# Patient Record
Sex: MALE | Race: WHITE | Employment: OTHER | URBAN - METROPOLITAN AREA
[De-identification: names, ages, dates, MRNs, and addresses within clinical notes are randomized per-mention and may not be internally consistent; named-entity substitution may affect disease eponyms.]

---

## 2019-04-01 ENCOUNTER — APPOINTMENT (OUTPATIENT)
Dept: CT IMAGING | Age: 54
End: 2019-04-01
Payer: MEDICARE

## 2019-04-01 ENCOUNTER — APPOINTMENT (OUTPATIENT)
Dept: GENERAL RADIOLOGY | Age: 54
End: 2019-04-01
Payer: MEDICARE

## 2019-04-01 ENCOUNTER — HOSPITAL ENCOUNTER (EMERGENCY)
Age: 54
Discharge: HOME | End: 2019-04-01
Attending: EMERGENCY MEDICINE
Payer: MEDICARE

## 2019-04-01 VITALS
TEMPERATURE: 98.2 F | BODY MASS INDEX: 30.27 KG/M2 | HEART RATE: 83 BPM | DIASTOLIC BLOOD PRESSURE: 88 MMHG | WEIGHT: 170.86 LBS | RESPIRATION RATE: 18 BRPM | SYSTOLIC BLOOD PRESSURE: 163 MMHG | OXYGEN SATURATION: 98 %

## 2019-04-01 DIAGNOSIS — S09.90XA CLOSED HEAD INJURY, INITIAL ENCOUNTER: ICD-10-CM

## 2019-04-01 DIAGNOSIS — S16.1XXA CERVICAL STRAIN, ACUTE, INITIAL ENCOUNTER: ICD-10-CM

## 2019-04-01 DIAGNOSIS — S20.211A RIB CONTUSION, RIGHT, INITIAL ENCOUNTER: ICD-10-CM
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
## 2019-04-01 PROCEDURE — 71046 X-RAY EXAM CHEST 2 VIEWS: CPT

## 2019-04-01 PROCEDURE — 72125 CT NECK SPINE W/O DYE: CPT

## 2019-04-01 PROCEDURE — 6370000000 HC RX 637 (ALT 250 FOR IP): Performed by: EMERGENCY MEDICINE

## 2019-04-01 PROCEDURE — 99284 EMERGENCY DEPT VISIT MOD MDM: CPT

## 2019-04-01 PROCEDURE — 70450 CT HEAD/BRAIN W/O DYE: CPT

## 2019-04-01 RX ORDER — ONDANSETRON 4 MG/1
4 TABLET, FILM COATED ORAL EVERY 8 HOURS PRN
Qty: 10 TABLET | Refills: 0 | Status: SHIPPED | OUTPATIENT
Start: 2019-04-01

## 2019-04-01 RX ORDER — TRAMADOL HYDROCHLORIDE 50 MG/1
50 TABLET ORAL EVERY 6 HOURS PRN
Qty: 9 TABLET | Refills: 0 | Status: SHIPPED | OUTPATIENT
Start: 2019-04-01 | End: 2019-04-04

## 2019-04-01 RX ORDER — IBUPROFEN 800 MG/1
800 TABLET ORAL EVERY 8 HOURS PRN
Qty: 30 TABLET | Refills: 0 | Status: SHIPPED | OUTPATIENT
Start: 2019-04-01

## 2019-04-01 RX ORDER — TRAMADOL HYDROCHLORIDE 50 MG/1
50 TABLET ORAL ONCE
Status: COMPLETED | OUTPATIENT
Start: 2019-04-01 | End: 2019-04-01

## 2019-04-01 RX ADMIN — TRAMADOL HYDROCHLORIDE 50 MG: 50 TABLET, FILM COATED ORAL at 01:37

## 2019-04-01 ASSESSMENT — PAIN DESCRIPTION - DESCRIPTORS: DESCRIPTORS: HEADACHE;THROBBING;SHARP

## 2019-04-01 ASSESSMENT — PAIN DESCRIPTION - PAIN TYPE: TYPE: ACUTE PAIN

## 2019-04-01 ASSESSMENT — PAIN DESCRIPTION - LOCATION: LOCATION: RIB CAGE;NECK

## 2019-04-01 ASSESSMENT — PAIN SCALES - GENERAL
PAINLEVEL_OUTOF10: 10
PAINLEVEL_OUTOF10: 5
PAINLEVEL_OUTOF10: 10

## 2019-04-01 ASSESSMENT — PAIN DESCRIPTION - FREQUENCY: FREQUENCY: CONTINUOUS

## 2019-04-01 NOTE — LETTER
[address] [address]  Phone: [phone]               April 1, 2019    Patient: Marie Badillo Sr   YOB: 1965   Date of Visit: 4/1/2019       To Whom It May Concern:    Cheyanen Schmid was seen and treated in our emergency department on 4/1/2019. He may return to work on 04/01/2019.       Sincerely,       Abiola Larsen RN         Signature:__________________________________
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
## 2022-11-29 ENCOUNTER — OFFICE VISIT (OUTPATIENT)
Dept: INTERNAL MEDICINE CLINIC | Age: 57
End: 2022-11-29
Payer: MEDICAID

## 2022-11-29 VITALS
WEIGHT: 173 LBS | HEART RATE: 83 BPM | BODY MASS INDEX: 30.65 KG/M2 | TEMPERATURE: 97.9 F | SYSTOLIC BLOOD PRESSURE: 134 MMHG | OXYGEN SATURATION: 97 % | DIASTOLIC BLOOD PRESSURE: 86 MMHG | HEIGHT: 63 IN

## 2022-11-29 DIAGNOSIS — K21.9 GASTROESOPHAGEAL REFLUX DISEASE, UNSPECIFIED WHETHER ESOPHAGITIS PRESENT: ICD-10-CM

## 2022-11-29 DIAGNOSIS — I50.22 CHRONIC SYSTOLIC CONGESTIVE HEART FAILURE (HCC): ICD-10-CM

## 2022-11-29 DIAGNOSIS — Z11.4 SCREENING FOR HIV WITHOUT PRESENCE OF RISK FACTORS: ICD-10-CM

## 2022-11-29 DIAGNOSIS — I25.118 CORONARY ARTERY DISEASE OF NATIVE ARTERY OF NATIVE HEART WITH STABLE ANGINA PECTORIS (HCC): ICD-10-CM

## 2022-11-29 DIAGNOSIS — Z12.5 SCREENING PSA (PROSTATE SPECIFIC ANTIGEN): ICD-10-CM

## 2022-11-29 DIAGNOSIS — E11.65 TYPE 2 DIABETES MELLITUS WITH HYPERGLYCEMIA, WITH LONG-TERM CURRENT USE OF INSULIN (HCC): Primary | ICD-10-CM

## 2022-11-29 DIAGNOSIS — Z79.4 TYPE 2 DIABETES MELLITUS WITH HYPERGLYCEMIA, WITH LONG-TERM CURRENT USE OF INSULIN (HCC): Primary | ICD-10-CM

## 2022-11-29 DIAGNOSIS — E11.41 DIABETIC MONONEUROPATHY ASSOCIATED WITH TYPE 2 DIABETES MELLITUS (HCC): ICD-10-CM

## 2022-11-29 DIAGNOSIS — I10 ESSENTIAL HYPERTENSION: ICD-10-CM

## 2022-11-29 DIAGNOSIS — Z11.59 NEED FOR HEPATITIS C SCREENING TEST: ICD-10-CM

## 2022-11-29 DIAGNOSIS — E78.2 MIXED HYPERLIPIDEMIA: ICD-10-CM

## 2022-11-29 LAB
A/G RATIO: 0.7 (ref 1.1–2.2)
ALBUMIN SERPL-MCNC: 2.8 G/DL (ref 3.4–5)
ALP BLD-CCNC: 123 U/L (ref 40–129)
ALT SERPL-CCNC: 7 U/L (ref 10–40)
ANION GAP SERPL CALCULATED.3IONS-SCNC: 12 MMOL/L (ref 3–16)
AST SERPL-CCNC: 9 U/L (ref 15–37)
BILIRUB SERPL-MCNC: 0.6 MG/DL (ref 0–1)
BUN BLDV-MCNC: 41 MG/DL (ref 7–20)
CALCIUM SERPL-MCNC: 9 MG/DL (ref 8.3–10.6)
CHLORIDE BLD-SCNC: 105 MMOL/L (ref 99–110)
CHOLESTEROL, TOTAL: 101 MG/DL (ref 0–199)
CO2: 24 MMOL/L (ref 21–32)
CREAT SERPL-MCNC: 1.6 MG/DL (ref 0.9–1.3)
GFR SERPL CREATININE-BSD FRML MDRD: 49 ML/MIN/{1.73_M2}
GLUCOSE BLD-MCNC: 128 MG/DL (ref 70–99)
HDLC SERPL-MCNC: 30 MG/DL (ref 40–60)
HEPATITIS C ANTIBODY INTERPRETATION: NORMAL
LDL CHOLESTEROL CALCULATED: 52 MG/DL
POTASSIUM SERPL-SCNC: 4.6 MMOL/L (ref 3.5–5.1)
PROSTATE SPECIFIC ANTIGEN: 1.75 NG/ML (ref 0–4)
SODIUM BLD-SCNC: 141 MMOL/L (ref 136–145)
TOTAL PROTEIN: 6.9 G/DL (ref 6.4–8.2)
TRIGL SERPL-MCNC: 94 MG/DL (ref 0–150)
TSH REFLEX: 2.13 UIU/ML (ref 0.27–4.2)
VLDLC SERPL CALC-MCNC: 19 MG/DL

## 2022-11-29 PROCEDURE — G8484 FLU IMMUNIZE NO ADMIN: HCPCS | Performed by: INTERNAL MEDICINE

## 2022-11-29 PROCEDURE — 3017F COLORECTAL CA SCREEN DOC REV: CPT | Performed by: INTERNAL MEDICINE

## 2022-11-29 PROCEDURE — 3046F HEMOGLOBIN A1C LEVEL >9.0%: CPT | Performed by: INTERNAL MEDICINE

## 2022-11-29 PROCEDURE — 2022F DILAT RTA XM EVC RTNOPTHY: CPT | Performed by: INTERNAL MEDICINE

## 2022-11-29 PROCEDURE — 1036F TOBACCO NON-USER: CPT | Performed by: INTERNAL MEDICINE

## 2022-11-29 PROCEDURE — 36415 COLL VENOUS BLD VENIPUNCTURE: CPT | Performed by: INTERNAL MEDICINE

## 2022-11-29 PROCEDURE — G8417 CALC BMI ABV UP PARAM F/U: HCPCS | Performed by: INTERNAL MEDICINE

## 2022-11-29 PROCEDURE — 3078F DIAST BP <80 MM HG: CPT | Performed by: INTERNAL MEDICINE

## 2022-11-29 PROCEDURE — 99204 OFFICE O/P NEW MOD 45 MIN: CPT | Performed by: INTERNAL MEDICINE

## 2022-11-29 PROCEDURE — G8427 DOCREV CUR MEDS BY ELIG CLIN: HCPCS | Performed by: INTERNAL MEDICINE

## 2022-11-29 PROCEDURE — 3074F SYST BP LT 130 MM HG: CPT | Performed by: INTERNAL MEDICINE

## 2022-11-29 RX ORDER — CLOPIDOGREL BISULFATE 75 MG/1
75 TABLET ORAL DAILY
COMMUNITY

## 2022-11-29 RX ORDER — HYDRALAZINE HYDROCHLORIDE 10 MG/1
10 TABLET, FILM COATED ORAL 3 TIMES DAILY
COMMUNITY

## 2022-11-29 RX ORDER — INSULIN ASPART 100 [IU]/ML
7 INJECTION, SOLUTION INTRAVENOUS; SUBCUTANEOUS
Qty: 5 ADJUSTABLE DOSE PRE-FILLED PEN SYRINGE | Refills: 3 | Status: SHIPPED | OUTPATIENT
Start: 2022-11-29

## 2022-11-29 RX ORDER — DULAGLUTIDE 0.75 MG/.5ML
0.75 INJECTION, SOLUTION SUBCUTANEOUS WEEKLY
Qty: 4 ADJUSTABLE DOSE PRE-FILLED PEN SYRINGE | Refills: 1 | Status: SHIPPED | OUTPATIENT
Start: 2022-11-29

## 2022-11-29 RX ORDER — ASPIRIN 81 MG/1
81 TABLET ORAL DAILY
COMMUNITY

## 2022-11-29 RX ORDER — INSULIN GLARGINE 100 [IU]/ML
10 INJECTION, SOLUTION SUBCUTANEOUS NIGHTLY
Qty: 5 ADJUSTABLE DOSE PRE-FILLED PEN SYRINGE | Refills: 3 | Status: SHIPPED | OUTPATIENT
Start: 2022-11-29

## 2022-11-29 RX ORDER — TORSEMIDE 20 MG/1
20 TABLET ORAL DAILY
COMMUNITY

## 2022-11-29 RX ORDER — CARVEDILOL 3.12 MG/1
3.12 TABLET ORAL 2 TIMES DAILY WITH MEALS
COMMUNITY

## 2022-11-29 RX ORDER — ATORVASTATIN CALCIUM 40 MG/1
40 TABLET, FILM COATED ORAL DAILY
COMMUNITY

## 2022-11-29 RX ORDER — OMEPRAZOLE 20 MG/1
40 CAPSULE, DELAYED RELEASE ORAL DAILY
COMMUNITY

## 2022-11-29 RX ORDER — GABAPENTIN 300 MG/1
300 CAPSULE ORAL 3 TIMES DAILY
COMMUNITY

## 2022-11-29 RX ORDER — LISINOPRIL 10 MG/1
10 TABLET ORAL DAILY
COMMUNITY

## 2022-11-29 SDOH — ECONOMIC STABILITY: FOOD INSECURITY: WITHIN THE PAST 12 MONTHS, YOU WORRIED THAT YOUR FOOD WOULD RUN OUT BEFORE YOU GOT MONEY TO BUY MORE.: NEVER TRUE

## 2022-11-29 SDOH — ECONOMIC STABILITY: FOOD INSECURITY: WITHIN THE PAST 12 MONTHS, THE FOOD YOU BOUGHT JUST DIDN'T LAST AND YOU DIDN'T HAVE MONEY TO GET MORE.: NEVER TRUE

## 2022-11-29 ASSESSMENT — PATIENT HEALTH QUESTIONNAIRE - PHQ9
SUM OF ALL RESPONSES TO PHQ QUESTIONS 1-9: 0
SUM OF ALL RESPONSES TO PHQ QUESTIONS 1-9: 0
2. FEELING DOWN, DEPRESSED OR HOPELESS: 0
SUM OF ALL RESPONSES TO PHQ QUESTIONS 1-9: 0
SUM OF ALL RESPONSES TO PHQ9 QUESTIONS 1 & 2: 0
SUM OF ALL RESPONSES TO PHQ QUESTIONS 1-9: 0
1. LITTLE INTEREST OR PLEASURE IN DOING THINGS: 0

## 2022-11-29 ASSESSMENT — ENCOUNTER SYMPTOMS
BLOOD IN STOOL: 0
VOMITING: 0
SHORTNESS OF BREATH: 0
COUGH: 0
SORE THROAT: 0
ABDOMINAL PAIN: 0
NAUSEA: 0
CHEST TIGHTNESS: 0

## 2022-11-29 ASSESSMENT — LIFESTYLE VARIABLES
HOW MANY STANDARD DRINKS CONTAINING ALCOHOL DO YOU HAVE ON A TYPICAL DAY: PATIENT DOES NOT DRINK
HOW OFTEN DO YOU HAVE A DRINK CONTAINING ALCOHOL: NEVER

## 2022-11-29 ASSESSMENT — SOCIAL DETERMINANTS OF HEALTH (SDOH): HOW HARD IS IT FOR YOU TO PAY FOR THE VERY BASICS LIKE FOOD, HOUSING, MEDICAL CARE, AND HEATING?: NOT HARD AT ALL

## 2022-11-29 NOTE — PROGRESS NOTES
Wolf Fernando Sr (:  1965) is a 59 y.o. male, New patient, here for evaluation of the following chief complaint(s):  New Patient, Establish Care, and Diabetes         ASSESSMENT/PLAN:  1. Type 2 diabetes mellitus with hyperglycemia, with long-term current use of insulin (HCC)  Chronic, uncontrolled. Continue current dose of insulin glargine and aspart, starting on Trulicity for diabetes treatment   -     AFL - Desmond Rivera MD, Ophthalmology, [facility]  -     Dulaglutide (TRULICITY) 1.76 FC/0.8UW SOPN; Inject 0.75 mg into the skin once a week, Disp-4 Adjustable Dose Pre-filled Pen Syringe, R-1Normal  -     insulin glargine (LANTUS SOLOSTAR) 100 UNIT/ML injection pen; Inject 10 Units into the skin nightly, Disp-5 Adjustable Dose Pre-filled Pen Syringe, R-3Normal  -     insulin aspart (NOVOLOG FLEXPEN) 100 UNIT/ML injection pen; Inject 7 Units into the skin 3 times daily (before meals), Disp-5 Adjustable Dose Pre-filled Pen Syringe, R-3Normal  -     Hemoglobin A1C  2. Coronary artery disease of native artery of native heart with stable angina pectoris (HCC)  - Stable   - Continue current dose of aspirin, atorvastatin, carvedilol, clopidogrel, lisinopril. Continue nitroglycerin as needed for chest pain. Patient follows with cardiology. 3. Chronic systolic congestive heart failure (HCC)  - Stable   - Continue current dose of torsemide, carvedilol, lisinopril.  -Follows with cardiology    4. Gastroesophageal reflux disease, unspecified whether esophagitis present  - Stable   - Continue current dose of Omeprazole    5. Diabetic mononeuropathy associated with type 2 diabetes mellitus (HCC)  - Stable   - Continue current dose of gabapentin    6. Screening PSA (prostate specific antigen)  -     PSA Screening    7. Mixed hyperlipidemia  -     Lipid Panel  -     TSH with Reflex    8.  Essential hypertension  - Stable   - Continue current dose of Carvedilol and lisinopril  -     Comprehensive Metabolic Panel    9. Screening for HIV without presence of risk factors  -     HIV Screen    10. Need for hepatitis C screening test  -     Hepatitis C Antibody      Return in about 4 weeks (around 12/27/2022). Subjective   SUBJECTIVE/OBJECTIVE:  Diabetes  He presents for his initial diabetic visit. He has type 2 diabetes mellitus. Pertinent negatives for hypoglycemia include no dizziness. Pertinent negatives for diabetes include no chest pain, no fatigue and no weakness. Diabetic complications include heart disease and peripheral neuropathy. Risk factors for coronary artery disease include hypertension, male sex and diabetes mellitus. Current diabetic treatment includes insulin injections. He is compliant with treatment all of the time. He is following a diabetic diet. His home blood glucose trend is fluctuating minimally. An ACE inhibitor/angiotensin II receptor blocker is being taken. Coronary Artery Disease  Presents for initial visit. The disease course has been stable. Symptoms include leg swelling. Pertinent negatives include no chest pain, chest pressure, chest tightness, dizziness, palpitations or shortness of breath. Risk factors include diabetes. Past treatments include nitrates, antiplatelet agents, aspirin and ACE inhibitors. The treatment provided significant relief. Compliance with prior treatments has been good. His past medical history is significant for CHF. Past surgical history includes cardiac stents. Review of Systems   Constitutional:  Negative for fatigue and fever. HENT:  Negative for nosebleeds and sore throat. Respiratory:  Negative for cough, chest tightness and shortness of breath. Cardiovascular:  Positive for leg swelling. Negative for chest pain and palpitations. Gastrointestinal:  Negative for abdominal pain, blood in stool, nausea and vomiting. Neurological:  Negative for dizziness and weakness.         Objective   Physical Exam  Constitutional: Appearance: Normal appearance. Comments: Patient is on wheelchair   HENT:      Head: Normocephalic and atraumatic. Eyes:      General: No scleral icterus. Conjunctiva/sclera: Conjunctivae normal.   Cardiovascular:      Rate and Rhythm: Normal rate and regular rhythm. Pulses: Normal pulses. Heart sounds: Normal heart sounds. Pulmonary:      Effort: Pulmonary effort is normal.      Breath sounds: Normal breath sounds. Musculoskeletal:         General: No swelling. Right lower leg: Edema present. Left lower leg: Edema present. Comments: S/p partial amputation of left foot   Skin:     General: Skin is warm and dry. Neurological:      Mental Status: He is alert and oriented to person, place, and time. Mental status is at baseline. Psychiatric:         Mood and Affect: Mood normal.         Behavior: Behavior normal.            An electronic signature was used to authenticate this note.     --Jett Graves MD

## 2022-11-30 LAB
ESTIMATED AVERAGE GLUCOSE: 168.6 MG/DL
HBA1C MFR BLD: 7.5 %
HIV AG/AB: NORMAL
HIV ANTIGEN: NORMAL
HIV-1 ANTIBODY: NORMAL
HIV-2 AB: NORMAL

## 2022-12-01 DIAGNOSIS — N28.9 IMPAIRED RENAL FUNCTION: Primary | ICD-10-CM

## 2022-12-19 DIAGNOSIS — E11.65 TYPE 2 DIABETES MELLITUS WITH HYPERGLYCEMIA, WITH LONG-TERM CURRENT USE OF INSULIN (HCC): ICD-10-CM

## 2022-12-19 DIAGNOSIS — Z79.4 TYPE 2 DIABETES MELLITUS WITH HYPERGLYCEMIA, WITH LONG-TERM CURRENT USE OF INSULIN (HCC): ICD-10-CM

## 2022-12-19 RX ORDER — INSULIN GLARGINE 100 [IU]/ML
10 INJECTION, SOLUTION SUBCUTANEOUS NIGHTLY
Qty: 5 ADJUSTABLE DOSE PRE-FILLED PEN SYRINGE | Refills: 3 | Status: SHIPPED | OUTPATIENT
Start: 2022-12-19

## 2023-01-24 DIAGNOSIS — Z79.4 TYPE 2 DIABETES MELLITUS WITH HYPERGLYCEMIA, WITH LONG-TERM CURRENT USE OF INSULIN (HCC): ICD-10-CM

## 2023-01-24 DIAGNOSIS — E11.65 TYPE 2 DIABETES MELLITUS WITH HYPERGLYCEMIA, WITH LONG-TERM CURRENT USE OF INSULIN (HCC): ICD-10-CM

## 2023-01-25 RX ORDER — DULAGLUTIDE 0.75 MG/.5ML
0.75 INJECTION, SOLUTION SUBCUTANEOUS WEEKLY
Qty: 4 ADJUSTABLE DOSE PRE-FILLED PEN SYRINGE | Refills: 1 | Status: SHIPPED | OUTPATIENT
Start: 2023-01-25

## 2023-01-30 ENCOUNTER — OFFICE VISIT (OUTPATIENT)
Dept: INTERNAL MEDICINE CLINIC | Age: 58
End: 2023-01-30
Payer: MEDICAID

## 2023-01-30 VITALS
WEIGHT: 183.6 LBS | BODY MASS INDEX: 32.52 KG/M2 | TEMPERATURE: 97.6 F | DIASTOLIC BLOOD PRESSURE: 72 MMHG | HEART RATE: 91 BPM | SYSTOLIC BLOOD PRESSURE: 153 MMHG

## 2023-01-30 DIAGNOSIS — E11.65 TYPE 2 DIABETES MELLITUS WITH HYPERGLYCEMIA, WITH LONG-TERM CURRENT USE OF INSULIN (HCC): ICD-10-CM

## 2023-01-30 DIAGNOSIS — E78.2 MIXED HYPERLIPIDEMIA: ICD-10-CM

## 2023-01-30 DIAGNOSIS — I25.118 CORONARY ARTERY DISEASE OF NATIVE ARTERY OF NATIVE HEART WITH STABLE ANGINA PECTORIS (HCC): ICD-10-CM

## 2023-01-30 DIAGNOSIS — I10 ESSENTIAL HYPERTENSION: ICD-10-CM

## 2023-01-30 DIAGNOSIS — E11.41 DIABETIC MONONEUROPATHY ASSOCIATED WITH TYPE 2 DIABETES MELLITUS (HCC): ICD-10-CM

## 2023-01-30 DIAGNOSIS — Z79.4 TYPE 2 DIABETES MELLITUS WITH HYPERGLYCEMIA, WITH LONG-TERM CURRENT USE OF INSULIN (HCC): ICD-10-CM

## 2023-01-30 DIAGNOSIS — K21.9 GASTROESOPHAGEAL REFLUX DISEASE, UNSPECIFIED WHETHER ESOPHAGITIS PRESENT: ICD-10-CM

## 2023-01-30 DIAGNOSIS — R06.02 SHORTNESS OF BREATH: ICD-10-CM

## 2023-01-30 DIAGNOSIS — I50.22 CHRONIC SYSTOLIC CONGESTIVE HEART FAILURE (HCC): ICD-10-CM

## 2023-01-30 DIAGNOSIS — R05.1 ACUTE COUGH: Primary | ICD-10-CM

## 2023-01-30 LAB
A/G RATIO: 0.7 (ref 1.1–2.2)
ALBUMIN SERPL-MCNC: 2.7 G/DL (ref 3.4–5)
ALP BLD-CCNC: 151 U/L (ref 40–129)
ALT SERPL-CCNC: 10 U/L (ref 10–40)
ANION GAP SERPL CALCULATED.3IONS-SCNC: 9 MMOL/L (ref 3–16)
AST SERPL-CCNC: 14 U/L (ref 15–37)
BASOPHILS ABSOLUTE: 0.1 K/UL (ref 0–0.2)
BASOPHILS RELATIVE PERCENT: 1.2 %
BILIRUB SERPL-MCNC: 0.3 MG/DL (ref 0–1)
BUN BLDV-MCNC: 38 MG/DL (ref 7–20)
CALCIUM SERPL-MCNC: 8.4 MG/DL (ref 8.3–10.6)
CHLORIDE BLD-SCNC: 105 MMOL/L (ref 99–110)
CO2: 24 MMOL/L (ref 21–32)
CREAT SERPL-MCNC: 1.7 MG/DL (ref 0.9–1.3)
EOSINOPHILS ABSOLUTE: 0.3 K/UL (ref 0–0.6)
EOSINOPHILS RELATIVE PERCENT: 4.9 %
GFR SERPL CREATININE-BSD FRML MDRD: 46 ML/MIN/{1.73_M2}
GLUCOSE BLD-MCNC: 90 MG/DL (ref 70–99)
HCT VFR BLD CALC: 30.2 % (ref 40.5–52.5)
HEMOGLOBIN: 9.8 G/DL (ref 13.5–17.5)
INFLUENZA A ANTIBODY: NORMAL
INFLUENZA B ANTIBODY: NORMAL
LYMPHOCYTES ABSOLUTE: 1.4 K/UL (ref 1–5.1)
LYMPHOCYTES RELATIVE PERCENT: 20.4 %
MCH RBC QN AUTO: 29.7 PG (ref 26–34)
MCHC RBC AUTO-ENTMCNC: 32.5 G/DL (ref 31–36)
MCV RBC AUTO: 91.3 FL (ref 80–100)
MONOCYTES ABSOLUTE: 0.4 K/UL (ref 0–1.3)
MONOCYTES RELATIVE PERCENT: 5.6 %
NEUTROPHILS ABSOLUTE: 4.8 K/UL (ref 1.7–7.7)
NEUTROPHILS RELATIVE PERCENT: 67.9 %
PDW BLD-RTO: 13.8 % (ref 12.4–15.4)
PLATELET # BLD: 282 K/UL (ref 135–450)
PMV BLD AUTO: 8.8 FL (ref 5–10.5)
POTASSIUM SERPL-SCNC: 4.5 MMOL/L (ref 3.5–5.1)
PRO-BNP: ABNORMAL PG/ML (ref 0–124)
RBC # BLD: 3.31 M/UL (ref 4.2–5.9)
SODIUM BLD-SCNC: 138 MMOL/L (ref 136–145)
TOTAL PROTEIN: 6.6 G/DL (ref 6.4–8.2)
WBC # BLD: 7 K/UL (ref 4–11)

## 2023-01-30 PROCEDURE — 99215 OFFICE O/P EST HI 40 MIN: CPT | Performed by: INTERNAL MEDICINE

## 2023-01-30 PROCEDURE — 2022F DILAT RTA XM EVC RTNOPTHY: CPT | Performed by: INTERNAL MEDICINE

## 2023-01-30 PROCEDURE — G8417 CALC BMI ABV UP PARAM F/U: HCPCS | Performed by: INTERNAL MEDICINE

## 2023-01-30 PROCEDURE — 3017F COLORECTAL CA SCREEN DOC REV: CPT | Performed by: INTERNAL MEDICINE

## 2023-01-30 PROCEDURE — 1036F TOBACCO NON-USER: CPT | Performed by: INTERNAL MEDICINE

## 2023-01-30 PROCEDURE — G8427 DOCREV CUR MEDS BY ELIG CLIN: HCPCS | Performed by: INTERNAL MEDICINE

## 2023-01-30 PROCEDURE — 3077F SYST BP >= 140 MM HG: CPT | Performed by: INTERNAL MEDICINE

## 2023-01-30 PROCEDURE — 3046F HEMOGLOBIN A1C LEVEL >9.0%: CPT | Performed by: INTERNAL MEDICINE

## 2023-01-30 PROCEDURE — 87804 INFLUENZA ASSAY W/OPTIC: CPT | Performed by: INTERNAL MEDICINE

## 2023-01-30 PROCEDURE — G8484 FLU IMMUNIZE NO ADMIN: HCPCS | Performed by: INTERNAL MEDICINE

## 2023-01-30 PROCEDURE — 36415 COLL VENOUS BLD VENIPUNCTURE: CPT | Performed by: INTERNAL MEDICINE

## 2023-01-30 PROCEDURE — 3078F DIAST BP <80 MM HG: CPT | Performed by: INTERNAL MEDICINE

## 2023-01-30 RX ORDER — TORSEMIDE 20 MG/1
20 TABLET ORAL 2 TIMES DAILY
Qty: 60 TABLET | Refills: 0 | Status: SHIPPED | OUTPATIENT
Start: 2023-01-30 | End: 2023-03-01

## 2023-01-30 ASSESSMENT — ENCOUNTER SYMPTOMS
NAUSEA: 0
CHEST TIGHTNESS: 0
VOMITING: 0
SHORTNESS OF BREATH: 0
COUGH: 0
SORE THROAT: 0
COUGH: 1
ABDOMINAL PAIN: 0
SHORTNESS OF BREATH: 1
BLOOD IN STOOL: 0

## 2023-01-30 NOTE — PROGRESS NOTES
Emelina Wright Sr (:  1965) is a 59 y.o. male, New patient, here for evaluation of the following chief complaint(s):  Diabetes (Follow up, pt also states his been sob x 3-4  days ) and Cough         ASSESSMENT/PLAN:  1. Acute cough  Probably due to heart failure exacerbation. Patient has gained 10 pounds in the last 2 months. Complains of shortness of breath. Ordered COVID and influenza test as mentioned below, will treat if positive. Increasing the dose of torsemide to 20 mg twice daily. -     COVID-19  -     POCT Influenza A/B  2. Chronic systolic congestive heart failure ([address]  Patient is probably having heart failure exacerbation  Complains of cough and shortness of breath. Crackles present on lung exam bilaterally. Increasing dose of torsemide to 20 mg twice daily. Advised patient and his daughter to inform patient's cardiologist about the current condition of the patient and go to emergency if there is any worsening shortness of breath or if there is chest pain. -     torsemide (DEMADEX) 20 MG tablet; Take 1 tablet by mouth in the morning and at bedtime, Disp-60 tablet, R-0Normal  3. Shortness of breath  Probably due to heart failure exacerbation as mentioned above. Ordered CBC, CMP and BNP for further work-up. Treating heart failure as mentioned above by increasing the dose of torsemide to 20 mg daily.  -     CBC with Auto Differential  -     Comprehensive Metabolic Panel  -     Brain Natriuretic Peptide  4. Type 2 diabetes mellitus with hyperglycemia, with long-term current use of insulin (HCC)  - Stable   - Continue current dose of Trulicity, insulin glargine and aspart. 5. Coronary artery disease of native artery of native heart with stable angina pectoris [facility])  Patient has worsening shortness of breath in the past few days. Treating heart failure exacerbation as mentioned above.   Advised patient to call his cardiologist and inform about shortness of breath or go to emergency if there is worsening shortness of breath or chest pain. .  Continue current dose of aspirin, atorvastatin, carvedilol, clopidogrel, lisinopril. Continue  nitroglycerin as needed for chest pain. 6. Gastroesophageal reflux disease, unspecified whether esophagitis present  - Stable   - Continue current dose of Omeprazole  7. Diabetic mononeuropathy associated with type 2 diabetes mellitus (HCC)  - Stable   - Continue current dose of gabapentin  8. Essential hypertension  Blood pressure is elevated today. Increasing the dose of torsemide as mentioned above. Continue current dose of carvedilol and lisinopril. 9. Mixed hyperlipidemia  - Stable   - Continue current dose of atorvastatin    Return in about 4 weeks (around 2/27/2023). Subjective   SUBJECTIVE/OBJECTIVE:  Diabetes  He presents for his initial diabetic visit. He has type 2 diabetes mellitus. Pertinent negatives for hypoglycemia include no dizziness. Pertinent negatives for diabetes include no chest pain, no fatigue and no weakness. Diabetic complications include heart disease and peripheral neuropathy. Risk factors for coronary artery disease include hypertension, male sex and diabetes mellitus. Current diabetic treatment includes insulin injections. He is compliant with treatment all of the time. He is following a diabetic diet. His home blood glucose trend is fluctuating minimally. An ACE inhibitor/angiotensin II receptor blocker is being taken. Coronary Artery Disease  Presents for initial visit. The disease course has been stable. Symptoms include leg swelling and shortness of breath. Pertinent negatives include no chest pain, chest pressure, chest tightness, dizziness or palpitations. Risk factors include diabetes. Past treatments include nitrates, antiplatelet agents, aspirin and ACE inhibitors. The treatment provided significant relief. Compliance with prior treatments has been good. His past medical history is significant for CHF.  Past surgical history includes cardiac stents.   Congestive Heart Failure  Presents for follow-up visit. Associated symptoms include edema and shortness of breath. Pertinent negatives include no abdominal pain, chest pain, chest pressure, fatigue or palpitations. The symptoms have been worsening. Compliance with total regimen is %. Compliance with medications is %.     Review of Systems   Constitutional:  Negative for fatigue and fever.   HENT:  Negative for nosebleeds and sore throat.    Respiratory:  Positive for cough and shortness of breath. Negative for chest tightness.    Cardiovascular:  Positive for leg swelling. Negative for chest pain and palpitations.   Gastrointestinal:  Negative for abdominal pain, blood in stool, nausea and vomiting.   Neurological:  Negative for dizziness and weakness.        Objective   Physical Exam  Constitutional:       Appearance: Normal appearance.      Comments: Patient is on wheelchair   HENT:      Head: Normocephalic and atraumatic.   Eyes:      General: No scleral icterus.     Conjunctiva/sclera: Conjunctivae normal.   Cardiovascular:      Rate and Rhythm: Normal rate and regular rhythm.      Pulses: Normal pulses.      Heart sounds: Normal heart sounds.   Pulmonary:      Effort: Pulmonary effort is normal. No respiratory distress.      Comments: Bilateral crackles present  Musculoskeletal:         General: No swelling.      Right lower leg: Edema present.      Left lower leg: Edema present.      Comments: S/p partial amputation of left foot   Skin:     General: Skin is warm and dry.   Neurological:      Mental Status: He is alert and oriented to person, place, and time. Mental status is at baseline.   Psychiatric:         Mood and Affect: Mood normal.         Behavior: Behavior normal.        During this visit I have spent 40 minutes reviewing previous notes, test results, medications and to perform face to face encounter with the patient obtaining history,  performing physical exam and discussing the diagnosis, lab results, medications and importance of compliance with the treatment plan as well as to complete documentation in electronic health records. An electronic signature was used to authenticate this note.     --Sophie Carbajal MD

## 2023-01-30 NOTE — PROGRESS NOTES
Becky Chávez Sr (:  1965) is a 59 y.o. male, New patient, here for evaluation of the following chief complaint(s):  Diabetes (Follow up, pt also states his been sob x 3-4  days )         ASSESSMENT/PLAN:  1. Type 2 diabetes mellitus with hyperglycemia, with long-term current use of insulin (HCC)  Chronic, uncontrolled. Continue current dose of insulin glargine and aspart, starting on Trulicity for diabetes treatment   -     [facility] - Desmond Rivera MD, Ophthalmology, [facility] [place]  -     Dulaglutide (TRULICITY) 1.06 AQ/6.9DM SOPN; Inject 0.75 mg into the skin once a week, Disp-4 Adjustable Dose Pre-filled Pen Syringe, R-1Normal  -     insulin glargine (LANTUS SOLOSTAR) 100 UNIT/ML injection pen; Inject 10 Units into the skin nightly, Disp-5 Adjustable Dose Pre-filled Pen Syringe, R-3Normal  -     insulin aspart (NOVOLOG FLEXPEN) 100 UNIT/ML injection pen; Inject 7 Units into the skin 3 times daily (before meals), Disp-5 Adjustable Dose Pre-filled Pen Syringe, R-3Normal  -     Hemoglobin A1C  2. Coronary artery disease of native artery of native heart with stable angina pectoris (HCC)  - Stable   - Continue current dose of aspirin, atorvastatin, carvedilol, clopidogrel, lisinopril. Continue nitroglycerin as needed for chest pain. Patient follows with cardiology. 3. Chronic systolic congestive heart failure (HCC)  - Stable   - Continue current dose of torsemide, carvedilol, lisinopril.  -Follows with cardiology    4. Gastroesophageal reflux disease, unspecified whether esophagitis present  - Stable   - Continue current dose of Omeprazole    5. Diabetic mononeuropathy associated with type 2 diabetes mellitus (HCC)  - Stable   - Continue current dose of gabapentin    6. Screening PSA (prostate specific antigen)  -     PSA Screening    7. Mixed hyperlipidemia  -     Lipid Panel  -     TSH with Reflex    8.  Essential hypertension  - Stable   - Continue current dose of Carvedilol and lisinopril  - Comprehensive Metabolic Panel    9. Screening for HIV without presence of risk factors  -     HIV Screen    10. Need for hepatitis C screening test  -     Hepatitis C Antibody      Return in about 4 weeks (around 2/27/2023). Subjective   SUBJECTIVE/OBJECTIVE:  Diabetes  He presents for his initial diabetic visit. He has type 2 diabetes mellitus. Pertinent negatives for hypoglycemia include no dizziness. Pertinent negatives for diabetes include no chest pain, no fatigue and no weakness. Diabetic complications include heart disease and peripheral neuropathy. Risk factors for coronary artery disease include hypertension, male sex and diabetes mellitus. Current diabetic treatment includes insulin injections. He is compliant with treatment all of the time. He is following a diabetic diet. His home blood glucose trend is fluctuating minimally. An ACE inhibitor/angiotensin II receptor blocker is being taken. Coronary Artery Disease  Presents for initial visit. The disease course has been stable. Symptoms include leg swelling. Pertinent negatives include no chest pain, chest pressure, chest tightness, dizziness, palpitations or shortness of breath. Risk factors include diabetes. Past treatments include nitrates, antiplatelet agents, aspirin and ACE inhibitors. The treatment provided significant relief. Compliance with prior treatments has been good. His past medical history is significant for CHF. Past surgical history includes cardiac stents. Review of Systems   Constitutional:  Negative for fatigue and fever. HENT:  Negative for nosebleeds and sore throat. Respiratory:  Negative for cough, chest tightness and shortness of breath. Cardiovascular:  Positive for leg swelling. Negative for chest pain and palpitations. Gastrointestinal:  Negative for abdominal pain, blood in stool, nausea and vomiting. Neurological:  Negative for dizziness and weakness.         Objective   Physical Exam  Constitutional:       Appearance: Normal appearance. Comments: Patient is on wheelchair   HENT:      Head: Normocephalic and atraumatic. Eyes:      General: No scleral icterus. Conjunctiva/sclera: Conjunctivae normal.   Cardiovascular:      Rate and Rhythm: Normal rate and regular rhythm. Pulses: Normal pulses. Heart sounds: Normal heart sounds. Pulmonary:      Effort: Pulmonary effort is normal.      Breath sounds: Normal breath sounds. Musculoskeletal:         General: No swelling. Right lower leg: Edema present. Left lower leg: Edema present. Comments: S/p partial amputation of left foot   Skin:     General: Skin is warm and dry. Neurological:      Mental Status: He is alert and oriented to person, place, and time. Mental status is at baseline. Psychiatric:         Mood and Affect: Mood normal.         Behavior: Behavior normal.            An electronic signature was used to authenticate this note.     --Néstor Regan MD

## 2023-01-31 DIAGNOSIS — D64.9 ANEMIA, UNSPECIFIED TYPE: Primary | ICD-10-CM

## 2023-01-31 DIAGNOSIS — R74.8 ELEVATED ALKALINE PHOSPHATASE LEVEL: ICD-10-CM

## 2023-01-31 LAB — SARS-COV-2: NOT DETECTED

## 2023-03-02 DIAGNOSIS — I50.22 CHRONIC SYSTOLIC CONGESTIVE HEART FAILURE (HCC): ICD-10-CM

## 2023-03-02 RX ORDER — TORSEMIDE 20 MG/1
TABLET ORAL
Qty: 60 TABLET | Refills: 0 | Status: SHIPPED | OUTPATIENT
Start: 2023-03-02

## 2023-03-14 ENCOUNTER — TELEPHONE (OUTPATIENT)
Dept: INTERNAL MEDICINE CLINIC | Age: 58
End: 2023-03-14

## 2023-03-14 NOTE — TELEPHONE ENCOUNTER
[facility] , he was in [facility]  for a HEART attach - 4 March 2023 till 9 March 2023 .

## 2023-04-01 DIAGNOSIS — Z79.4 TYPE 2 DIABETES MELLITUS WITH HYPERGLYCEMIA, WITH LONG-TERM CURRENT USE OF INSULIN (HCC): ICD-10-CM

## 2023-04-01 DIAGNOSIS — E11.65 TYPE 2 DIABETES MELLITUS WITH HYPERGLYCEMIA, WITH LONG-TERM CURRENT USE OF INSULIN (HCC): ICD-10-CM

## 2023-04-03 DIAGNOSIS — I50.22 CHRONIC SYSTOLIC CONGESTIVE HEART FAILURE (HCC): ICD-10-CM

## 2023-04-03 RX ORDER — TORSEMIDE 20 MG/1
TABLET ORAL
Qty: 60 TABLET | Refills: 0 | Status: SHIPPED | OUTPATIENT
Start: 2023-04-03

## 2023-04-03 RX ORDER — DULAGLUTIDE 0.75 MG/.5ML
0.75 INJECTION, SOLUTION SUBCUTANEOUS WEEKLY
Qty: 2 ADJUSTABLE DOSE PRE-FILLED PEN SYRINGE | Refills: 1 | Status: SHIPPED | OUTPATIENT
Start: 2023-04-03

## 2023-04-03 NOTE — TELEPHONE ENCOUNTER
Last office visit 1/30/23      Future Appointments   Date Time Provider Sanjana Ponce   4/20/2023 12:00 PM Nallely Payne MD [facility]

## 2023-05-06 DIAGNOSIS — I50.22 CHRONIC SYSTOLIC CONGESTIVE HEART FAILURE (HCC): ICD-10-CM

## 2023-05-08 RX ORDER — TORSEMIDE 20 MG/1
TABLET ORAL
Qty: 60 TABLET | Refills: 0 | Status: SHIPPED | OUTPATIENT
Start: 2023-05-08

## 2023-06-17 DIAGNOSIS — Z79.4 TYPE 2 DIABETES MELLITUS WITH HYPERGLYCEMIA, WITH LONG-TERM CURRENT USE OF INSULIN (HCC): ICD-10-CM

## 2023-06-17 DIAGNOSIS — E11.65 TYPE 2 DIABETES MELLITUS WITH HYPERGLYCEMIA, WITH LONG-TERM CURRENT USE OF INSULIN (HCC): ICD-10-CM

## 2023-06-19 DIAGNOSIS — I50.22 CHRONIC SYSTOLIC CONGESTIVE HEART FAILURE (HCC): ICD-10-CM

## 2023-06-19 RX ORDER — DULAGLUTIDE 0.75 MG/.5ML
INJECTION, SOLUTION SUBCUTANEOUS
Qty: 2 ADJUSTABLE DOSE PRE-FILLED PEN SYRINGE | Refills: 1 | Status: SHIPPED | OUTPATIENT
Start: 2023-06-19

## 2023-06-19 RX ORDER — TORSEMIDE 20 MG/1
TABLET ORAL
Qty: 60 TABLET | Refills: 0 | Status: SHIPPED | OUTPATIENT
Start: 2023-06-19

## 2023-07-26 DIAGNOSIS — I50.22 CHRONIC SYSTOLIC CONGESTIVE HEART FAILURE (HCC): ICD-10-CM

## 2023-07-26 RX ORDER — TORSEMIDE 20 MG/1
TABLET ORAL
Qty: 60 TABLET | Refills: 0 | OUTPATIENT
Start: 2023-07-26

## 2023-10-12 ENCOUNTER — OFFICE VISIT (OUTPATIENT)
Dept: INTERNAL MEDICINE CLINIC | Age: 58
End: 2023-10-12
Payer: MEDICAID

## 2023-10-12 VITALS
TEMPERATURE: 98.5 F | DIASTOLIC BLOOD PRESSURE: 87 MMHG | OXYGEN SATURATION: 98 % | WEIGHT: 174.8 LBS | SYSTOLIC BLOOD PRESSURE: 174 MMHG | BODY MASS INDEX: 30.96 KG/M2 | HEART RATE: 91 BPM

## 2023-10-12 DIAGNOSIS — Z12.5 SCREENING PSA (PROSTATE SPECIFIC ANTIGEN): ICD-10-CM

## 2023-10-12 DIAGNOSIS — E11.41 DIABETIC MONONEUROPATHY ASSOCIATED WITH TYPE 2 DIABETES MELLITUS (HCC): ICD-10-CM

## 2023-10-12 DIAGNOSIS — I10 ESSENTIAL HYPERTENSION: ICD-10-CM

## 2023-10-12 DIAGNOSIS — K59.00 CONSTIPATION, UNSPECIFIED CONSTIPATION TYPE: ICD-10-CM

## 2023-10-12 DIAGNOSIS — E11.65 TYPE 2 DIABETES MELLITUS WITH HYPERGLYCEMIA, WITH LONG-TERM CURRENT USE OF INSULIN (HCC): Primary | ICD-10-CM

## 2023-10-12 DIAGNOSIS — I25.118 CORONARY ARTERY DISEASE OF NATIVE ARTERY OF NATIVE HEART WITH STABLE ANGINA PECTORIS (HCC): ICD-10-CM

## 2023-10-12 DIAGNOSIS — D64.9 ANEMIA, UNSPECIFIED TYPE: ICD-10-CM

## 2023-10-12 DIAGNOSIS — E78.2 MIXED HYPERLIPIDEMIA: ICD-10-CM

## 2023-10-12 DIAGNOSIS — Z79.4 TYPE 2 DIABETES MELLITUS WITH HYPERGLYCEMIA, WITH LONG-TERM CURRENT USE OF INSULIN (HCC): Primary | ICD-10-CM

## 2023-10-12 DIAGNOSIS — K21.9 GASTROESOPHAGEAL REFLUX DISEASE, UNSPECIFIED WHETHER ESOPHAGITIS PRESENT: ICD-10-CM

## 2023-10-12 DIAGNOSIS — I50.22 CHRONIC SYSTOLIC CONGESTIVE HEART FAILURE (HCC): ICD-10-CM

## 2023-10-12 LAB
CREAT UR-MCNC: 35.4 MG/DL (ref 39–259)
MICROALBUMIN UR DL<=1MG/L-MCNC: 180.7 MG/DL
MICROALBUMIN/CREAT UR: 5104.5 MG/G (ref 0–30)
PSA SERPL DL<=0.01 NG/ML-MCNC: 1.64 NG/ML (ref 0–4)

## 2023-10-12 PROCEDURE — G8427 DOCREV CUR MEDS BY ELIG CLIN: HCPCS | Performed by: INTERNAL MEDICINE

## 2023-10-12 PROCEDURE — 3046F HEMOGLOBIN A1C LEVEL >9.0%: CPT | Performed by: INTERNAL MEDICINE

## 2023-10-12 PROCEDURE — G8417 CALC BMI ABV UP PARAM F/U: HCPCS | Performed by: INTERNAL MEDICINE

## 2023-10-12 PROCEDURE — 2022F DILAT RTA XM EVC RTNOPTHY: CPT | Performed by: INTERNAL MEDICINE

## 2023-10-12 PROCEDURE — 3077F SYST BP >= 140 MM HG: CPT | Performed by: INTERNAL MEDICINE

## 2023-10-12 PROCEDURE — G8484 FLU IMMUNIZE NO ADMIN: HCPCS | Performed by: INTERNAL MEDICINE

## 2023-10-12 PROCEDURE — 3079F DIAST BP 80-89 MM HG: CPT | Performed by: INTERNAL MEDICINE

## 2023-10-12 PROCEDURE — 1036F TOBACCO NON-USER: CPT | Performed by: INTERNAL MEDICINE

## 2023-10-12 PROCEDURE — 99215 OFFICE O/P EST HI 40 MIN: CPT | Performed by: INTERNAL MEDICINE

## 2023-10-12 PROCEDURE — 3017F COLORECTAL CA SCREEN DOC REV: CPT | Performed by: INTERNAL MEDICINE

## 2023-10-12 PROCEDURE — 36415 COLL VENOUS BLD VENIPUNCTURE: CPT | Performed by: INTERNAL MEDICINE

## 2023-10-12 RX ORDER — NITROGLYCERIN 0.3 MG/1
0.3 TABLET SUBLINGUAL EVERY 5 MIN PRN
Qty: 30 TABLET | Refills: 1 | Status: SHIPPED | OUTPATIENT
Start: 2023-10-12

## 2023-10-12 RX ORDER — TORSEMIDE 20 MG/1
TABLET ORAL
Qty: 60 TABLET | Refills: 1 | Status: SHIPPED | OUTPATIENT
Start: 2023-10-12

## 2023-10-12 RX ORDER — DULAGLUTIDE 1.5 MG/.5ML
1.5 INJECTION, SOLUTION SUBCUTANEOUS WEEKLY
Qty: 4 ADJUSTABLE DOSE PRE-FILLED PEN SYRINGE | Refills: 5 | Status: SHIPPED | OUTPATIENT
Start: 2023-10-12

## 2023-10-12 RX ORDER — ATORVASTATIN CALCIUM 40 MG/1
40 TABLET, FILM COATED ORAL DAILY
Qty: 30 TABLET | Refills: 5 | Status: SHIPPED | OUTPATIENT
Start: 2023-10-12

## 2023-10-12 RX ORDER — ASPIRIN 81 MG/1
81 TABLET ORAL DAILY
Qty: 30 TABLET | Refills: 5 | Status: SHIPPED | OUTPATIENT
Start: 2023-10-12

## 2023-10-12 RX ORDER — CARVEDILOL 6.25 MG/1
6.25 TABLET ORAL 2 TIMES DAILY WITH MEALS
Qty: 60 TABLET | Status: CANCELLED | OUTPATIENT
Start: 2023-10-12

## 2023-10-12 RX ORDER — DULAGLUTIDE 0.75 MG/.5ML
INJECTION, SOLUTION SUBCUTANEOUS
Qty: 2 ADJUSTABLE DOSE PRE-FILLED PEN SYRINGE | Refills: 1 | Status: CANCELLED | OUTPATIENT
Start: 2023-10-12

## 2023-10-12 RX ORDER — GLUCOSAMINE HCL/CHONDROITIN SU 500-400 MG
CAPSULE ORAL
Qty: 100 STRIP | Refills: 5 | Status: SHIPPED | OUTPATIENT
Start: 2023-10-12

## 2023-10-12 RX ORDER — CLOPIDOGREL BISULFATE 75 MG/1
75 TABLET ORAL DAILY
Qty: 30 TABLET | Status: CANCELLED | OUTPATIENT
Start: 2023-10-12

## 2023-10-12 RX ORDER — OMEPRAZOLE 20 MG/1
40 CAPSULE, DELAYED RELEASE ORAL DAILY
Qty: 30 CAPSULE | Refills: 5 | Status: SHIPPED | OUTPATIENT
Start: 2023-10-12

## 2023-10-12 RX ORDER — POLYETHYLENE GLYCOL 3350 17 G/17G
17 POWDER, FOR SOLUTION ORAL DAILY PRN
Qty: 527 G | Refills: 1 | Status: SHIPPED | OUTPATIENT
Start: 2023-10-12 | End: 2023-12-13

## 2023-10-12 RX ORDER — INSULIN GLARGINE 100 [IU]/ML
10 INJECTION, SOLUTION SUBCUTANEOUS NIGHTLY
Qty: 5 ADJUSTABLE DOSE PRE-FILLED PEN SYRINGE | Refills: 3 | Status: CANCELLED | OUTPATIENT
Start: 2023-10-12

## 2023-10-12 RX ORDER — GABAPENTIN 300 MG/1
300 CAPSULE ORAL 3 TIMES DAILY
Qty: 90 CAPSULE | Refills: 5 | Status: SHIPPED | OUTPATIENT
Start: 2023-10-12 | End: 2024-04-09

## 2023-10-12 RX ORDER — HYDRALAZINE HYDROCHLORIDE 10 MG/1
10 TABLET, FILM COATED ORAL 3 TIMES DAILY
Qty: 90 TABLET | Refills: 5 | Status: SHIPPED | OUTPATIENT
Start: 2023-10-12

## 2023-10-12 RX ORDER — LISINOPRIL 10 MG/1
10 TABLET ORAL DAILY
Qty: 30 TABLET | Refills: 5 | Status: SHIPPED | OUTPATIENT
Start: 2023-10-12

## 2023-10-12 RX ORDER — INSULIN ASPART 100 [IU]/ML
7 INJECTION, SOLUTION INTRAVENOUS; SUBCUTANEOUS
Qty: 5 ADJUSTABLE DOSE PRE-FILLED PEN SYRINGE | Refills: 3 | Status: CANCELLED | OUTPATIENT
Start: 2023-10-12

## 2023-10-12 SDOH — ECONOMIC STABILITY: HOUSING INSECURITY
IN THE LAST 12 MONTHS, WAS THERE A TIME WHEN YOU DID NOT HAVE A STEADY PLACE TO SLEEP OR SLEPT IN A SHELTER (INCLUDING NOW)?: NO

## 2023-10-12 SDOH — ECONOMIC STABILITY: INCOME INSECURITY: HOW HARD IS IT FOR YOU TO PAY FOR THE VERY BASICS LIKE FOOD, HOUSING, MEDICAL CARE, AND HEATING?: NOT HARD AT ALL

## 2023-10-12 SDOH — ECONOMIC STABILITY: FOOD INSECURITY: WITHIN THE PAST 12 MONTHS, YOU WORRIED THAT YOUR FOOD WOULD RUN OUT BEFORE YOU GOT MONEY TO BUY MORE.: NEVER TRUE

## 2023-10-12 SDOH — ECONOMIC STABILITY: FOOD INSECURITY: WITHIN THE PAST 12 MONTHS, THE FOOD YOU BOUGHT JUST DIDN'T LAST AND YOU DIDN'T HAVE MONEY TO GET MORE.: NEVER TRUE

## 2023-10-12 ASSESSMENT — ENCOUNTER SYMPTOMS
SORE THROAT: 0
BLOOD IN STOOL: 0
CHEST TIGHTNESS: 0
CONSTIPATION: 1
SHORTNESS OF BREATH: 0
VOMITING: 0
NAUSEA: 0
ABDOMINAL PAIN: 0
COUGH: 0

## 2023-10-12 ASSESSMENT — PATIENT HEALTH QUESTIONNAIRE - PHQ9
SUM OF ALL RESPONSES TO PHQ9 QUESTIONS 1 & 2: 0
SUM OF ALL RESPONSES TO PHQ QUESTIONS 1-9: 0
SUM OF ALL RESPONSES TO PHQ QUESTIONS 1-9: 0
1. LITTLE INTEREST OR PLEASURE IN DOING THINGS: 0
2. FEELING DOWN, DEPRESSED OR HOPELESS: 0
SUM OF ALL RESPONSES TO PHQ QUESTIONS 1-9: 0
SUM OF ALL RESPONSES TO PHQ QUESTIONS 1-9: 0

## 2023-10-12 NOTE — PROGRESS NOTES
Teresa Cardona  (:  1965) is a 59 y.o. male, New patient, here for evaluation of the following chief complaint(s):  Constipation (Been going on for awhile, says that he goes 2-3 times a week) and Medication Check (Would like to have diabetic testing strips as well as chewable aspirin tablets)         ASSESSMENT/PLAN:  1. Type 2 diabetes mellitus with hyperglycemia, with long-term current use of insulin ([facility])  Chronic, uncontrolled. Patient is noncompliant with his insulin. Advised patient to be compliant with his diabetic medications including insulin. Increasing the dose of Trulicity to 1.5 mg q. weekly. Continue current dose of insulin glargine and aspart. -     Hemoglobin A1C; Future  -     Microalbumin / creatinine urine ratio  -     dulaglutide (TRULICITY) 1.5 XP/3.3EQ SC injection; Inject 0.5 mLs into the skin once a week, Disp-4 Adjustable Dose Pre-filled Pen Syringe, R-5Normal  -     blood glucose monitor strips; Test 2 times a day & as needed for symptoms of irregular blood glucose. Dispense sufficient amount for indicated testing frequency plus additional to accommodate PRN testing needs. , Disp-100 strip, R-5, Normal  2. Coronary artery disease of native artery of native heart with stable angina pectoris (HCC)  - Stable   - Continue current dose of aspirin, atorvastatin, carvedilol, clopidogrel, lisinopril. Continue nitroglycerin as needed for chest pain. Patient follows with cardiology. -     aspirin 81 MG EC tablet; Take 1 tablet by mouth daily, Disp-30 tablet, R-5Normal    3. Chronic systolic congestive heart failure (HCC)  - Stable   - Continue current dose of torsemide, carvedilol, lisinopril. -     torsemide (DEMADEX) 20 MG tablet; TAKE 1 TABLET BY MOUTH TWICE A DAY IN THE MORNING AND IN THE EVENING, Disp-60 tablet, R-1Normal  -     nitroGLYCERIN (NITROSTAT) 0.3 MG SL tablet; Place 1 tablet under the tongue every 5 minutes as needed for Chest pain up to max of 3 total doses.

## 2023-10-19 DIAGNOSIS — E11.65 TYPE 2 DIABETES MELLITUS WITH HYPERGLYCEMIA, WITH LONG-TERM CURRENT USE OF INSULIN (HCC): Primary | ICD-10-CM

## 2023-10-19 DIAGNOSIS — Z79.4 TYPE 2 DIABETES MELLITUS WITH HYPERGLYCEMIA, WITH LONG-TERM CURRENT USE OF INSULIN (HCC): Primary | ICD-10-CM

## 2023-11-03 DIAGNOSIS — Z79.4 TYPE 2 DIABETES MELLITUS WITH HYPERGLYCEMIA, WITH LONG-TERM CURRENT USE OF INSULIN (HCC): ICD-10-CM

## 2023-11-03 DIAGNOSIS — E11.65 TYPE 2 DIABETES MELLITUS WITH HYPERGLYCEMIA, WITH LONG-TERM CURRENT USE OF INSULIN (HCC): ICD-10-CM

## 2023-11-03 NOTE — TELEPHONE ENCOUNTER
Future Appointments   Date Time Provider Department Center   11/20/2023  9:15 AM Idris Laws MD [facility] [place] [facility]         Last appt 10/12/23

## 2023-11-04 RX ORDER — DULAGLUTIDE 0.75 MG/.5ML
INJECTION, SOLUTION SUBCUTANEOUS
OUTPATIENT
Start: 2023-11-04

## 2023-11-07 DIAGNOSIS — I50.22 CHRONIC SYSTOLIC CONGESTIVE HEART FAILURE (HCC): ICD-10-CM

## 2023-11-07 RX ORDER — TORSEMIDE 20 MG/1
TABLET ORAL
Qty: 60 TABLET | Refills: 1 | Status: SHIPPED | OUTPATIENT
Start: 2023-11-07

## 2023-11-13 ENCOUNTER — TELEPHONE (OUTPATIENT)
Dept: INTERNAL MEDICINE CLINIC | Age: 58
End: 2023-11-13

## 2023-11-13 NOTE — TELEPHONE ENCOUNTER
Pts daughter called, is he supposed to stop taking Mireya?    Had labs done Friday with [facility]    Please advise    Thank you

## 2023-11-14 NOTE — TELEPHONE ENCOUNTER
I am unable to see his blood work results done at [facility].   Please advise the patient to continue taking Neyda Aldana and bring his blood work results to the appointment with me on 11/20/2023

## 2023-11-20 ENCOUNTER — OFFICE VISIT (OUTPATIENT)
Dept: INTERNAL MEDICINE CLINIC | Age: 58
End: 2023-11-20
Payer: MEDICAID

## 2023-11-20 VITALS
OXYGEN SATURATION: 100 % | SYSTOLIC BLOOD PRESSURE: 135 MMHG | HEART RATE: 84 BPM | TEMPERATURE: 98.3 F | DIASTOLIC BLOOD PRESSURE: 74 MMHG

## 2023-11-20 DIAGNOSIS — N28.9 KIDNEY FUNCTION ABNORMAL: ICD-10-CM

## 2023-11-20 DIAGNOSIS — I50.22 CHRONIC SYSTOLIC CONGESTIVE HEART FAILURE (HCC): ICD-10-CM

## 2023-11-20 DIAGNOSIS — E11.41 DIABETIC MONONEUROPATHY ASSOCIATED WITH TYPE 2 DIABETES MELLITUS (HCC): ICD-10-CM

## 2023-11-20 DIAGNOSIS — E78.2 MIXED HYPERLIPIDEMIA: ICD-10-CM

## 2023-11-20 DIAGNOSIS — E11.65 TYPE 2 DIABETES MELLITUS WITH HYPERGLYCEMIA, WITH LONG-TERM CURRENT USE OF INSULIN (HCC): Primary | ICD-10-CM

## 2023-11-20 DIAGNOSIS — D64.9 ANEMIA, UNSPECIFIED TYPE: ICD-10-CM

## 2023-11-20 DIAGNOSIS — I25.118 CORONARY ARTERY DISEASE OF NATIVE ARTERY OF NATIVE HEART WITH STABLE ANGINA PECTORIS (HCC): ICD-10-CM

## 2023-11-20 DIAGNOSIS — K21.9 GASTROESOPHAGEAL REFLUX DISEASE, UNSPECIFIED WHETHER ESOPHAGITIS PRESENT: ICD-10-CM

## 2023-11-20 DIAGNOSIS — I10 ESSENTIAL HYPERTENSION: ICD-10-CM

## 2023-11-20 DIAGNOSIS — Z79.4 TYPE 2 DIABETES MELLITUS WITH HYPERGLYCEMIA, WITH LONG-TERM CURRENT USE OF INSULIN (HCC): Primary | ICD-10-CM

## 2023-11-20 DIAGNOSIS — Z12.11 COLON CANCER SCREENING: ICD-10-CM

## 2023-11-20 LAB
ANION GAP SERPL CALCULATED.3IONS-SCNC: 13 MMOL/L (ref 3–16)
BASOPHILS # BLD: 0.1 K/UL (ref 0–0.2)
BASOPHILS NFR BLD: 0.6 %
BUN SERPL-MCNC: 52 MG/DL (ref 7–20)
CALCIUM SERPL-MCNC: 8 MG/DL (ref 8.3–10.6)
CHLORIDE SERPL-SCNC: 96 MMOL/L (ref 99–110)
CO2 SERPL-SCNC: 19 MMOL/L (ref 21–32)
CREAT SERPL-MCNC: 2.8 MG/DL (ref 0.8–1.3)
DEPRECATED RDW RBC AUTO: 12.6 % (ref 12.4–15.4)
EOSINOPHIL # BLD: 0.5 K/UL (ref 0–0.6)
EOSINOPHIL NFR BLD: 6.4 %
FERRITIN SERPL IA-MCNC: 387.3 NG/ML (ref 30–400)
FOLATE SERPL-MCNC: 5.32 NG/ML (ref 4.78–24.2)
GFR SERPLBLD CREATININE-BSD FMLA CKD-EPI: 25 ML/MIN/{1.73_M2}
GLUCOSE SERPL-MCNC: 497 MG/DL (ref 70–99)
HCT VFR BLD AUTO: 24 % (ref 40.5–52.5)
HGB BLD-MCNC: 8.2 G/DL (ref 13.5–17.5)
IRON SATN MFR SERPL: 29 % (ref 20–50)
IRON SERPL-MCNC: 54 UG/DL (ref 59–158)
LYMPHOCYTES # BLD: 1 K/UL (ref 1–5.1)
LYMPHOCYTES NFR BLD: 13 %
MCH RBC QN AUTO: 31.2 PG (ref 26–34)
MCHC RBC AUTO-ENTMCNC: 34.1 G/DL (ref 31–36)
MCV RBC AUTO: 91.5 FL (ref 80–100)
MONOCYTES # BLD: 0.5 K/UL (ref 0–1.3)
MONOCYTES NFR BLD: 6.8 %
NEUTROPHILS # BLD: 5.7 K/UL (ref 1.7–7.7)
NEUTROPHILS NFR BLD: 73.2 %
PLATELET # BLD AUTO: 319 K/UL (ref 135–450)
PMV BLD AUTO: 8.7 FL (ref 5–10.5)
POTASSIUM SERPL-SCNC: 5.9 MMOL/L (ref 3.5–5.1)
RBC # BLD AUTO: 2.62 M/UL (ref 4.2–5.9)
SODIUM SERPL-SCNC: 128 MMOL/L (ref 136–145)
TIBC SERPL-MCNC: 189 UG/DL (ref 260–445)
VIT B12 SERPL-MCNC: 541 PG/ML (ref 211–911)
WBC # BLD AUTO: 7.8 K/UL (ref 4–11)

## 2023-11-20 PROCEDURE — 36415 COLL VENOUS BLD VENIPUNCTURE: CPT | Performed by: INTERNAL MEDICINE

## 2023-11-20 PROCEDURE — 3078F DIAST BP <80 MM HG: CPT | Performed by: INTERNAL MEDICINE

## 2023-11-20 PROCEDURE — G8484 FLU IMMUNIZE NO ADMIN: HCPCS | Performed by: INTERNAL MEDICINE

## 2023-11-20 PROCEDURE — 3046F HEMOGLOBIN A1C LEVEL >9.0%: CPT | Performed by: INTERNAL MEDICINE

## 2023-11-20 PROCEDURE — 1036F TOBACCO NON-USER: CPT | Performed by: INTERNAL MEDICINE

## 2023-11-20 PROCEDURE — 99215 OFFICE O/P EST HI 40 MIN: CPT | Performed by: INTERNAL MEDICINE

## 2023-11-20 PROCEDURE — G8427 DOCREV CUR MEDS BY ELIG CLIN: HCPCS | Performed by: INTERNAL MEDICINE

## 2023-11-20 PROCEDURE — 3075F SYST BP GE 130 - 139MM HG: CPT | Performed by: INTERNAL MEDICINE

## 2023-11-20 PROCEDURE — G8417 CALC BMI ABV UP PARAM F/U: HCPCS | Performed by: INTERNAL MEDICINE

## 2023-11-20 PROCEDURE — 3017F COLORECTAL CA SCREEN DOC REV: CPT | Performed by: INTERNAL MEDICINE

## 2023-11-20 PROCEDURE — 2022F DILAT RTA XM EVC RTNOPTHY: CPT | Performed by: INTERNAL MEDICINE

## 2023-11-20 ASSESSMENT — ENCOUNTER SYMPTOMS
SHORTNESS OF BREATH: 0
COUGH: 0
VOMITING: 0
BLOOD IN STOOL: 0
NAUSEA: 0
SORE THROAT: 0
ABDOMINAL PAIN: 0

## 2023-11-20 NOTE — PROGRESS NOTES
Katy Mccarthy Sr (:  1965) is a 59 y.o. male, New patient, here for evaluation of the following chief complaint(s):  Diabetes         ASSESSMENT/PLAN:  1. Type 2 diabetes mellitus with hyperglycemia, with long-term current use of insulin (HCC)  Chronic, uncontrolled. Patient was admitted in the hospital recently and his medications were changed. Patient stopped short acting insulin  Increasing insulin glargine to 14 units nightly   Continue current dose of farxiga and trulicity  Advised patient to check his fasting blood sugar daily and bring the records during the follow-up. 2. Coronary artery disease of native artery of native heart with stable angina pectoris (HCC)  - Stable   - Continue current dose of aspirin, atorvastatin, carvedilol, clopidogrel, lisinopril. Continue nitroglycerin as needed for chest pain. Patient follows with cardiology. 3. Chronic systolic congestive heart failure (HCC)  - Stable   - Continue current dose of torsemide, carvedilol, lisinopril.  -Follows with cardiology    4. Gastroesophageal reflux disease, unspecified whether esophagitis present  - Stable   - Continue current dose of Omeprazole    5. Diabetic mononeuropathy associated with type 2 diabetes mellitus (HCC)  - Stable   - Continue current dose of gabapentin  -     gabapentin (NEURONTIN) 300 MG capsule; Take 1 capsule by mouth 3 times daily for 180 days. , Disp-90 capsule, R-5Normal    6. Mixed hyperlipidemia  - Stable   - Continue current dose of atorvastatin  -     atorvastatin (LIPITOR) 40 MG tablet; Take 1 tablet by mouth daily, Disp-30 tablet, R-5Normal  -     Lipid Panel; Future    8. Essential hypertension  - Stable   - Continue current dose of hydralazine, carvedilol and lisinopril    8. Kidney function abnormal  Patient follows with nephrology. Repeat kidney function today. -     Basic Metabolic Panel. 9. Anemia, unspecified type  Chronic, uncontrolled.   Etiology is unclear  Ordered below

## 2023-11-22 DIAGNOSIS — K21.9 GASTROESOPHAGEAL REFLUX DISEASE, UNSPECIFIED WHETHER ESOPHAGITIS PRESENT: ICD-10-CM

## 2023-11-22 NOTE — TELEPHONE ENCOUNTER
Future Appointments   Date Time Provider [address]   12/4/2023  9:15 AM Mynor Kirk MD [address] [facility]. appointment 11/20/23      Pharmacy is requesting 90 day prescription

## 2023-11-23 RX ORDER — OMEPRAZOLE 20 MG/1
40 CAPSULE, DELAYED RELEASE ORAL DAILY
Qty: 180 CAPSULE | Refills: 0 | Status: SHIPPED | OUTPATIENT
Start: 2023-11-23 | End: 2024-02-21

## 2023-12-04 ENCOUNTER — OFFICE VISIT (OUTPATIENT)
Dept: INTERNAL MEDICINE CLINIC | Age: 58
End: 2023-12-04
Payer: MEDICAID

## 2023-12-04 VITALS
OXYGEN SATURATION: 97 % | BODY MASS INDEX: 32.56 KG/M2 | WEIGHT: 183.8 LBS | TEMPERATURE: 98.3 F | HEART RATE: 91 BPM | SYSTOLIC BLOOD PRESSURE: 137 MMHG | DIASTOLIC BLOOD PRESSURE: 73 MMHG

## 2023-12-04 DIAGNOSIS — I10 ESSENTIAL HYPERTENSION: ICD-10-CM

## 2023-12-04 DIAGNOSIS — Z79.4 TYPE 2 DIABETES MELLITUS WITH HYPERGLYCEMIA, WITH LONG-TERM CURRENT USE OF INSULIN (HCC): Primary | ICD-10-CM

## 2023-12-04 DIAGNOSIS — N28.9 KIDNEY FUNCTION ABNORMAL: ICD-10-CM

## 2023-12-04 DIAGNOSIS — E11.65 TYPE 2 DIABETES MELLITUS WITH HYPERGLYCEMIA, WITH LONG-TERM CURRENT USE OF INSULIN (HCC): Primary | ICD-10-CM

## 2023-12-04 PROCEDURE — G8484 FLU IMMUNIZE NO ADMIN: HCPCS | Performed by: INTERNAL MEDICINE

## 2023-12-04 PROCEDURE — 3075F SYST BP GE 130 - 139MM HG: CPT | Performed by: INTERNAL MEDICINE

## 2023-12-04 PROCEDURE — 99214 OFFICE O/P EST MOD 30 MIN: CPT | Performed by: INTERNAL MEDICINE

## 2023-12-04 PROCEDURE — 1036F TOBACCO NON-USER: CPT | Performed by: INTERNAL MEDICINE

## 2023-12-04 PROCEDURE — 3046F HEMOGLOBIN A1C LEVEL >9.0%: CPT | Performed by: INTERNAL MEDICINE

## 2023-12-04 PROCEDURE — G8417 CALC BMI ABV UP PARAM F/U: HCPCS | Performed by: INTERNAL MEDICINE

## 2023-12-04 PROCEDURE — 3017F COLORECTAL CA SCREEN DOC REV: CPT | Performed by: INTERNAL MEDICINE

## 2023-12-04 PROCEDURE — 3078F DIAST BP <80 MM HG: CPT | Performed by: INTERNAL MEDICINE

## 2023-12-04 PROCEDURE — 2022F DILAT RTA XM EVC RTNOPTHY: CPT | Performed by: INTERNAL MEDICINE

## 2023-12-04 PROCEDURE — G8427 DOCREV CUR MEDS BY ELIG CLIN: HCPCS | Performed by: INTERNAL MEDICINE

## 2023-12-04 ASSESSMENT — ENCOUNTER SYMPTOMS
COUGH: 0
SHORTNESS OF BREATH: 0
SORE THROAT: 0
VOMITING: 0
ABDOMINAL PAIN: 0
NAUSEA: 0
BLOOD IN STOOL: 0

## 2023-12-04 NOTE — PROGRESS NOTES
Hira Jiménez Sr (:  1965) is a 59 y.o. male, New patient, here for evaluation of the following chief complaint(s):  Follow-up         ASSESSMENT/PLAN:  1. Type 2 diabetes mellitus with hyperglycemia, with long-term current use of insulin (HCC)  Chronic, uncontrolled. Recent A1c in 2023 during hospital admission was 13.5  Home blood sugars improving with insulin glargine and Trulicity. Advised patient to increase his Lantus insulin glargine dose to 16 units nightly, check fasting blood sugars daily and adjust insulin dose based on the results. Continue current dose of Trulicity. Will increase Trulicity dose after 4 weeks of therapy. [address] [address] [place] was stopped due to worsening kidney function. 2. Essential hypertension  - Stable   - Continue current dose of hydralazine, carvedilol  Lisinopril was stopped by his nephrologist due to worsening kidney function. 3. Kidney function abnormal  Patient follows with nephrology. Farxiga and lisinopril were stopped due to worsening kidney function. Patient will perform labs to check his kidney function and other tests as advised by his nephrologist as soon as possible. Return in about 4 weeks (around 2024) for Diabetes. Subjective   SUBJECTIVE/OBJECTIVE:  Diabetes  He presents for his initial diabetic visit. He has type 2 diabetes mellitus. His disease course has been fluctuating. Pertinent negatives for hypoglycemia include no dizziness or headaches. Pertinent negatives for diabetes include no chest pain, no fatigue and no weakness. Risk factors for coronary artery disease include hypertension, male sex and diabetes mellitus. Current diabetic treatment includes insulin injections (Trulicity). He is compliant with treatment all of the time. He is following a diabetic diet. His home blood glucose trend is fluctuating minimally. His breakfast blood glucose range is generally 140-180 mg/dl.  An ACE inhibitor/angiotensin II receptor blocker

## 2024-01-08 ENCOUNTER — TELEPHONE (OUTPATIENT)
Dept: INTERNAL MEDICINE CLINIC | Age: 59
End: 2024-01-08

## 2024-01-08 NOTE — TELEPHONE ENCOUNTER
Located within [facility] reached out to use regarding getting a hold of this pt, I attempted to call pt and left vm for him to call us back.        Needs to schedule with Gastro at [phone]

## 2024-01-15 ENCOUNTER — OFFICE VISIT (OUTPATIENT)
Dept: INTERNAL MEDICINE CLINIC | Age: 59
End: 2024-01-15
Payer: COMMERCIAL

## 2024-01-15 VITALS
WEIGHT: 188 LBS | HEIGHT: 63 IN | DIASTOLIC BLOOD PRESSURE: 80 MMHG | SYSTOLIC BLOOD PRESSURE: 130 MMHG | BODY MASS INDEX: 33.31 KG/M2 | OXYGEN SATURATION: 95 % | HEART RATE: 92 BPM

## 2024-01-15 DIAGNOSIS — E11.41 DIABETIC MONONEUROPATHY ASSOCIATED WITH TYPE 2 DIABETES MELLITUS (HCC): ICD-10-CM

## 2024-01-15 DIAGNOSIS — I50.22 CHRONIC SYSTOLIC CONGESTIVE HEART FAILURE (HCC): ICD-10-CM

## 2024-01-15 DIAGNOSIS — E11.65 TYPE 2 DIABETES MELLITUS WITH HYPERGLYCEMIA, WITH LONG-TERM CURRENT USE OF INSULIN (HCC): Primary | ICD-10-CM

## 2024-01-15 DIAGNOSIS — I25.118 CORONARY ARTERY DISEASE OF NATIVE ARTERY OF NATIVE HEART WITH STABLE ANGINA PECTORIS (HCC): ICD-10-CM

## 2024-01-15 DIAGNOSIS — Z79.4 TYPE 2 DIABETES MELLITUS WITH HYPERGLYCEMIA, WITH LONG-TERM CURRENT USE OF INSULIN (HCC): Primary | ICD-10-CM

## 2024-01-15 DIAGNOSIS — N18.4 STAGE 4 CHRONIC KIDNEY DISEASE (HCC): ICD-10-CM

## 2024-01-15 DIAGNOSIS — I10 ESSENTIAL HYPERTENSION: ICD-10-CM

## 2024-01-15 PROCEDURE — 3075F SYST BP GE 130 - 139MM HG: CPT | Performed by: INTERNAL MEDICINE

## 2024-01-15 PROCEDURE — 1036F TOBACCO NON-USER: CPT | Performed by: INTERNAL MEDICINE

## 2024-01-15 PROCEDURE — 2022F DILAT RTA XM EVC RTNOPTHY: CPT | Performed by: INTERNAL MEDICINE

## 2024-01-15 PROCEDURE — 99214 OFFICE O/P EST MOD 30 MIN: CPT | Performed by: INTERNAL MEDICINE

## 2024-01-15 PROCEDURE — 3079F DIAST BP 80-89 MM HG: CPT | Performed by: INTERNAL MEDICINE

## 2024-01-15 PROCEDURE — G8417 CALC BMI ABV UP PARAM F/U: HCPCS | Performed by: INTERNAL MEDICINE

## 2024-01-15 PROCEDURE — G8484 FLU IMMUNIZE NO ADMIN: HCPCS | Performed by: INTERNAL MEDICINE

## 2024-01-15 PROCEDURE — G8427 DOCREV CUR MEDS BY ELIG CLIN: HCPCS | Performed by: INTERNAL MEDICINE

## 2024-01-15 PROCEDURE — 3017F COLORECTAL CA SCREEN DOC REV: CPT | Performed by: INTERNAL MEDICINE

## 2024-01-15 PROCEDURE — 3046F HEMOGLOBIN A1C LEVEL >9.0%: CPT | Performed by: INTERNAL MEDICINE

## 2024-01-15 RX ORDER — GABAPENTIN 300 MG/1
300 CAPSULE ORAL 2 TIMES DAILY
Qty: 180 CAPSULE | Refills: 1 | Status: SHIPPED | OUTPATIENT
Start: 2024-01-15 | End: 2024-07-13

## 2024-01-15 RX ORDER — DULAGLUTIDE 1.5 MG/.5ML
1.5 INJECTION, SOLUTION SUBCUTANEOUS WEEKLY
Qty: 4 ADJUSTABLE DOSE PRE-FILLED PEN SYRINGE | Refills: 5 | Status: CANCELLED | OUTPATIENT
Start: 2024-01-15

## 2024-01-15 RX ORDER — ACYCLOVIR 400 MG/1
TABLET ORAL
Qty: 2 EACH | Refills: 5 | Status: SHIPPED | OUTPATIENT
Start: 2024-01-15

## 2024-01-15 RX ORDER — DULAGLUTIDE 3 MG/.5ML
3 INJECTION, SOLUTION SUBCUTANEOUS WEEKLY
Qty: 4 ADJUSTABLE DOSE PRE-FILLED PEN SYRINGE | Refills: 1 | Status: SHIPPED | OUTPATIENT
Start: 2024-01-15

## 2024-01-15 ASSESSMENT — PATIENT HEALTH QUESTIONNAIRE - PHQ9
SUM OF ALL RESPONSES TO PHQ QUESTIONS 1-9: 0
SUM OF ALL RESPONSES TO PHQ9 QUESTIONS 1 & 2: 0
SUM OF ALL RESPONSES TO PHQ QUESTIONS 1-9: 0
1. LITTLE INTEREST OR PLEASURE IN DOING THINGS: 0
2. FEELING DOWN, DEPRESSED OR HOPELESS: 0

## 2024-01-15 NOTE — PROGRESS NOTES
Juan Carlos Wong  (:  1965) is a 58 y.o. male, Established patient, here for evaluation of the following chief complaint(s):  Follow-up and Diabetes         ASSESSMENT/PLAN:  1. Type 2 diabetes mellitus with hyperglycemia, with long-term current use of insulin (HCC)  Chronic, uncontrolled.  Increasing the dose of Trulicity to 3 mg q. weekly after discussing with the patient.  Continue current dose of insulin Lantus  Prescribed Continuous glucose monitor for monitoring his blood sugar and improve glycemic control  -     Dulaglutide (TRULICITY) 3 MG/0.5ML SOPN; Inject 3 mg into the skin once a week, Disp-4 Adjustable Dose Pre-filled Pen Syringe, R-1Normal  -     Continuous Blood Gluc Sensor (DEXCOM G7 SENSOR) MISC; Use as directed, Disp-2 each, R-5Normal  2. Diabetic mononeuropathy associated with type 2 diabetes mellitus (HCC)  - Stable   - Continue current dose of gabapentin  -     gabapentin (NEURONTIN) 300 MG capsule; Take 1 capsule by mouth 2 times daily for 180 days., Disp-180 capsule, R-1Normal  3. Essential hypertension  - Stable   - Continue current dose of carvedilol, hydralazine  4. Stage 4 chronic kidney disease (HCC)  Follows with nephrology  5. Coronary artery disease of native artery of native heart with stable angina pectoris (HCC)  - Stable   - Continue current dose of aspirin, atorvastatin, carvedilol, clopidogrel  Continue nitroglycerin as needed for chest pain.  Patient follows with cardiology.  6. Chronic systolic congestive heart failure (HCC)  - Stable   - Continue current dose of torsemide, carvedilol  -Follows with cardiology    Return in about 4 weeks (around 2024).         Subjective   SUBJECTIVE/OBJECTIVE:  Diabetes  He presents for his initial diabetic visit. He has type 2 diabetes mellitus. His disease course has been fluctuating. Pertinent negatives for hypoglycemia include no dizziness or headaches. Pertinent negatives for diabetes include no chest pain, no fatigue and

## 2024-01-17 ENCOUNTER — TELEPHONE (OUTPATIENT)
Dept: PRIMARY CARE CLINIC | Age: 59
End: 2024-01-17

## 2024-01-17 DIAGNOSIS — Z79.4 TYPE 2 DIABETES MELLITUS WITH HYPERGLYCEMIA, WITH LONG-TERM CURRENT USE OF INSULIN (HCC): ICD-10-CM

## 2024-01-17 DIAGNOSIS — E11.65 TYPE 2 DIABETES MELLITUS WITH HYPERGLYCEMIA, WITH LONG-TERM CURRENT USE OF INSULIN (HCC): ICD-10-CM

## 2024-01-17 RX ORDER — ACYCLOVIR 400 MG/1
TABLET ORAL
Qty: 2 EACH | Refills: 5 | Status: CANCELLED | OUTPATIENT
Start: 2024-01-17

## 2024-01-17 NOTE — TELEPHONE ENCOUNTER
Office has been notified that pt is requiring Prior Authorization for the following medication:  DEXCOM G7 SENSOR     Please initiate this request through CoverMyMeds, contacting the following Payor/Insurance:  Ady Vance     Please see below, or the documentation attached to this encounter for any additional information that may assist in processing PA:  --     Thank you!

## 2024-01-18 ENCOUNTER — TELEPHONE (OUTPATIENT)
Dept: ADMINISTRATIVE | Age: 59
End: 2024-01-18

## 2024-01-18 NOTE — TELEPHONE ENCOUNTER
Submitted PA for DEXCOM G7 SENSOR  Via CMM (Key: [id number]) STATUS: NOT SENT    OV NOT DONE     Follow up done daily; if no decision with in three days we will refax.  If another three days goes by with no decision will call the insurance for status.

## 2024-01-19 NOTE — TELEPHONE ENCOUNTER
Please let the PA dept know once the office visit is completed. Thanks.     If this requires a response please respond to the pool ( P MHCX PSC MEDICATION PRE-AUTH).      Thank you please advise patient.

## 2024-01-22 ASSESSMENT — ENCOUNTER SYMPTOMS
SHORTNESS OF BREATH: 0
NAUSEA: 0
COUGH: 0
ABDOMINAL PAIN: 0
SORE THROAT: 0
BLOOD IN STOOL: 0
VOMITING: 0

## 2024-01-24 NOTE — TELEPHONE ENCOUNTER
The medication is APPROVED FROM 01/23/24 THRU 01/23/25    If this requires a response please respond to the pool ( P MHCX PSC MEDICATION PRE-AUTH).      Thank you please advise patient.

## 2024-02-08 DIAGNOSIS — I10 ESSENTIAL HYPERTENSION: ICD-10-CM

## 2024-02-08 RX ORDER — HYDRALAZINE HYDROCHLORIDE 10 MG/1
10 TABLET, FILM COATED ORAL 3 TIMES DAILY
Qty: 270 TABLET | Refills: 1 | Status: SHIPPED | OUTPATIENT
Start: 2024-02-08

## 2024-02-19 ENCOUNTER — TELEPHONE (OUTPATIENT)
Dept: INTERNAL MEDICINE CLINIC | Age: 59
End: 2024-02-19

## 2024-02-19 NOTE — TELEPHONE ENCOUNTER
JUNIOR Curry from [place] called stating that he was in [facility] for sepsis and went into Cardic Arrest.  From Feb 5th-Feb 12th.  Would like us to schedule a follow up with him.

## 2024-02-20 DIAGNOSIS — K21.9 GASTROESOPHAGEAL REFLUX DISEASE, UNSPECIFIED WHETHER ESOPHAGITIS PRESENT: ICD-10-CM

## 2024-02-20 DIAGNOSIS — E78.2 MIXED HYPERLIPIDEMIA: ICD-10-CM

## 2024-02-20 RX ORDER — OMEPRAZOLE 20 MG/1
40 CAPSULE, DELAYED RELEASE ORAL DAILY
Qty: 180 CAPSULE | Refills: 0 | Status: SHIPPED | OUTPATIENT
Start: 2024-02-20 | End: 2024-02-20 | Stop reason: SDUPTHER

## 2024-02-20 RX ORDER — ATORVASTATIN CALCIUM 40 MG/1
40 TABLET, FILM COATED ORAL DAILY
Qty: 90 TABLET | Refills: 1 | Status: SHIPPED | OUTPATIENT
Start: 2024-02-20

## 2024-02-20 RX ORDER — OMEPRAZOLE 20 MG/1
40 CAPSULE, DELAYED RELEASE ORAL DAILY
Qty: 180 CAPSULE | Refills: 0 | Status: SHIPPED | OUTPATIENT
Start: 2024-02-20

## 2024-02-20 NOTE — TELEPHONE ENCOUNTER
Future Appointments   Date Time Provider Department Center   2/28/2024 11:15 AM Idris Laws MD [facility] [place] - [place]         Last appt 1/15/24

## 2024-02-20 NOTE — TELEPHONE ENCOUNTER
Kathia with [facility] called, will you sign home care orders for [place]? Nursing, PT and OT      Also needs refills for:    omeprazole (PRILOSEC) 20 MG delayed release capsule     Odansetron 4mg 1 tablet 8 hours as needed    Iron 325mg daily    He has also been approved for a Dexcom, can that be sent to the pharmacy as well    Please send to [facility] on [address]      Please advise    [phone]

## 2024-02-21 ENCOUNTER — TELEPHONE (OUTPATIENT)
Dept: INTERNAL MEDICINE CLINIC | Age: 59
End: 2024-02-21

## 2024-02-21 DIAGNOSIS — E11.41 DIABETIC MONONEUROPATHY ASSOCIATED WITH TYPE 2 DIABETES MELLITUS (HCC): Primary | ICD-10-CM

## 2024-02-21 NOTE — TELEPHONE ENCOUNTER
[facility] called and they want to know if you can fax a order for a     4 wheel walker with a seat.    Please fax to [facility] [phone]    Kristine [phone] [facility]

## 2024-03-08 LAB
LEFT VENTRICULAR EJECTION FRACTION HIGH VALUE: 25 %
LEFT VENTRICULAR EJECTION FRACTION MODE: NORMAL
LV EF: 20 %

## 2024-03-12 ENCOUNTER — TELEPHONE (OUTPATIENT)
Dept: INTERNAL MEDICINE CLINIC | Age: 59
End: 2024-03-12

## 2024-03-12 NOTE — TELEPHONE ENCOUNTER
Kathia with [facility] called, will you sign home care orders for Juan Carlos?    [phone]    Thank you

## 2024-03-27 ENCOUNTER — TELEPHONE (OUTPATIENT)
Dept: INTERNAL MEDICINE CLINIC | Age: 59
End: 2024-03-27

## 2024-03-27 ENCOUNTER — HOSPITAL ENCOUNTER (INPATIENT)
Age: 59
LOS: 7 days | Discharge: HOME HEALTH | DRG: 871 | End: 2024-04-03
Attending: EMERGENCY MEDICINE | Admitting: INTERNAL MEDICINE
Payer: COMMERCIAL

## 2024-03-27 ENCOUNTER — APPOINTMENT (OUTPATIENT)
Dept: CT IMAGING | Age: 59
DRG: 871 | End: 2024-03-27
Payer: COMMERCIAL

## 2024-03-27 ENCOUNTER — APPOINTMENT (OUTPATIENT)
Dept: GENERAL RADIOLOGY | Age: 59
DRG: 871 | End: 2024-03-27
Payer: COMMERCIAL

## 2024-03-27 DIAGNOSIS — S00.83XA CONTUSION OF FACE, INITIAL ENCOUNTER: ICD-10-CM

## 2024-03-27 DIAGNOSIS — J18.9 PNEUMONIA DUE TO INFECTIOUS ORGANISM, UNSPECIFIED LATERALITY, UNSPECIFIED PART OF LUNG: ICD-10-CM
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
## 2024-03-27 DIAGNOSIS — I50.22 CHRONIC SYSTOLIC CONGESTIVE HEART FAILURE (HCC): ICD-10-CM

## 2024-03-27 DIAGNOSIS — R53.1 GENERALIZED WEAKNESS: Primary | ICD-10-CM

## 2024-03-27 DIAGNOSIS — E86.0 DEHYDRATION: ICD-10-CM

## 2024-03-27 DIAGNOSIS — M62.82 NON-TRAUMATIC RHABDOMYOLYSIS: ICD-10-CM

## 2024-03-27 DIAGNOSIS — I50.9 ACUTE ON CHRONIC CONGESTIVE HEART FAILURE, UNSPECIFIED HEART FAILURE TYPE (HCC): ICD-10-CM

## 2024-03-27 DIAGNOSIS — R60.9 DEPENDENT EDEMA: ICD-10-CM

## 2024-03-27 DIAGNOSIS — A41.9 SEPTICEMIA (HCC): ICD-10-CM

## 2024-03-27 LAB
ALBUMIN SERPL-MCNC: 2.5 G/DL (ref 3.4–5)
ALBUMIN/GLOB SERPL: 0.6 {RATIO} (ref 1.1–2.2)
ALP SERPL-CCNC: 221 U/L (ref 40–129)
ALT SERPL-CCNC: 13 U/L (ref 10–40)
ANION GAP SERPL CALCULATED.3IONS-SCNC: 11 MMOL/L (ref 3–16)
AST SERPL-CCNC: 20 U/L (ref 15–37)
BASOPHILS # BLD: 0.2 K/UL (ref 0–0.2)
BASOPHILS NFR BLD: 1 %
BILIRUB SERPL-MCNC: 0.8 MG/DL (ref 0–1)
BILIRUB UR QL STRIP.AUTO: ABNORMAL
BUN SERPL-MCNC: 35 MG/DL (ref 7–20)
CALCIUM SERPL-MCNC: 8.3 MG/DL (ref 8.3–10.6)
CHLORIDE SERPL-SCNC: 105 MMOL/L (ref 99–110)
CK SERPL-CCNC: 972 U/L (ref 39–308)
CLARITY UR: CLEAR
CO2 SERPL-SCNC: 18 MMOL/L (ref 21–32)
COLOR UR: YELLOW
CREAT SERPL-MCNC: 2.6 MG/DL (ref 0.8–1.3)
DEPRECATED RDW RBC AUTO: 17.8 % (ref 12.4–15.4)
EOSINOPHIL # BLD: 0 K/UL (ref 0–0.6)
EOSINOPHIL NFR BLD: 0 %
EPI CELLS #/AREA URNS HPF: ABNORMAL /HPF (ref 0–5)
FLUAV RNA UPPER RESP QL NAA+PROBE: NEGATIVE
FLUBV AG NPH QL: NEGATIVE
GFR SERPLBLD CREATININE-BSD FMLA CKD-EPI: 27 ML/MIN/{1.73_M2}
GLUCOSE SERPL-MCNC: 178 MG/DL (ref 70–99)
GLUCOSE UR STRIP.AUTO-MCNC: 250 MG/DL
HCT VFR BLD AUTO: 33.1 % (ref 40.5–52.5)
HGB BLD-MCNC: 10.7 G/DL (ref 13.5–17.5)
HGB UR QL STRIP.AUTO: ABNORMAL
KETONES UR STRIP.AUTO-MCNC: NEGATIVE MG/DL
LACTATE BLDV-SCNC: 1.3 MMOL/L (ref 0.4–1.9)
LEUKOCYTE ESTERASE UR QL STRIP.AUTO: NEGATIVE
LYMPHOCYTES # BLD: 0.6 K/UL (ref 1–5.1)
LYMPHOCYTES NFR BLD: 3.8 %
MCH RBC QN AUTO: 29.4 PG (ref 26–34)
MCHC RBC AUTO-ENTMCNC: 32.4 G/DL (ref 31–36)
MCV RBC AUTO: 90.8 FL (ref 80–100)
MONOCYTES # BLD: 0.6 K/UL (ref 0–1.3)
MONOCYTES NFR BLD: 3.8 %
MUCOUS THREADS #/AREA URNS LPF: ABNORMAL /LPF
NEUTROPHILS # BLD: 14.4 K/UL (ref 1.7–7.7)
NEUTROPHILS NFR BLD: 91.4 %
NITRITE UR QL STRIP.AUTO: NEGATIVE
NT-PROBNP SERPL-MCNC: ABNORMAL PG/ML (ref 0–124)
PH UR STRIP.AUTO: 6 [PH] (ref 5–8)
PLATELET # BLD AUTO: 226 K/UL (ref 135–450)
PMV BLD AUTO: 9.4 FL (ref 5–10.5)
POTASSIUM SERPL-SCNC: 4.9 MMOL/L (ref 3.5–5.1)
PROT SERPL-MCNC: 6.9 G/DL (ref 6.4–8.2)
PROT UR STRIP.AUTO-MCNC: >=300 MG/DL
RBC # BLD AUTO: 3.64 M/UL (ref 4.2–5.9)
RBC #/AREA URNS HPF: ABNORMAL /HPF (ref 0–4)
REASON FOR REJECTION: NORMAL
REJECTED TEST: NORMAL
SARS-COV-2 RDRP RESP QL NAA+PROBE: NOT DETECTED
SODIUM SERPL-SCNC: 134 MMOL/L (ref 136–145)
SP GR UR STRIP.AUTO: 1.02 (ref 1–1.03)
TROPONIN, HIGH SENSITIVITY: 333 NG/L (ref 0–22)
TROPONIN, HIGH SENSITIVITY: 344 NG/L (ref 0–22)
UA COMPLETE W REFLEX CULTURE PNL UR: ABNORMAL
UA DIPSTICK W REFLEX MICRO PNL UR: YES
URN SPEC COLLECT METH UR: ABNORMAL
UROBILINOGEN UR STRIP-ACNC: 0.2 E.U./DL
WBC # BLD AUTO: 15.8 K/UL (ref 4–11)
WBC #/AREA URNS HPF: ABNORMAL /HPF (ref 0–5)

## 2024-03-27 PROCEDURE — 2580000003 HC RX 258: Performed by: PHYSICIAN ASSISTANT

## 2024-03-27 PROCEDURE — 85025 COMPLETE CBC W/AUTO DIFF WBC: CPT

## 2024-03-27 PROCEDURE — 99285 EMERGENCY DEPT VISIT HI MDM: CPT

## 2024-03-27 PROCEDURE — 93005 ELECTROCARDIOGRAM TRACING: CPT | Performed by: PHYSICIAN ASSISTANT

## 2024-03-27 PROCEDURE — 83880 ASSAY OF NATRIURETIC PEPTIDE: CPT

## 2024-03-27 PROCEDURE — 82550 ASSAY OF CK (CPK): CPT

## 2024-03-27 PROCEDURE — 87040 BLOOD CULTURE FOR BACTERIA: CPT

## 2024-03-27 PROCEDURE — 83605 ASSAY OF LACTIC ACID: CPT

## 2024-03-27 PROCEDURE — 96375 TX/PRO/DX INJ NEW DRUG ADDON: CPT

## 2024-03-27 PROCEDURE — 6360000002 HC RX W HCPCS: Performed by: PHYSICIAN ASSISTANT

## 2024-03-27 PROCEDURE — 72125 CT NECK SPINE W/O DYE: CPT

## 2024-03-27 PROCEDURE — 96374 THER/PROPH/DIAG INJ IV PUSH: CPT

## 2024-03-27 PROCEDURE — 70450 CT HEAD/BRAIN W/O DYE: CPT

## 2024-03-27 PROCEDURE — 81001 URINALYSIS AUTO W/SCOPE: CPT

## 2024-03-27 PROCEDURE — 80053 COMPREHEN METABOLIC PANEL: CPT

## 2024-03-27 PROCEDURE — 87804 INFLUENZA ASSAY W/OPTIC: CPT

## 2024-03-27 PROCEDURE — 87635 SARS-COV-2 COVID-19 AMP PRB: CPT

## 2024-03-27 PROCEDURE — 71046 X-RAY EXAM CHEST 2 VIEWS: CPT

## 2024-03-27 PROCEDURE — 1200000000 HC SEMI PRIVATE

## 2024-03-27 PROCEDURE — 6370000000 HC RX 637 (ALT 250 FOR IP): Performed by: PHYSICIAN ASSISTANT

## 2024-03-27 PROCEDURE — 84484 ASSAY OF TROPONIN QUANT: CPT

## 2024-03-27 PROCEDURE — 36415 COLL VENOUS BLD VENIPUNCTURE: CPT

## 2024-03-27 RX ORDER — SODIUM CHLORIDE 9 MG/ML
INJECTION, SOLUTION INTRAVENOUS PRN
Status: DISCONTINUED | OUTPATIENT
Start: 2024-03-27 | End: 2024-04-02 | Stop reason: SDUPTHER

## 2024-03-27 RX ORDER — ASPIRIN 81 MG/1
81 TABLET ORAL DAILY
Status: DISCONTINUED | OUTPATIENT
Start: 2024-03-28 | End: 2024-04-03 | Stop reason: HOSPADM

## 2024-03-27 RX ORDER — SODIUM CHLORIDE 0.9 % (FLUSH) 0.9 %
10 SYRINGE (ML) INJECTION EVERY 12 HOURS SCHEDULED
Status: DISCONTINUED | OUTPATIENT
Start: 2024-03-28 | End: 2024-04-03 | Stop reason: HOSPADM

## 2024-03-27 RX ORDER — PROMETHAZINE HYDROCHLORIDE 25 MG/1
12.5 TABLET ORAL EVERY 6 HOURS PRN
Status: DISCONTINUED | OUTPATIENT
Start: 2024-03-27 | End: 2024-04-03 | Stop reason: HOSPADM

## 2024-03-27 RX ORDER — LANOLIN ALCOHOL/MO/W.PET/CERES
3 CREAM (GRAM) TOPICAL NIGHTLY
Status: DISCONTINUED | OUTPATIENT
Start: 2024-03-28 | End: 2024-04-03 | Stop reason: HOSPADM

## 2024-03-27 RX ORDER — HYDRALAZINE HYDROCHLORIDE 10 MG/1
10 TABLET, FILM COATED ORAL 3 TIMES DAILY
Status: DISCONTINUED | OUTPATIENT
Start: 2024-03-28 | End: 2024-04-03 | Stop reason: HOSPADM

## 2024-03-27 RX ORDER — ENOXAPARIN SODIUM 100 MG/ML
40 INJECTION SUBCUTANEOUS DAILY
Status: DISCONTINUED | OUTPATIENT
Start: 2024-03-28 | End: 2024-03-28

## 2024-03-27 RX ORDER — GLUCAGON 1 MG/ML
1 KIT INJECTION PRN
Status: DISCONTINUED | OUTPATIENT
Start: 2024-03-27 | End: 2024-04-03 | Stop reason: HOSPADM

## 2024-03-27 RX ORDER — DEXTROSE MONOHYDRATE 100 MG/ML
INJECTION, SOLUTION INTRAVENOUS CONTINUOUS PRN
Status: DISCONTINUED | OUTPATIENT
Start: 2024-03-27 | End: 2024-04-03 | Stop reason: HOSPADM

## 2024-03-27 RX ORDER — ACETAMINOPHEN 650 MG/1
650 SUPPOSITORY RECTAL EVERY 6 HOURS PRN
Status: DISCONTINUED | OUTPATIENT
Start: 2024-03-27 | End: 2024-04-03 | Stop reason: HOSPADM

## 2024-03-27 RX ORDER — ONDANSETRON 2 MG/ML
4 INJECTION INTRAMUSCULAR; INTRAVENOUS EVERY 6 HOURS PRN
Status: DISCONTINUED | OUTPATIENT
Start: 2024-03-27 | End: 2024-04-03 | Stop reason: HOSPADM

## 2024-03-27 RX ORDER — MAGNESIUM SULFATE IN WATER 40 MG/ML
2000 INJECTION, SOLUTION INTRAVENOUS PRN
Status: DISCONTINUED | OUTPATIENT
Start: 2024-03-27 | End: 2024-04-03 | Stop reason: HOSPADM

## 2024-03-27 RX ORDER — ACETAMINOPHEN 325 MG/1
650 TABLET ORAL EVERY 6 HOURS PRN
Status: DISCONTINUED | OUTPATIENT
Start: 2024-03-27 | End: 2024-04-03 | Stop reason: HOSPADM

## 2024-03-27 RX ORDER — PANTOPRAZOLE SODIUM 40 MG/1
40 TABLET, DELAYED RELEASE ORAL
Status: DISCONTINUED | OUTPATIENT
Start: 2024-03-28 | End: 2024-04-03 | Stop reason: HOSPADM

## 2024-03-27 RX ORDER — ACETAMINOPHEN 325 MG/1
650 TABLET ORAL ONCE
Status: COMPLETED | OUTPATIENT
Start: 2024-03-27 | End: 2024-03-27

## 2024-03-27 RX ORDER — GABAPENTIN 300 MG/1
300 CAPSULE ORAL 2 TIMES DAILY
Status: DISCONTINUED | OUTPATIENT
Start: 2024-03-28 | End: 2024-04-03 | Stop reason: HOSPADM

## 2024-03-27 RX ORDER — POTASSIUM CHLORIDE 20 MEQ/1
40 TABLET, EXTENDED RELEASE ORAL PRN
Status: DISCONTINUED | OUTPATIENT
Start: 2024-03-27 | End: 2024-04-03 | Stop reason: HOSPADM

## 2024-03-27 RX ORDER — POTASSIUM CHLORIDE 7.45 MG/ML
10 INJECTION INTRAVENOUS PRN
Status: DISCONTINUED | OUTPATIENT
Start: 2024-03-27 | End: 2024-04-03 | Stop reason: HOSPADM

## 2024-03-27 RX ORDER — ATORVASTATIN CALCIUM 40 MG/1
40 TABLET, FILM COATED ORAL DAILY
Status: DISCONTINUED | OUTPATIENT
Start: 2024-03-28 | End: 2024-04-03 | Stop reason: HOSPADM

## 2024-03-27 RX ORDER — FUROSEMIDE 10 MG/ML
20 INJECTION INTRAMUSCULAR; INTRAVENOUS ONCE
Status: DISCONTINUED | OUTPATIENT
Start: 2024-03-27 | End: 2024-03-27

## 2024-03-27 RX ORDER — SODIUM CHLORIDE 0.9 % (FLUSH) 0.9 %
10 SYRINGE (ML) INJECTION PRN
Status: DISCONTINUED | OUTPATIENT
Start: 2024-03-27 | End: 2024-04-03 | Stop reason: HOSPADM

## 2024-03-27 RX ORDER — ONDANSETRON 2 MG/ML
4 INJECTION INTRAMUSCULAR; INTRAVENOUS ONCE
Status: COMPLETED | OUTPATIENT
Start: 2024-03-27 | End: 2024-03-27

## 2024-03-27 RX ORDER — SODIUM CHLORIDE 9 MG/ML
INJECTION, SOLUTION INTRAVENOUS CONTINUOUS
Status: DISCONTINUED | OUTPATIENT
Start: 2024-03-27 | End: 2024-03-28

## 2024-03-27 RX ORDER — NICOTINE 21 MG/24HR
1 PATCH, TRANSDERMAL 24 HOURS TRANSDERMAL DAILY
Status: DISCONTINUED | OUTPATIENT
Start: 2024-03-28 | End: 2024-03-30

## 2024-03-27 RX ORDER — 0.9 % SODIUM CHLORIDE 0.9 %
500 INTRAVENOUS SOLUTION INTRAVENOUS ONCE
Status: DISCONTINUED | OUTPATIENT
Start: 2024-03-27 | End: 2024-03-27

## 2024-03-27 RX ORDER — INSULIN LISPRO 100 [IU]/ML
0-4 INJECTION, SOLUTION INTRAVENOUS; SUBCUTANEOUS NIGHTLY
Status: DISCONTINUED | OUTPATIENT
Start: 2024-03-28 | End: 2024-04-01

## 2024-03-27 RX ORDER — INSULIN GLARGINE 100 [IU]/ML
8 INJECTION, SOLUTION SUBCUTANEOUS NIGHTLY
Status: DISCONTINUED | OUTPATIENT
Start: 2024-03-28 | End: 2024-04-02

## 2024-03-27 RX ORDER — CLOPIDOGREL BISULFATE 75 MG/1
75 TABLET ORAL DAILY
Status: DISCONTINUED | OUTPATIENT
Start: 2024-03-28 | End: 2024-04-03 | Stop reason: HOSPADM

## 2024-03-27 RX ORDER — CARVEDILOL 6.25 MG/1
6.25 TABLET ORAL 2 TIMES DAILY WITH MEALS
Status: DISCONTINUED | OUTPATIENT
Start: 2024-03-28 | End: 2024-03-29

## 2024-03-27 RX ORDER — INSULIN LISPRO 100 [IU]/ML
0-8 INJECTION, SOLUTION INTRAVENOUS; SUBCUTANEOUS
Status: DISCONTINUED | OUTPATIENT
Start: 2024-03-28 | End: 2024-04-01

## 2024-03-27 RX ADMIN — SODIUM CHLORIDE 1500 MG: 9 INJECTION, SOLUTION INTRAVENOUS at 21:49

## 2024-03-27 RX ADMIN — ONDANSETRON 4 MG: 2 INJECTION INTRAMUSCULAR; INTRAVENOUS at 20:41

## 2024-03-27 RX ADMIN — CEFEPIME 2000 MG: 2 INJECTION, POWDER, FOR SOLUTION INTRAVENOUS at 20:29

## 2024-03-27 RX ADMIN — SODIUM CHLORIDE: 9 INJECTION, SOLUTION INTRAVENOUS at 21:47

## 2024-03-27 RX ADMIN — ACETAMINOPHEN 650 MG: 325 TABLET ORAL at 20:22

## 2024-03-27 ASSESSMENT — PAIN - FUNCTIONAL ASSESSMENT: PAIN_FUNCTIONAL_ASSESSMENT: NONE - DENIES PAIN

## 2024-03-27 NOTE — TELEPHONE ENCOUNTER
Linnette with [facility] called, patient fell last night around 2am. Laid on the flood till around 11am.  Legs are swollen and right eye.     Daughter is taking him to [facility]    Thank you

## 2024-03-28 ENCOUNTER — APPOINTMENT (OUTPATIENT)
Dept: GENERAL RADIOLOGY | Age: 59
DRG: 871 | End: 2024-03-28
Payer: COMMERCIAL

## 2024-03-28 ENCOUNTER — APPOINTMENT (OUTPATIENT)
Dept: CT IMAGING | Age: 59
DRG: 871 | End: 2024-03-28
Payer: COMMERCIAL

## 2024-03-28 LAB
ALBUMIN SERPL-MCNC: 2.2 G/DL (ref 3.4–5)
ALBUMIN/GLOB SERPL: 0.6 {RATIO} (ref 1.1–2.2)
ALP SERPL-CCNC: 178 U/L (ref 40–129)
ALT SERPL-CCNC: 11 U/L (ref 10–40)
ANION GAP SERPL CALCULATED.3IONS-SCNC: 10 MMOL/L (ref 3–16)
AST SERPL-CCNC: 18 U/L (ref 15–37)
BASOPHILS # BLD: 0 K/UL (ref 0–0.2)
BASOPHILS NFR BLD: 0.5 %
BILIRUB SERPL-MCNC: 0.4 MG/DL (ref 0–1)
BUN SERPL-MCNC: 34 MG/DL (ref 7–20)
C DIFF TOX A+B STL QL IA: NORMAL
CALCIUM SERPL-MCNC: 8 MG/DL (ref 8.3–10.6)
CHLORIDE SERPL-SCNC: 106 MMOL/L (ref 99–110)
CO2 SERPL-SCNC: 19 MMOL/L (ref 21–32)
CREAT SERPL-MCNC: 2.6 MG/DL (ref 0.8–1.3)
CREAT UR-MCNC: 54.3 MG/DL (ref 39–259)
DEPRECATED RDW RBC AUTO: 17.7 % (ref 12.4–15.4)
EKG ATRIAL RATE: 104 BPM
EKG DIAGNOSIS: NORMAL
EKG P AXIS: 36 DEGREES
EKG P-R INTERVAL: 154 MS
EKG Q-T INTERVAL: 334 MS
EKG QRS DURATION: 84 MS
EKG QTC CALCULATION (BAZETT): 439 MS
EKG R AXIS: -3 DEGREES
EKG T AXIS: 98 DEGREES
EKG VENTRICULAR RATE: 104 BPM
EOSINOPHIL # BLD: 0 K/UL (ref 0–0.6)
EOSINOPHIL NFR BLD: 0.2 %
EST. AVERAGE GLUCOSE BLD GHB EST-MCNC: 182.9 MG/DL
GFR SERPLBLD CREATININE-BSD FMLA CKD-EPI: 27 ML/MIN/{1.73_M2}
GLUCOSE BLD-MCNC: 125 MG/DL (ref 70–99)
GLUCOSE BLD-MCNC: 145 MG/DL (ref 70–99)
GLUCOSE BLD-MCNC: 162 MG/DL (ref 70–99)
GLUCOSE BLD-MCNC: 183 MG/DL (ref 70–99)
GLUCOSE BLD-MCNC: 184 MG/DL (ref 70–99)
GLUCOSE SERPL-MCNC: 161 MG/DL (ref 70–99)
HBA1C MFR BLD: 8 %
HCT VFR BLD AUTO: 28.5 % (ref 40.5–52.5)
HGB BLD-MCNC: 9.6 G/DL (ref 13.5–17.5)
LACTATE BLDV-SCNC: 1.3 MMOL/L (ref 0.4–2)
LDH SERPL L TO P-CCNC: 279 U/L (ref 100–190)
LYMPHOCYTES # BLD: 0.7 K/UL (ref 1–5.1)
LYMPHOCYTES NFR BLD: 7.5 %
MAGNESIUM SERPL-MCNC: 1.7 MG/DL (ref 1.8–2.4)
MCH RBC QN AUTO: 30.6 PG (ref 26–34)
MCHC RBC AUTO-ENTMCNC: 33.6 G/DL (ref 31–36)
MCV RBC AUTO: 90.9 FL (ref 80–100)
MONOCYTES # BLD: 0.5 K/UL (ref 0–1.3)
MONOCYTES NFR BLD: 5.4 %
NEUTROPHILS # BLD: 8.3 K/UL (ref 1.7–7.7)
NEUTROPHILS NFR BLD: 86.4 %
PERFORMED ON: ABNORMAL
PLATELET # BLD AUTO: 179 K/UL (ref 135–450)
PMV BLD AUTO: 8.9 FL (ref 5–10.5)
POTASSIUM SERPL-SCNC: 5.2 MMOL/L (ref 3.5–5.1)
PROT SERPL-MCNC: 6 G/DL (ref 6.4–8.2)
PROT UR-MCNC: 418 MG/DL
PROT/CREAT UR-RTO: 7.7 MG/DL
RBC # BLD AUTO: 3.13 M/UL (ref 4.2–5.9)
REPORT: NORMAL
RESP PATH DNA+RNA PNL NPH NAA+NON-PROBE: NORMAL
SODIUM SERPL-SCNC: 135 MMOL/L (ref 136–145)
TROPONIN, HIGH SENSITIVITY: 319 NG/L (ref 0–22)
TROPONIN, HIGH SENSITIVITY: 357 NG/L (ref 0–22)
VANCOMYCIN SERPL-MCNC: 12.7 UG/ML
WBC # BLD AUTO: 9.6 K/UL (ref 4–11)

## 2024-03-28 PROCEDURE — 93325 DOPPLER ECHO COLOR FLOW MAPG: CPT

## 2024-03-28 PROCEDURE — 84156 ASSAY OF PROTEIN URINE: CPT

## 2024-03-28 PROCEDURE — 6370000000 HC RX 637 (ALT 250 FOR IP)

## 2024-03-28 PROCEDURE — 6360000004 HC RX CONTRAST MEDICATION: Performed by: INTERNAL MEDICINE

## 2024-03-28 PROCEDURE — 2580000003 HC RX 258: Performed by: INTERNAL MEDICINE

## 2024-03-28 PROCEDURE — 97162 PT EVAL MOD COMPLEX 30 MIN: CPT

## 2024-03-28 PROCEDURE — 83615 LACTATE (LD) (LDH) ENZYME: CPT

## 2024-03-28 PROCEDURE — 6360000002 HC RX W HCPCS

## 2024-03-28 PROCEDURE — 80202 ASSAY OF VANCOMYCIN: CPT

## 2024-03-28 PROCEDURE — 6370000000 HC RX 637 (ALT 250 FOR IP): Performed by: INTERNAL MEDICINE

## 2024-03-28 PROCEDURE — 80053 COMPREHEN METABOLIC PANEL: CPT

## 2024-03-28 PROCEDURE — 6360000002 HC RX W HCPCS: Performed by: INTERNAL MEDICINE

## 2024-03-28 PROCEDURE — 85025 COMPLETE CBC W/AUTO DIFF WBC: CPT

## 2024-03-28 PROCEDURE — 2060000000 HC ICU INTERMEDIATE R&B

## 2024-03-28 PROCEDURE — 73630 X-RAY EXAM OF FOOT: CPT

## 2024-03-28 PROCEDURE — 36415 COLL VENOUS BLD VENIPUNCTURE: CPT

## 2024-03-28 PROCEDURE — 84484 ASSAY OF TROPONIN QUANT: CPT

## 2024-03-28 PROCEDURE — 87324 CLOSTRIDIUM AG IA: CPT

## 2024-03-28 PROCEDURE — 99223 1ST HOSP IP/OBS HIGH 75: CPT | Performed by: INTERNAL MEDICINE

## 2024-03-28 PROCEDURE — 71250 CT THORAX DX C-: CPT

## 2024-03-28 PROCEDURE — 97166 OT EVAL MOD COMPLEX 45 MIN: CPT

## 2024-03-28 PROCEDURE — 97530 THERAPEUTIC ACTIVITIES: CPT

## 2024-03-28 PROCEDURE — 87449 NOS EACH ORGANISM AG IA: CPT

## 2024-03-28 PROCEDURE — 83735 ASSAY OF MAGNESIUM: CPT

## 2024-03-28 PROCEDURE — 0202U NFCT DS 22 TRGT SARS-COV-2: CPT

## 2024-03-28 PROCEDURE — 93010 ELECTROCARDIOGRAM REPORT: CPT | Performed by: INTERNAL MEDICINE

## 2024-03-28 PROCEDURE — 83605 ASSAY OF LACTIC ACID: CPT

## 2024-03-28 PROCEDURE — 82570 ASSAY OF URINE CREATININE: CPT

## 2024-03-28 PROCEDURE — 82668 ASSAY OF ERYTHROPOIETIN: CPT

## 2024-03-28 PROCEDURE — 2580000003 HC RX 258

## 2024-03-28 PROCEDURE — 97116 GAIT TRAINING THERAPY: CPT

## 2024-03-28 PROCEDURE — 87086 URINE CULTURE/COLONY COUNT: CPT

## 2024-03-28 PROCEDURE — 83036 HEMOGLOBIN GLYCOSYLATED A1C: CPT

## 2024-03-28 PROCEDURE — 97535 SELF CARE MNGMENT TRAINING: CPT

## 2024-03-28 PROCEDURE — 93308 TTE F-UP OR LMTD: CPT

## 2024-03-28 PROCEDURE — 6370000000 HC RX 637 (ALT 250 FOR IP): Performed by: PODIATRIST

## 2024-03-28 PROCEDURE — 84155 ASSAY OF PROTEIN SERUM: CPT

## 2024-03-28 PROCEDURE — 84165 PROTEIN E-PHORESIS SERUM: CPT

## 2024-03-28 RX ORDER — MAGNESIUM SULFATE 1 G/100ML
1000 INJECTION INTRAVENOUS
Status: DISPENSED | OUTPATIENT
Start: 2024-03-28 | End: 2024-03-28

## 2024-03-28 RX ORDER — ENOXAPARIN SODIUM 100 MG/ML
30 INJECTION SUBCUTANEOUS DAILY
Status: DISCONTINUED | OUTPATIENT
Start: 2024-03-29 | End: 2024-04-03 | Stop reason: HOSPADM

## 2024-03-28 RX ORDER — LANOLIN ALCOHOL/MO/W.PET/CERES
400 CREAM (GRAM) TOPICAL ONCE
Status: COMPLETED | OUTPATIENT
Start: 2024-03-28 | End: 2024-03-28

## 2024-03-28 RX ORDER — CASTOR OIL AND BALSAM, PERU 788; 87 MG/G; MG/G
OINTMENT TOPICAL 2 TIMES DAILY
Status: DISCONTINUED | OUTPATIENT
Start: 2024-03-28 | End: 2024-04-03 | Stop reason: HOSPADM

## 2024-03-28 RX ORDER — MILRINONE LACTATE 0.2 MG/ML
.0625-.75 INJECTION, SOLUTION INTRAVENOUS CONTINUOUS
Status: DISCONTINUED | OUTPATIENT
Start: 2024-03-28 | End: 2024-04-01

## 2024-03-28 RX ORDER — FUROSEMIDE 10 MG/ML
80 INJECTION INTRAMUSCULAR; INTRAVENOUS ONCE
Status: COMPLETED | OUTPATIENT
Start: 2024-03-28 | End: 2024-03-28

## 2024-03-28 RX ADMIN — MILRINONE LACTATE IN DEXTROSE 0.12 MCG/KG/MIN: 200 INJECTION, SOLUTION INTRAVENOUS at 21:00

## 2024-03-28 RX ADMIN — PANTOPRAZOLE SODIUM 40 MG: 40 TABLET, DELAYED RELEASE ORAL at 00:28

## 2024-03-28 RX ADMIN — CARVEDILOL 6.25 MG: 6.25 TABLET, FILM COATED ORAL at 09:15

## 2024-03-28 RX ADMIN — Medication: at 18:47

## 2024-03-28 RX ADMIN — FUROSEMIDE 5 MG/HR: 10 INJECTION, SOLUTION INTRAMUSCULAR; INTRAVENOUS at 07:19

## 2024-03-28 RX ADMIN — GABAPENTIN 300 MG: 300 CAPSULE ORAL at 21:07

## 2024-03-28 RX ADMIN — CEFEPIME 2000 MG: 2 INJECTION, POWDER, FOR SOLUTION INTRAVENOUS at 16:28

## 2024-03-28 RX ADMIN — PERFLUTREN 1.5 ML: 6.52 INJECTION, SUSPENSION INTRAVENOUS at 14:43

## 2024-03-28 RX ADMIN — CEFTRIAXONE SODIUM 2000 MG: 2 INJECTION, POWDER, FOR SOLUTION INTRAMUSCULAR; INTRAVENOUS at 00:28

## 2024-03-28 RX ADMIN — INSULIN GLARGINE 8 UNITS: 100 INJECTION, SOLUTION SUBCUTANEOUS at 21:07

## 2024-03-28 RX ADMIN — GABAPENTIN 300 MG: 300 CAPSULE ORAL at 00:28

## 2024-03-28 RX ADMIN — PANTOPRAZOLE SODIUM 40 MG: 40 TABLET, DELAYED RELEASE ORAL at 16:21

## 2024-03-28 RX ADMIN — FUROSEMIDE 5 MG/HR: 10 INJECTION, SOLUTION INTRAMUSCULAR; INTRAVENOUS at 23:25

## 2024-03-28 RX ADMIN — Medication 3 MG: at 00:28

## 2024-03-28 RX ADMIN — PANTOPRAZOLE SODIUM 40 MG: 40 TABLET, DELAYED RELEASE ORAL at 05:53

## 2024-03-28 RX ADMIN — HYDRALAZINE HYDROCHLORIDE 10 MG: 10 TABLET, FILM COATED ORAL at 21:07

## 2024-03-28 RX ADMIN — ASPIRIN 81 MG: 81 TABLET, COATED ORAL at 09:15

## 2024-03-28 RX ADMIN — Medication 10 ML: at 21:07

## 2024-03-28 RX ADMIN — ENOXAPARIN SODIUM 40 MG: 100 INJECTION SUBCUTANEOUS at 09:15

## 2024-03-28 RX ADMIN — INSULIN GLARGINE 8 UNITS: 100 INJECTION, SOLUTION SUBCUTANEOUS at 00:35

## 2024-03-28 RX ADMIN — CASTOR OIL AND BALSAM, PERU: 788; 87 OINTMENT TOPICAL at 14:02

## 2024-03-28 RX ADMIN — FUROSEMIDE 80 MG: 10 INJECTION, SOLUTION INTRAMUSCULAR; INTRAVENOUS at 12:57

## 2024-03-28 RX ADMIN — ATORVASTATIN CALCIUM 40 MG: 40 TABLET, FILM COATED ORAL at 09:15

## 2024-03-28 RX ADMIN — GABAPENTIN 300 MG: 300 CAPSULE ORAL at 09:15

## 2024-03-28 RX ADMIN — MILRINONE LACTATE IN DEXTROSE 0.12 MCG/KG/MIN: 200 INJECTION, SOLUTION INTRAVENOUS at 20:38

## 2024-03-28 RX ADMIN — CASTOR OIL AND BALSAM, PERU: 788; 87 OINTMENT TOPICAL at 21:07

## 2024-03-28 RX ADMIN — Medication 400 MG: at 16:21

## 2024-03-28 RX ADMIN — CLOPIDOGREL BISULFATE 75 MG: 75 TABLET ORAL at 09:15

## 2024-03-28 RX ADMIN — Medication 3 MG: at 21:08

## 2024-03-28 ASSESSMENT — PAIN SCALES - GENERAL: PAINLEVEL_OUTOF10: 0

## 2024-03-28 NOTE — CARE COORDINATION
[facility] Contin[facility] Nurse  Consult Note       NAME:  Juan Carlos Wong Sr  MEDICAL RECORD NUMBER:  [id number]  AGE: 59 y.o.   GENDER: male  : 1965  TODAY'S DATE:  3/28/2024    Subjective   Reason for WOCN Evaluation and Assessment: left diabetic foot wound and wound to right buttock, uncertain of etiology possible DTI      Juan Carlos Wong Sr is a 59 y.o. male referred by:   [] Physician  [x] Nursing  [] Other:     Wound Identification:  Wound Type: diabetic and undetermined  Contributing Factors: diabetes    Wound History: chronic foot wound, follows at [place]  Current Wound Care Treatment:  uncertain    Patient Goal of Care:  [x] Wound Healing  [] Odor Control  [] Palliative Care  [] Pain Control   [] Other:         PAST MEDICAL HISTORY        Diagnosis Date    Back pain     Diabetes mellitus (HCC)     Pneumonia        PAST SURGICAL HISTORY    Past Surgical History:   Procedure Laterality Date    COLONOSCOPY      LEG SURGERY Left     LIVER BIOPSY         FAMILY HISTORY    History reviewed. No pertinent family history.    SOCIAL HISTORY    Social History     Tobacco Use    Smoking status: Never    Smokeless tobacco: Never   Substance Use Topics    Alcohol use: No    Drug use: No       ALLERGIES    No Known Allergies    MEDICATIONS    No current facility-administered medications on file prior to encounter.     Current Outpatient Medications on File Prior to Encounter   Medication Sig Dispense Refill    atorvastatin (LIPITOR) 40 MG tablet TAKE 1 TABLET BY MOUTH EVERY DAY 90 tablet 1    omeprazole (PRILOSEC) 20 MG delayed release capsule Take 2 capsules by mouth daily 180 capsule 0    hydrALAZINE (APRESOLINE) 10 MG tablet TAKE 1 TABLET BY MOUTH THREE TIMES A  tablet 1    gabapentin (NEURONTIN) 300 MG capsule Take 1 capsule by mouth 2 times daily for 180 days. 180 capsule 1    Dulaglutide (TRULICITY) 3 MG/0.5ML SOPN Inject 3 mg into the skin once a week 4 Adjustable Dose Pre-filled Pen

## 2024-03-28 NOTE — H&P
221*     No results for input(s): \"INR\" in the last 72 hours.  Recent Labs     03/27/24 1925   CKTOTAL 972*       Urinalysis:      Lab Results   Component Value Date/Time    NITRU Negative 03/27/2024 08:03 PM    WBCUA 3-5 03/27/2024 08:03 PM    RBCUA 0-2 03/27/2024 08:03 PM    BLOODU LARGE 03/27/2024 08:03 PM    SPECGRAV 1.025 03/27/2024 08:03 PM    GLUCOSEU 250 03/27/2024 08:03 PM       Radiology:     CXR: I have reviewed the CXR with the following interpretation:   No acute process  EKG:  I have reviewed the EKG with the following interpretation:   Sinus tachycardia    .  CT CERVICAL SPINE WO CONTRAST   Final Result   No acute abnormality of the cervical spine.         CT HEAD WO CONTRAST   Final Result   No acute intracranial abnormality.         XR CHEST (2 VW)   Final Result   Findings most consistent with mild CHF.  Interval increase in size of the   cardiac silhouette with perihilar edema and small right pleural effusion.                      Yamila Salas, DO

## 2024-03-28 NOTE — DISCHARGE INSTR - COC
Continuity of Care Form    Patient Name: Juan Carlos Wong Sr   :  1965  MRN:  [id number]    Admit date:  3/27/2024  Discharge date:  4/3/24    Code Status Order: Full Code   Advance Directives:     Admitting Physician:  Yamila Salas DO  PCP: Idris Laws MD    Discharging Nurse: ***  Discharging Hospital Unit/Room#: Z0P-2651/3110-01  Discharging Unit Phone Number: ***    Emergency Contact:   Extended Emergency Contact Information  Primary Emergency Contact: Elmira Plunkett  Home Phone: [phone]  Relation: Child    Past Surgical History:  Past Surgical History:   Procedure Laterality Date    COLONOSCOPY      LEG SURGERY Left     LIVER BIOPSY         Immunization History:     There is no immunization history on file for this patient.    Active Problems:  Patient Active Problem List   Diagnosis Code    Sepsis (HCC) A41.9       Isolation/Infection:   Isolation            C Diff Contact          Patient Infection Status       None to display                     Nurse Assessment:  Last Vital Signs: BP (!) 147/70   Pulse 86   Temp 97.9 °F (36.6 °C) (Oral)   Resp 21   Ht 1.753 m (5' 9.02\")   Wt 92.7 kg (204 lb 5.9 oz)   SpO2 96%   BMI 30.17 kg/m²     Last documented pain score (0-10 scale): Pain Level: 0  Last Weight:   Wt Readings from Last 1 Encounters:   24 92.7 kg (204 lb 5.9 oz)     Mental Status:  oriented and alert    IV Access:  - None    Nursing Mobility/ADLs:  Walking   Assisted  Transfer  Assisted  Bathing  Assisted  Dressing  Assisted  Toileting  Assisted  Feeding  Independent  Med Admin  Assisted  Med Delivery   whole    Wound Care Documentation and Therapy:  Wound 24 Foot Left;Lateral (Active)   Wound Image   24   Wound Etiology Diabetic 24   Dressing Status New dressing applied;Clean;Dry;Intact 24   Dressing/Treatment Silicone border 24   Wound Length (cm) 3 cm 24   Wound Width (cm) 4 cm 24   Wound

## 2024-03-28 NOTE — DISCHARGE INSTRUCTIONS
[email or web address]   --- this is American Heart Association interactive Healthier Living with Heart Failure guidebook.  Please click hyperlink or copy / paste link into search bar. The QR Code is also available below. Use your mouse to scroll through the pages.  Lots of information about weight monitoring, diet tips, activity, meds, etc    Heart Failure Tools and Resources QR Code is below. It includes multiple resources to include symptom tracker, med tracker, further HF info, and access to a HF Support Network online Community    HF [place]  -- this is a free smart phone joyce available for iPhone and Android download.  Use your phone to track sodium / fluid intake, zone tool symptom tracking, weights, medications, etc. Click on this hyperlink  HF [place]   for QR code for easy download or the link is also found in the below HF Tools and Resources.      DASH (Dietary Approach to Stop Hypertension) diet --  https://www.nhlbi.nih.gov/education/dash-eating-plan -- this diet is a flexible eating plan that promotes heart healthy eating style.  Click on hyperlink or copy / paste link into search bar.  Lots of low sodium recipes and tips.    [email or web address]/recipes  -- more free recipes

## 2024-03-28 NOTE — DISCHARGE INSTR - DIET
Heart Failure Nutrition Therapy  This diet will help you feel better and support your heart by reducing symptoms of fluid retention, shortness of breath and swelling.   You should focus on:  Limiting sodium in your diet by reading labels and limiting foods high in sodium.  Limit your daily sodium intake to 2,000 mg per day.  Select foods with 140 mg of sodium or less per serving.  Foods with more than 300 mg of sodium per serving may not fit into a reduced-sodium meal plan.  Check serving sizes. If you eat more than 1 serving, you will get more sodium than the amount listed.   Limiting fluid in your diet.  Ask your doctor how much fluid you can have per day  Remember foods that are liquid at room temperature such as popsicles, soup, ice cream and Jell-O are fluids.   Checking your weight to make sure you're not retaining too much fluid.  Weigh yourself every morning. If you gain 3 or more pounds in one day or 5 pounds within 1 week, call your doctor.  Foods to choose and avoid:  Avoid processed foods. Eat more fresh foods.  Fresh and frozen fruits and vegetables are good choices.  Choose fresh meats. Avoid processed meats such as fernandez, sausage and hot dogs.  Do not add salt to your food while cooking or at the table.  Try dry or fresh herbs, pepper, lemon juice, or a sodium-free seasoning blend such as Mrs. Dash to add flavor to food.   Do not use a salt substitute.  Use caution at restaurants  Restaurant foods are high in sodium. Ask for your food to be cooked without salt and request sauces and dressing to come “on the side.”

## 2024-03-28 NOTE — CARE COORDINATION
[facility]  Patient is active with [facility] for nurse and OT. Will follow for discharge to home with orders to resume care.    Kodi Harman RN, BSN CTN  [facility] [phone]

## 2024-03-28 NOTE — ED PROVIDER NOTES
[facility]  EMERGENCY DEPARTMENT ENCOUNTER        Pt Name: Juan Carlos Wong Sr  MRN: [id number]  Birthdate 1965  Date of evaluation: 3/27/2024  Provider: Angelina Ram PA-C  PCP: Idris Laws MD  Note Started: 8:19 PM EDT 3/27/24       I have seen and evaluated this patient with my supervising physician Omari Salcedo DO.      CHIEF COMPLAINT       Chief Complaint   Patient presents with    Fall     Pt states slid out of chair onto his knees and hit his right eye on the floor at 0200. No LOC. Pt was unable to get up off the floor r/t bilat leg swelling x 3 weeks. Daughter helped pt off the floor at 1145. Home care nurse did left foot wound care today. Pt states Home Care nurse comes to his home 3 x a week. Pt A&O. Right eye swollen. No visual disturbances.        HISTORY OF PRESENT ILLNESS: 1 or more Elements             Juan Carlos Wong Sr is a 59 y.o. male who presents to the ED with multiple complaints.  States he has been having some increased leg swelling over the past 2 to 3 weeks.  Also some generalized weakness.  Yesterday evening he was trying to get out of his chair and go into the bathroom to urinate but he was so weak that he fell to the ground.  He hit his head.  He did not lose consciousness.  However he was so weak he was unable to get up.  He was found by his neighbor today who brings him in for further evaluation.  He has chills here, but denies pain.    Nursing Notes were all reviewed and agreed with or any disagreements were addressed in the HPI.    REVIEW OF SYSTEMS :      Review of Systems   All other systems reviewed and are negative.      Positives and Pertinent negatives as per HPI.     SURGICAL HISTORY     Past Surgical History:   Procedure Laterality Date    COLONOSCOPY      LEG SURGERY Left     LIVER BIOPSY         CURRENTMEDICATIONS       Previous Medications    ASPIRIN 81 MG EC TABLET    Take 1 tablet by mouth daily    
instructions for specific medications, discharge information, and treatments. They were verbally instructed to return to emergency if any problems.    Medications - No data to display    New Prescriptions    No medications on file       The patient's blood pressure was found to be elevated according to CMS/Medicare and the Affordable Care Act/[facility] criteria. Elevated blood pressure could occur because of pain or anxiety or other reasons and does not mean that they need to have their blood pressure treated or medications otherwise adjusted. However, this could also be a sign that they will need to have their blood pressure treated or medications changed.    The patient was instructed to follow up closely with their personal physician to have their blood pressure rechecked. The patient was instructed to take a list of recent blood pressure readings to their next visit with their personal physician.    Patient refused pain medicines at the time of their exam.    IMPRESSION(S):  1. Generalized weakness    2. Dependent edema    3. Dehydration          Critical Care Time: 10 minutes       Omari Salcedo,   03/27/24 0997

## 2024-03-29 PROBLEM — L03.119 CELLULITIS OF FOOT: Status: ACTIVE | Noted: 2024-03-29

## 2024-03-29 PROBLEM — L08.9 DIABETIC FOOT INFECTION (HCC): Status: ACTIVE | Noted: 2024-03-29

## 2024-03-29 PROBLEM — I50.9 ACUTE ON CHRONIC CONGESTIVE HEART FAILURE (HCC): Status: ACTIVE | Noted: 2024-03-29
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
## 2024-03-29 PROBLEM — D72.828 NEUTROPHILIA: Status: ACTIVE | Noted: 2024-03-29

## 2024-03-29 PROBLEM — N18.32 STAGE 3B CHRONIC KIDNEY DISEASE (HCC): Status: ACTIVE | Noted: 2024-03-29

## 2024-03-29 PROBLEM — I25.10 CORONARY ARTERY DISEASE INVOLVING NATIVE CORONARY ARTERY OF NATIVE HEART WITHOUT ANGINA PECTORIS: Status: ACTIVE | Noted: 2024-03-29

## 2024-03-29 PROBLEM — M62.82 NON-TRAUMATIC RHABDOMYOLYSIS: Status: ACTIVE | Noted: 2024-03-29

## 2024-03-29 PROBLEM — R53.1 GENERALIZED WEAKNESS: Status: ACTIVE | Noted: 2024-03-29

## 2024-03-29 PROBLEM — E11.628 DIABETIC FOOT INFECTION (HCC): Status: ACTIVE | Noted: 2024-03-29

## 2024-03-29 PROBLEM — D72.9 NEUTROPHILIA: Status: ACTIVE | Noted: 2024-03-29

## 2024-03-29 LAB
ALBUMIN SERPL ELPH-MCNC: 1.8 G/DL (ref 3.1–4.9)
ALBUMIN SERPL-MCNC: 2.1 G/DL (ref 3.4–5)
ALBUMIN/GLOB SERPL: 0.6 {RATIO} (ref 1.1–2.2)
ALP SERPL-CCNC: 148 U/L (ref 40–129)
ALPHA1 GLOB SERPL ELPH-MCNC: 0.4 G/DL (ref 0.2–0.4)
ALPHA2 GLOB SERPL ELPH-MCNC: 0.8 G/DL (ref 0.4–1.1)
ALT SERPL-CCNC: 11 U/L (ref 10–40)
ANION GAP SERPL CALCULATED.3IONS-SCNC: 13 MMOL/L (ref 3–16)
AST SERPL-CCNC: 16 U/L (ref 15–37)
B-GLOBULIN SERPL ELPH-MCNC: 1 G/DL (ref 0.9–1.6)
BACTERIA UR CULT: NORMAL
BASOPHILS # BLD: 0 K/UL (ref 0–0.2)
BASOPHILS NFR BLD: 0.4 %
BILIRUB SERPL-MCNC: 0.4 MG/DL (ref 0–1)
BUN SERPL-MCNC: 38 MG/DL (ref 7–20)
CALCIUM SERPL-MCNC: 7.8 MG/DL (ref 8.3–10.6)
CHLORIDE SERPL-SCNC: 104 MMOL/L (ref 99–110)
CO2 SERPL-SCNC: 19 MMOL/L (ref 21–32)
CREAT SERPL-MCNC: 2.8 MG/DL (ref 0.8–1.3)
DEPRECATED RDW RBC AUTO: 17.7 % (ref 12.4–15.4)
EOSINOPHIL # BLD: 0.6 K/UL (ref 0–0.6)
EOSINOPHIL NFR BLD: 7.5 %
EPO SERPL-ACNC: 23 MU/ML (ref 4–27)
GAMMA GLOB SERPL ELPH-MCNC: 1.9 G/DL (ref 0.6–1.8)
GFR SERPLBLD CREATININE-BSD FMLA CKD-EPI: 25 ML/MIN/{1.73_M2}
GLUCOSE BLD-MCNC: 117 MG/DL (ref 70–99)
GLUCOSE BLD-MCNC: 183 MG/DL (ref 70–99)
GLUCOSE BLD-MCNC: 203 MG/DL (ref 70–99)
GLUCOSE BLD-MCNC: 264 MG/DL (ref 70–99)
GLUCOSE SERPL-MCNC: 134 MG/DL (ref 70–99)
HCT VFR BLD AUTO: 29.9 % (ref 40.5–52.5)
HGB BLD-MCNC: 10.2 G/DL (ref 13.5–17.5)
LYMPHOCYTES # BLD: 0.7 K/UL (ref 1–5.1)
LYMPHOCYTES NFR BLD: 8.9 %
MAGNESIUM SERPL-MCNC: 1.7 MG/DL (ref 1.8–2.4)
MCH RBC QN AUTO: 30.3 PG (ref 26–34)
MCHC RBC AUTO-ENTMCNC: 34 G/DL (ref 31–36)
MCV RBC AUTO: 89 FL (ref 80–100)
MONOCYTES # BLD: 0.5 K/UL (ref 0–1.3)
MONOCYTES NFR BLD: 7.3 %
NEUTROPHILS # BLD: 5.7 K/UL (ref 1.7–7.7)
NEUTROPHILS NFR BLD: 75.9 %
PERFORMED ON: ABNORMAL
PHOSPHATE SERPL-MCNC: 4.2 MG/DL (ref 2.5–4.9)
PLATELET # BLD AUTO: 168 K/UL (ref 135–450)
PMV BLD AUTO: 9.1 FL (ref 5–10.5)
POTASSIUM SERPL-SCNC: 4 MMOL/L (ref 3.5–5.1)
POTASSIUM SERPL-SCNC: 4 MMOL/L (ref 3.5–5.1)
PROT SERPL-MCNC: 5.6 G/DL (ref 6.4–8.2)
PROT SERPL-MCNC: 5.8 G/DL (ref 6.4–8.2)
RBC # BLD AUTO: 3.36 M/UL (ref 4.2–5.9)
SODIUM SERPL-SCNC: 136 MMOL/L (ref 136–145)
SPE/IFE INTERPRETATION: NORMAL
TROPONIN, HIGH SENSITIVITY: 270 NG/L (ref 0–22)
WBC # BLD AUTO: 7.5 K/UL (ref 4–11)

## 2024-03-29 PROCEDURE — 6360000002 HC RX W HCPCS: Performed by: INTERNAL MEDICINE

## 2024-03-29 PROCEDURE — 6370000000 HC RX 637 (ALT 250 FOR IP): Performed by: INTERNAL MEDICINE

## 2024-03-29 PROCEDURE — 6360000002 HC RX W HCPCS

## 2024-03-29 PROCEDURE — 87641 MR-STAPH DNA AMP PROBE: CPT

## 2024-03-29 PROCEDURE — 36415 COLL VENOUS BLD VENIPUNCTURE: CPT

## 2024-03-29 PROCEDURE — 97530 THERAPEUTIC ACTIVITIES: CPT

## 2024-03-29 PROCEDURE — 6370000000 HC RX 637 (ALT 250 FOR IP): Performed by: PODIATRIST

## 2024-03-29 PROCEDURE — 2580000003 HC RX 258

## 2024-03-29 PROCEDURE — 2060000000 HC ICU INTERMEDIATE R&B

## 2024-03-29 PROCEDURE — 2580000003 HC RX 258: Performed by: INTERNAL MEDICINE

## 2024-03-29 PROCEDURE — 93926 LOWER EXTREMITY STUDY: CPT

## 2024-03-29 PROCEDURE — 6360000002 HC RX W HCPCS: Performed by: HOSPITALIST

## 2024-03-29 PROCEDURE — 80053 COMPREHEN METABOLIC PANEL: CPT

## 2024-03-29 PROCEDURE — 99223 1ST HOSP IP/OBS HIGH 75: CPT | Performed by: INTERNAL MEDICINE

## 2024-03-29 PROCEDURE — 83735 ASSAY OF MAGNESIUM: CPT

## 2024-03-29 PROCEDURE — 85025 COMPLETE CBC W/AUTO DIFF WBC: CPT

## 2024-03-29 PROCEDURE — 99233 SBSQ HOSP IP/OBS HIGH 50: CPT | Performed by: INTERNAL MEDICINE

## 2024-03-29 PROCEDURE — 84484 ASSAY OF TROPONIN QUANT: CPT

## 2024-03-29 PROCEDURE — 94760 N-INVAS EAR/PLS OXIMETRY 1: CPT

## 2024-03-29 RX ORDER — MAGNESIUM SULFATE IN WATER 40 MG/ML
4000 INJECTION, SOLUTION INTRAVENOUS ONCE
Status: COMPLETED | OUTPATIENT
Start: 2024-03-29 | End: 2024-03-29

## 2024-03-29 RX ORDER — MAGNESIUM SULFATE 1 G/100ML
1000 INJECTION INTRAVENOUS
Status: DISCONTINUED | OUTPATIENT
Start: 2024-03-29 | End: 2024-03-29

## 2024-03-29 RX ORDER — METRONIDAZOLE 500 MG/1
500 TABLET ORAL EVERY 8 HOURS SCHEDULED
Status: DISCONTINUED | OUTPATIENT
Start: 2024-03-29 | End: 2024-04-03 | Stop reason: HOSPADM

## 2024-03-29 RX ORDER — CARVEDILOL 3.12 MG/1
3.12 TABLET ORAL 2 TIMES DAILY WITH MEALS
Status: DISCONTINUED | OUTPATIENT
Start: 2024-03-29 | End: 2024-03-31

## 2024-03-29 RX ADMIN — HYDRALAZINE HYDROCHLORIDE 10 MG: 10 TABLET, FILM COATED ORAL at 14:29

## 2024-03-29 RX ADMIN — GABAPENTIN 300 MG: 300 CAPSULE ORAL at 11:33

## 2024-03-29 RX ADMIN — FUROSEMIDE 5 MG/HR: 10 INJECTION, SOLUTION INTRAMUSCULAR; INTRAVENOUS at 20:08

## 2024-03-29 RX ADMIN — MAGNESIUM SULFATE HEPTAHYDRATE 4000 MG: 40 INJECTION, SOLUTION INTRAVENOUS at 14:18

## 2024-03-29 RX ADMIN — CASTOR OIL AND BALSAM, PERU: 788; 87 OINTMENT TOPICAL at 11:38

## 2024-03-29 RX ADMIN — CEFEPIME 2000 MG: 2 INJECTION, POWDER, FOR SOLUTION INTRAVENOUS at 18:33

## 2024-03-29 RX ADMIN — MILRINONE LACTATE IN DEXTROSE 0.13 MCG/KG/MIN: 200 INJECTION, SOLUTION INTRAVENOUS at 18:22

## 2024-03-29 RX ADMIN — CARVEDILOL 3.12 MG: 3.12 TABLET, FILM COATED ORAL at 18:25

## 2024-03-29 RX ADMIN — Medication: at 13:24

## 2024-03-29 RX ADMIN — CLOPIDOGREL BISULFATE 75 MG: 75 TABLET ORAL at 11:33

## 2024-03-29 RX ADMIN — GABAPENTIN 300 MG: 300 CAPSULE ORAL at 20:07

## 2024-03-29 RX ADMIN — Medication 10 ML: at 12:21

## 2024-03-29 RX ADMIN — HYDRALAZINE HYDROCHLORIDE 10 MG: 10 TABLET, FILM COATED ORAL at 11:33

## 2024-03-29 RX ADMIN — ENOXAPARIN SODIUM 30 MG: 100 INJECTION SUBCUTANEOUS at 11:33

## 2024-03-29 RX ADMIN — INSULIN GLARGINE 8 UNITS: 100 INJECTION, SOLUTION SUBCUTANEOUS at 21:03

## 2024-03-29 RX ADMIN — CARVEDILOL 3.12 MG: 3.12 TABLET, FILM COATED ORAL at 11:50

## 2024-03-29 RX ADMIN — Medication 3 MG: at 20:07

## 2024-03-29 RX ADMIN — PANTOPRAZOLE SODIUM 40 MG: 40 TABLET, DELAYED RELEASE ORAL at 06:27

## 2024-03-29 RX ADMIN — ASPIRIN 81 MG: 81 TABLET, COATED ORAL at 11:33

## 2024-03-29 RX ADMIN — PANTOPRAZOLE SODIUM 40 MG: 40 TABLET, DELAYED RELEASE ORAL at 14:29

## 2024-03-29 RX ADMIN — METRONIDAZOLE 500 MG: 500 TABLET ORAL at 23:05

## 2024-03-29 RX ADMIN — ATORVASTATIN CALCIUM 40 MG: 40 TABLET, FILM COATED ORAL at 11:33

## 2024-03-29 RX ADMIN — CASTOR OIL AND BALSAM, PERU: 788; 87 OINTMENT TOPICAL at 20:07

## 2024-03-29 RX ADMIN — INSULIN LISPRO 2 UNITS: 100 INJECTION, SOLUTION INTRAVENOUS; SUBCUTANEOUS at 18:25

## 2024-03-29 ASSESSMENT — PAIN SCALES - GENERAL: PAINLEVEL_OUTOF10: 0

## 2024-03-29 NOTE — CONSULTS
Cardiovascular Consultation     Attending Physician: Jere Padilla MD    PATIENT: Juan Carlos Wong Sr  : 1965  MRN: [id number]    Reason for Consultation:   Chief Complaint   Patient presents with    Fall     Pt states slid out of chair onto his knees and hit his right eye on the floor at 0200. No LOC. Pt was unable to get up off the floor r/t bilat leg swelling x 3 weeks. Daughter helped pt off the floor at 1145. Home care nurse did left foot wound care today. Pt states Home Care nurse comes to his home 3 x a week. Pt A&O. Right eye swollen. No visual disturbances.      History of present illness:   Mr. Juan Carlos Wong Sr is a 59 y.o. male patient, known to [facility], admitted yesterday following fall at home.   Juan Carlos was admitted -24 at [facility] for sepsis with Influenza A and lower extremity cellulitis (LLE DFU). Echo was stable from previous without vegetations. Patient's previous ACEi was held for MIGUEL/CKD and ultimately not resumed. Pertinent medications on discharge were Aspirin, Plavix, Atorvastatin, Hydralazine, Carvedilol,Torsemide. Patient with home healthcare. Daughter found him after fall out of chair onto the floor. No syncope/LOC.   Reports bilateral LE edema progressing over preceding three weeks. Unknown weight trend.   Receiving antibiotics for concerns of cellulitis.     Medical History:      Diagnosis Date    Back pain     Diabetes mellitus (HCC)     Pneumonia        Surgical History:      Procedure Laterality Date    COLONOSCOPY      LEG SURGERY Left     LIVER BIOPSY         Social History:  Social History     Socioeconomic History    Marital status:      Spouse name: Not on file    Number of children: Not on file    Years of education: Not on file    Highest education level: Not on file   Occupational History    Not on file   Tobacco Use    Smoking status: Never    Smokeless tobacco: Never   Substance and Sexual Activity    
      Infectious Diseases Inpatient Consult Note      Reason for Consult:  Left complicated diabetic foot infection, WBC elevation    Requesting Physician:       Primary Care Physician:  Idris Laws MD    History Obtained From:  Epic  and pt     CHIEF COMPLAINT:     Chief Complaint   Patient presents with    Fall     Pt states slid out of chair onto his knees and hit his right eye on the floor at 0200. No LOC. Pt was unable to get up off the floor r/t bilat leg swelling x 3 weeks. Daughter helped pt off the floor at 1145. Home care nurse did left foot wound care today. Pt states Home Care nurse comes to his home 3 x a week. Pt A&O. Right eye swollen. No visual disturbances.          HISTORY OF PRESENT ILLNESS:  59 y.o. man with a significant history for diabetes, neuropathy, congestive heart failure, chronic kidney disease stage IIIb admitted to hospital following a fall.  Patient was sitting on the chair slid on his knees and down on the floor no report of loss of consciousness but was unable to get up from the floor family came to help him brought him into the hospital for further evaluation.  Patient has been dealing with the left foot ulceration ongoing from February 2024.  He was also found to have bilateral lower extremity edema with increasing weight gain and fluid retention labs indicate creatinine 2.8 lactic acid 1.3 CK9 22 BNP 35,000 LFT normal hemoglobin A1c at 8 WBC 15.8 on admission, blood cultures negative C. difficile toxin negative respiratory viral panel negative Tmax 100.5.  CT chest abdomen pelvis indicated third spacing of the fluid with consistent with fluid overload significantly enlarged groin lymph node right greater than the left with bilateral external iliac chain lymph nodes reactive  Left foot negative for osteomyelitis chest x-ray consistent with congestive heart failure CT head negative we are consulted for recommendations.  :        Past Medical History:    Past 
Clinical Pharmacy Note  Vancomycin Consult    Pharmacy consult received for one-time dose of vancomycin in the Emergency Department.    Ht Readings from Last 1 Encounters:   03/27/24 1.753 m (5' 9\")        Wt Readings from Last 1 Encounters:   03/27/24 99.1 kg (218 lb 7.6 oz)         Assessment/Plan:  Vancomycin 1500 mg x 1 in ED.  If vancomycin is to continue on admission and pharmacy is to manage dosing, please re-consult with admission orders.          
Comprehensive Nutrition Assessment    Type and Reason for Visit:  Patient Education, Wound    Nutrition Recommendations/Plan:   Continue current diet  Start Jamie BID.     Malnutrition Assessment:  Malnutrition Status:  Insufficient data (03/28/24 1009)    Context:  Acute Illness       Nutrition Assessment:    Currently in radiology. RD assessment for wounds. Pt. presented to the ED s/p fall. Pt. unable to get himself off the floor d/t bilateral lower extremity edema. Pt. is currently being treated for sepsis and was recently placed in droplet plus isolation for COVID rule out. Receiving a 3 carb/ cardiac diet. Meal intakes appear adequate, meal intake data is limited, day 1 of encounter. Increased nutrient needs d/t impaired skin integiry to foot and buttock. Recommend starting Jamie BID to promote adequate collagen production.  Consult per protocol for heart failure nutrition therapy received. Heart failure nutrition therapy handout placed in discharge instructions for Pt. to review. Will defer in person education at this time and reassess per nutrition standards of care.    Nutrition Related Findings:    Na 135, K 5.2, BUN 34, Cr 2.6, GFR 27, , Mg 1.70, Ca 8.0. LBM unknown. Skin w/ area to Lt. foot and buttock. Wound Type: Multiple, Diabetic Ulcer       Current Nutrition Intake & Therapies:    Average Meal Intake: %  Average Supplements Intake: None Ordered  ADULT DIET; Regular; 3 carb choices (45 gm/meal); Low Fat/Low Chol/High Fiber/2 gm Na; Low Sodium (2 gm); Low Potassium (Less than 3000 mg/day); 1500 ml    Anthropometric Measures:  Height: 175.3 cm (5' 9.02\")  Ideal Body Weight (IBW): 160 lbs (73 kg)       Current Body Weight: 92.7 kg (204 lb 5.9 oz), 127.7 % IBW.    Current BMI (kg/m2): 30.2                          BMI Categories: Obese Class 1 (BMI 30.0-34.9)    Estimated Daily Nutrient Needs:  Energy Requirements Based On: Kcal/kg  Weight Used for Energy Requirements: Current  Energy 
Full note dictated.    Patient seen at bedside.  Left plantar foot ulceration with fibro granular base. Patient had MRI at [facility] in February that was negative for OM.   Xrays taken on admission negative for OM. Soft tissue gas corresponds to area of ulceration.   Order written for local wound care daily with Cas.    Will follow    Shara Michelle DPM  [phone]      
HF RN consult noted per order set, chart reviewed. Pt came in due to fall/slipped to floor and BLE swelling x3 weeks. He was admitted with sepsis possibly due to diabetic foot ulcer as well as acute on chronic CHF. Pt does have a h/o systolic HF. He follows with Dr Justice at [facility]. Last office visit was 5/8/23, wt noted at 164 lbs. Pt's admit wt was 218 lbs. He had an OV with his PCP on 1/15 wt noted 188 lbs. Nonetheless, he is clearly above his dry weight. He is presently on Lasix gtt with both Cardiology and Nephrology following. Repeat echo pending. Appropriate HF orders are in place. Pt is active with [facility]. I will attempt to reach out to their liaison (it's after hours presently) to see what they have for pt's dry wt and when the last time he was seen. He might be a candidate for their remote telemedicine program. HF RN will follow along peripherally at this time while pt diureses. Continue routine HF education at bedside. I did add HF dc instructions to the AVS as well as to the BRUNO since active with [facility]. I did get a hospital f/u appt just in case he is discharged over the weekend but he will likely be her thru the weekend to jonathan. Will follow along.   
saturation.  Ferritin was 387.  B12 and folic acid were unremarkable.  Today's CBC shows the hemoglobin at 9.6.    History is taken largely from the electronic medical record as the patient is lethargic, arousable but not really answering questions    PAST MEDICAL HISTORY:      Past Medical History:   Diagnosis Date    Back pain     Diabetes mellitus (HCC)     Pneumonia        PAST SURGICAL HISTORY:      Past Surgical History:   Procedure Laterality Date    COLONOSCOPY      LEG SURGERY Left     LIVER BIOPSY         CURRENT MEDICATIONS:    Current Facility-Administered Medications   Medication Dose Route Frequency Provider Last Rate Last Admin    balsum peru-castor oil (VENELEX) ointment   Topical BID Mily Levi PA-C   Given at 03/28/24 1402    ceFEPIme (MAXIPIME) 2,000 mg in sodium chloride 0.9 % 100 mL IVPB (mini-bag)  2,000 mg IntraVENous Once Mily Levi PA-C        Followed by    [START ON 3/29/2024] ceFEPIme (MAXIPIME) 2,000 mg in sodium chloride 0.9 % 100 mL IVPB (mini-bag)  2,000 mg IntraVENous Q24H Mily Levi PA-C        magnesium oxide (MAG-OX) tablet 400 mg  400 mg Oral Once Mily Levi PA-C        collagenase ointment   Topical Daily Shara Michelle DPM        aspirin EC tablet 81 mg  81 mg Oral Daily Alvarez Salasd A, DO   81 mg at 03/28/24 0915    atorvastatin (LIPITOR) tablet 40 mg  40 mg Oral Daily Alvarez Salasd A, DO   40 mg at 03/28/24 0915    clopidogrel (PLAVIX) tablet 75 mg  75 mg Oral Daily Abbi Salasmad A, DO   75 mg at 03/28/24 0915    gabapentin (NEURONTIN) capsule 300 mg  300 mg Oral BID JosueAbbi javiermad A, DO   300 mg at 03/28/24 0915    hydrALAZINE (APRESOLINE) tablet 10 mg  10 mg Oral TID Abbi Salasmad A, DO        carvedilol (COREG) tablet 6.25 mg  6.25 mg Oral BID WC Abbi Salasmad A, DO   6.25 mg at 03/28/24 0915    insulin glargine (LANTUS) injection vial 8 Units  8 Units SubCUTAneous Nightly Alvarez Salasd A, DO   8 Units at 03/28/24 0035    pantoprazole 
was some concern for soft tissue emphysema, which corresponds to the area of ulceration on the plantar lateral foot.  The patient also has had a previous tibial aaron placed with the distal screws visible on his x-ray.  The patient did have an MRI done at a [facility] facility on February 7th, which was negative for any definitive osteomyelitis.    The patient has nonpalpable pedal pulses bilaterally.  He has pitting edema noted on the bilateral lower extremity of 2+.  He has absent pedal hair growth.  There are brawny pigmentary changes.    The patient's protective sensation as tested with a monofilament is absent at all sites tested on the bilateral lower extremities.    The patient has a previous partial hallux amputation on the right.  He has had a previous transmetatarsal amputation on the left.    There is a full-thickness skin ulceration noted on the plantar lateral aspect of the left foot.  The wound has a fibro-granular base.  It does not probe or track directly to bone.  There is no surrounding erythema, warmth, crepitus, or malodor associated with this ulceration.    ASSESSMENT:  Diabetic foot ulceration with concern for infection.    PLAN:    1. The patient was seen and evaluated at bedside.  2. Labs and imaging were reviewed.  The soft tissue emphysema seen on x-ray corresponds with the area of ulceration. As the patient's white count has resolved, there is a low index of suspicion for osteomyelitis, but we will follow along closely as this is a long-standing nonhealing ulceration.  3. An arterial duplex was ordered on the left lower extremity to ensure the patient has adequate peripheral circulation for healing.  4. Orders were written for local wound care with Santyl daily.  5. Agree with broad-spectrum antibiotic coverage until the etiology of this nonhealing ulcerations can be assessed.  Thank you for allowing me to participate in the care of this patient.  We will follow along closely while 
03/28/2024     Lab Results   Component Value Date    WBC 9.6 03/28/2024    HGB 9.6 (L) 03/28/2024    HCT 28.5 (L) 03/28/2024    MCV 90.9 03/28/2024     03/28/2024       ASSESSMENT     Patient Active Problem List   Diagnosis    Sepsis (HCC)       ASSESSMENT/PLAN:    # CKD Stage 3  - follows with Dr. Howell  - baseline serum creatinine 2.0-2.3   - at baseline  - check UPC  - monitor renal panel daily    # Edema  - weight gain and CXR consistent with CHF  - agree with IV lasix gtt   - was started without a bolus   - will add 80 mg IV x 1  - monitor response  - Cardiology to assess    # Leukocytosis  - on vanc and cetriaxone  - WBC improved  - recommend vanc levels daily if this medication to continue   - will check level in AM    # Hyperkalemia  - due to CKD and diet  - add low K diet   - should improve with diuresis  - add lokelma if persists    # Hypomagnesemia  - replete IV    # CKD MBD  - monitor phos    #HTN  - BP adequate.   - monitor with diuresis    Thank you!  Will follow with you      Available via [employer]/Doclink secure messaging

## 2024-03-29 NOTE — ACP (ADVANCE CARE PLANNING)
Advance Care Planning     Advance Care Planning Inpatient Note  [facility] Department    Today's Date: 3/29/2024  Unit: [facility] 5W PROGRESSIVE CARE    Received request from HealthCare Provider.  Upon review of chart and communication with care team, patient's decision making abilities are not in question.. Patient was/were present in the room during visit.    Goals of ACP Conversation:  Discuss advance care planning documents    Health Care Decision Makers:     No healthcare decision makers have been documented.  Click here to complete HealthCare Decision Makers including selection of the Healthcare Decision Maker Relationship (ie \"Primary\")  Summary:  No Decision Maker named by patient at this time    Advance Care Planning Documents (Patient Wishes):  None     Assessment:  Pt said he has 7 children but has one daughter he would like to discuss the AD docs with before completing them.    Interventions:  Provided education on documents for clarity and greater understanding  Discussed and provided education on state decision maker hierarchy  Encouraged ongoing ACP conversation with future decision makers and loved ones  Reviewed but did not complete ACP document    Care Preferences Communicated:   No    Outcomes/Plan:  ACP Discussion: Postponed  Pt to follow-up    Electronically signed by Chaplain Christel on 3/29/2024 at 3:11 PM

## 2024-03-30 LAB
ALBUMIN SERPL-MCNC: 2.1 G/DL (ref 3.4–5)
ALBUMIN/GLOB SERPL: 0.5 {RATIO} (ref 1.1–2.2)
ALP SERPL-CCNC: 181 U/L (ref 40–129)
ALT SERPL-CCNC: 11 U/L (ref 10–40)
ANION GAP SERPL CALCULATED.3IONS-SCNC: 12 MMOL/L (ref 3–16)
AST SERPL-CCNC: 16 U/L (ref 15–37)
BASOPHILS # BLD: 0 K/UL (ref 0–0.2)
BASOPHILS NFR BLD: 0.5 %
BILIRUB SERPL-MCNC: 0.4 MG/DL (ref 0–1)
BUN SERPL-MCNC: 36 MG/DL (ref 7–20)
CALCIUM SERPL-MCNC: 7.7 MG/DL (ref 8.3–10.6)
CHLORIDE SERPL-SCNC: 100 MMOL/L (ref 99–110)
CO2 SERPL-SCNC: 20 MMOL/L (ref 21–32)
CREAT SERPL-MCNC: 2.8 MG/DL (ref 0.8–1.3)
CRP SERPL-MCNC: 67.6 MG/L (ref 0–5.1)
DEPRECATED RDW RBC AUTO: 17.6 % (ref 12.4–15.4)
EOSINOPHIL # BLD: 0.7 K/UL (ref 0–0.6)
EOSINOPHIL NFR BLD: 8.4 %
ERYTHROCYTE [SEDIMENTATION RATE] IN BLOOD BY WESTERGREN METHOD: 27 MM/HR (ref 0–20)
GFR SERPLBLD CREATININE-BSD FMLA CKD-EPI: 25 ML/MIN/{1.73_M2}
GLUCOSE BLD-MCNC: 193 MG/DL (ref 70–99)
GLUCOSE BLD-MCNC: 195 MG/DL (ref 70–99)
GLUCOSE BLD-MCNC: 213 MG/DL (ref 70–99)
GLUCOSE BLD-MCNC: 238 MG/DL (ref 70–99)
GLUCOSE SERPL-MCNC: 235 MG/DL (ref 70–99)
HCT VFR BLD AUTO: 33.5 % (ref 40.5–52.5)
HGB BLD-MCNC: 11.3 G/DL (ref 13.5–17.5)
LYMPHOCYTES # BLD: 0.9 K/UL (ref 1–5.1)
LYMPHOCYTES NFR BLD: 9.9 %
MAGNESIUM SERPL-MCNC: 2.4 MG/DL (ref 1.8–2.4)
MCH RBC QN AUTO: 30.3 PG (ref 26–34)
MCHC RBC AUTO-ENTMCNC: 33.7 G/DL (ref 31–36)
MCV RBC AUTO: 89.7 FL (ref 80–100)
MONOCYTES # BLD: 0.6 K/UL (ref 0–1.3)
MONOCYTES NFR BLD: 7.1 %
MRSA DNA SPEC QL NAA+PROBE: NORMAL
NEUTROPHILS # BLD: 6.4 K/UL (ref 1.7–7.7)
NEUTROPHILS NFR BLD: 74.1 %
NT-PROBNP SERPL-MCNC: ABNORMAL PG/ML (ref 0–124)
PERFORMED ON: ABNORMAL
PHOSPHATE SERPL-MCNC: 3.6 MG/DL (ref 2.5–4.9)
PLATELET # BLD AUTO: 186 K/UL (ref 135–450)
PMV BLD AUTO: 9.1 FL (ref 5–10.5)
POTASSIUM SERPL-SCNC: 4.2 MMOL/L (ref 3.5–5.1)
POTASSIUM SERPL-SCNC: 4.2 MMOL/L (ref 3.5–5.1)
PROT SERPL-MCNC: 6.4 G/DL (ref 6.4–8.2)
RBC # BLD AUTO: 3.73 M/UL (ref 4.2–5.9)
REASON FOR REJECTION: NORMAL
REJECTED TEST: NORMAL
SODIUM SERPL-SCNC: 132 MMOL/L (ref 136–145)
WBC # BLD AUTO: 8.6 K/UL (ref 4–11)

## 2024-03-30 PROCEDURE — 2580000003 HC RX 258: Performed by: INTERNAL MEDICINE

## 2024-03-30 PROCEDURE — 80053 COMPREHEN METABOLIC PANEL: CPT

## 2024-03-30 PROCEDURE — 85025 COMPLETE CBC W/AUTO DIFF WBC: CPT

## 2024-03-30 PROCEDURE — 2060000000 HC ICU INTERMEDIATE R&B

## 2024-03-30 PROCEDURE — 6370000000 HC RX 637 (ALT 250 FOR IP): Performed by: INTERNAL MEDICINE

## 2024-03-30 PROCEDURE — 85652 RBC SED RATE AUTOMATED: CPT

## 2024-03-30 PROCEDURE — 36415 COLL VENOUS BLD VENIPUNCTURE: CPT

## 2024-03-30 PROCEDURE — 86140 C-REACTIVE PROTEIN: CPT

## 2024-03-30 PROCEDURE — 83880 ASSAY OF NATRIURETIC PEPTIDE: CPT

## 2024-03-30 PROCEDURE — 6360000002 HC RX W HCPCS: Performed by: INTERNAL MEDICINE

## 2024-03-30 PROCEDURE — 2580000003 HC RX 258

## 2024-03-30 PROCEDURE — 83735 ASSAY OF MAGNESIUM: CPT

## 2024-03-30 PROCEDURE — 6360000002 HC RX W HCPCS

## 2024-03-30 PROCEDURE — 6360000002 HC RX W HCPCS: Performed by: HOSPITALIST

## 2024-03-30 PROCEDURE — 99232 SBSQ HOSP IP/OBS MODERATE 35: CPT | Performed by: INTERNAL MEDICINE

## 2024-03-30 RX ADMIN — FUROSEMIDE 5 MG/HR: 10 INJECTION, SOLUTION INTRAMUSCULAR; INTRAVENOUS at 18:40

## 2024-03-30 RX ADMIN — CASTOR OIL AND BALSAM, PERU: 788; 87 OINTMENT TOPICAL at 20:56

## 2024-03-30 RX ADMIN — Medication: at 10:15

## 2024-03-30 RX ADMIN — ASPIRIN 81 MG: 81 TABLET, COATED ORAL at 09:33

## 2024-03-30 RX ADMIN — ATORVASTATIN CALCIUM 40 MG: 40 TABLET, FILM COATED ORAL at 09:32

## 2024-03-30 RX ADMIN — CEFEPIME 2000 MG: 2 INJECTION, POWDER, FOR SOLUTION INTRAVENOUS at 18:40

## 2024-03-30 RX ADMIN — HYDRALAZINE HYDROCHLORIDE 10 MG: 10 TABLET, FILM COATED ORAL at 13:52

## 2024-03-30 RX ADMIN — INSULIN GLARGINE 8 UNITS: 100 INJECTION, SOLUTION SUBCUTANEOUS at 20:58

## 2024-03-30 RX ADMIN — CARVEDILOL 3.12 MG: 3.12 TABLET, FILM COATED ORAL at 09:32

## 2024-03-30 RX ADMIN — HYDRALAZINE HYDROCHLORIDE 10 MG: 10 TABLET, FILM COATED ORAL at 09:34

## 2024-03-30 RX ADMIN — Medication 10 ML: at 09:34

## 2024-03-30 RX ADMIN — METRONIDAZOLE 500 MG: 500 TABLET ORAL at 05:15

## 2024-03-30 RX ADMIN — CLOPIDOGREL BISULFATE 75 MG: 75 TABLET ORAL at 09:32

## 2024-03-30 RX ADMIN — PANTOPRAZOLE SODIUM 40 MG: 40 TABLET, DELAYED RELEASE ORAL at 05:20

## 2024-03-30 RX ADMIN — CARVEDILOL 3.12 MG: 3.12 TABLET, FILM COATED ORAL at 18:41

## 2024-03-30 RX ADMIN — METRONIDAZOLE 500 MG: 500 TABLET ORAL at 20:57

## 2024-03-30 RX ADMIN — Medication 3 MG: at 20:57

## 2024-03-30 RX ADMIN — GABAPENTIN 300 MG: 300 CAPSULE ORAL at 20:57

## 2024-03-30 RX ADMIN — GABAPENTIN 300 MG: 300 CAPSULE ORAL at 09:32

## 2024-03-30 RX ADMIN — ENOXAPARIN SODIUM 30 MG: 100 INJECTION SUBCUTANEOUS at 09:33

## 2024-03-30 RX ADMIN — INSULIN LISPRO 2 UNITS: 100 INJECTION, SOLUTION INTRAVENOUS; SUBCUTANEOUS at 13:50

## 2024-03-30 RX ADMIN — METRONIDAZOLE 500 MG: 500 TABLET ORAL at 13:51

## 2024-03-30 RX ADMIN — CASTOR OIL AND BALSAM, PERU: 788; 87 OINTMENT TOPICAL at 10:15

## 2024-03-30 RX ADMIN — MILRINONE LACTATE IN DEXTROSE 0.13 MCG/KG/MIN: 200 INJECTION, SOLUTION INTRAVENOUS at 20:56

## 2024-03-31 LAB
ALBUMIN SERPL-MCNC: 2.2 G/DL (ref 3.4–5)
ANION GAP SERPL CALCULATED.3IONS-SCNC: 10 MMOL/L (ref 3–16)
BACTERIA BLD CULT ORG #2: NORMAL
BACTERIA BLD CULT: NORMAL
BUN SERPL-MCNC: 33 MG/DL (ref 7–20)
CALCIUM SERPL-MCNC: 7.8 MG/DL (ref 8.3–10.6)
CHLORIDE SERPL-SCNC: 100 MMOL/L (ref 99–110)
CO2 SERPL-SCNC: 23 MMOL/L (ref 21–32)
CREAT SERPL-MCNC: 2.2 MG/DL (ref 0.8–1.3)
GFR SERPLBLD CREATININE-BSD FMLA CKD-EPI: 33 ML/MIN/{1.73_M2}
GLUCOSE BLD-MCNC: 152 MG/DL (ref 70–99)
GLUCOSE BLD-MCNC: 202 MG/DL (ref 70–99)
GLUCOSE BLD-MCNC: 224 MG/DL (ref 70–99)
GLUCOSE BLD-MCNC: 319 MG/DL (ref 70–99)
GLUCOSE SERPL-MCNC: 175 MG/DL (ref 70–99)
MAGNESIUM SERPL-MCNC: 2.2 MG/DL (ref 1.8–2.4)
PERFORMED ON: ABNORMAL
PHOSPHATE SERPL-MCNC: 3.2 MG/DL (ref 2.5–4.9)
POTASSIUM SERPL-SCNC: 3.6 MMOL/L (ref 3.5–5.1)
SODIUM SERPL-SCNC: 133 MMOL/L (ref 136–145)

## 2024-03-31 PROCEDURE — 80069 RENAL FUNCTION PANEL: CPT

## 2024-03-31 PROCEDURE — 94760 N-INVAS EAR/PLS OXIMETRY 1: CPT

## 2024-03-31 PROCEDURE — 6370000000 HC RX 637 (ALT 250 FOR IP): Performed by: PODIATRIST

## 2024-03-31 PROCEDURE — 83735 ASSAY OF MAGNESIUM: CPT

## 2024-03-31 PROCEDURE — 6360000002 HC RX W HCPCS

## 2024-03-31 PROCEDURE — 6370000000 HC RX 637 (ALT 250 FOR IP): Performed by: INTERNAL MEDICINE

## 2024-03-31 PROCEDURE — 2580000003 HC RX 258

## 2024-03-31 PROCEDURE — 6360000002 HC RX W HCPCS: Performed by: HOSPITALIST

## 2024-03-31 PROCEDURE — 99233 SBSQ HOSP IP/OBS HIGH 50: CPT | Performed by: INTERNAL MEDICINE

## 2024-03-31 PROCEDURE — 2580000003 HC RX 258: Performed by: INTERNAL MEDICINE

## 2024-03-31 PROCEDURE — 36415 COLL VENOUS BLD VENIPUNCTURE: CPT

## 2024-03-31 PROCEDURE — 2060000000 HC ICU INTERMEDIATE R&B

## 2024-03-31 RX ORDER — SPIRONOLACTONE 25 MG/1
12.5 TABLET ORAL DAILY
Status: DISCONTINUED | OUTPATIENT
Start: 2024-03-31 | End: 2024-04-03 | Stop reason: HOSPADM

## 2024-03-31 RX ORDER — CARVEDILOL 6.25 MG/1
6.25 TABLET ORAL 2 TIMES DAILY WITH MEALS
Status: DISCONTINUED | OUTPATIENT
Start: 2024-03-31 | End: 2024-04-01

## 2024-03-31 RX ADMIN — INSULIN LISPRO 2 UNITS: 100 INJECTION, SOLUTION INTRAVENOUS; SUBCUTANEOUS at 18:51

## 2024-03-31 RX ADMIN — CARVEDILOL 6.25 MG: 6.25 TABLET, FILM COATED ORAL at 18:51

## 2024-03-31 RX ADMIN — ASPIRIN 81 MG: 81 TABLET, COATED ORAL at 10:42

## 2024-03-31 RX ADMIN — METRONIDAZOLE 500 MG: 500 TABLET ORAL at 21:08

## 2024-03-31 RX ADMIN — CEFEPIME 2000 MG: 2 INJECTION, POWDER, FOR SOLUTION INTRAVENOUS at 18:50

## 2024-03-31 RX ADMIN — Medication 10 ML: at 23:36

## 2024-03-31 RX ADMIN — SPIRONOLACTONE 12.5 MG: 25 TABLET ORAL at 18:51

## 2024-03-31 RX ADMIN — GABAPENTIN 300 MG: 300 CAPSULE ORAL at 21:08

## 2024-03-31 RX ADMIN — INSULIN GLARGINE 8 UNITS: 100 INJECTION, SOLUTION SUBCUTANEOUS at 21:08

## 2024-03-31 RX ADMIN — Medication 3 MG: at 21:08

## 2024-03-31 RX ADMIN — CLOPIDOGREL BISULFATE 75 MG: 75 TABLET ORAL at 10:42

## 2024-03-31 RX ADMIN — HYDRALAZINE HYDROCHLORIDE 10 MG: 10 TABLET, FILM COATED ORAL at 10:42

## 2024-03-31 RX ADMIN — INSULIN LISPRO 6 UNITS: 100 INJECTION, SOLUTION INTRAVENOUS; SUBCUTANEOUS at 12:33

## 2024-03-31 RX ADMIN — ATORVASTATIN CALCIUM 40 MG: 40 TABLET, FILM COATED ORAL at 10:42

## 2024-03-31 RX ADMIN — Medication: at 23:31

## 2024-03-31 RX ADMIN — CARVEDILOL 3.12 MG: 3.12 TABLET, FILM COATED ORAL at 10:47

## 2024-03-31 RX ADMIN — METRONIDAZOLE 500 MG: 500 TABLET ORAL at 15:04

## 2024-03-31 RX ADMIN — ENOXAPARIN SODIUM 30 MG: 100 INJECTION SUBCUTANEOUS at 10:43

## 2024-03-31 RX ADMIN — PANTOPRAZOLE SODIUM 40 MG: 40 TABLET, DELAYED RELEASE ORAL at 05:51

## 2024-03-31 RX ADMIN — PANTOPRAZOLE SODIUM 40 MG: 40 TABLET, DELAYED RELEASE ORAL at 15:04

## 2024-03-31 RX ADMIN — METRONIDAZOLE 500 MG: 500 TABLET ORAL at 05:51

## 2024-03-31 RX ADMIN — GABAPENTIN 300 MG: 300 CAPSULE ORAL at 10:42

## 2024-04-01 LAB
ALBUMIN SERPL-MCNC: 2.2 G/DL (ref 3.4–5)
ANION GAP SERPL CALCULATED.3IONS-SCNC: 9 MMOL/L (ref 3–16)
BASOPHILS # BLD: 0 K/UL (ref 0–0.2)
BASOPHILS NFR BLD: 0.4 %
BUN SERPL-MCNC: 30 MG/DL (ref 7–20)
CALCIUM SERPL-MCNC: 7.6 MG/DL (ref 8.3–10.6)
CHLORIDE SERPL-SCNC: 97 MMOL/L (ref 99–110)
CO2 SERPL-SCNC: 24 MMOL/L (ref 21–32)
CREAT SERPL-MCNC: 2.2 MG/DL (ref 0.8–1.3)
DEPRECATED RDW RBC AUTO: 17 % (ref 12.4–15.4)
EOSINOPHIL # BLD: 0.5 K/UL (ref 0–0.6)
EOSINOPHIL NFR BLD: 7 %
GFR SERPLBLD CREATININE-BSD FMLA CKD-EPI: 33 ML/MIN/{1.73_M2}
GLUCOSE BLD-MCNC: 182 MG/DL (ref 70–99)
GLUCOSE BLD-MCNC: 243 MG/DL (ref 70–99)
GLUCOSE BLD-MCNC: 278 MG/DL (ref 70–99)
GLUCOSE BLD-MCNC: 280 MG/DL (ref 70–99)
GLUCOSE SERPL-MCNC: 297 MG/DL (ref 70–99)
HCT VFR BLD AUTO: 32.7 % (ref 40.5–52.5)
HGB BLD-MCNC: 11.1 G/DL (ref 13.5–17.5)
LYMPHOCYTES # BLD: 1 K/UL (ref 1–5.1)
LYMPHOCYTES NFR BLD: 13.7 %
MAGNESIUM SERPL-MCNC: 1.8 MG/DL (ref 1.8–2.4)
MCH RBC QN AUTO: 29.8 PG (ref 26–34)
MCHC RBC AUTO-ENTMCNC: 34 G/DL (ref 31–36)
MCV RBC AUTO: 87.7 FL (ref 80–100)
MONOCYTES # BLD: 0.6 K/UL (ref 0–1.3)
MONOCYTES NFR BLD: 8.2 %
NEUTROPHILS # BLD: 4.9 K/UL (ref 1.7–7.7)
NEUTROPHILS NFR BLD: 70.7 %
PERFORMED ON: ABNORMAL
PHOSPHATE SERPL-MCNC: 2.8 MG/DL (ref 2.5–4.9)
PLATELET # BLD AUTO: 201 K/UL (ref 135–450)
PMV BLD AUTO: 9.1 FL (ref 5–10.5)
POTASSIUM SERPL-SCNC: 3.6 MMOL/L (ref 3.5–5.1)
RBC # BLD AUTO: 3.73 M/UL (ref 4.2–5.9)
SODIUM SERPL-SCNC: 130 MMOL/L (ref 136–145)
WBC # BLD AUTO: 7 K/UL (ref 4–11)

## 2024-04-01 PROCEDURE — 99232 SBSQ HOSP IP/OBS MODERATE 35: CPT | Performed by: INTERNAL MEDICINE

## 2024-04-01 PROCEDURE — 85025 COMPLETE CBC W/AUTO DIFF WBC: CPT

## 2024-04-01 PROCEDURE — 6370000000 HC RX 637 (ALT 250 FOR IP): Performed by: INTERNAL MEDICINE

## 2024-04-01 PROCEDURE — 97530 THERAPEUTIC ACTIVITIES: CPT

## 2024-04-01 PROCEDURE — 1200000000 HC SEMI PRIVATE

## 2024-04-01 PROCEDURE — 80069 RENAL FUNCTION PANEL: CPT

## 2024-04-01 PROCEDURE — 99232 SBSQ HOSP IP/OBS MODERATE 35: CPT | Performed by: NURSE PRACTITIONER

## 2024-04-01 PROCEDURE — 83735 ASSAY OF MAGNESIUM: CPT

## 2024-04-01 PROCEDURE — 6370000000 HC RX 637 (ALT 250 FOR IP)

## 2024-04-01 PROCEDURE — 6370000000 HC RX 637 (ALT 250 FOR IP): Performed by: NURSE PRACTITIONER

## 2024-04-01 PROCEDURE — 94760 N-INVAS EAR/PLS OXIMETRY 1: CPT

## 2024-04-01 PROCEDURE — 6360000002 HC RX W HCPCS

## 2024-04-01 PROCEDURE — 2580000003 HC RX 258: Performed by: INTERNAL MEDICINE

## 2024-04-01 PROCEDURE — 6360000002 HC RX W HCPCS: Performed by: HOSPITALIST

## 2024-04-01 PROCEDURE — 36415 COLL VENOUS BLD VENIPUNCTURE: CPT

## 2024-04-01 PROCEDURE — 2580000003 HC RX 258

## 2024-04-01 RX ORDER — INSULIN LISPRO 100 [IU]/ML
0-4 INJECTION, SOLUTION INTRAVENOUS; SUBCUTANEOUS NIGHTLY
Status: DISCONTINUED | OUTPATIENT
Start: 2024-04-01 | End: 2024-04-03 | Stop reason: HOSPADM

## 2024-04-01 RX ORDER — INSULIN LISPRO 100 [IU]/ML
0-16 INJECTION, SOLUTION INTRAVENOUS; SUBCUTANEOUS
Status: DISCONTINUED | OUTPATIENT
Start: 2024-04-01 | End: 2024-04-03

## 2024-04-01 RX ORDER — ISOSORBIDE MONONITRATE 30 MG/1
15 TABLET, EXTENDED RELEASE ORAL DAILY
Status: DISCONTINUED | OUTPATIENT
Start: 2024-04-01 | End: 2024-04-03 | Stop reason: HOSPADM

## 2024-04-01 RX ORDER — TORSEMIDE 20 MG/1
20 TABLET ORAL DAILY
Status: DISCONTINUED | OUTPATIENT
Start: 2024-04-01 | End: 2024-04-03 | Stop reason: HOSPADM

## 2024-04-01 RX ORDER — CARVEDILOL 12.5 MG/1
12.5 TABLET ORAL 2 TIMES DAILY WITH MEALS
Status: DISCONTINUED | OUTPATIENT
Start: 2024-04-01 | End: 2024-04-03 | Stop reason: HOSPADM

## 2024-04-01 RX ADMIN — ISOSORBIDE MONONITRATE 15 MG: 30 TABLET, EXTENDED RELEASE ORAL at 13:32

## 2024-04-01 RX ADMIN — ENOXAPARIN SODIUM 30 MG: 100 INJECTION SUBCUTANEOUS at 08:41

## 2024-04-01 RX ADMIN — Medication 3 MG: at 20:35

## 2024-04-01 RX ADMIN — CASTOR OIL AND BALSAM, PERU: 788; 87 OINTMENT TOPICAL at 00:36

## 2024-04-01 RX ADMIN — CARVEDILOL 6.25 MG: 6.25 TABLET, FILM COATED ORAL at 08:43

## 2024-04-01 RX ADMIN — HYDRALAZINE HYDROCHLORIDE 10 MG: 10 TABLET, FILM COATED ORAL at 20:36

## 2024-04-01 RX ADMIN — CLOPIDOGREL BISULFATE 75 MG: 75 TABLET ORAL at 08:43

## 2024-04-01 RX ADMIN — SPIRONOLACTONE 12.5 MG: 25 TABLET ORAL at 08:42

## 2024-04-01 RX ADMIN — CEFEPIME 2000 MG: 2 INJECTION, POWDER, FOR SOLUTION INTRAVENOUS at 18:45

## 2024-04-01 RX ADMIN — ASPIRIN 81 MG: 81 TABLET, COATED ORAL at 08:42

## 2024-04-01 RX ADMIN — INSULIN LISPRO 8 UNITS: 100 INJECTION, SOLUTION INTRAVENOUS; SUBCUTANEOUS at 12:49

## 2024-04-01 RX ADMIN — INSULIN GLARGINE 8 UNITS: 100 INJECTION, SOLUTION SUBCUTANEOUS at 20:36

## 2024-04-01 RX ADMIN — PANTOPRAZOLE SODIUM 40 MG: 40 TABLET, DELAYED RELEASE ORAL at 16:04

## 2024-04-01 RX ADMIN — INSULIN LISPRO 4 UNITS: 100 INJECTION, SOLUTION INTRAVENOUS; SUBCUTANEOUS at 08:41

## 2024-04-01 RX ADMIN — CARVEDILOL 12.5 MG: 12.5 TABLET, FILM COATED ORAL at 18:46

## 2024-04-01 RX ADMIN — GABAPENTIN 300 MG: 300 CAPSULE ORAL at 20:35

## 2024-04-01 RX ADMIN — METRONIDAZOLE 500 MG: 500 TABLET ORAL at 06:22

## 2024-04-01 RX ADMIN — METRONIDAZOLE 500 MG: 500 TABLET ORAL at 20:36

## 2024-04-01 RX ADMIN — METRONIDAZOLE 500 MG: 500 TABLET ORAL at 13:32

## 2024-04-01 RX ADMIN — CASTOR OIL AND BALSAM, PERU: 788; 87 OINTMENT TOPICAL at 08:44

## 2024-04-01 RX ADMIN — ATORVASTATIN CALCIUM 40 MG: 40 TABLET, FILM COATED ORAL at 08:42

## 2024-04-01 RX ADMIN — EMPAGLIFLOZIN 10 MG: 10 TABLET, FILM COATED ORAL at 08:42

## 2024-04-01 RX ADMIN — PANTOPRAZOLE SODIUM 40 MG: 40 TABLET, DELAYED RELEASE ORAL at 06:22

## 2024-04-01 RX ADMIN — TORSEMIDE 20 MG: 20 TABLET ORAL at 13:32

## 2024-04-01 RX ADMIN — GABAPENTIN 300 MG: 300 CAPSULE ORAL at 08:42

## 2024-04-01 RX ADMIN — Medication 10 ML: at 08:45

## 2024-04-01 ASSESSMENT — PAIN SCALES - GENERAL
PAINLEVEL_OUTOF10: 0
PAINLEVEL_OUTOF10: 0

## 2024-04-01 NOTE — FLOWSHEET NOTE
04/01/24 1211   Treatment Team Notification   Reason for Communication Evaluate  (\" pt's blood sugar looks like it's been pretty uncontrolled. Was 278 for breakfast and got 4 units of humalog, and now its 280 for lunch. Do you think we should increase his sliding scale?\")   Name of Team Member Notified Mily Levi PA   Treatment Team Role Physician Assistant   Method of Communication Secure Message   Response Waiting for response   Notification Time 1211     See new orders for increased sliding scale humalog.

## 2024-04-01 NOTE — CARE COORDINATION
Case Management Assessment  Initial Evaluation    Date/Time of Evaluation: 4/1/2024 2:54 PM  Assessment Completed by: Marianne Live RN    If patient is discharged prior to next notation, then this note serves as note for discharge by case management.    Patient Name: Juan Carlos Wong Sr                   YOB: 1965  Diagnosis: Dehydration [E86.0]  Dependent edema [R60.9]  Septicemia (HCC) [A41.9]  Generalized weakness [R53.1]  Contusion of face, initial encounter [S00.83XA]  Fall, initial encounter [W19.XXXA]  Sepsis (HCC) [A41.9]  Non-traumatic rhabdomyolysis [M62.82]  Pneumonia due to infectious organism, unspecified laterality, unspecified part of lung [J18.9]  Acute on chronic congestive heart failure, unspecified heart failure type (HCC) [I50.9]                   Date / Time: 3/27/2024  5:22 PM    Patient Admission Status: Inpatient   Readmission Risk (Low < 19, Mod (19-27), High > 27): Readmission Risk Score: 15.5    Current PCP: Idris Laws MD  PCP verified by ? Yes    Chart Reviewed: Yes      History Provided by: Patient  Patient Orientation: Alert and Oriented    Patient Cognition: Alert    Hospitalization in the last 30 days (Readmission):  No    If yes, Readmission Assessment in  Navigator will be completed.    Advance Directives:      Code Status: Full Code   Patient's Primary Decision Maker is: Legal Next of Kin    Primary Decision Maker: Elmira Plunkett - Child - [phone]    Discharge Planning:    Patient lives with: Alone Type of Home: Apartment  Primary Care Giver: Self  Patient Support Systems include: Children, Family Members, Friends/Neighbors   Current Financial resources: Medicare  Current community resources: ECF/Home Care  Current services prior to admission: Home Care, Durable Medical Equipment            Current DME: Cane, Walker, Wheelchair            Type of Home Care services:  Nursing Services, OT, PT    ADLS  Prior functional level: Assistance with

## 2024-04-01 NOTE — ACP (ADVANCE CARE PLANNING)
Advance Care Planning     Advance Care Planning Activator (Inpatient)  Conversation Note      Date of ACP Conversation: 4/1/2024     Conversation Conducted with: Patient with Decision Making Capacity    ACP Activator: Marianne Live RN    Health Care Decision Maker:     Current Designated Health Care Decision Maker:     Primary Decision Maker: DimitriosElmira - Child - [phone]    Today we documented Decision Maker(s) consistent with Legal Next of Kin hierarchy.    Care Preferences    Ventilation:  \"If you were in your present state of health and suddenly became very ill and were unable to breathe on your own, what would your preference be about the use of a ventilator (breathing machine) if it were available to you?\"      Would the patient desire the use of ventilator (breathing machine)?: yes    \"If your health worsens and it becomes clear that your chance of recovery is unlikely, what would your preference be about the use of a ventilator (breathing machine) if it were available to you?\"     Would the patient desire the use of ventilator (breathing machine)?: Yes      Resuscitation  \"CPR works best to restart the heart when there is a sudden event, like a heart attack, in someone who is otherwise healthy. Unfortunately, CPR does not typically restart the heart for people who have serious health conditions or who are very sick.\"    \"In the event your heart stopped as a result of an underlying serious health condition, would you want attempts to be made to restart your heart (answer \"yes\" for attempt to resuscitate) or would you prefer a natural death (answer \"no\" for do not attempt to resuscitate)?\" yes       [] Yes   [] No   Educated Patient / Decision Maker regarding differences between Advance Directives and portable DNR orders.    Length of ACP Conversation in minutes:      Conversation Outcomes:  ACP discussion completed    Follow-up plan:    [] Schedule follow-up conversation to continue planning  []

## 2024-04-01 NOTE — CARE COORDINATION
[facility] Nurse  Follow-up Progress Note       NAME:  Juan Carlos Wong Sr  MEDICAL RECORD NUMBER:  [id number]  AGE:  59 y.o.   GENDER:  male  :  1965  TODAY'S DATE:  2024    Subjective:   Pt seen for follow-up. Right buttock area resolved.  Will not cont to follow. Please re-consult if needed.  Objective:    /77   Pulse 80   Temp 98.2 °F (36.8 °C) (Oral)   Resp 17   Ht 1.753 m (5' 9.02\")   Wt 81.5 kg (179 lb 10.8 oz)   SpO2 95%   BMI 26.52 kg/m²   Jaxon Risk Score: Jaxon Scale Score: 20  Assessment:   Measurements:      Response to treatment:  Well tolerated by patient.     Pain Assessment:  Severity:  0 / 10    Plan:   Plan of Care:   -cont current tx  Will not cont to follow. Please re-consult if needed.    Specialty Bed Required : Yes   [] Low Air Loss   [x] Pressure Redistribution -Isoflex  [] Fluid Immersion  [] Bariatric  [] Total Pressure Relief  [] Other:     Current Diet: ADULT DIET; Regular; 3 carb choices (45 gm/meal); Low Fat/Low Chol/High Fiber/2 gm Na; Low Sodium (2 gm); Low Potassium (Less than 3000 mg/day); 1500 ml  ADULT ORAL NUTRITION SUPPLEMENT; Lunch, Dinner; Wound Healing Oral Supplement  Dietician consult:  Yes    Discharge Plan:  Placement for patient upon discharge: home with support   Patient appropriate for Outpatient Wound Care Center: Yes-[facility]    Referrals:  [x]   [] Home Health Care  [] Supplies  [] Other    Patient/Caregiver Teaching:  Level of patient/caregiver understanding able to:   [] Indicates understanding       [] Needs reinforcement  [] Unsuccessful      [x] Verbal Understanding  [] Demonstrated understanding       [] No evidence of learning  [] Refused teaching         [] N/A       Electronically signed by LILIA Jewell on 2024 at 2:42 PM

## 2024-04-02 ENCOUNTER — APPOINTMENT (OUTPATIENT)
Dept: CARDIAC CATH/INVASIVE PROCEDURES | Age: 59
DRG: 871 | End: 2024-04-02
Payer: COMMERCIAL

## 2024-04-02 LAB
ALBUMIN SERPL-MCNC: 2.2 G/DL (ref 3.4–5)
ANION GAP SERPL CALCULATED.3IONS-SCNC: 11 MMOL/L (ref 3–16)
BUN SERPL-MCNC: 28 MG/DL (ref 7–20)
CALCIUM SERPL-MCNC: 8 MG/DL (ref 8.3–10.6)
CHLORIDE SERPL-SCNC: 99 MMOL/L (ref 99–110)
CO2 SERPL-SCNC: 24 MMOL/L (ref 21–32)
CREAT SERPL-MCNC: 2.6 MG/DL (ref 0.8–1.3)
GFR SERPLBLD CREATININE-BSD FMLA CKD-EPI: 27 ML/MIN/{1.73_M2}
GLUCOSE BLD-MCNC: 208 MG/DL (ref 70–99)
GLUCOSE BLD-MCNC: 217 MG/DL (ref 70–99)
GLUCOSE BLD-MCNC: 241 MG/DL (ref 70–99)
GLUCOSE BLD-MCNC: 273 MG/DL (ref 70–99)
GLUCOSE BLD-MCNC: 316 MG/DL (ref 70–99)
GLUCOSE SERPL-MCNC: 276 MG/DL (ref 70–99)
NT-PROBNP SERPL-MCNC: ABNORMAL PG/ML (ref 0–124)
PERFORMED ON: ABNORMAL
PHOSPHATE SERPL-MCNC: 3.9 MG/DL (ref 2.5–4.9)
POTASSIUM SERPL-SCNC: 3.5 MMOL/L (ref 3.5–5.1)
SODIUM SERPL-SCNC: 134 MMOL/L (ref 136–145)

## 2024-04-02 PROCEDURE — 6370000000 HC RX 637 (ALT 250 FOR IP): Performed by: INTERNAL MEDICINE

## 2024-04-02 PROCEDURE — 2580000003 HC RX 258

## 2024-04-02 PROCEDURE — 94760 N-INVAS EAR/PLS OXIMETRY 1: CPT

## 2024-04-02 PROCEDURE — 6370000000 HC RX 637 (ALT 250 FOR IP)

## 2024-04-02 PROCEDURE — 93456 R HRT CORONARY ARTERY ANGIO: CPT | Performed by: INTERNAL MEDICINE

## 2024-04-02 PROCEDURE — 97535 SELF CARE MNGMENT TRAINING: CPT

## 2024-04-02 PROCEDURE — 1200000000 HC SEMI PRIVATE

## 2024-04-02 PROCEDURE — C1760 CLOSURE DEV, VASC: HCPCS | Performed by: INTERNAL MEDICINE

## 2024-04-02 PROCEDURE — 99233 SBSQ HOSP IP/OBS HIGH 50: CPT | Performed by: NURSE PRACTITIONER

## 2024-04-02 PROCEDURE — 6360000004 HC RX CONTRAST MEDICATION: Performed by: INTERNAL MEDICINE

## 2024-04-02 PROCEDURE — 97530 THERAPEUTIC ACTIVITIES: CPT

## 2024-04-02 PROCEDURE — 80069 RENAL FUNCTION PANEL: CPT

## 2024-04-02 PROCEDURE — 2709999900 HC NON-CHARGEABLE SUPPLY: Performed by: INTERNAL MEDICINE

## 2024-04-02 PROCEDURE — 99152 MOD SED SAME PHYS/QHP 5/>YRS: CPT

## 2024-04-02 PROCEDURE — 36415 COLL VENOUS BLD VENIPUNCTURE: CPT

## 2024-04-02 PROCEDURE — 6360000002 HC RX W HCPCS

## 2024-04-02 PROCEDURE — B2111ZZ FLUOROSCOPY OF MULTIPLE CORONARY ARTERIES USING LOW OSMOLAR CONTRAST: ICD-10-PCS | Performed by: INTERNAL MEDICINE

## 2024-04-02 PROCEDURE — 99152 MOD SED SAME PHYS/QHP 5/>YRS: CPT | Performed by: INTERNAL MEDICINE

## 2024-04-02 PROCEDURE — 4A023N8 MEASUREMENT OF CARDIAC SAMPLING AND PRESSURE, BILATERAL, PERCUTANEOUS APPROACH: ICD-10-PCS | Performed by: INTERNAL MEDICINE

## 2024-04-02 PROCEDURE — 2580000003 HC RX 258: Performed by: INTERNAL MEDICINE

## 2024-04-02 PROCEDURE — 83880 ASSAY OF NATRIURETIC PEPTIDE: CPT

## 2024-04-02 PROCEDURE — C1751 CATH, INF, PER/CENT/MIDLINE: HCPCS | Performed by: INTERNAL MEDICINE

## 2024-04-02 PROCEDURE — 2500000003 HC RX 250 WO HCPCS

## 2024-04-02 PROCEDURE — 6370000000 HC RX 637 (ALT 250 FOR IP): Performed by: NURSE PRACTITIONER

## 2024-04-02 PROCEDURE — C1894 INTRO/SHEATH, NON-LASER: HCPCS | Performed by: INTERNAL MEDICINE

## 2024-04-02 PROCEDURE — C1769 GUIDE WIRE: HCPCS | Performed by: INTERNAL MEDICINE

## 2024-04-02 PROCEDURE — 93456 R HRT CORONARY ARTERY ANGIO: CPT

## 2024-04-02 PROCEDURE — 6360000002 HC RX W HCPCS: Performed by: HOSPITALIST

## 2024-04-02 PROCEDURE — 2580000003 HC RX 258: Performed by: NURSE PRACTITIONER

## 2024-04-02 RX ORDER — INSULIN GLARGINE 100 [IU]/ML
10 INJECTION, SOLUTION SUBCUTANEOUS NIGHTLY
Status: DISCONTINUED | OUTPATIENT
Start: 2024-04-02 | End: 2024-04-03

## 2024-04-02 RX ORDER — SODIUM CHLORIDE 9 MG/ML
INJECTION, SOLUTION INTRAVENOUS CONTINUOUS
Status: DISCONTINUED | OUTPATIENT
Start: 2024-04-02 | End: 2024-04-03

## 2024-04-02 RX ORDER — ONDANSETRON 2 MG/ML
4 INJECTION INTRAMUSCULAR; INTRAVENOUS EVERY 6 HOURS PRN
Status: DISCONTINUED | OUTPATIENT
Start: 2024-04-02 | End: 2024-04-02 | Stop reason: SDUPTHER

## 2024-04-02 RX ORDER — SODIUM CHLORIDE 0.9 % (FLUSH) 0.9 %
5-40 SYRINGE (ML) INJECTION PRN
Status: DISCONTINUED | OUTPATIENT
Start: 2024-04-02 | End: 2024-04-03 | Stop reason: HOSPADM

## 2024-04-02 RX ORDER — SODIUM CHLORIDE 0.9 % (FLUSH) 0.9 %
5-40 SYRINGE (ML) INJECTION EVERY 12 HOURS SCHEDULED
Status: DISCONTINUED | OUTPATIENT
Start: 2024-04-02 | End: 2024-04-03 | Stop reason: HOSPADM

## 2024-04-02 RX ORDER — SODIUM CHLORIDE 9 MG/ML
INJECTION, SOLUTION INTRAVENOUS PRN
Status: DISCONTINUED | OUTPATIENT
Start: 2024-04-02 | End: 2024-04-03 | Stop reason: HOSPADM

## 2024-04-02 RX ORDER — INSULIN GLARGINE 100 [IU]/ML
2 INJECTION, SOLUTION SUBCUTANEOUS ONCE
Status: COMPLETED | OUTPATIENT
Start: 2024-04-02 | End: 2024-04-02

## 2024-04-02 RX ORDER — SODIUM CHLORIDE 9 MG/ML
INJECTION, SOLUTION INTRAVENOUS PRN
Status: DISCONTINUED | OUTPATIENT
Start: 2024-04-02 | End: 2024-04-02 | Stop reason: SDUPTHER

## 2024-04-02 RX ORDER — ACETAMINOPHEN 325 MG/1
650 TABLET ORAL EVERY 4 HOURS PRN
Status: DISCONTINUED | OUTPATIENT
Start: 2024-04-02 | End: 2024-04-02 | Stop reason: SDUPTHER

## 2024-04-02 RX ADMIN — INSULIN LISPRO 4 UNITS: 100 INJECTION, SOLUTION INTRAVENOUS; SUBCUTANEOUS at 21:11

## 2024-04-02 RX ADMIN — INSULIN GLARGINE 2 UNITS: 100 INJECTION, SOLUTION SUBCUTANEOUS at 08:58

## 2024-04-02 RX ADMIN — ATORVASTATIN CALCIUM 40 MG: 40 TABLET, FILM COATED ORAL at 08:58

## 2024-04-02 RX ADMIN — INSULIN LISPRO 4 UNITS: 100 INJECTION, SOLUTION INTRAVENOUS; SUBCUTANEOUS at 08:59

## 2024-04-02 RX ADMIN — CLOPIDOGREL BISULFATE 75 MG: 75 TABLET ORAL at 08:55

## 2024-04-02 RX ADMIN — PANTOPRAZOLE SODIUM 40 MG: 40 TABLET, DELAYED RELEASE ORAL at 17:13

## 2024-04-02 RX ADMIN — IOPAMIDOL 40 ML: 755 INJECTION, SOLUTION INTRAVENOUS at 16:05

## 2024-04-02 RX ADMIN — CASTOR OIL AND BALSAM, PERU: 788; 87 OINTMENT TOPICAL at 12:27

## 2024-04-02 RX ADMIN — SODIUM CHLORIDE, PRESERVATIVE FREE 10 ML: 5 INJECTION INTRAVENOUS at 10:19

## 2024-04-02 RX ADMIN — SPIRONOLACTONE 12.5 MG: 25 TABLET ORAL at 08:57

## 2024-04-02 RX ADMIN — CARVEDILOL 12.5 MG: 12.5 TABLET, FILM COATED ORAL at 17:13

## 2024-04-02 RX ADMIN — TORSEMIDE 20 MG: 20 TABLET ORAL at 08:55

## 2024-04-02 RX ADMIN — ASPIRIN 81 MG: 81 TABLET, COATED ORAL at 08:55

## 2024-04-02 RX ADMIN — CARVEDILOL 12.5 MG: 12.5 TABLET, FILM COATED ORAL at 08:57

## 2024-04-02 RX ADMIN — PANTOPRAZOLE SODIUM 40 MG: 40 TABLET, DELAYED RELEASE ORAL at 05:04

## 2024-04-02 RX ADMIN — Medication 3 MG: at 21:07

## 2024-04-02 RX ADMIN — ISOSORBIDE MONONITRATE 15 MG: 30 TABLET, EXTENDED RELEASE ORAL at 08:55

## 2024-04-02 RX ADMIN — SODIUM CHLORIDE: 9 INJECTION, SOLUTION INTRAVENOUS at 18:20

## 2024-04-02 RX ADMIN — INSULIN GLARGINE 10 UNITS: 100 INJECTION, SOLUTION SUBCUTANEOUS at 21:08

## 2024-04-02 RX ADMIN — HYDRALAZINE HYDROCHLORIDE 10 MG: 10 TABLET, FILM COATED ORAL at 08:57

## 2024-04-02 RX ADMIN — METRONIDAZOLE 500 MG: 500 TABLET ORAL at 05:04

## 2024-04-02 RX ADMIN — CEFEPIME 2000 MG: 2 INJECTION, POWDER, FOR SOLUTION INTRAVENOUS at 18:20

## 2024-04-02 RX ADMIN — SODIUM CHLORIDE: 9 INJECTION, SOLUTION INTRAVENOUS at 10:16

## 2024-04-02 RX ADMIN — GABAPENTIN 300 MG: 300 CAPSULE ORAL at 08:58

## 2024-04-02 RX ADMIN — METRONIDAZOLE 500 MG: 500 TABLET ORAL at 21:08

## 2024-04-02 RX ADMIN — ENOXAPARIN SODIUM 30 MG: 100 INJECTION SUBCUTANEOUS at 08:58

## 2024-04-02 RX ADMIN — Medication 10 ML: at 09:02

## 2024-04-02 RX ADMIN — HYDRALAZINE HYDROCHLORIDE 10 MG: 10 TABLET, FILM COATED ORAL at 21:08

## 2024-04-02 RX ADMIN — GABAPENTIN 300 MG: 300 CAPSULE ORAL at 21:08

## 2024-04-02 RX ADMIN — INSULIN LISPRO 4 UNITS: 100 INJECTION, SOLUTION INTRAVENOUS; SUBCUTANEOUS at 17:13

## 2024-04-02 NOTE — OP NOTE
[facility]   Procedure Note    CLINICAL HISTORY:       Juan Carlos Wong Sr is a 59 y.o. male with a history of coronary artery disease.  He was admitted with complaints of dyspnea. Troponin elevated     Patient Active Problem List   Diagnosis    Sepsis (HCC)    Generalized weakness    Acute on chronic congestive heart failure (HCC)    Coronary artery disease involving native coronary artery of native heart without angina pectoris    Fall    Non-traumatic rhabdomyolysis    Neutrophilia    Diabetic foot infection (HCC)    Stage 3b chronic kidney disease (HCC)    Cellulitis of foot         The risks, benefits, and details of the procedure were explained to the patient.  The patient verbalized understanding and wanted to proceed.  Informed written consent was obtained.    INDICATION:  NSTEMI    PROCEDURES PERFORMED:   Coronary angiogram   [facility]     PROCEDURE TECHNIQUE:  The patient was approached from the right femoral artery and right femoral vein using US/micro access techniques placing a 6/7F sheath.      CONTRAST:  Total of 40 cc.    COMPLICATIONS:  None.  At the end of the procedure a MYnx device was used for hemostasis.      EBL: 5 cc    Moderate Sedation:  Start time: 1436  Stop time: 1515  1 mg versed   50 mcg fentanyl   An independent trained observer pushed medications at my direction. We monitored the patient's level of consciousness and vital signs/physiologic status throughout the procedure duration (see start and start times above).       HEMODYNAMICS:      RA 3  RV 35/3  PA 44/15/23  PCWP 7   PA % 61  AO % 89  CO/CI 6.69 L/min 3.2 L/min/m2   dynes . Sec/cm-5   dynes . Sec/cm-5    ANGIOGRAM/CORONARY ARTERIOGRAM:       The left main coronary artery is normal .    The left anterior descending artery is normal .   D1 is normal     The left circumflex artery is normal .   OM1 is normal     The right coronary artery is dominant vessel with no disease .   RPDA is normal       SUMMARY:

## 2024-04-02 NOTE — PRE SEDATION
Sedation Pre-Procedure Note    Patient Name: Juan Carlos Wong Sr   YOB: 1965  Room/Bed: C1I-2682/4251-01  Medical Record Number: [id number]  Date: 4/2/2024   Time: 2:29 PM       Indication:  systolic heart failure     Consent: I have discussed with the patient and/or the patient representative the indication, alternatives, and the possible risks and/or complications of the planned procedure and the anesthesia methods. The patient and/or patient representative appear to understand and agree to proceed.    Vital Signs:   Vitals:    04/02/24 1345   BP: 138/76   Pulse: 77   Resp: 17   Temp:    SpO2: 94%       Past Medical History:   has a past medical history of Back pain, Diabetes mellitus (HCC), and Pneumonia.    Past Surgical History:   has a past surgical history that includes liver biopsy; Leg Surgery (Left); and Colonoscopy.    Medications:   Scheduled Meds:    insulin glargine  10 Units SubCUTAneous Nightly    sodium chloride flush  5-40 mL IntraVENous 2 times per day    insulin lispro  0-16 Units SubCUTAneous TID WC    insulin lispro  0-4 Units SubCUTAneous Nightly    carvedilol  12.5 mg Oral BID WC    isosorbide mononitrate  15 mg Oral Daily    [Held by provider] torsemide  20 mg Oral Daily    [Held by provider] empagliflozin  10 mg Oral Daily    [Held by provider] spironolactone  12.5 mg Oral Daily    metroNIDAZOLE  500 mg Oral 3 times per day    balsum peru-castor oil   Topical BID    cefepime  2,000 mg IntraVENous Q24H    collagenase   Topical Daily    enoxaparin  30 mg SubCUTAneous Daily    aspirin  81 mg Oral Daily    atorvastatin  40 mg Oral Daily    clopidogrel  75 mg Oral Daily    gabapentin  300 mg Oral BID    hydrALAZINE  10 mg Oral TID    pantoprazole  40 mg Oral BID AC    sodium chloride flush  10 mL IntraVENous 2 times per day    melatonin  3 mg Oral Nightly     Continuous Infusions:    sodium chloride      sodium chloride 50 mL/hr at 04/02/24 1016    dextrose       PRN Meds: sodium

## 2024-04-03 VITALS
TEMPERATURE: 97.8 F | SYSTOLIC BLOOD PRESSURE: 130 MMHG | HEART RATE: 71 BPM | OXYGEN SATURATION: 97 % | HEIGHT: 69 IN | WEIGHT: 176.37 LBS | RESPIRATION RATE: 15 BRPM | BODY MASS INDEX: 26.12 KG/M2 | DIASTOLIC BLOOD PRESSURE: 72 MMHG

## 2024-04-03 LAB
ALBUMIN SERPL-MCNC: 2.1 G/DL (ref 3.4–5)
ANION GAP SERPL CALCULATED.3IONS-SCNC: 10 MMOL/L (ref 3–16)
BUN SERPL-MCNC: 28 MG/DL (ref 7–20)
CALCIUM SERPL-MCNC: 7.9 MG/DL (ref 8.3–10.6)
CHLORIDE SERPL-SCNC: 99 MMOL/L (ref 99–110)
CO2 SERPL-SCNC: 24 MMOL/L (ref 21–32)
CREAT SERPL-MCNC: 2.3 MG/DL (ref 0.8–1.3)
DEPRECATED RDW RBC AUTO: 17 % (ref 12.4–15.4)
GFR SERPLBLD CREATININE-BSD FMLA CKD-EPI: 32 ML/MIN/{1.73_M2}
GLUCOSE BLD-MCNC: 219 MG/DL (ref 70–99)
GLUCOSE BLD-MCNC: 221 MG/DL (ref 70–99)
GLUCOSE BLD-MCNC: 266 MG/DL (ref 70–99)
GLUCOSE SERPL-MCNC: 247 MG/DL (ref 70–99)
HCT VFR BLD AUTO: 33.3 % (ref 40.5–52.5)
HGB BLD-MCNC: 11.3 G/DL (ref 13.5–17.5)
MAGNESIUM SERPL-MCNC: 1.8 MG/DL (ref 1.8–2.4)
MCH RBC QN AUTO: 29.9 PG (ref 26–34)
MCHC RBC AUTO-ENTMCNC: 33.8 G/DL (ref 31–36)
MCV RBC AUTO: 88.6 FL (ref 80–100)
PERFORMED ON: ABNORMAL
PHOSPHATE SERPL-MCNC: 4.2 MG/DL (ref 2.5–4.9)
PLATELET # BLD AUTO: 258 K/UL (ref 135–450)
PMV BLD AUTO: 8.7 FL (ref 5–10.5)
POTASSIUM SERPL-SCNC: 3.5 MMOL/L (ref 3.5–5.1)
RBC # BLD AUTO: 3.76 M/UL (ref 4.2–5.9)
SODIUM SERPL-SCNC: 133 MMOL/L (ref 136–145)
WBC # BLD AUTO: 7.8 K/UL (ref 4–11)

## 2024-04-03 PROCEDURE — 83735 ASSAY OF MAGNESIUM: CPT

## 2024-04-03 PROCEDURE — 99232 SBSQ HOSP IP/OBS MODERATE 35: CPT | Performed by: NURSE PRACTITIONER

## 2024-04-03 PROCEDURE — 80069 RENAL FUNCTION PANEL: CPT

## 2024-04-03 PROCEDURE — 6370000000 HC RX 637 (ALT 250 FOR IP)

## 2024-04-03 PROCEDURE — 6370000000 HC RX 637 (ALT 250 FOR IP): Performed by: INTERNAL MEDICINE

## 2024-04-03 PROCEDURE — 99232 SBSQ HOSP IP/OBS MODERATE 35: CPT | Performed by: INTERNAL MEDICINE

## 2024-04-03 PROCEDURE — 36415 COLL VENOUS BLD VENIPUNCTURE: CPT

## 2024-04-03 PROCEDURE — 2580000003 HC RX 258: Performed by: INTERNAL MEDICINE

## 2024-04-03 PROCEDURE — 6360000002 HC RX W HCPCS: Performed by: HOSPITALIST

## 2024-04-03 PROCEDURE — 6370000000 HC RX 637 (ALT 250 FOR IP): Performed by: NURSE PRACTITIONER

## 2024-04-03 PROCEDURE — 94760 N-INVAS EAR/PLS OXIMETRY 1: CPT

## 2024-04-03 PROCEDURE — 85027 COMPLETE CBC AUTOMATED: CPT

## 2024-04-03 PROCEDURE — 97530 THERAPEUTIC ACTIVITIES: CPT

## 2024-04-03 RX ORDER — CLOPIDOGREL BISULFATE 75 MG/1
75 TABLET ORAL DAILY
Qty: 30 TABLET | Refills: 5
Start: 2024-04-03 | End: 2024-09-30

## 2024-04-03 RX ORDER — CARVEDILOL 6.25 MG/1
12.5 TABLET ORAL 2 TIMES DAILY WITH MEALS
Qty: 60 TABLET | Refills: 3 | Status: SHIPPED | OUTPATIENT
Start: 2024-04-03

## 2024-04-03 RX ORDER — LEVOFLOXACIN 250 MG/1
250 TABLET, FILM COATED ORAL DAILY
Qty: 7 TABLET | Refills: 0 | Status: SHIPPED | OUTPATIENT
Start: 2024-04-03 | End: 2024-04-10

## 2024-04-03 RX ORDER — INSULIN LISPRO 100 [IU]/ML
0-4 INJECTION, SOLUTION INTRAVENOUS; SUBCUTANEOUS NIGHTLY
Status: DISCONTINUED | OUTPATIENT
Start: 2024-04-03 | End: 2024-04-03 | Stop reason: HOSPADM

## 2024-04-03 RX ORDER — ISOSORBIDE MONONITRATE 30 MG/1
15 TABLET, EXTENDED RELEASE ORAL DAILY
Qty: 30 TABLET | Refills: 3 | Status: SHIPPED | OUTPATIENT
Start: 2024-04-04

## 2024-04-03 RX ORDER — INSULIN GLARGINE 100 [IU]/ML
12 INJECTION, SOLUTION SUBCUTANEOUS NIGHTLY
Status: DISCONTINUED | OUTPATIENT
Start: 2024-04-03 | End: 2024-04-03 | Stop reason: HOSPADM

## 2024-04-03 RX ORDER — METRONIDAZOLE 500 MG/1
500 TABLET ORAL EVERY 8 HOURS SCHEDULED
Qty: 21 TABLET | Refills: 0 | Status: SHIPPED | OUTPATIENT
Start: 2024-04-03 | End: 2024-04-10

## 2024-04-03 RX ORDER — INSULIN LISPRO 100 [IU]/ML
5 INJECTION, SOLUTION INTRAVENOUS; SUBCUTANEOUS
Status: DISCONTINUED | OUTPATIENT
Start: 2024-04-03 | End: 2024-04-03 | Stop reason: HOSPADM

## 2024-04-03 RX ORDER — INSULIN LISPRO 100 [IU]/ML
0-4 INJECTION, SOLUTION INTRAVENOUS; SUBCUTANEOUS
Status: DISCONTINUED | OUTPATIENT
Start: 2024-04-03 | End: 2024-04-03 | Stop reason: HOSPADM

## 2024-04-03 RX ORDER — TORSEMIDE 20 MG/1
TABLET ORAL
Qty: 60 TABLET | Refills: 1
Start: 2024-04-03

## 2024-04-03 RX ADMIN — PANTOPRAZOLE SODIUM 40 MG: 40 TABLET, DELAYED RELEASE ORAL at 06:01

## 2024-04-03 RX ADMIN — METRONIDAZOLE 500 MG: 500 TABLET ORAL at 13:31

## 2024-04-03 RX ADMIN — INSULIN LISPRO 8 UNITS: 100 INJECTION, SOLUTION INTRAVENOUS; SUBCUTANEOUS at 09:10

## 2024-04-03 RX ADMIN — Medication 10 ML: at 00:28

## 2024-04-03 RX ADMIN — ENOXAPARIN SODIUM 30 MG: 100 INJECTION SUBCUTANEOUS at 09:09

## 2024-04-03 RX ADMIN — METRONIDAZOLE 500 MG: 500 TABLET ORAL at 06:00

## 2024-04-03 RX ADMIN — GABAPENTIN 300 MG: 300 CAPSULE ORAL at 09:07

## 2024-04-03 RX ADMIN — PANTOPRAZOLE SODIUM 40 MG: 40 TABLET, DELAYED RELEASE ORAL at 17:22

## 2024-04-03 RX ADMIN — CARVEDILOL 12.5 MG: 12.5 TABLET, FILM COATED ORAL at 09:04

## 2024-04-03 RX ADMIN — INSULIN LISPRO 1 UNITS: 100 INJECTION, SOLUTION INTRAVENOUS; SUBCUTANEOUS at 13:29

## 2024-04-03 RX ADMIN — ATORVASTATIN CALCIUM 40 MG: 40 TABLET, FILM COATED ORAL at 09:07

## 2024-04-03 RX ADMIN — CLOPIDOGREL BISULFATE 75 MG: 75 TABLET ORAL at 09:03

## 2024-04-03 RX ADMIN — INSULIN LISPRO 5 UNITS: 100 INJECTION, SOLUTION INTRAVENOUS; SUBCUTANEOUS at 14:08

## 2024-04-03 RX ADMIN — INSULIN LISPRO 1 UNITS: 100 INJECTION, SOLUTION INTRAVENOUS; SUBCUTANEOUS at 17:21

## 2024-04-03 RX ADMIN — ASPIRIN 81 MG: 81 TABLET, COATED ORAL at 09:06

## 2024-04-03 RX ADMIN — CARVEDILOL 12.5 MG: 12.5 TABLET, FILM COATED ORAL at 17:22

## 2024-04-03 RX ADMIN — INSULIN LISPRO 5 UNITS: 100 INJECTION, SOLUTION INTRAVENOUS; SUBCUTANEOUS at 17:22

## 2024-04-03 RX ADMIN — ISOSORBIDE MONONITRATE 15 MG: 30 TABLET, EXTENDED RELEASE ORAL at 09:09

## 2024-04-03 ASSESSMENT — PAIN SCALES - GENERAL
PAINLEVEL_OUTOF10: 0
PAINLEVEL_OUTOF10: 0

## 2024-04-03 NOTE — PLAN OF CARE
Problem: Cardiovascular - Adult  Goal: Absence of cardiac dysrhythmias or at baseline  Outcome: Progressing     Problem: Musculoskeletal - Adult  Goal: Return mobility to safest level of function  Outcome: Progressing  Flowsheets (Taken 4/2/2024 1850)  Return Mobility to Safest Level of Function: Assess patient stability and activity tolerance for standing, transferring and ambulating with or without assistive devices     Problem: Metabolic/Fluid and Electrolytes - Adult  Goal: Electrolytes maintained within normal limits  Outcome: Progressing  Flowsheets (Taken 4/2/2024 1850)  Electrolytes maintained within normal limits: Instruct patient on fluid and nutrition restrictions as appropriate     Problem: Metabolic/Fluid and Electrolytes - Adult  Goal: Glucose maintained within prescribed range  Outcome: Progressing     Problem: Skin/Tissue Integrity  Goal: Absence of new skin breakdown  Description: 1.  Monitor for areas of redness and/or skin breakdown  2.  Assess vascular access sites hourly  3.  Every 4-6 hours minimum:  Change oxygen saturation probe site  4.  Every 4-6 hours:  If on nasal continuous positive airway pressure, respiratory therapy assess nares and determine need for appliance change or resting period.  Outcome: Progressing     Problem: Musculoskeletal - Adult  Goal: Return ADL status to a safe level of function  Recent Flowsheet Documentation  Taken 4/2/2024 1850 by Mala Swain, RN  Return ADL Status to a Safe Level of Function:   Assist and instruct patient to increase activity and self care as tolerated   Administer medication as ordered     
  Problem: Discharge Planning  Goal: Discharge to home or other facility with appropriate resources  4/1/2024 0506 by Ramonita Olea, RN  Outcome: Progressing  Flowsheets (Taken 3/31/2024 2000)  Discharge to home or other facility with appropriate resources: Identify barriers to discharge with patient and caregiver  3/31/2024 1531 by Cierra Olivier RN  Outcome: Progressing     Problem: ABCDS Injury Assessment  Goal: Absence of physical injury  4/1/2024 0506 by Ramonita Olea, RN  Outcome: Progressing  3/31/2024 1531 by Cierra Olivier RN  Outcome: Progressing     Problem: Safety - Adult  Goal: Free from fall injury  4/1/2024 0506 by Ramonita Olea RN  Outcome: Progressing  3/31/2024 1531 by Cierra Olivier RN  Outcome: Progressing     
  Problem: Discharge Planning  Goal: Discharge to home or other facility with appropriate resources  4/1/2024 2215 by Kinga Ford RN  Outcome: Progressing     Problem: ABCDS Injury Assessment  Goal: Absence of physical injury  4/1/2024 2215 by Kinga Ford RN  Outcome: Progressing     Problem: Safety - Adult  Goal: Free from fall injury  4/1/2024 2215 by Kinga Ford RN  Outcome: Progressing     Problem: Chronic Conditions and Co-morbidities  Goal: Patient's chronic conditions and co-morbidity symptoms are monitored and maintained or improved  4/1/2024 2215 by Kinga Ford RN  Outcome: Progressing     Problem: Nutrition Deficit:  Goal: Optimize nutritional status  4/1/2024 2215 by Kinga Ford RN  Outcome: Progressing     Problem: Pain  Goal: Verbalizes/displays adequate comfort level or baseline comfort level  4/1/2024 2215 by Kinga Ford RN  Outcome: Progressing     Problem: Respiratory - Adult  Goal: Achieves optimal ventilation and oxygenation  4/1/2024 2215 by Kinga Ford RN  Outcome: Progressing     Problem: Cardiovascular - Adult  Goal: Maintains optimal cardiac output and hemodynamic stability  4/1/2024 2215 by Kinga Ford RN  Outcome: Progressing     Problem: Cardiovascular - Adult  Goal: Absence of cardiac dysrhythmias or at baseline  4/1/2024 2215 by Kinga Ford RN  Outcome: Progressing     Problem: Musculoskeletal - Adult  Goal: Return mobility to safest level of function  4/1/2024 2215 by Kinga Ford RN  Outcome: Progressing     Problem: Musculoskeletal - Adult  Goal: Maintain proper alignment of affected body part  4/1/2024 2215 by Kinga Ford RN  Outcome: Progressing     Problem: Musculoskeletal - Adult  Goal: Return ADL status to a safe level of function  4/1/2024 2215 by Kinga Ford RN  Outcome: Progressing     Problem: Metabolic/Fluid and Electrolytes - Adult  Goal: Electrolytes 
  Problem: Discharge Planning  Goal: Discharge to home or other facility with appropriate resources  4/2/2024 0920 by Nallely Sahni RN  Outcome: Progressing  4/1/2024 2215 by Kinga Ford RN  Outcome: Progressing     Problem: ABCDS Injury Assessment  Goal: Absence of physical injury  4/2/2024 0920 by Nallely Sahni RN  Outcome: Progressing  4/1/2024 2215 by Kinga Ford RN  Outcome: Progressing     Problem: Safety - Adult  Goal: Free from fall injury  4/2/2024 0920 by Nallely Sahni RN  Outcome: Progressing  4/1/2024 2215 by Kinga Ford RN  Outcome: Progressing     Problem: Chronic Conditions and Co-morbidities  Goal: Patient's chronic conditions and co-morbidity symptoms are monitored and maintained or improved  4/2/2024 0920 by Nallely Sahni RN  Outcome: Progressing  4/1/2024 2215 by Kinga Ford RN  Outcome: Progressing     Problem: Nutrition Deficit:  Goal: Optimize nutritional status  4/2/2024 0920 by Nallely Sahni RN  Outcome: Progressing  4/1/2024 2215 by Kinga Ford RN  Outcome: Progressing     Problem: Pain  Goal: Verbalizes/displays adequate comfort level or baseline comfort level  4/2/2024 0920 by Nallely Sahni RN  Outcome: Progressing  4/1/2024 2215 by Kinga Ford RN  Outcome: Progressing     Problem: Respiratory - Adult  Goal: Achieves optimal ventilation and oxygenation  4/2/2024 0920 by Nallely Sahni RN  Outcome: Progressing  4/1/2024 2215 by Kinga Ford RN  Outcome: Progressing     Problem: Cardiovascular - Adult  Goal: Maintains optimal cardiac output and hemodynamic stability  4/2/2024 0920 by Nallely Sahin RN  Outcome: Progressing  4/1/2024 2215 by Kinga Ford RN  Outcome: Progressing  Goal: Absence of cardiac dysrhythmias or at baseline  4/2/2024 0920 by Nallely Sahni RN  Outcome: Progressing  4/1/2024 2215 by Kinga Ford, RN  Outcome: Progressing     Problem: 
  Problem: Discharge Planning  Goal: Discharge to home or other facility with appropriate resources  4/3/2024 1009 by Nallely Sahni RN  Outcome: Progressing  4/3/2024 0427 by Mala Swain RN  Outcome: Progressing  Flowsheets (Taken 4/2/2024 1850)  Discharge to home or other facility with appropriate resources: Identify discharge learning needs (meds, wound care, etc)     Problem: ABCDS Injury Assessment  Goal: Absence of physical injury  4/3/2024 1009 by Nallely Sahni RN  Outcome: Progressing  4/3/2024 0427 by Mala Swain RN  Outcome: Progressing     Problem: Safety - Adult  Goal: Free from fall injury  4/3/2024 1009 by Nallely Sahni RN  Outcome: Progressing  4/3/2024 0427 by Mala Swain RN  Outcome: Progressing     Problem: Chronic Conditions and Co-morbidities  Goal: Patient's chronic conditions and co-morbidity symptoms are monitored and maintained or improved  4/3/2024 1009 by Nallely Sahni RN  Outcome: Progressing  4/3/2024 0427 by Mala Swain RN  Outcome: Progressing  Flowsheets (Taken 4/2/2024 1850)  Care Plan - Patient's Chronic Conditions and Co-Morbidity Symptoms are Monitored and Maintained or Improved: Monitor and assess patient's chronic conditions and comorbid symptoms for stability, deterioration, or improvement     Problem: Nutrition Deficit:  Goal: Optimize nutritional status  Outcome: Progressing     Problem: Pain  Goal: Verbalizes/displays adequate comfort level or baseline comfort level  Outcome: Progressing     Problem: Respiratory - Adult  Goal: Achieves optimal ventilation and oxygenation  Outcome: Progressing     Problem: Cardiovascular - Adult  Goal: Maintains optimal cardiac output and hemodynamic stability  4/3/2024 1009 by Nallely Sahni RN  Outcome: Progressing  4/3/2024 0427 by Mala Swain RN  Outcome: Progressing  Flowsheets (Taken 4/2/2024 2045)  Maintains optimal cardiac output and hemodynamic stability:   Monitor blood pressure and heart rate   Assess for 
  Problem: Discharge Planning  Goal: Discharge to home or other facility with appropriate resources  Outcome: Progressing     Problem: ABCDS Injury Assessment  Goal: Absence of physical injury  Outcome: Progressing     Problem: Safety - Adult  Goal: Free from fall injury  Outcome: Progressing     Problem: Chronic Conditions and Co-morbidities  Goal: Patient's chronic conditions and co-morbidity symptoms are monitored and maintained or improved  Outcome: Progressing     Problem: Nutrition Deficit:  Goal: Optimize nutritional status  Outcome: Progressing     Problem: Pain  Goal: Verbalizes/displays adequate comfort level or baseline comfort level  Outcome: Progressing     Problem: Respiratory - Adult  Goal: Achieves optimal ventilation and oxygenation  Outcome: Progressing     Problem: Cardiovascular - Adult  Goal: Maintains optimal cardiac output and hemodynamic stability  Outcome: Progressing  Goal: Absence of cardiac dysrhythmias or at baseline  Outcome: Progressing     Problem: Musculoskeletal - Adult  Goal: Return mobility to safest level of function  Outcome: Progressing  Goal: Maintain proper alignment of affected body part  Outcome: Progressing  Goal: Return ADL status to a safe level of function  Outcome: Progressing     Problem: Metabolic/Fluid and Electrolytes - Adult  Goal: Electrolytes maintained within normal limits  Outcome: Progressing  Goal: Hemodynamic stability and optimal renal function maintained  Outcome: Progressing  Goal: Glucose maintained within prescribed range  Outcome: Progressing     Problem: Hematologic - Adult  Goal: Maintains hematologic stability  Outcome: Progressing     Problem: Skin/Tissue Integrity  Goal: Absence of new skin breakdown  Description: 1.  Monitor for areas of redness and/or skin breakdown  2.  Assess vascular access sites hourly  3.  Every 4-6 hours minimum:  Change oxygen saturation probe site  4.  Every 4-6 hours:  If on nasal continuous positive airway pressure, 
  Problem: Discharge Planning  Goal: Discharge to home or other facility with appropriate resources  Outcome: Progressing     Problem: ABCDS Injury Assessment  Goal: Absence of physical injury  Outcome: Progressing     Problem: Safety - Adult  Goal: Free from fall injury  Outcome: Progressing     Problem: Chronic Conditions and Co-morbidities  Goal: Patient's chronic conditions and co-morbidity symptoms are monitored and maintained or improved  Outcome: Progressing     Problem: Nutrition Deficit:  Goal: Optimize nutritional status  Outcome: Progressing     Problem: Pain  Goal: Verbalizes/displays adequate comfort level or baseline comfort level  Outcome: Progressing     Problem: Respiratory - Adult  Goal: Achieves optimal ventilation and oxygenation  Outcome: Progressing     Problem: Cardiovascular - Adult  Goal: Maintains optimal cardiac output and hemodynamic stability  Outcome: Progressing  Goal: Absence of cardiac dysrhythmias or at baseline  Outcome: Progressing     Problem: Musculoskeletal - Adult  Goal: Return mobility to safest level of function  Outcome: Progressing  Goal: Maintain proper alignment of affected body part  Outcome: Progressing  Goal: Return ADL status to a safe level of function  Outcome: Progressing     Problem: Metabolic/Fluid and Electrolytes - Adult  Goal: Electrolytes maintained within normal limits  Outcome: Progressing  Goal: Hemodynamic stability and optimal renal function maintained  Outcome: Progressing  Goal: Glucose maintained within prescribed range  Outcome: Progressing     Problem: Hematologic - Adult  Goal: Maintains hematologic stability  Outcome: Progressing     Problem: Skin/Tissue Integrity  Goal: Absence of new skin breakdown  Description: 1.  Monitor for areas of redness and/or skin breakdown  2.  Assess vascular access sites hourly  3.  Every 4-6 hours minimum:  Change oxygen saturation probe site  4.  Every 4-6 hours:  If on nasal continuous positive airway pressure, 
  Problem: Discharge Planning  Goal: Discharge to home or other facility with appropriate resources  Outcome: Progressing     Problem: ABCDS Injury Assessment  Goal: Absence of physical injury  Outcome: Progressing     Problem: Safety - Adult  Goal: Free from fall injury  Outcome: Progressing     Problem: Chronic Conditions and Co-morbidities  Goal: Patient's chronic conditions and co-morbidity symptoms are monitored and maintained or improved  Outcome: Progressing     Problem: Pain  Goal: Verbalizes/displays adequate comfort level or baseline comfort level  Outcome: Progressing     Problem: Respiratory - Adult  Goal: Achieves optimal ventilation and oxygenation  Outcome: Progressing     
  Problem: Discharge Planning  Goal: Discharge to home or other facility with appropriate resources  Outcome: Progressing  Flowsheets (Taken 3/28/2024 0042)  Discharge to home or other facility with appropriate resources:   Identify barriers to discharge with patient and caregiver   Arrange for needed discharge resources and transportation as appropriate     Problem: ABCDS Injury Assessment  Goal: Absence of physical injury  Outcome: Progressing  Flowsheets (Taken 3/28/2024 0143)  Absence of Physical Injury: Implement safety measures based on patient assessment     Problem: Safety - Adult  Goal: Free from fall injury  Outcome: Progressing  Flowsheets (Taken 3/28/2024 0143)  Free From Fall Injury: Instruct family/caregiver on patient safety     
  Problem: Discharge Planning  Goal: Discharge to home or other facility with appropriate resources  Outcome: Progressing  Flowsheets (Taken 3/29/2024 2010)  Discharge to home or other facility with appropriate resources: Identify barriers to discharge with patient and caregiver     Problem: ABCDS Injury Assessment  Goal: Absence of physical injury  Outcome: Progressing     Problem: Safety - Adult  Goal: Free from fall injury  Outcome: Progressing     Problem: Chronic Conditions and Co-morbidities  Goal: Patient's chronic conditions and co-morbidity symptoms are monitored and maintained or improved  Outcome: Progressing  Flowsheets (Taken 3/29/2024 2010)  Care Plan - Patient's Chronic Conditions and Co-Morbidity Symptoms are Monitored and Maintained or Improved: Monitor and assess patient's chronic conditions and comorbid symptoms for stability, deterioration, or improvement     Problem: Nutrition Deficit:  Goal: Optimize nutritional status  Outcome: Progressing     Problem: Pain  Goal: Verbalizes/displays adequate comfort level or baseline comfort level  Outcome: Progressing     Problem: Respiratory - Adult  Goal: Achieves optimal ventilation and oxygenation  Outcome: Progressing  Flowsheets (Taken 3/29/2024 2010)  Achieves optimal ventilation and oxygenation: Assess for changes in respiratory status     Problem: Cardiovascular - Adult  Goal: Maintains optimal cardiac output and hemodynamic stability  Outcome: Progressing  Flowsheets (Taken 3/29/2024 2010)  Maintains optimal cardiac output and hemodynamic stability: Monitor blood pressure and heart rate  Goal: Absence of cardiac dysrhythmias or at baseline  Outcome: Progressing     Problem: Musculoskeletal - Adult  Goal: Return mobility to safest level of function  Outcome: Progressing  Flowsheets (Taken 3/29/2024 2010)  Return Mobility to Safest Level of Function: Assess patient stability and activity tolerance for standing, transferring and ambulating with or 
for changes in mentation and behavior   Assess for changes in respiratory status   Position to facilitate oxygenation and minimize respiratory effort   Oxygen supplementation based on oxygen saturation or arterial blood gases     Problem: Cardiovascular - Adult  Goal: Maintains optimal cardiac output and hemodynamic stability  4/1/2024 0901 by Linda Duffy RN  Outcome: Progressing  4/1/2024 0506 by Ramonita Olea RN  Outcome: Progressing  Flowsheets (Taken 3/31/2024 2000)  Maintains optimal cardiac output and hemodynamic stability:   Monitor blood pressure and heart rate   Monitor urine output and notify Licensed Independent Practitioner for values outside of normal range   Assess for signs of decreased cardiac output  Goal: Absence of cardiac dysrhythmias or at baseline  4/1/2024 0901 by Linda Duffy RN  Outcome: Progressing  4/1/2024 0506 by Ramonita Olea RN  Outcome: Progressing  Flowsheets (Taken 3/31/2024 2000)  Absence of cardiac dysrhythmias or at baseline:   Monitor cardiac rate and rhythm   Assess for signs of decreased cardiac output     Problem: Musculoskeletal - Adult  Goal: Return mobility to safest level of function  4/1/2024 0901 by Linda Duffy RN  Outcome: Progressing  4/1/2024 0506 by Ramonita Olea RN  Outcome: Progressing  Flowsheets (Taken 3/31/2024 2000)  Return Mobility to Safest Level of Function:   Assess patient stability and activity tolerance for standing, transferring and ambulating with or without assistive devices   Assist with transfers and ambulation using safe patient handling equipment as needed   Ensure adequate protection for wounds/incisions during mobilization  Goal: Maintain proper alignment of affected body part  4/1/2024 0901 by Linda Duffy RN  Outcome: Progressing  4/1/2024 0506 by Ramonita Olea RN  Outcome: Progressing  Flowsheets (Taken 3/31/2024 2000)  Maintain proper alignment of affected body part: Support and protect limb and body alignment per provider's

## 2024-04-03 NOTE — ADT AUTH CERT
S  92 Given 03/29/24 1400(s)  03/29/24 1429(a)  03/29/24 1429(r) 10 mg   Oral      Rachel Todd  93 Held 03/29/24 2100(s)  03/29/24 2008(a)  03/29/24 2008(r) 0 mg   Oral     Reason: Order parameters not met Dolores Graves  94 Given 03/30/24 0900(s)  03/30/24 0934(a)  03/30/24 0934(r) 10 mg   Oral      Cierra Olivier  95 Given 03/30/24 1400(s)  03/30/24 1352(a)  03/30/24 1352(r) 10 mg   Oral      Cierra Olivier  96 Not Given 03/30/24 2100(s)  03/30/24 2058(a)  03/30/24 2058(r) 10 mg   Oral     Reason: Order parameters not met Dolores Graves  97 Given 03/31/24 0900(s)  03/31/24 1042(a)  03/31/24 1043(r) 10 mg   Oral      Cierra Olivier  98 Held 03/31/24 1400(s)  03/31/24 1504(a)  03/31/24 1504(r) 0 mg   Oral     Reason: Order parameters not met Cierra Olivier  99 Not Given 03/31/24 2100(s)  03/31/24 2108(a)  03/31/24 2109(r) 10 mg   Oral     Reason: Order parameters not met Ramonita Olea    Given 03/31/24 2108(a)  03/31/24 2109(r) 10 mg   Oral      Ramonita Olea  100 Not Given 04/01/24 0900(s)  04/01/24 0846(a)  04/01/24 0846(r) 10 mg   Oral     Reason: Order parameters not met Linda Duffy  101 Not Given 04/01/24 1400(s)  04/01/24 1231(a)  04/01/24 1231(r) 10 mg   Oral     Reason: Order parameters not met Linda Duffy  102 Given 04/01/24 2100(s)  04/01/24 2036(a)  04/01/24 2036(r) 10 mg   Oral      Kinga Ford  103 Given 04/02/24 0900(s)  04/02/24 0857(a)  04/02/24 0859(r) 10 mg   Oral      Candy Maldonado  104 Not Given 04/02/24 1400(s)  04/02/24 1608(a)  04/02/24 1609(r) 10 mg   Oral     Reason: Transfer to a Procedural area Nallely Sahni  105 Given 04/02/24 2100(s)  04/02/24 2108(a)  04/02/24 2112(r) 10 mg   Oral      Mala Swain  106 Held 04/03/24 0900(s)  04/03/24 0915(a)  04/03/24 0915(r) 0 mg   Oral     Reason: Order parameters not met Linh Ashley  107 Not Given 04/01/24 2100(s)  04/01/24 2040(a)  04/01/24 2040(r)     SubCUTAneous     Reason: Order parameters not met Logan

## 2024-04-03 NOTE — CARE COORDINATION
DISCHARGE SUMMARY     DATE OF DISCHARGE: 4/3/24    DISCHARGE DESTINATION: home    HOME CARE: Yes    Agency Name: [facility]  Discharging to Facility/ Agency   Name:  [facility]    Address: [address] [place] [address]  Phone: [phone]  Fax: [phone]       Notified: RN, Family, and Facility/Agency    TRANSPORTATION: Private Car    NEW DME ORDERED: N/A  Electronically signed by Chelsey Salter on 4/3/2024 at 4:43 PM  #[phone]

## 2024-04-03 NOTE — DISCHARGE SUMMARY
Hospital Medicine Discharge Summary    Patient ID: Juan Carlos Wong Sr      Patient's PCP: Idris Laws MD    Admit Date: 3/27/2024     Discharge Date:   4/3/24    Admitting Physician: Yamila Salas DO     Discharge Physician: Mily Levi PA-C     Discharge Diagnoses  Sepsis with acute organ dysfunction  Left diabetic foot ulcer  Left foot cellulitis  Acute on chronic heart failure reserved ejection fraction  Ischemic cardiomyopathy  Anasarca  Elevated troponin  And CAD s/p PCI  MIGUEL on CKD stage IV  Hyperkalemia  Type 2 diabetes mellitus with chronic complications with long-term insulin use  Chronic normocytic anemia  Physical debility  History of C. difficile  enlarged groin lymph nodes  Essential hypertension    Hospital Course: 59 y.o. male who presented to [facility] with past medical history of back pain, type 2 diabetes presented ED with chief complaint of patient falling.  While in ED he met sepsis criteria with tachycardia, leukocytosis.  Source thought secondary to diabetic foot ulcer/cellulitis.  Left foot x-ray was obtained which showed no evidence of osteo but cellulitis with soft tissue gas.  He was started empirically on IV Vanco/cefepime, blood cultures were ordered and remain no growth to date.  Imaging workup for fall with, CT head was unremarkable for any acute intracranial abnormality.  CXR showed findings consistent with CHF.  CT C-spine was negative.  CT A/P showed third spacing of fluids with bilateral pleural effusions, anasarca and ascites.  Also showed incidentally seen significantly large groin lymph nodes right greater than left.  Patient was started on Lasix drip for hypervolemia.  Echocardiogram completed with reduced EF from previous.  Cardiology was consulted.  Patient has known CKD, nephrology was consulted an and assisted in diuretic dosing.  Oncology was consulted for further workup of enlarged groin lymph nodes but deferred intervention in the setting

## 2024-04-03 NOTE — CARE COORDINATION
PAYOR CARE COORDINATOR INFORMATION:    COMPANY:  [employer] [facility]  NAME:  Ann Marie Fallon  PH:  [phone]      Daisy Escalante Sr. Administrative Assist, Case Management  [phone]  Electronically signed by Daisy Escalante on 4/3/2024 at 1:07 PM

## 2024-04-03 NOTE — PROGRESS NOTES
ONCOLOGY HEMATOLOGY CARE PROGRESS NOTE      SUBJECTIVE:      Feels better      ROS:     14 point review of systems performed negative except for as documented above      OBJECTIVE        Physical    VITALS:  Patient Vitals for the past 24 hrs:   BP Temp Temp src Pulse Resp SpO2 Weight   03/30/24 0932 -- -- -- 100 -- -- --   03/30/24 0754 (!) 152/78 98.3 °F (36.8 °C) Oral (!) 106 24 92 % --   03/30/24 0450 123/68 97.5 °F (36.4 °C) Oral 90 16 94 % 88.5 kg (195 lb 1.7 oz)   03/29/24 2306 122/75 97.4 °F (36.3 °C) Oral 93 25 92 % --   03/29/24 1938 124/66 97.4 °F (36.3 °C) Oral 94 17 93 % --   03/29/24 1525 116/66 98.2 °F (36.8 °C) Oral 91 18 91 % --   03/29/24 1150 129/78 97.5 °F (36.4 °C) Oral 85 -- 92 % --       24HR INTAKE/OUTPUT:    Intake/Output Summary (Last 24 hours) at 3/30/2024 1052  Last data filed at 3/30/2024 0945  Gross per 24 hour   Intake 1020 ml   Output 2300 ml   Net -1280 ml       CONSTITUTIONAL: awake, alert, cooperative, no apparent distress   LUNGS: no increased work of breathing and clear to auscultation   CARDIOVASCULAR: regular rate and rhythm, normal S1 and S2, no murmur noted  ABDOMEN: normal bowel sounds x 4, soft, non-distended, non-tender, no masses palpated, no hepatosplenomgaly   EXTREMITIES: no LE edema     DATA:  CBC:    Recent Labs     03/30/24  0525 03/29/24  0445 03/28/24  0544 03/27/24  1800   WBC 8.6 7.5 9.6 15.8*   NEUTROABS 6.4 5.7 8.3* 14.4*   LYMPHOPCT 9.9 8.9 7.5 3.8   RBC 3.73* 3.36* 3.13* 3.64*   HGB 11.3* 10.2* 9.6* 10.7*   HCT 33.5* 29.9* 28.5* 33.1*   MCV 89.7 89.0 90.9 90.8   MCH 30.3 30.3 30.6 29.4   MCHC 33.7 34.0 33.6 32.4   RDW 17.6* 17.7* 17.7* 17.8*    168 179 226       PT/INR:    Recent Labs     03/30/24  0525 03/29/24  0445 03/28/24  1724 03/28/24  0544 03/27/24  1925   PROT 6.4 5.6* 5.8* 6.0* 6.9     PTT:  No results for input(s): \"APTT\" in the last 720 hours.    CMP:    Recent Labs     03/30/24  0525 03/29/24 0445 
                               [facility] Inpatient Nephrology Note        SUBJECTIVE:    Reason: CKD  HPI: Breathing unchanged.  O > I.    ROS:  In bed.  PMFSH:  medications reviewed.      OBJECTIVE      Physical    /78   Pulse 93   Temp 97.6 °F (36.4 °C) (Oral)   Resp 16   Ht 1.753 m (5' 9.02\")   Wt 85.7 kg (188 lb 15 oz)   SpO2 90%   BMI 27.89 kg/m²     24HR INTAKE/OUTPUT:    Intake/Output Summary (Last 24 hours) at 3/31/2024 1642  Last data filed at 3/31/2024 1236  Gross per 24 hour   Intake 640.05 ml   Output 3900 ml   Net -3259.95 ml         GEN: no distress, obese  CV: RRR  LUNGS: rales bilaterally, unlabored  ABD: + bowel sounds. No distension. No  tenderness  EXT: 1+ BLE edema. 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 Howell  - baseline serum creatinine 2.0-2.3  - UPC 7.7  - preserve L arm  - monitor renal panel daily    # ACSDHF  - cardiology is stopping IV lasix gtt and milrinone  - echo LVEF 20-25%  - O > I  - SGLT2i being started tomorrow, so will need to monitor renal function    # Leukocytosis  - on cefepime only  - WBC improved  - per IM    # Hyperkalemia  - due to CKD and diet  - continue low K diet  - improving    # Hypomagnesemia  - supplement prn  - recheck    # CKD MBD  - adequtae phos    #HTN  - BP adequate.   - monitor with diuresis    #lymphadenopathy  - hematology consulted  - outpt 
                               [facility] Inpatient Nephrology Note        SUBJECTIVE:    Reason: CKD  HPI: Breathing unchanged.  O > I.    ROS:  In bed.  PMFSH:  medications reviewed.      OBJECTIVE      Physical    BP (!) 148/91   Pulse 100   Temp 98.3 °F (36.8 °C) (Oral)   Resp 24   Ht 1.753 m (5' 9.02\")   Wt 88.5 kg (195 lb 1.7 oz)   SpO2 92%   BMI 28.80 kg/m²     24HR INTAKE/OUTPUT:    Intake/Output Summary (Last 24 hours) at 3/30/2024 1527  Last data filed at 3/30/2024 0945  Gross per 24 hour   Intake 600 ml   Output 1850 ml   Net -1250 ml         GEN: no distress, obese  CV: RRR  LUNGS: rales bilaterally, unlabored  ABD: + bowel sounds. No distension. No  tenderness  EXT: 1+ BLE edema. L TMA  SKIN: no rash  NEURO: no tremor    Data      Lab Results   Component Value Date    CREATININE 2.8 (H) 03/30/2024    BUN 36 (H) 03/30/2024     (L) 03/30/2024    K 4.2 03/30/2024    K 4.2 03/30/2024     03/30/2024    CO2 20 (L) 03/30/2024     Lab Results   Component Value Date    WBC 8.6 03/30/2024    HGB 11.3 (L) 03/30/2024    HCT 33.5 (L) 03/30/2024    MCV 89.7 03/30/2024     03/30/2024         ASSESSMENT/PLAN:    # MIGUEL on CKD  - due to cardiorenal factors  - will need to accept some azotemia in order to prevent congestive sx  - serum creatinine essentially stable    # CKD Stage 3/4  - 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 Howell  - baseline serum creatinine 2.0-2.3  - UPC 7.7  - preserve L arm  - monitor renal panel daily    # ACSDHF  - on IV lasix gtt and milrinone  - echo LVEF 20-25%  - O > I  - SGLT2i being discussed    # Leukocytosis  - on cefepime only  - WBC improved  - per IM    # Hyperkalemia  - due to CKD and diet  - continue low K diet  - improving    # Hypomagnesemia  - supplement prn  - recheck    # CKD MBD  - adequtae phos    #HTN  - BP adequate.   - monitor with diuresis    #lymphadenopathy  - hematology consulted  - outpt follow-up planned  
        Juan Carlos Wong      CHIEF COMPLAINT/HISTORY OF PRESENT ILLNESS:  This 59-year-old gentleman was admitted to the emergency department after a mechanical fall.  Podiatry is consulted for left lower extremity ulceration.  The patient relates he has a longstanding history, with Dr. April Kelly at [facility] and follow-through weekly for wound care.    No complaints, up with PT / OT for ambulation, doing well, anticipates discharge as he feels well     PAST MEDICAL HISTORY:  Significant for diabetes mellitus, pneumonia, back pain, and coronary artery disease.     PAST SURGICAL HISTORY:  Unremarkable at this time.     MEDICATIONS:  Please see the medical record.  The patient has been started on vancomycin and cefepime.     ALLERGIES:  NO KNOWN DRUG ALLERGIES.        SOCIAL HISTORY:  The patient denies tobacco, alcohol, or drug use.     REVIEW OF SYSTEMS:  Unremarkable at this time.  The patient specifically denies nausea, fever, vomiting, chills, shortness of breath, or pain in chest.     PHYSICAL EXAMINATION:  The patient's vital signs are stable.  He is afebrile.     LABORATORY DATA:  The patient's white blood cell count is 7.0, it was 15.8 on admission.  His hemoglobin is 9.6 and HCT 28.5. His platelet count is 179.  The patient came in with a BNP of 35,000.  He has a sodium of 135; a potassium of 5.2, up from 4.9 on admission.        The patient's albumin is 2.2.      IMAGING:  X-rays 3 views of the left foot were negative for any evidence of osteomyelitis.  There was some concern for soft tissue emphysema, which corresponds to the area of ulceration on the plantar lateral foot.  The patient also has had a previous tibial aaron placed with the distal screws visible on his x-ray.  The patient did have an MRI done at a [facility] facility on February 7th, which was negative for any definitive osteomyelitis.     The patient has non-palpable pedal pulses bilaterally.  He has pitting edema noted on the bilateral 
        Juan Carlos Wong      CHIEF COMPLAINT/HISTORY OF PRESENT ILLNESS:  This 59-year-old gentleman was admitted to the emergency department after a mechanical fall.  Podiatry is consulted for left lower extremity ulceration.  The patient relates he has a longstanding history, with Dr. April Kelly at [facility] and follow-through weekly for wound care.    This morning he is resting well without complaints     PAST MEDICAL HISTORY:  Significant for diabetes mellitus, pneumonia, back pain, and coronary artery disease.     PAST SURGICAL HISTORY:  Unremarkable at this time.     MEDICATIONS:  Please see the medical record.  The patient has been started on vancomycin and cefepime.     ALLERGIES:  NO KNOWN DRUG ALLERGIES.        SOCIAL HISTORY:  The patient denies tobacco, alcohol, or drug use.     REVIEW OF SYSTEMS:  Unremarkable at this time.  The patient specifically denies nausea, fever, vomiting, chills, shortness of breath, or pain in chest.     PHYSICAL EXAMINATION:  The patient's vital signs are stable.  He is afebrile.     LABORATORY DATA:  The patient's white blood cell count is 9.6, it was 15.8 on admission.  His hemoglobin is 9.6 and HCT 28.5. His platelet count is 179.  The patient came in with a BNP of 35,000.  He has a sodium of 135; a potassium of 5.2, up from 4.9 on admission.  His bicarb is 19.  His BUN is 34.  His creatinine is 2.6.     The patient's albumin is 2.2.  His blood cultures are pending.  His urine culture is pending.  The patient's C difficile toxin was negative.     IMAGING:  X-rays 3 views of the left foot were negative for any evidence of osteomyelitis.  There was some concern for soft tissue emphysema, which corresponds to the area of ulceration on the plantar lateral foot.  The patient also has had a previous tibial aaron placed with the distal screws visible on his x-ray.  The patient did have an MRI done at a [facility] facility on February 7th, which was negative for any definitive 
        Juan Carlos Wong      CHIEF COMPLAINT/HISTORY OF PRESENT ILLNESS:  This 59-year-old gentleman was admitted to the emergency department after a mechanical fall.  Podiatry is consulted for left lower extremity ulceration.  The patient relates he has a longstanding history, with Dr. April Kelly at [facility] and follow-through weekly for wound care.    No complaints     PAST MEDICAL HISTORY:  Significant for diabetes mellitus, pneumonia, back pain, and coronary artery disease.     PAST SURGICAL HISTORY:  Unremarkable at this time.     MEDICATIONS:  Please see the medical record.  The patient has been started on vancomycin and cefepime.     ALLERGIES:  NO KNOWN DRUG ALLERGIES.        SOCIAL HISTORY:  The patient denies tobacco, alcohol, or drug use.     REVIEW OF SYSTEMS:  Unremarkable at this time.  The patient specifically denies nausea, fever, vomiting, chills, shortness of breath, or pain in chest.     PHYSICAL EXAMINATION:  The patient's vital signs are stable.  He is afebrile.     LABORATORY DATA:  The patient's white blood cell count is 9.6, it was 15.8 on admission.  His hemoglobin is 9.6 and HCT 28.5. His platelet count is 179.  The patient came in with a BNP of 35,000.  He has a sodium of 135; a potassium of 5.2, up from 4.9 on admission.  His bicarb is 19.  His BUN is 34.  His creatinine is 2.6.     The patient's albumin is 2.2.  His blood cultures are pending.  His urine culture is pending.  The patient's C difficile toxin was negative.     IMAGING:  X-rays 3 views of the left foot were negative for any evidence of osteomyelitis.  There was some concern for soft tissue emphysema, which corresponds to the area of ulceration on the plantar lateral foot.  The patient also has had a previous tibial aaron placed with the distal screws visible on his x-ray.  The patient did have an MRI done at a [facility] facility on February 7th, which was negative for any definitive osteomyelitis.     The patient has 
        Juan Carlos Wong      CHIEF COMPLAINT/HISTORY OF PRESENT ILLNESS:  This 59-year-old gentleman was admitted to the emergency department after a mechanical fall.  Podiatry is consulted for left lower extremity ulceration.  The patient relates he has a longstanding history, with Dr. April Kelly at [facility] and follow-through weekly for wound care.    No complaints, up with PT / OT for ambulation, doing well     PAST MEDICAL HISTORY:  Significant for diabetes mellitus, pneumonia, back pain, and coronary artery disease.     PAST SURGICAL HISTORY:  Unremarkable at this time.     MEDICATIONS:  Please see the medical record.  The patient has been started on vancomycin and cefepime.     ALLERGIES:  NO KNOWN DRUG ALLERGIES.        SOCIAL HISTORY:  The patient denies tobacco, alcohol, or drug use.     REVIEW OF SYSTEMS:  Unremarkable at this time.  The patient specifically denies nausea, fever, vomiting, chills, shortness of breath, or pain in chest.     PHYSICAL EXAMINATION:  The patient's vital signs are stable.  He is afebrile.     LABORATORY DATA:  The patient's white blood cell count is 7.0, it was 15.8 on admission.  His hemoglobin is 9.6 and HCT 28.5. His platelet count is 179.  The patient came in with a BNP of 35,000.  He has a sodium of 135; a potassium of 5.2, up from 4.9 on admission.        The patient's albumin is 2.2.      IMAGING:  X-rays 3 views of the left foot were negative for any evidence of osteomyelitis.  There was some concern for soft tissue emphysema, which corresponds to the area of ulceration on the plantar lateral foot.  The patient also has had a previous tibial aaron placed with the distal screws visible on his x-ray.  The patient did have an MRI done at a [facility] facility on February 7th, which was negative for any definitive osteomyelitis.     The patient has non-palpable pedal pulses bilaterally.  He has pitting edema noted on the bilateral lower extremity of 1+.  He has absent pedal 
       Oncology Hematology Care  Progress Note      SUBJECTIVE:     The patient states that he is doing much better today.  He is breathing much easier.  He is certainly more alert.  He is more engaging.  He would like to be discharged and is wondering who was empowered with that decision.  He denies any night sweats.  He was unaware of the lymphadenopathy that had been discussed yesterday.  He could not recall our conversation yesterday.      ROS: No fever chills sweats, no nausea, vomiting, diarrhea  shortness of breath chest pain or other pain    OBJECTIVE      Medications    Current Facility-Administered Medications: magnesium sulfate 4000 mg in 100 mL IVPB premix, 4,000 mg, IntraVENous, Once  carvedilol (COREG) tablet 3.125 mg, 3.125 mg, Oral, BID WC  balsum peru-castor oil (VENELEX) ointment, , Topical, BID  perflutren lipid microspheres (DEFINITY) injection 1.5 mL, 1.5 mL, IntraVENous, ONCE PRN  [COMPLETED] ceFEPIme (MAXIPIME) 2,000 mg in sodium chloride 0.9 % 100 mL IVPB (mini-bag), 2,000 mg, IntraVENous, Once **FOLLOWED BY** ceFEPIme (MAXIPIME) 2,000 mg in sodium chloride 0.9 % 100 mL IVPB (mini-bag), 2,000 mg, IntraVENous, Q24H  collagenase ointment, , Topical, Daily  milrinone (PRIMACOR) 20 mg in dextrose 5 % 100 mL infusion, 0.0625-0.75 mcg/kg/min, IntraVENous, Continuous  enoxaparin Sodium (LOVENOX) injection 30 mg, 30 mg, SubCUTAneous, Daily  aspirin EC tablet 81 mg, 81 mg, Oral, Daily  atorvastatin (LIPITOR) tablet 40 mg, 40 mg, Oral, Daily  clopidogrel (PLAVIX) tablet 75 mg, 75 mg, Oral, Daily  gabapentin (NEURONTIN) capsule 300 mg, 300 mg, Oral, BID  hydrALAZINE (APRESOLINE) tablet 10 mg, 10 mg, Oral, TID  insulin glargine (LANTUS) injection vial 8 Units, 8 Units, SubCUTAneous, Nightly  pantoprazole (PROTONIX) tablet 40 mg, 40 mg, Oral, BID AC  glucose chewable tablet 16 g, 4 tablet, Oral, PRN  dextrose bolus 10% 125 mL, 125 mL, IntraVENous, PRN **OR** dextrose bolus 10% 250 mL, 250 mL, IntraVENous, 
      Hospitalist Progress Note      PCP: Idris Laws MD    Date of Admission: 3/27/2024    Chief Complaint: Debility, fall    Hospital Course: 59 y.o. male who presented to [facility] with past medical history of back pain, type 2 diabetes presented ED with chief complaint of patient falling.  While in ED he met sepsis criteria with tachycardia, leukocytosis.  Source thought secondary to diabetic foot ulcer/cellulitis.  Left foot x-ray was obtained which showed no evidence of osteo but cellulitis with soft tissue gas.  He was started empirically on IV Vanco/cefepime, blood cultures were ordered.  Imaging workup for fall with, CT head was unremarkable for any acute intracranial abnormality.  CXR showed findings consistent with CHF.  CT C-spine was negative.  CT A/P showed third spacing of fluids with bilateral pleural effusions, anasarca and ascites.  Also showed incidentally seen significantly large groin lymph nodes right greater than left.  Patient was started on Lasix drip for hypervolemia.  Echocardiogram completed with reduced EF from previous.  Cardiology was consulted.  Patient has known CKD, nephrology was consulted an and assisted in diuretic dosing.  Oncology was consulted for further workup of enlarged groin lymph nodes but deferred intervention in the setting of other acute medical concerns.     Subjective: Patient seen lying in bed, states he feels great today.  Asking if he can go home.  No acute events overnight.  Leg edema appears significantly improved.      Assessment/Plan:    Sepsis with acute organ dysfunction  Left diabetic foot ulcer  Left foot cellulitis  -Criteria met on admission Tmax 100.4, tachycardic, leukocytosis 15.8  -Was recently treated at [facility] for diabetic foot ulcer with associated cellulitis from 2/5 to 2/12. Completed course of Flagyl and cefdinir  -MRI on 2/7 showed no definitive OM.    -Left foot x-ray not suggestive of osteo but showing left foot 
      Hospitalist Progress Note      PCP: Idris Laws MD    Date of Admission: 3/27/2024    Chief Complaint: Debility, fall    Hospital Course: 59 y.o. male who presented to [facility] with past medical history of back pain, type 2 diabetes presented ED with chief complaint of patient falling.  While in ED he met sepsis criteria with tachycardia, leukocytosis.  Source thought secondary to diabetic foot ulcer/cellulitis.  Left foot x-ray was obtained which showed no evidence of osteo but cellulitis with soft tissue gas.  He was started empirically on IV Vanco/cefepime, blood cultures were ordered.  Imaging workup for fall with, CT head was unremarkable for any acute intracranial abnormality.  CXR showed findings consistent with CHF.  CT C-spine was negative.  CT A/P showed third spacing of fluids with bilateral pleural effusions, anasarca and ascites.  Also showed incidentally seen significantly large groin lymph nodes right greater than left.  Patient was started on Lasix drip for hypervolemia.  Echocardiogram was ordered.  Cardiology was consulted.  Patient has known CKD, nephrology was consulted an and assisted in diuretic dosing.  Oncology was consulted for further workup of enlarged groin lymph nodes.    Subjective: Patient seen sitting in chair, much more alert today.  States he is feeling okay, been urinating a lot with the diuretics.  Denies any fevers, states he always has chills.  Denies any shortness of breath or chest pain currently.    Attempted to call patient's daughter to update her on plan of care but VM was full.     Assessment/Plan:    Sepsis  -Criteria met Tmax 100.4, tachycardic, leukocytosis 15.8  -Source likely left foot cellulitis  -Blood cultures NGTD, RVP negative, C diff neg. UA does not appear infected bt Ucx ordered.  -podiatry consulted for foot wound  -Received empiric Vanco/cefepime, continued on cefepime.  Infectious disease consulted for antibiotic 
      Hospitalist Progress Note      PCP: Idris Laws MD    Date of Admission: 3/27/2024    Chief Complaint: Debility, fall    Hospital Course: 59 y.o. male who presented to [facility] with past medical history of back pain, type 2 diabetes presented ED with chief complaint of patient falling.  While in ED he met sepsis criteria with tachycardia, leukocytosis.  Source thought secondary to diabetic foot ulcer/cellulitis.  Left foot x-ray was obtained which showed no evidence of osteo but cellulitis with soft tissue gas.  He was started empirically on IV Vanco/cefepime, blood cultures were ordered.  Imaging workup for fall with, CT head was unremarkable for any acute intracranial abnormality.  CXR showed findings consistent with CHF.  CT C-spine was negative.  CT A/P showed third spacing of fluids with bilateral pleural effusions, anasarca and ascites.  Also showed incidentally seen significantly large groin lymph nodes right greater than left.  Patient was started on Lasix drip for hypervolemia.  Echocardiogram was ordered.  Cardiology was consulted.  Patient has known CKD, nephrology was consulted an and assisted in diuretic dosing.  Oncology was consulted for further workup of enlarged groin lymph nodes.    Subjective: Patient seen lying in bed, states he is having some pain in his hips which is chronic.  States his legs just gave out and he cannot get up when he is on his knees which is what happened earlier when he slid out of his chair at home.  Denies any recent infectious symptoms such as cough, cold, fever but states he frequently has chills.  States has been seeing wound care 3 times a week for his left foot wound.  Also states his bilateral extremities have been getting more swollen last few weeks.    Assessment/Plan:    Sepsis  -Criteria met Tmax 100.4, tachycardic, leukocytosis 15.8  -Source likely left foot cellulitis  -Blood cultures pending, podiatry consulted for foot wound  -Continue 
      Hospitalist Progress Note      PCP: Idris Laws MD    Date of Admission: 3/27/2024    Chief Complaint: Debility, fall    Hospital Course: 59 y.o. male who presented to [facility] with past medical history of back pain, type 2 diabetes presented ED with chief complaint of patient falling.  While in ED he met sepsis criteria with tachycardia, leukocytosis.  Source thought secondary to diabetic foot ulcer/cellulitis.  Left foot x-ray was obtained which showed no evidence of osteo but cellulitis with soft tissue gas.  He was started empirically on IV Vanco/cefepime, blood cultures were ordered.  Imaging workup for fall with, CT head was unremarkable for any acute intracranial abnormality.  CXR showed findings consistent with CHF.  CT C-spine was negative.  CT A/P showed third spacing of fluids with bilateral pleural effusions, anasarca and ascites.  Also showed incidentally seen significantly large groin lymph nodes right greater than left.  Patient was started on Lasix drip for hypervolemia.  Echocardiogram completed with reduced EF from previous.  Cardiology was consulted.  Patient has known CKD, nephrology was consulted an and assisted in diuretic dosing.  Oncology was consulted for further workup of enlarged groin lymph nodes but deferred intervention in the setting of other acute medical concerns.     Cardiology started patient on milrinone and Lasix drip.  He had significant diuresis.  He was transitioned off milrinone and Lasix drip and onto oral torsemide 3/31.  He was started on Jardiance after his creatinine stabilized.  On 4/2 creatinine increased and he underwent left/right heart cath to monitor fluid status.    Subjective: Patient seen sitting in chair, states he feels great.  Leg swelling has still improved.  States he is hoping he can go home today as he feels like he is at his baseline.  Was up walking the galindo without any shortness of breath or chest 
      Hospitalist Progress Note      PCP: Idris Laws MD    Date of Admission: 3/27/2024    Chief Complaint: Debility, fall    Hospital Course: 59 y.o. male who presented to [facility] with past medical history of back pain, type 2 diabetes presented ED with chief complaint of patient falling.  While in ED he met sepsis criteria with tachycardia, leukocytosis.  Source thought secondary to diabetic foot ulcer/cellulitis.  Left foot x-ray was obtained which showed no evidence of osteo but cellulitis with soft tissue gas.  He was started empirically on IV Vanco/cefepime, blood cultures were ordered.  Imaging workup for fall with, CT head was unremarkable for any acute intracranial abnormality.  CXR showed findings consistent with CHF.  CT C-spine was negative.  CT A/P showed third spacing of fluids with bilateral pleural effusions, anasarca and ascites.  Also showed incidentally seen significantly large groin lymph nodes right greater than left.  Patient was started on Lasix drip for hypervolemia.  Echocardiogram was ordered.  Cardiology was consulted.  Patient has known CKD, nephrology was consulted an and assisted in diuretic dosing.  Oncology was consulted for further workup of enlarged groin lymph nodes.    Subjective:     Reports feeling overall improved.  Reports his lower extremities are significant less swollen  States he has had significant weight loss from diuresis    No overnight events reported per nursing      Assessment/Plan:    Sepsis  Left diabetic foot ulcer  Left foot cellulitis  -Criteria met on admission Tmax 100.4, tachycardic, leukocytosis 15.8  -Was recently treated at [facility] for diabetic foot ulcer with associated cellulitis from 2/5 to 2/12. Completed course of Flagyl and cefdinir  -MRI on 2/7 showed no definitive osteo.    -Left foot x-ray not suggestive of osteo but showing left foot cellulitis with soft tissue gas collection laterally  -Was following with wound care 
      Infectious Diseases Inpatient Progress Note  CHIEF COMPLAINT:     Diabetic foot infection  Foot cellulitis  Foot ulcer+  CHF  Lower leg edema+         HISTORY OF PRESENT ILLNESS:  59 y.o. man with a significant history for diabetes, neuropathy, congestive heart failure, chronic kidney disease stage IIIb admitted to hospital following a fall.  Patient was sitting on the chair slid on his knees and down on the floor no report of loss of consciousness but was unable to get up from the floor family came to help him brought him into the hospital for further evaluation.  Patient has been dealing with the left foot ulceration ongoing from February 2024.  He was also found to have bilateral lower extremity edema with increasing weight gain and fluid retention labs indicate creatinine 2.8 lactic acid 1.3 CK9 22 BNP 35,000 LFT normal hemoglobin A1c at 8 WBC 15.8 on admission, blood cultures negative C. difficile toxin negative respiratory viral panel negative Tmax 100.5.  CT chest abdomen pelvis indicated third spacing of the fluid with consistent with fluid overload significantly enlarged groin lymph node right greater than the left with bilateral external iliac chain lymph nodes reactive  Left foot negative for osteomyelitis chest x-ray consistent with congestive heart failure CT head negative we are consulted for recommendations.  :  Interval History : Bilateral lower extremity edema slowly improving left foot redness improving dressing intact tolerating antibiotic therapy okay creatinine down to 2.2      Past Medical History:    Past Medical History:   Diagnosis Date    Back pain     Diabetes mellitus (HCC)     Pneumonia        Past Surgical History:    Past Surgical History:   Procedure Laterality Date    COLONOSCOPY      LEG SURGERY Left     LIVER BIOPSY         Current Medications:    No outpatient medications have been marked as taking for the 3/27/24 encounter (Hospital Encounter).       Allergies:  Patient has 
      Infectious Diseases Inpatient Progress Note  CHIEF COMPLAINT:     Diabetic foot infection  Foot cellulitis  Foot ulcer+  CHF  Lower leg edema+         HISTORY OF PRESENT ILLNESS:  59 y.o. man with a significant history for diabetes, neuropathy, congestive heart failure, chronic kidney disease stage IIIb admitted to hospital following a fall.  Patient was sitting on the chair slid on his knees and down on the floor no report of loss of consciousness but was unable to get up from the floor family came to help him brought him into the hospital for further evaluation.  Patient has been dealing with the left foot ulceration ongoing from February 2024.  He was also found to have bilateral lower extremity edema with increasing weight gain and fluid retention labs indicate creatinine 2.8 lactic acid 1.3 CK9 22 BNP 35,000 LFT normal hemoglobin A1c at 8 WBC 15.8 on admission, blood cultures negative C. difficile toxin negative respiratory viral panel negative Tmax 100.5.  CT chest abdomen pelvis indicated third spacing of the fluid with consistent with fluid overload significantly enlarged groin lymph node right greater than the left with bilateral external iliac chain lymph nodes reactive  Left foot negative for osteomyelitis chest x-ray consistent with congestive heart failure CT head negative we are consulted for recommendations.  :  Interval History : Bilateral lower extremity edema slowly improving redness going down status post cardiac cath, needs to follow-up with podiatrist and footcare discussed with the patient will be able to choose oral antibiotics      Past Medical History:    Past Medical History:   Diagnosis Date    Back pain     Diabetes mellitus (HCC)     Pneumonia        Past Surgical History:    Past Surgical History:   Procedure Laterality Date    COLONOSCOPY      LEG SURGERY Left     LIVER BIOPSY         Current Medications:    No outpatient medications have been marked as taking for the 3/27/24 
      [facility]   Daily Progress Note      Admit Date:  3/27/2024    CC: fall    HPI:   Juan Carlos Wong Sr is a 59 y.o. male with PMH CAD/stents, CKD, DM, neuropathy, PAD/s/p L partial foot amputation w chronic ulcer.  Follows with [facility]  Hospitalized in Feb at [facility] for sepsis and influenza A and LE cellulits. ACEI held for MIGUEL/CKD    Adm to W after a fall at home.   Mechanical fall out of a chair.   No syncope/change LOC.  Reported worsening BLE edema X3 weeks.     Started on IV milrinone and lasix gtt- stopped 3/31.    Today he ambulated in galindo with walker,   Mild SOB, denies any CP.   He tells me SOB and activity tolerance significantly improved since admission.   Denies LH, dizziness, syncope.    L foot ulcer chronic.     Review of Systems:   General: Denies fever, chills  Skin: Denies skin changes, rash, itching, lesions.  HEENT: Denies headache, dizziness, vision changes, nosebleeds, sore throat, nasal drainage  RESP: Denies cough, sputum,  wheeze, snoring  CARD: Denies palpitations,  murmur  GI:Denies nausea, vomiting, heartburn, loss of appetite, change in bowels  : Denies frequency, pain, incontinence, polyuria  VASC: Denies claudication, leg cramps, clots  MUSC/SKEL: Denies pain, stiffness, arthritis  PSYCH: Denies anxiety, depression, stress  NEURO: Denies numbness, tingling, weakness,change in mood or memory  HEME: Denies abn bruising, bleeding, anemia  ENDO: Denies intolerance to heat, cold, excessive thirst or hunger, hx thyroid disease    Objective:   /77   Pulse 83   Temp 98.5 °F (36.9 °C) (Oral)   Resp 25   Ht 1.753 m (5' 9.02\")   Wt 81.5 kg (179 lb 10.8 oz)   SpO2 95%   BMI 26.52 kg/m²         Intake/Output Summary (Last 24 hours) at 4/1/2024 1131  Last data filed at 4/1/2024 0952  Gross per 24 hour   Intake 1220 ml   Output 2800 ml   Net -1580 ml     I/O since adm: 8.2    WEIGHT:Admit Weight - Scale: 99.1 kg (218 lb 7.6 oz)         Today  Weight - Scale: 
      [facility]   Daily Progress Note      Admit Date:  3/27/2024    CC: fall    HPI:   Juan Carlos Wong Sr is a 59 y.o. male with PMH CAD/stents, CKD, DM, neuropathy, PAD/s/p L partial foot amputation w chronic ulcer.  Follows with [facility]  Hospitalized in Feb at [facility] for sepsis and influenza A and LE cellulits. ACEI held for MIGUEL/CKD    Adm to W after a fall at home.   Mechanical fall out of a chair.   No syncope/change LOC.  Reported worsening BLE edema X3 weeks.     Started on IV milrinone and lasix gtt- stopped 3/31.    Ambulated in galindo with walker,   Mild SOB, denies any CP.   He tells me SOB and activity tolerance significantly improved since admission.   Denies LH, dizziness, syncope.    L foot ulcer chronic.     Creat worsened 4/2  L & R heart cath 4/2 showed non occlusive CAD and normal filling pressures    Today he is feeling OK.  Ambulates in room on RA.    Review of Systems:   General: Denies fever, chills  Skin: Denies skin changes, rash, itching, lesions.  HEENT: Denies headache, dizziness, vision changes, nosebleeds, sore throat, nasal drainage  RESP: Denies cough, sputum,  wheeze, snoring  CARD: Denies palpitations,  murmur  GI:Denies nausea, vomiting, heartburn, loss of appetite, change in bowels  : Denies frequency, pain, incontinence, polyuria  VASC: Denies claudication, leg cramps, clots  MUSC/SKEL: Denies pain, stiffness, arthritis  PSYCH: Denies anxiety, depression, stress  NEURO: Denies numbness, tingling, weakness,change in mood or memory  HEME: Denies abn bruising, bleeding, anemia  ENDO: Denies intolerance to heat, cold, excessive thirst or hunger, hx thyroid disease    Objective:   /72   Pulse 80   Temp 97.7 °F (36.5 °C) (Oral)   Resp 18   Ht 1.753 m (5' 9.02\")   Wt 80 kg (176 lb 5.9 oz)   SpO2 93%   BMI 26.03 kg/m²         Intake/Output Summary (Last 24 hours) at 4/3/2024 0902  Last data filed at 4/3/2024 0832  Gross per 24 hour   Intake 1995.34 ml 
      [facility]   Daily Progress Note      Admit Date:  3/27/2024    CC: \"   Mr. Juan Carlos Wong Sr is a 59 y.o. male patient, known to [facility], admitted yesterday following fall at home.   Juan Carlos was admitted 2/5-2/12/24 at [facility] for sepsis with Influenza A and lower extremity cellulitis (LLE DFU). Echo was stable from previous without vegetations. Patient's previous ACEi was held for MIGUEL/CKD and ultimately not resumed. Pertinent medications on discharge were Aspirin, Plavix, Atorvastatin, Hydralazine, Carvedilol,Torsemide. Patient with home healthcare. Daughter found him after fall out of chair onto the floor. No syncope/LOC.   Reports bilateral LE edema progressing over preceding three weeks. Unknown weight trend.   Receiving antibiotics for concerns of cellulitis.      Interval history 3/29/2024  Patient is breathing better and responding well to IV milrinone and Lasix infusion  Blood pressure is soft but stable  Pt with no acute overnight events. Denies chest pain, palpitations, and dyspnea.    Interval history 3/30/2024  Weight is down 6 pounds  Tolerating IV Primacor and Lasix infusion  Shortness of breath and lower extremity edema have improved    Interval history 3/31/2024  -Patient continues to show improvement.  -He is less short of breath and breathing comfortably on room air  -Blood pressure is elevated today.  He has diuresed well and lost additional 7 pounds  Objective:   BP (!) 160/77   Pulse 94   Temp 97.6 °F (36.4 °C) (Oral)   Resp 25   Ht 1.753 m (5' 9.02\")   Wt 85.7 kg (188 lb 15 oz)   SpO2 90%   BMI 27.89 kg/m²     Intake/Output Summary (Last 24 hours) at 3/31/2024 1231  Last data filed at 3/31/2024 1039  Gross per 24 hour   Intake 760.05 ml   Output 3850 ml   Net -3089.95 ml     Wt Readings from Last 3 Encounters:   03/31/24 85.7 kg (188 lb 15 oz)   01/15/24 85.3 kg (188 lb)   12/04/23 83.4 kg (183 lb 12.8 oz)     Telemetry:s tach    Physical 
     Juan Carlos Wong      CHIEF COMPLAINT/HISTORY OF PRESENT ILLNESS:  This 59-year-old gentleman was admitted to the emergency department after a mechanical fall.  Podiatry is consulted for left lower extremity ulceration.  The patient relates he has a longstanding history, with Dr. April Kelly at [facility] and follow-through weekly for wound care.     PAST MEDICAL HISTORY:  Significant for diabetes mellitus, pneumonia, back pain, and coronary artery disease.     PAST SURGICAL HISTORY:  Unremarkable at this time.     MEDICATIONS:  Please see the medical record.  The patient has been started on vancomycin and cefepime.     ALLERGIES:  NO KNOWN DRUG ALLERGIES.        SOCIAL HISTORY:  The patient denies tobacco, alcohol, or drug use.     REVIEW OF SYSTEMS:  Unremarkable at this time.  The patient specifically denies nausea, fever, vomiting, chills, shortness of breath, or pain in chest.     PHYSICAL EXAMINATION:  The patient's vital signs are stable.  He is afebrile.     LABORATORY DATA:  The patient's white blood cell count is 9.6, it was 15.8 on admission.  His hemoglobin is 9.6 and HCT 28.5. His platelet count is 179.  The patient came in with a BNP of 35,000.  He has a sodium of 135; a potassium of 5.2, up from 4.9 on admission.  His bicarb is 19.  His BUN is 34.  His creatinine is 2.6.     The patient's albumin is 2.2.  His blood cultures are pending.  His urine culture is pending.  The patient's C difficile toxin was negative.     IMAGING:  X-rays 3 views of the left foot were negative for any evidence of osteomyelitis.  There was some concern for soft tissue emphysema, which corresponds to the area of ulceration on the plantar lateral foot.  The patient also has had a previous tibial aaron placed with the distal screws visible on his x-ray.  The patient did have an MRI done at a [facility] facility on February 7th, which was negative for any definitive osteomyelitis.     The patient has non-palpable pedal pulses 
  Physician Progress Note      PATIENT:               MARIEL LOCKHART SR  CSN #:                  [id number]  :                       1965  ADMIT DATE:       3/27/2024 5:22 PM  DISCH DATE:  RESPONDING  PROVIDER #:        Yamila Salas X          QUERY TEXT:    Pt admitted with fall and has CHF documented. If possible, please document in   progress notes and discharge summary further specificity regarding the type   and acuity of CHF:      The medical record reflects the following:  Risk Factors: Age, Hx- DM, CKD 3, Ischemic cardiomyopathy, CAD, HTN, Chronic   anemia  Clinical Indicators: BNP 35,000 CRT 2.6....CXR-Findings most consistent with   mild CHF.  Interval increase in size of the  cardiac silhouette with perihilar edema and small right pleural   effusion......ECHO 3/28/24- ejection fraction of 20-25%........ Per H&P on   3/27/24-Acute cardiorenal syndrome: Acute heart failure in the setting of   chronic kidney disease  Treatment: Lasix gtt, Strict I&O, Daily wts, Cardiology consult, Nephrology   consult, ECHO    Thank You,  Hafsa Landrum RN BSN CDS CRCR  [email or web address]  Options provided:  -- Acute Systolic CHF/HFrEF  -- Chronic Systolic CHF/HFrEF  -- Acute on Chronic Systolic CHF/HFrEF  -- Other - I will add my own diagnosis  -- Disagree - Not applicable / Not valid  -- Disagree - Clinically unable to determine / Unknown  -- Refer to Clinical Documentation Reviewer    PROVIDER RESPONSE TEXT:    This patient is in acute systolic CHF/HFrEF.    Query created by: Hafsa Landrum on 3/29/2024 8:07 AM      Electronically signed by:  Yamila Salas X 3/30/2024 5:48 AM          
1520 Returned to pre/post from procedural area.  Awake and alert.  Right groin site soft.  Monitor NSR  1545 Drinking cranberry juice.  Call light in reach.  1625  Report called to RADHA Browning on floor.  Patient dozing, arouses to name.  1700  Transferred to 4251 via bed.  Groin site remains soft.    
4 Eyes Skin Assessment     NAME:  Juan Carlos Wong Sr  YOB: 1965  MEDICAL RECORD NUMBER:  [id number]    The patient is being assessed for  Admission    I agree that at least one RN has performed a thorough Head to Toe Skin Assessment on the patient. ALL assessment sites listed below have been assessed.      Areas assessed by both nurses:    Head, Face, Ears, Shoulders, Back, Chest, Arms, Elbows, Hands, Sacrum. Buttock, Coccyx, Ischium, Legs. Feet and Heels, and Under Medical Devices         Does the Patient have a Wound? Yes wound(s) were present on assessment. LDA wound assessment was Initiated and completed by RN       Jaxon Prevention initiated by RN: No  Wound Care Orders initiated by RN: No    Pressure Injury (Stage 3,4, Unstageable, DTI, NWPT, and Complex wounds) if present, place Wound referral order by RN under : No    New Ostomies, if present place, Ostomy referral order under : No     Nurse 1 eSignature: Electronically signed by Magdalene Manning RN on 3/28/24 at 3:42 AM EDT    **SHARE this note so that the co-signing nurse can place an eSignature**    Nurse 2 eSignature: Electronically signed by Esperanza Flores RN on 3/28/24 at 4:38 AM EDT   
4 Eyes Skin Assessment     NAME:  Juan Carlos Wong Sr  YOB: 1965  MEDICAL RECORD NUMBER:  [id number]    The patient is being assessed for  Transfer to New Unit    I agree that at least one RN has performed a thorough Head to Toe Skin Assessment on the patient. ALL assessment sites listed below have been assessed.      Areas assessed by both nurses:    Head, Face, Ears, Shoulders, Back, Chest, Arms, Elbows, Hands, Sacrum. Buttock, Coccyx, Ischium, Legs. Feet and Heels, and Under Medical Devices         Does the Patient have a Wound? Yes wound(s) were present on assessment. LDA wound assessment was Initiated and completed by RN       Jaxon Prevention initiated by RN: Yes  Wound Care Orders initiated by RN: Yes    Pressure Injury (Stage 3,4, Unstageable, DTI, NWPT, and Complex wounds) if present, place Wound referral order by RN under : Yes    New Ostomies, if present place, Ostomy referral order under : No     Nurse 1 eSignature: Electronically signed by Maria Elena Castillo RN on 3/28/24 at 7:06 PM EDT    **SHARE this note so that the co-signing nurse can place an eSignature**    Nurse 2 eSignature: Electronically signed by Ilana Guerrero RN on 3/28/24 at 11:49 PM EDT  
4 Eyes Skin Assessment     NAME:  Juan Carlos Wong Sr  YOB: 1965  MEDICAL RECORD NUMBER:  [id number]    The patient is being assessed for  Transfer from surgery    I agree that at least one RN has performed a thorough Head to Toe Skin Assessment on the patient. ALL assessment sites listed below have been assessed.      Areas assessed by both nurses:    Head, Face, Ears, Shoulders, Back, Chest, Arms, Elbows, Hands, Sacrum. Buttock, Coccyx, Ischium, Legs. Feet and Heels, and Under Medical Devices         Does the Patient have a Wound? No noted wound(s)       Jaxon Prevention initiated by RN: No  Wound Care Orders initiated by RN: No    Pressure Injury (Stage 3,4, Unstageable, DTI, NWPT, and Complex wounds) if present, place Wound referral order by RN under : No    New Ostomies, if present place, Ostomy referral order under : No     Nurse 1 eSignature: Electronically signed by Nallely Sahni RN on 4/2/24 at 5:21 PM EDT    **SHARE this note so that the co-signing nurse can place an eSignature**    Nurse 2 eSignature: Electronically signed by Selena Wong RN on 4/2/24 at 5:21 PM EDT   
Attempted to call report to 4N. RN states they will call me back.      1650: Report called to 4N RN.  
Call placed to patient's daughter Elmira ([phone]) to get an updated home med list per patient's request. Medication list updated. Message sent to ROSITA Levi per order directions to alert to completion of med rec. Elmira states that patient does not currently take plavix at home.  PA alerted.    Elmira also states that patient typically gets a wet to dry dressing change daily to the L foot wound. This RN alerted ROSITA Minor to see if dressing should be completed here, or if mepilex applied by wound care should remain in place until patient is seen by podiatry. PA states to leave mepilex at this time.     
Call received by this RN from therapy stating that patient had a loose runny BM while up w/ therapy. Patient placed into cdiff r/o per protocol. Sample to be sent to lab should patient have another BM today.   
Dressing change completed to L foot. Mepilex removed. Moderate amounts of serosanguinous drainage present. Wound cleansed w/ normal saline. Collagenase ointment applied. Saline moistened 4x4, dry ABD, and Kerlix in place. New sock applied. Patient tolerated dressing change well. No reports of pain.     Patient made aware of transfer order for PCU and is agreeable. Patient belongings collected and patient to be transported by this RN to [facility]. Report called to receiving PCU RADHA Arredondo. All questions posed to this RN answered.   
Lasix drip started per order. Patient made aware and is agreeable. Patient aware that the Lasix drip will increase urination. 2 new urinals placed within reach at the bedside. Patient denies needs at this time, states he is tired and wishes to rest. Will continue to monitor and assess  
Occupational Therapy  Facility/Department: [address] ORTHOPEDICS  Occupational Therapy Initial Assessment    Name: Juan Carlos Wong Sr  : 1965  MRN: [id number]  Date of Service: 3/28/2024    Discharge Recommendations:  2-3 sessions per week, 3-5 sessions per week, 24 hour supervision or assist, Patient would benefit from continued therapy after discharge, Continue to assess pending progress  OT Equipment Recommendations  Other: TBD    Juan Carlos Wong Sr scored a 16/24 on the AM-PAC ADL Inpatient form. Current research shows that an AM-PAC score of 17 or less is typically not associated with a discharge to the patient's home setting. Based on the patient's AM-PAC score and their current ADL deficits, it is recommended that the patient have 3-5 sessions per week of Occupational Therapy at d/c to increase the patient's independence.  Please see assessment section for further patient specific details.    If patient discharges prior to next session this note will serve as a discharge summary.  Please see below for the latest assessment towards goals.       Patient Diagnosis(es): The primary encounter diagnosis was Generalized weakness. Diagnoses of Dependent edema, Dehydration, Septicemia (HCC), Acute on chronic congestive heart failure, unspecified heart failure type (HCC), Non-traumatic rhabdomyolysis, Pneumonia due to infectious organism, unspecified laterality, unspecified part of lung, Fall, initial encounter, and Contusion of face, initial encounter were also pertinent to this visit.  Past Medical History:  has a past medical history of Back pain, Diabetes mellitus (HCC), and Pneumonia.  Past Surgical History:  has a past surgical history that includes liver biopsy; Leg Surgery (Left); and Colonoscopy.    Treatment Diagnosis: decreased fxl mobility/ADL status/balance    Assessment   Performance deficits / Impairments: Decreased functional mobility ;Decreased safe awareness;Decreased balance;Decreased ADL 
Occupational Therapy  Facility/Department: [address] MED SURG  Occupational Therapy Daily Treatment    Name: Juan Carlos Wong Sr  : 1965  MRN: [id number]  Date of Service: 2024    Discharge Recommendations:  2-3 sessions per week, Patient would benefit from continued therapy after discharge, Continue to assess pending progress, Home with assist PRN     Juan Carlos Wong Sr scored a 20/24 on the AM-PAC ADL Inpatient form. Current research shows that an AM-PAC score of 18 or greater is typically associated with a discharge to the patient's home setting. Based on the patient's AM-PAC score, and their current ADL deficits, it is recommended that the patient have 2-3 sessions per week of Occupational Therapy at d/c to increase the patient's independence.  At this time, this patient demonstrates the endurance and safety to discharge home with home OT and a follow up treatment frequency of 2-3x/wk.   Please see assessment section for further patient specific details.    If patient discharges prior to next session this note will serve as a discharge summary.  Please see below for the latest assessment towards goals.        Patient Diagnosis(es): The primary encounter diagnosis was Generalized weakness. Diagnoses of Dependent edema, Dehydration, Septicemia (HCC), Acute on chronic congestive heart failure, unspecified heart failure type (HCC), Non-traumatic rhabdomyolysis, Pneumonia due to infectious organism, unspecified laterality, unspecified part of lung, Fall, initial encounter, and Contusion of face, initial encounter were also pertinent to this visit.  Past Medical History:  has a past medical history of Back pain, Diabetes mellitus (HCC), and Pneumonia.  Past Surgical History:  has a past surgical history that includes liver biopsy; Leg Surgery (Left); and Colonoscopy.    Treatment Diagnosis: decreased fxl mobility/ADL status/balance      Assessment   Performance deficits / Impairments: Decreased functional 
Occupational Therapy Attempt  Juan Carlos Wong Sr    Attempted to see pt for OT. Pt seated in recliner, declining therapy at this time reporting he already worked w/ PT this AM and wants to rest. Will re-attempt as able.    Electronically signed by Debra Shipman OT on 4/3/24 at 11:54 AM EDT    
PRE-PROCEDURE    DATE: 4/2/2024 ARRIVAL TO CATH LAB: 1:43 PM    ADMIT SOURCE: Inpatient    ID & ALLERGY BAND: On    CONSENT: Yes    NPO SINCE:  0930    LABS: Yes, Positive    PULSES:  Right DP Present with Doppler  Right PT Present with Doppler  Left DP Present with Doppler  Left PT Present with Doppler    IV SITE : Patent in left arm and right AC.  with fluids infusing at kvo 1:43 PM     SURGERIES PLANNED: Yes    BLEEDING PROBLEMS: No    COMPLIANCE: Yes    LAST DOSE (if applicable):  ASA: 04/02/24  P2Y12 (Plavix, Effient, Brilinta): 04/02/24  Anticoagulants (Coumadin, Xarelto, Eliquis):   Other Blood Thinners: Lovenox 30mg 0858      OTHER MEDICATIONS PRIOR TO ARRIVAL:  ASA, Plavix, Atorvastatin, venelex oint, carvedilol, lovenox, hydralazine, lispro, imdur, flagyl, protonix, aldactone, torsemide        PATIENT HISTORY    CHIEF COMPLAINT: Swelling of Extremities    EKG:  Yes    Pre CATH Rhythm: Normal Sinus Rhythm    STRESS TEST PREFORMED:  No    CARDIAC CTA PREFORMED:  No    [place] CORONARY CALCIUM SCORE:   Assessed: No    ECHO: DATE:  Yes.  Most recent date: 0/28/24      EF:  23    PRIOR DIAGNOSTIC PROCEDURE no    HYPERTENSION: Yes    DYSLIPIDEMIA: Yes    FAMILY HX OF CAD: No    PRIOR MI: No    PRIOR PCI: Yes.  Most recent date: 03/23    PRIOR CABG: No    CEREBRALVASCULAR DX: No    PERIPHERAL ARTERIAL DISEASE: No    CHRONIC LUNG DISEASE: No    TOBACCO: Never    DIABETIC: Yes, Insulin Treatment    CIALIS/VIAGRA IN PAST 24 HOURS: No    CARDIAC ARREST: No    DIALYSIS: No    FRAILTY SCORE: 6 MODERATELY FRAIL (need help with all outside activities and housekeeping, often have problems with stairs, bathing, dressing)    CHEST PAIN SYMPTOM ASSESSMENT:Asymptomatic    CARDIOVASCULAR INSTABILITY: NO    
Patient A&Ox4, Vitals have been stable, patient currently on lasix and milrinone gtt,   
Patient arrived to room 5129 from 3110 via w/c. Portable tele in place upon arrival. VS taken, placed on tele monitor. Patient oriented to unit and room. History obtained. Orders reviewed with patient and POC discussed.  Call light in reach. Bed in lowest position, bed alarm on. Denies other needs. Will continue to monitor.     Electronically signed by Maria Elena Castillo RN on 3/28/2024 at 7:05 PM    
Patient is declining to sleep in the bed for the night. Pt reminded of his DTI on his buttock and the importance of repositioning. Despite the education and stressing importance of his skin integrity, pt's preference is to staying the chair. He is agreeable to shift his body weight from \"time to time.\" Electronically signed by Dolores Graves RN on 3/30/2024 at 1:41 AM    
Patient placed into covid r/o at this time per orders. Respiratory panel collected & sent to lab per orders. Patient tolerated nasopharyngeal swab fairly well. Patient assisted to sit up in bed and is currently eating breakfast. Denies needs. Will continue to monitor and assess   
Patient resting in bed this morning w/o complaint. Patient does report some chronic pain to his bilateral hips w/ bed mobility but denies the need for pain medication / interventions for pain management. Scheduled morning medications administered per orders. See eMAR for documentation. Patient tolerated medications whole w/ water w/o complication.     Head to toe assessment completed at this time. Patient noted to have a chronic wound present to the L foot. Wound dry w/ defined edges. Mepilex applied. Skin prep applied to bilateral heels. Patient noted to have a dark spot to his R buttock, noted to be a DTI. Venelex cream to be ordered & applied. Patient aware and agreeable. Patient w/ +2 pitting edema to BLE, +1 non pitting edema present to scrotum. Patient currently on Lasix drip at 5mL/hour. See eMAR for documentation of administration.     Patient further denies physical/emotional needs at this time. Call light, telephone, and bed side table are within reach. Fall precautions in place. Will continue to monitor and assess.     
Patient to echo w/ hospital transport via stretcher.     Urine and stool sample collected prior to patient departure from unit. Samples sent to lab per orders.       
Patient transferred to , room 3129. All belongings including IV pole w/ Lasix drip, topical ointments for wound care, & telemetry monitor sent w/ patient. Patient assisted to bed by this RN, bed alarm in place at this time.     Call placed to [facility] to alert to room change.     Call placed to patient's daughter Elmira to update to plan of care change and room change per patient request. All questions posed to this RN were answered.     No further needs identified at this time. Care assumed by RADHA Arredondo, PCU.   
Physical Therapy  Facility/Department: [address] ORTHOPEDICS  Physical Therapy Initial Assessment  This note serves as patient discharge summary if pt discharges prior to next PT visit      Name: Juan Carlos Wong Sr  : 1965  MRN: [id number]  Date of Service: 3/28/2024    Discharge Recommendations:  Continue to assess pending progress, Therapy recommended at discharge   Juan Carlos Wong Sr scored a 19/24 on the AM-PAC short mobility form.     PT Equipment Recommendations  Other: monitor      Patient Diagnosis(es): The primary encounter diagnosis was Generalized weakness. Diagnoses of Dependent edema, Dehydration, Septicemia (HCC), Acute on chronic congestive heart failure, unspecified heart failure type (HCC), Non-traumatic rhabdomyolysis, Pneumonia due to infectious organism, unspecified laterality, unspecified part of lung, Fall, initial encounter, and Contusion of face, initial encounter were also pertinent to this visit.  Past Medical History:  has a past medical history of Back pain, Diabetes mellitus (HCC), and Pneumonia.  Past Surgical History:  has a past surgical history that includes liver biopsy; Leg Surgery (Left); and Colonoscopy.    Assessment   Body Structures, Functions, Activity Limitations Requiring Skilled Therapeutic Intervention: Decreased functional mobility ;Decreased ROM;Increased pain;Decreased safe awareness  Assessment: Per H and P: \"Pt states slid out of chair onto his knees and hit his right eye on the floor at 0200. No LOC. Pt was unable to get up off the floor r/t bilat leg swelling x 3 weeks. Daughter helped pt off the floor at 1145. Home care nurse did left foot wound care today. Pt states Home Care nurse comes to his home 3 x a week. Pt A&O. Right eye swollen. No visual disturbances.\" Active Hospital Issues: Sepsis on arrival to the ED: Tachycardia, leukocytosis Blood cultures obtained Chest x-ray negative UA negative. Acute cardiorenal syndrome: Acute heart failure in the 
Physical Therapy  Facility/Department: [address] MED SURG  Physical Therapy Treatment Note    Name: Juan Carlos Wong Sr  : 1965  MRN: [id number]  Date of Service: 2024    Discharge Recommendations:  Home with assist PRN, Home with Home health PT, Continue to assess pending progress  PT Equipment Recommendations  Equipment Needed: No      Patient Diagnosis(es): The primary encounter diagnosis was Generalized weakness. Diagnoses of Dependent edema, Dehydration, Septicemia (HCC), Acute on chronic congestive heart failure, unspecified heart failure type (HCC), Non-traumatic rhabdomyolysis, Pneumonia due to infectious organism, unspecified laterality, unspecified part of lung, Fall, initial encounter, and Contusion of face, initial encounter were also pertinent to this visit.  Past Medical History:  has a past medical history of Back pain, Diabetes mellitus (HCC), and Pneumonia.  Past Surgical History:  has a past surgical history that includes liver biopsy; Leg Surgery (Left); and Colonoscopy.    Assessment   Body Structures, Functions, Activity Limitations Requiring Skilled Therapeutic Intervention: Decreased functional mobility ;Decreased ROM;Increased pain;Decreased safe awareness  Assessment: Pt is a 58 yo male with generalized weakness s/p sepsis. Active Hospital Issues: Sepsis on arrival to the ED: Tachycardia, leukocytosis Blood cultures obtained Chest x-ray negative UA negative. Acute cardiorenal syndrome: Acute heart failure in the setting of chronic kidney disease. CHF. Nephrology, cariology, and podiatry consulted. Other PMHx as noted including CKD, Diabetes, Amps L toes, Ulcer L foot.    PTA, pt living at home alone where he is typically independent with ADLs and fxl mobility without device (furniture walks in home). HHA 3x/wk and daughter at supervision with use of camera in the home. Patient transferred to PCU on 3/28/24 with cardiac + Lasix drip. Currently patient presents with  decreased 
Physical Therapy  Facility/Department: [facility] PROGRESSIVE CARE  Physical Therapy Treatment Note    Name: Juan Carlos Wong Sr  : 1965  MRN: [id number]  Date of Service: 3/29/2024    Discharge Recommendations:  Continue to assess pending progress, Therapy recommended at discharge (3-5x/wk vs 24 hr sup + HHPT)   PT Equipment Recommendations  Other: pt notes that he has a rollator - if he does return home, PT would recommend ensure that he has a RW (2WW)    Juan Carlos Wong Sr scored a 17/24 on the AM-PAC short mobility form. Current research shows that an AM-PAC score of 17 or less is typically not associated with a discharge to the patient's home setting. Based on the patient's AM-PAC score and their current functional mobility deficits, it is recommended that the patient have 3-5 sessions per week of Physical Therapy at d/c to increase the patient's independence.  Please see assessment section for further patient specific details.    If patient discharges prior to next session this note will serve as a discharge summary.  Please see below for the latest assessment towards goals.       Patient Diagnosis(es): The primary encounter diagnosis was Generalized weakness. Diagnoses of Dependent edema, Dehydration, Septicemia (HCC), Acute on chronic congestive heart failure, unspecified heart failure type (HCC), Non-traumatic rhabdomyolysis, Pneumonia due to infectious organism, unspecified laterality, unspecified part of lung, Fall, initial encounter, and Contusion of face, initial encounter were also pertinent to this visit.  Past Medical History:  has a past medical history of Back pain, Diabetes mellitus (HCC), and Pneumonia.  Past Surgical History:  has a past surgical history that includes liver biopsy; Leg Surgery (Left); and Colonoscopy.    Assessment   Body Structures, Functions, Activity Limitations Requiring Skilled Therapeutic Intervention: Decreased functional mobility ;Decreased ROM;Increased 
Pt A&Ox 4 on RA. AM assessment and vitals completed and put into flowsheets. AM medications given with no s/s of aspiration. Pt with no questions or concerns voiced to RN at this time. Fall precautions in place and call light within reach.   Vitals:    04/01/24 0721   BP: 137/77   Pulse: 83   Resp: 25   Temp: 98.5 °F (36.9 °C)   SpO2: 95%     Hydralazine held d/t order parameters not met.  
Pt back to room 4251. VSS. Pt denies pain. Pts R groin site is soft and palpable, no signs of hematoma or any complications. Pts pedal pulse is 2+. Pt to have post cath checks hourly for 4 hours. Pt educated on bedrest, pt states understanding. All safety measures in place.     Electronically signed by Nallely aShni RN on 4/2/2024 at 5:23 PM    
Pt resting in bed, alert and oriented x4. Patient's VSS. 0.9 NS infusing at 75 mL/hr and patient tolerating well. Pt voices no additional needs or concerns at this time. Fall precautions in place. Bedside table, call light, and telephone all within reach.     Electronically signed by Magdalene Manning RN on 3/28/2024 at 1:47 AM   
Pt to cath lab.    Electronically signed by Nallely Sahni RN on 4/2/2024 at 1:24 PM    
Respiratory panel results noted to be negative; patient taken out of contact plus isolation per protocol   
Scheduled magnesium replacement is incompatible w/ lasix drip. PIV placement attempted by this RN x2 unsuccessfully. Call placed to RADHA Larios to place an ultrasound guided IV. Ric to come to the bedside and attempt placement this afternoon.   
Significant shift events:   Jardiance started today - monitoring kidney function. Pt's daughter called for update and expressed concern about this. States in December he was taken off of it d/t pt's creat increasing a lot. RN notified provider.  Imdur and torsemide started.  Coreg dose increased  Increased humalog from medium sliding scale to high sliding scale.  Pt to be transferred to 4251.  
Written and verbal DC instructions reviewed with pt. Pt states understanding. All questions answered. IV removed without complications. Dressing clean dry and intact. Pt awaiting ride to arrive.     Electronically signed by Nallely Sahni RN on 4/3/2024 at 6:03 PM    
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 Howell  - baseline serum creatinine 2.0-2.3  - UPC 7.7  - preserve L arm  - monitor renal panel daily    # Edema  - weight gain and CXR consistent with CHF  - on IV lasix gtt and milrinone  - EF 45% with diastolic dysfunction in the past  - echo pending  - weight less  - would favor changing lasix to SGLT2i soon   - will defer timing to cardiology    # Leukocytosis  - on cefepime only  - WBC improved  - per IM    # Hyperkalemia  - due to CKD and diet  - continue low K diet  - improving    # Hypomagnesemia  - repleted IV  - Mg still low   - give 4 gms today  - recheck    # CKD MBD  - adequtae phos    #HTN  - BP adequate.   - monitor with diuresis          Available via [employer] secure messaging    
assessment       Nutrition Monitoring and Evaluation:   Behavioral-Environmental Outcomes: None Identified  Food/Nutrient Intake Outcomes: Food and Nutrient Intake, Supplement Intake  Physical Signs/Symptoms Outcomes: Biochemical Data, Weight, Skin, Nutrition Focused Physical Findings    Discharge Planning:    No discharge needs at this time     Panfilo Maxwell RD  Contact: [phone]    
consulted  - outpt follow-up planned    Will discuss with nephrology attending physician, Dr. Simmons.  See attestation for additional recommendations.    Greer Fernández, CHANELLE - CNP      
ondansetron, acetaminophen **OR** acetaminophen    Physical Exam:    TEMPERATURE:  Current - Temp: 98 °F (36.7 °C); Max - Temp  Av °F (36.7 °C)  Min: 97.7 °F (36.5 °C)  Max: 98.2 °F (36.8 °C)  RESPIRATIONS RANGE: Resp  Av.9  Min: 17  Max: 18  PULSE RANGE: Pulse  Av.2  Min: 71  Max: 80  BLOOD PRESSURE RANGE:  Systolic (24hrs), Av , Min:109 , Max:142   ; Diastolic (24hrs), Av, Min:60, Max:79    24HR INTAKE/OUTPUT:    Intake/Output Summary (Last 24 hours) at 4/3/2024 1429  Last data filed at 4/3/2024 0832  Gross per 24 hour   Intake 1995.34 ml   Output 1750 ml   Net 245.34 ml         Patient Vitals for the past 96 hrs (Last 3 readings):   Weight   24 0613 80 kg (176 lb 5.9 oz)   24 0504 80.3 kg (177 lb 0.5 oz)   24 0801 81.5 kg (179 lb 10.8 oz)         GEN: no distress, obese  CV: RRR  LUNGS: rales bilaterally, unlabored  ABD: + bowel sounds. No distension. No  tenderness  EXT: trace to 1+ BLE edema. L TMA  SKIN: no rash  NEURO: no tremor    LAB DATA:    CBC:   Lab Results   Component Value Date/Time    WBC 7.8 2024 04:27 AM    RBC 3.76 2024 04:27 AM    HGB 11.3 2024 04:27 AM    HCT 33.3 2024 04:27 AM    MCV 88.6 2024 04:27 AM    MCH 29.9 2024 04:27 AM    MCHC 33.8 2024 04:27 AM    RDW 17.0 2024 04:27 AM     2024 04:27 AM    MPV 8.7 2024 04:27 AM     BMP:    Lab Results   Component Value Date/Time     2024 04:27 AM    K 3.5 2024 04:27 AM    K 4.2 2024 05:25 AM    CL 99 2024 04:27 AM    CO2 24 2024 04:27 AM    BUN 28 2024 04:27 AM    CREATININE 2.3 2024 04:27 AM    CALCIUM 7.9 2024 04:27 AM    GFRAA >60 2017 09:36 PM    LABGLOM 32 2024 04:27 AM    GLUCOSE 247 2024 04:27 AM     Ionized Calcium:  No results found for: \"IONCA\"  Magnesium:    Lab Results   Component Value Date/Time    MG 1.80 2024 04:27 AM     Phosphorus:    Lab Results 
pain;Decreased safe awareness  Assessment: Pt is a 58 yo male with generalized weakness s/p sepsis. Active Hospital Issues: Sepsis on arrival to the ED: Tachycardia, leukocytosis Blood cultures obtained Chest x-ray negative UA negative. Acute cardiorenal syndrome: Acute heart failure in the setting of chronic kidney disease. CHF. Nephrology, cariology, and podiatry consulted. Other PMHx as noted including CKD, Diabetes, Amps L toes, Ulcer L foot.    PTA, pt living at home alone where he is typically independent with ADLs and fxl mobility without device (furniture walks in home). HHA 3x/wk and daughter at supervision with use of camera in the home. Patient transferred to PCU on 3/28/24 with cardiac + Lasix drip. Currently patient presents with  decreased activity tolerance, limited by fatigue, BLE swelling and weakness, and decreased insight. Pt currently functioning below baseline. Recommend continnued skilled therapy 3-5x/week. If patient/family refuses, patient would require 24 supervision and adequate assistance to manage safe functional mobility in the home, as well as HHPT. Will continue to monitor progress.  Treatment Diagnosis: Impaired functional mobility  Therapy Prognosis: Good  Decision Making: Medium Complexity  History: as noted  Clinical Presentation: Evolving  Activity Tolerance  Activity Tolerance: Patient tolerated treatment well     Plan   Physical Therapy Plan  General Plan: 3-5 times per week  Current Treatment Recommendations: Functional mobility training, Transfer training, Gait training, Safety education & training, Patient/Caregiver education & training, Equipment evaluation, education, & procurement, Therapeutic activities, Strengthening, Balance training, Endurance training, Home exercise program  Safety Devices  Type of Devices: Call light within reach, Left in chair, Patient at risk for falls (no chair alarm prior to session)  Restraints  Restraints Initially in Place: No 
   PHUR 6.0 03/27/2024 08:03 PM    WBCUA 3-5 03/27/2024 08:03 PM    RBCUA 0-2 03/27/2024 08:03 PM    MUCUS 1+ 03/27/2024 08:03 PM    CLARITYU Clear 03/27/2024 08:03 PM    SPECGRAV 1.025 03/27/2024 08:03 PM    LEUKOCYTESUR Negative 03/27/2024 08:03 PM    UROBILINOGEN 0.2 03/27/2024 08:03 PM    BILIRUBINUR SMALL 03/27/2024 08:03 PM    BLOODU LARGE 03/27/2024 08:03 PM    GLUCOSEU 250 03/27/2024 08:03 PM         IMPRESSION/RECOMMENDATIONS:      # 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 Follows with Dr. Howell in office. 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 UPCR 7.7. MIGUEL etiology 2/2 to cardiorenal factors  - Pk Cr 2.8 mg/dL. Cr 2.6 mg/dL today s/p restarting jardiance, torsemide, and spironolactone.   - Will need to accept some azotemia in order to prevent congestive sx  - Plans for L and RHC today noted -- on pre-procedure IVF  - Monitor for RIYA  - Per wife, patient has had issues with Cr increasing in the past with the initiation of SGLT2i     # ACSDHF  - S/p IV lasix gtt and milrinone gtt   - echo LVEF 20-25%  - Heart cath today  - Torsemide, spironolactone, and SGLT2i on hold  - Would recommend resuming above one at a time to reduce risk of kidney injury  - Strict I/Os. Daily weights.      # Leukocytosis  - on cefepime only  - WBC improved  - per IM     # Hyperkalemia  - Resolved     # Hypomagnesemia  - supplement prn     # CKD MBD  - adequate phos     #HTN  - BP adequate.   - monitor with diuresis     #lymphadenopathy  - hematology consulted  - outpt follow-up planned     Will discuss with nephrology attending physician, Dr. Simmons.  See attestation for additional recommendations.    Greer Fernández, APRN - CNP          
LOWER EXTREMITY   ARTERIES DUPLEX LEFT.      Vascular Sonographer Report     Indications for Study:Ulcer/gangrene.    Impressions  Right Impression  Right MILADY was not available due to noncompressible tibial vessels .  Left Impression  Left MILADY was not available due to noncompressible tibial vessels .  Imaging of the right lower extremity reveals normal multiphasic flow with no  evidence of hemodynamically significant stenosis.  Ultrasound images of the left lower extremity reveal severe calcific plaque  formation throughout.    Conclusions      Summary      Right MILADY was not available due to noncompressible tibial vessels .   Left MILADY was not available due to noncompressible tibial vessels .   Imaging of the right lower extremity reveals normal multiphasic flow with no   evidence of hemodynamically significant stenosis.   Ultrasound images of the left lower extremity reveal severe calcific plaque   formation throughout.      Signature      ------------------------------------------------------------------   Electronically signed by Corey Griffin MD (Interpreting   physician) on 03/29/2024 at 05:18 PM   ------------------------------------------------------------------     Patient Status:Routine.  Study Location:[facility] - Vascular Lab.  Technical Quality:Adequate visualization.    Velocities are measured in cm/s ; Diameters are measured in mm    LE Duplex Measurements    +-------------------++-----+-----+----------++-----+-----+-----------+  !                   !!Right!     !Left      !!     !     !           !  +-------------------++-----+-----+----------++-----+-----+-----------+  !Location           !!PSV  !Ratio!Wave Desc.!!PSV  !Ratio!Wave Desc. !  +-------------------++-----+-----+----------++-----+-----+-----------+  !Prox Common Femoral!!     !     !          !!141.1!     !Multiphasic!  +-------------------++-----+-----+----------++-----+-----+-----------+  !Dist Common Femoral!!     !     !    
eluting stent is widely patent. The lesion has no restenosis. No thrombosis in the previous stent. The stenosis was measured by a visual reading.   Left Circumflex: The vessel exhibits minimal luminal irregularities.   Right Coronary Artery: Prox RCA lesion is 99% stenosed. GABRIEL flow is 2. The lesion is irregular and thrombotic. Ultrasound (IVUS) was performed. Cross-sectional area: 0.8 mm². Proximal reference: 4.5 mm. Area Stenosis: 90 %. Severe plaque burden was detected. IVUS has determined that the lesion is thrombotic. Prox RCA to Mid RCA lesion is 70% stenosed. Culprit lesion. GABRIEL flow is 2. The lesion is thrombotic.      2v CAD with patent LAD stent and ruptured plaque in prox RCA with large   thrombus burden   Successful IVUS guided PCI of the RCA with overlapping drug-eluting stents   IVUS revealed severe stenosis with large thrombus burden pre IVUS and well   expanded stents post PCI   Normal systemic pressure   Normal LVEDP   Successful hemostasis of the right radial artery      ECHO:   3/2023  Summary:   Overall left ventricular ejection fraction is estimated to be 25-30%.   Left ventricular function is severely reduced.   Severe global hypokinesis of the left ventricle.   Moderate pleural effusion is present.   Mild mitral regurgitation.   Mild tricuspid regurgitation is present.   Left ventricle is dilated.   Left atrium is dilated.   Right ventricular systolic pressure is mildly elevated at 35-45 mmHg.   A contrast agent was used to demonstrate all left ventricular wall segments.      2/6/24  Summary:   Overall, left ventricular ejection fraction is estimated to be 45-50%.   Trace mitral regurgitation.   Right ventricular systolic pressure is indeterminate due to poorly visualized   tricuspid regurgitation.   Right ventricular systolic function is moderately reduced.   Left ventricular apex is mildly hypokinetic.   Contrast injection was performed.        Limited study.  Echocardiogram 3/29/2024   
(transport chair)  Has the patient had two or more falls in the past year or any fall with injury in the past year?: Yes (this admission)  ADL Assistance: Independent  Homemaking Responsibilities: No (daughter does grocery shopping and laundry and cleaning; pt does light meal prep)  Ambulation Assistance: Independent (no AD but reports furniture walks in home)  Transfer Assistance: Independent  Active : No  Occupation: On disability  Leisure & Hobbies: fishing       Objective   Observation/Palpation  Observation: L toes amputated ~1 year ago per pt report; swelling BLEs( pt reports decrease since last week)  Safety Devices  Type of Devices: Patient at risk for falls;Nurse notified;Gait belt;Left in chair;Call light within reach (RN ok without chair alarm in place this date)  Restraints  Restraints Initially in Place: No           ADL  Functional Mobility: Stand by assistance  Functional Mobility Skilled Clinical Factors: SBA ~150 ft with RW; no unsteadiness or LOB noted; no reports of SOB throughout  Additional Comments: Declines ADLs this date.  Skin Care: Bath wipes;Lotion        Bed mobility  Supine to Sit: Contact guard assistance;Minimal assistance  Sit to Supine: Unable to assess  Bed Mobility Comments: HOB elevated  Transfers  Sit to stand: Stand by assistance (EOB>RW)  Stand to sit: Stand by assistance (RW>recliner)  Transfer Comments: VCs for safe hand placement  Vision  Vision: Impaired (patient reports is having an eye surgery in a month.)  Hearing  Hearing: Within functional limits  Cognition  Overall Cognitive Status: WFL  Cognition Comment: decreased insight/safety awareness  Orientation  Overall Orientation Status: Within Functional Limits               Static Sitting Balance Exercises: Independent seated EOB in prep for fxl tx  Static Standing Balance Exercises: SBA with RW in prep for fxl mobility  Education Given To: Patient  Education Provided: Role of Therapy;Plan of Care;Transfer 
Sit: Stand by assistance  Ambulation  Surface: Level tile  Device: Rolling Walker  Assistance: Stand by assistance;Contact guard assistance  Gait Deviations: Slow Krissy;Decreased step length;Decreased step height  Distance: 200' + 25'  Comments: Pt ambulated to toilet for BM.     Balance  Posture: Fair  Sitting - Static: Good  Sitting - Dynamic: Good  Standing - Static: Fair;+  Standing - Dynamic: Fair;+             AM-PAC - Mobility  AM-PAC Basic Mobility - Inpatient   How much help is needed turning from your back to your side while in a flat bed without using bedrails?: None  How much help is needed moving from lying on your back to sitting on the side of a flat bed without using bedrails?: A Little  How much help is needed moving to and from a bed to a chair?: A Little  How much help is needed standing up from a chair using your arms?: A Little  How much help is needed walking in hospital room?: A Little  How much help is needed climbing 3-5 steps with a railing?: A Little  [facility] : 19  AM-PAC Inpatient T-Scale Score : 45.44  Mobility Inpatient CMS 0-100% Score: 41.77  Mobility Inpatient CMS G-Code Modifier : CK         Goals  Short Term Goals  Time Frame for Short Term Goals: By acute DC. - ongoing as of 4/3 unless noted otherwise  Short Term Goal 1: Bed Mobility Supervision - MET 4/2  Short Term Goal 2: Transfers SBA  Short Term Goal 3: Gait with least restrictive AD 75' Supervision.  Short Term Goal 4: Tolerates B LE exs x 10 each.  Patient Goals   Patient Goals : None Stated       Education  Patient Education  Education Given To: Patient  Education Provided: Role of Therapy;Transfer Training;Plan of Care;Equipment;Precautions;Fall Prevention Strategies  Education Method: Verbal  Barriers to Learning: Cognition;Vision  Education Outcome: Verbalized understanding;Continued education needed      Therapy Time   Individual Concurrent Group Co-treatment   Time In 0710         Time 
anemic and they thought was related to anemia of chronic disease.  Patient was noted to have nonhealing ulcer involving his foot and there was a concern for potential infection on the left side, patient was subsequently seen by podiatrist who recommended continue antibiotic until the etiology of this nonhealing ulceration can be assessed.    Patient seen by cardiologist who thought the patient suffered from acute on chronic biventricular heart failure with a baseline EF about 45%, related to ischemic cardiomyopathy, and to restart cardiac medication once stable that included carvedilol, hydralazine atorvastatin Plavix and torsemide.      Review of Systems:      Pertinent positives and negatives discussed in HPI    Objective:     Intake/Output Summary (Last 24 hours) at 3/28/2024 1704  Last data filed at 3/28/2024 1421  Gross per 24 hour   Intake 830 ml   Output 400 ml   Net 430 ml      Vitals:   Vitals:    03/28/24 0753 03/28/24 1005 03/28/24 1145 03/28/24 1646   BP: 131/84  (!) 147/70 135/62   Pulse: 80  86 82   Resp: 15  21 19   Temp: 97.6 °F (36.4 °C)  97.9 °F (36.6 °C) 98.2 °F (36.8 °C)   TempSrc: Oral  Oral Oral   SpO2: 95%  96% 98%   Weight:       Height:  1.753 m (5' 9.02\")           Physical Exam:    Middle-age gentleman who looks older than his age she is sitting up on recliner, he is alert oriented,  General: NAD  Eyes: EOMI  ENT: neck supple  Cardiovascular: Regular rate.  Respiratory: Clear to auscultation  Gastrointestinal: Soft, non tender  Genitourinary: Significant edema with anasarca  Musculoskeletal:  bilateral edema present.  Skin: warm, dry  Neuro: Alert.  Sleepy during my conversation but easily woke up, he is moving both upper extremity without difficulty  Psych: Flat affect        Medications:   Medications:    balsum peru-castor oil   Topical BID    [START ON 3/29/2024] cefepime  2,000 mg IntraVENous Q24H    collagenase   Topical Daily    aspirin  81 mg Oral Daily    atorvastatin  40 mg Oral 
apex is mildly hypokinetic.   Contrast injection was performed.        Limited study.  Echocardiogram 3/29/2024   Normal LV size, wall thickness with diffuse global systolic dysfunction: EF     20-25%   Left atrium is of normal size.   The right ventricle is enlarged and hypokinetic.   Trivial tricuspid regurgitation.       Assessment and plan  Acute on chronic systolic/diastolic heart failure NYHA class III on admission  Patient seems to be responding to IV milrinone and Lasix infusion  -Will continue  -Echocardiogram from 3/29/2024 shows significant reduction of LVEF to 20 to 25% which was 45 to 50% on 2/6/2024  Will increase carvedilol at a lower dose with hold parameters  currently not on ACE inhibitor/Arni/ARB or MRA due to chronic kidney disease and hypotension but will be considered in the future  -Will also consider SGLT2 inhibitor therapy once patient is off Lasix drip as long as creatinine clearance stays about 25  -ICD's discussion will take place in the outpatient setting after patient is maximized on medical therapy  -Strict I's and O's, fluid and sodium restriction  -CHF nurse navigator on board  CAD s/p PCI  Patient currently asymptomatic  -Troponin is significantly elevated which may be due to chronic kidney disease but with worsening LV function progressive atherosclerotic heart disease is suspected  -Will continue with medical therapy for now since patient is asymptomatic and has evidence of chronic kidney disease  Currently on DAPT, statin and beta-blocker therapy  -Will increase Lipitor to 80 mg daily    Chronic kidney disease  -Very likely due to cardiorenal syndrome  -Nephrology is following      Anemia  -Secondary to CKD  -Hemoglobin is 10.1 g this morning    Total visit time > 35 minutes; > 50% spend counseling / coordinating care. I reviewed interval history, physical exam, review of data including labs, imaging, development and implementation of treatment plan and coordination of complex 
  tricuspid regurgitation.   Right ventricular systolic function is moderately reduced.   Left ventricular apex is mildly hypokinetic.   Contrast injection was performed.       Limited study.  Echocardiogram 3/29/2024   Normal LV size, wall thickness with diffuse global systolic dysfunction: EF  20-25%   Left atrium is of normal size.   The right ventricle is enlarged and hypokinetic.   Trivial tricuspid regurgitation.    Assessment/Plan:    1.) Acute on chronic HFrEF:   LVEF 20-25%- reduced from previous 45-50%  NYHA Class III  SOB Improved with milrinone and lasix gtt- stopped 3/31.  Cont with BLE edema, crackles, BNP 41,432  Sl tachy and hypertensive- 4/1 increased coreg to 12.5mg BID  Creat up to 2.6 today.   Volume status hard to determine.   Discussed with Dr. Savage.  R/L heart cath today.  Continue jardiance  Stop spironolactone and lasix for now.  No ARNI D/T MIGUEL- hydralazine/imdur for afterload reduction  Neph consult    2gm Na diet, 1500ml fluid restriction    Repeat ECHO 3 mos- consider ICD and Cardiac rehab if EF remains <35%    HF RN consult [facility] as OP.     2.) CAD/hx stents:   Reduced EF- likely ischemic CM  Currently stable, no CP/angina  LHC today  Cont asa, plavix, statin, BB  Risk factor management: high blood pressure, high cholesterol, Diabetes, smoking, obesity, family hx  Lifestyle modification: Heart healthy diet, regular exercise, weight loss, smoking cessation, stress reduction    3.) PAD/L foot infection:   Antibiotics per ID/podiatry  Follows at [facility] as OP  Foot warm/pink  Cont DAPT, statin    4.) MIGUEL:   Creat up to 2.6  CRS vs dehydration/over diuresis  BP has been adequate/hemodynamically stable  Neph consult    Complex medical decision making 50 min    Electronically signed by CHANELLE Sherman - CNP on 4/2/2024 at 9:00 AM   
PM    BLOODU LARGE 03/27/2024 08:03 PM    SPECGRAV 1.025 03/27/2024 08:03 PM    GLUCOSEU 250 03/27/2024 08:03 PM       Radiology:  VL DUP LOWER EXTREMITY ARTERIES LEFT   Final Result      XR FOOT LEFT (MIN 3 VIEWS)   Final Result   No definite acute erosive changes seen to suggest osteomyelitis      Findings suggest cellulitis with a soft tissue gas collection laterally         CT CHEST ABDOMEN PELVIS WO CONTRAST Additional Contrast? None   Final Result   1. Third-spacing of fluid as evidenced by bilateral pleural effusions,   anasarca, and ascites.   2. Bibasilar airspace opacities favored to represent atelectasis and edema   over infection.   3. Mild wall thickening of the bladder.  Correlate with urinalysis to exclude   infection.   4. Significantly enlarged groin lymph nodes, right greater than left.   Bilateral external iliac chain lymph nodes are seen.  Reactive   lymphadenopathy or lymphoproliferative disease remain differential   considerations.  Findings are new since 2017.         CT CERVICAL SPINE WO CONTRAST   Final Result   No acute abnormality of the cervical spine.         CT HEAD WO CONTRAST   Final Result   No acute intracranial abnormality.         XR CHEST (2 VW)   Final Result   Findings most consistent with mild CHF.  Interval increase in size of the   cardiac silhouette with perihilar edema and small right pleural effusion.                 DVT Prophylaxis: lvoenox  Diet: ADULT DIET; Regular; 3 carb choices (45 gm/meal); Low Fat/Low Chol/High Fiber/2 gm Na; Low Sodium (2 gm); Low Potassium (Less than 3000 mg/day); 1500 ml  ADULT ORAL NUTRITION SUPPLEMENT; Lunch, Dinner; Wound Healing Oral Supplement  Code Status: Full Code    PT/OT Eval Status: ordered    Dispo -Home when medically stable likely 2-3 days    CHANELLE Almanza - CNP      NOTE:  This report was transcribed using voice recognition software.  Every effort was made to ensure accuracy; however, inadvertent computerized transcription 
Third-spacing of fluid as evidenced by bilateral pleural effusions,   anasarca, and ascites.   2. Bibasilar airspace opacities favored to represent atelectasis and edema   over infection.   3. Mild wall thickening of the bladder.  Correlate with urinalysis to exclude   infection.   4. Significantly enlarged groin lymph nodes, right greater than left.   Bilateral external iliac chain lymph nodes are seen.  Reactive   lymphadenopathy or lymphoproliferative disease remain differential   considerations.  Findings are new since 2017.         CT CERVICAL SPINE WO CONTRAST   Final Result   No acute abnormality of the cervical spine.         CT HEAD WO CONTRAST   Final Result   No acute intracranial abnormality.         XR CHEST (2 VW)   Final Result   Findings most consistent with mild CHF.  Interval increase in size of the   cardiac silhouette with perihilar edema and small right pleural effusion.                 DVT Prophylaxis: lvoenox  Diet: ADULT DIET; Regular; Low Sodium (2 gm)  Code Status: Full Code    PT/OT Eval Status: ordered    Dispo -Home when medically likely in a.m. if creatinine stable, cleared to DC by nephrology and cardiology    Mily Levi PA-C      NOTE:  This report was transcribed using voice recognition software.  Every effort was made to ensure accuracy; however, inadvertent computerized transcription errors may be present.

## 2024-04-05 ENCOUNTER — TELEPHONE (OUTPATIENT)
Dept: INTERNAL MEDICINE CLINIC | Age: 59
End: 2024-04-05

## 2024-04-05 DIAGNOSIS — E11.41 DIABETIC MONONEUROPATHY ASSOCIATED WITH TYPE 2 DIABETES MELLITUS (HCC): Primary | ICD-10-CM

## 2024-04-05 NOTE — TELEPHONE ENCOUNTER
[facility] [phone]    I spoke to Kathia and informed her of Dr Laws's message.  Kathia states that patient can barely see, so to get him to try and do it 3 times a day would be very difficult.  Was wondering if patient could get a Dexcom or Freestyle bertt reader?

## 2024-04-05 NOTE — TELEPHONE ENCOUNTER
Kathia marks RN from [facility] called wanting to let you know that Francisco MATA was at 514 this morning. He is still on steroids and had just eaten 2 packs of Apple and cinnamon oatmeal.     She would also like you to sign his at home care orders since he is back home. She said that she will accept a verbal over the phone.     She also wanted you to mention CHF in notes at next appt. On the 11th.

## 2024-04-08 RX ORDER — ACYCLOVIR 400 MG/1
TABLET ORAL
Qty: 2 EACH | Refills: 1 | Status: SHIPPED | OUTPATIENT
Start: 2024-04-08

## 2024-04-10 NOTE — TELEPHONE ENCOUNTER
Patient is already taking 20 units of basaglar , nightly . Truclity 3 mg weekly.     Blood sugar last was 435    Diet is very hard for him.....      Kathia, wanted you to know... [phone]

## 2024-04-11 ENCOUNTER — OFFICE VISIT (OUTPATIENT)
Dept: INTERNAL MEDICINE CLINIC | Age: 59
End: 2024-04-11
Payer: COMMERCIAL

## 2024-04-11 VITALS
SYSTOLIC BLOOD PRESSURE: 134 MMHG | DIASTOLIC BLOOD PRESSURE: 76 MMHG | BODY MASS INDEX: 30.26 KG/M2 | OXYGEN SATURATION: 95 % | WEIGHT: 205 LBS | TEMPERATURE: 98.1 F | HEART RATE: 91 BPM

## 2024-04-11 DIAGNOSIS — Z09 HOSPITAL DISCHARGE FOLLOW-UP: Primary | ICD-10-CM

## 2024-04-11 DIAGNOSIS — Z79.4 TYPE 2 DIABETES MELLITUS WITH HYPERGLYCEMIA, WITH LONG-TERM CURRENT USE OF INSULIN (HCC): ICD-10-CM

## 2024-04-11 DIAGNOSIS — I50.23 ACUTE ON CHRONIC SYSTOLIC CONGESTIVE HEART FAILURE (HCC): ICD-10-CM

## 2024-04-11 DIAGNOSIS — E11.65 TYPE 2 DIABETES MELLITUS WITH HYPERGLYCEMIA, WITH LONG-TERM CURRENT USE OF INSULIN (HCC): ICD-10-CM

## 2024-04-11 PROCEDURE — 3017F COLORECTAL CA SCREEN DOC REV: CPT | Performed by: STUDENT IN AN ORGANIZED HEALTH CARE EDUCATION/TRAINING PROGRAM

## 2024-04-11 PROCEDURE — 99214 OFFICE O/P EST MOD 30 MIN: CPT | Performed by: STUDENT IN AN ORGANIZED HEALTH CARE EDUCATION/TRAINING PROGRAM

## 2024-04-11 PROCEDURE — 3052F HG A1C>EQUAL 8.0%<EQUAL 9.0%: CPT | Performed by: STUDENT IN AN ORGANIZED HEALTH CARE EDUCATION/TRAINING PROGRAM

## 2024-04-11 PROCEDURE — 2022F DILAT RTA XM EVC RTNOPTHY: CPT | Performed by: STUDENT IN AN ORGANIZED HEALTH CARE EDUCATION/TRAINING PROGRAM

## 2024-04-11 PROCEDURE — 1111F DSCHRG MED/CURRENT MED MERGE: CPT | Performed by: STUDENT IN AN ORGANIZED HEALTH CARE EDUCATION/TRAINING PROGRAM

## 2024-04-11 PROCEDURE — G8417 CALC BMI ABV UP PARAM F/U: HCPCS | Performed by: STUDENT IN AN ORGANIZED HEALTH CARE EDUCATION/TRAINING PROGRAM

## 2024-04-11 PROCEDURE — 1036F TOBACCO NON-USER: CPT | Performed by: STUDENT IN AN ORGANIZED HEALTH CARE EDUCATION/TRAINING PROGRAM

## 2024-04-11 PROCEDURE — G8427 DOCREV CUR MEDS BY ELIG CLIN: HCPCS | Performed by: STUDENT IN AN ORGANIZED HEALTH CARE EDUCATION/TRAINING PROGRAM

## 2024-04-11 RX ORDER — OFLOXACIN 3 MG/ML
SOLUTION/ DROPS OPHTHALMIC
COMMUNITY
Start: 2024-03-07

## 2024-04-11 RX ORDER — METRONIDAZOLE 500 MG/1
500 TABLET ORAL EVERY 8 HOURS
COMMUNITY
Start: 2024-02-12

## 2024-04-11 RX ORDER — POLYETHYLENE GLYCOL 3350 17 G/17G
17 POWDER, FOR SOLUTION ORAL DAILY PRN
COMMUNITY

## 2024-04-11 RX ORDER — FUROSEMIDE 40 MG/1
40 TABLET ORAL DAILY
Qty: 90 TABLET | Refills: 1 | Status: SHIPPED | OUTPATIENT
Start: 2024-04-11

## 2024-04-11 RX ORDER — KETOROLAC TROMETHAMINE 5 MG/ML
SOLUTION OPHTHALMIC
COMMUNITY
Start: 2024-03-25

## 2024-04-11 RX ORDER — DULAGLUTIDE 3 MG/.5ML
3 INJECTION, SOLUTION SUBCUTANEOUS WEEKLY
Qty: 4 ADJUSTABLE DOSE PRE-FILLED PEN SYRINGE | Refills: 1 | Status: SHIPPED | OUTPATIENT
Start: 2024-04-11

## 2024-04-11 RX ORDER — LIDOCAINE 4 G/G
2 PATCH TOPICAL DAILY
COMMUNITY
Start: 2024-02-13

## 2024-04-11 RX ORDER — CEFDINIR 300 MG/1
CAPSULE ORAL
COMMUNITY
Start: 2024-02-12

## 2024-04-11 RX ORDER — CARVEDILOL 6.25 MG/1
6.25 TABLET ORAL 2 TIMES DAILY WITH MEALS
Qty: 180 TABLET | Refills: 1 | OUTPATIENT
Start: 2024-04-11

## 2024-04-11 RX ORDER — ACYCLOVIR 400 MG/1
1 TABLET ORAL DAILY
Qty: 1 EACH | Refills: 0 | Status: SHIPPED | OUTPATIENT
Start: 2024-04-11

## 2024-04-11 RX ORDER — VANCOMYCIN HYDROCHLORIDE 125 MG/1
CAPSULE ORAL
COMMUNITY
Start: 2024-02-12

## 2024-04-11 RX ORDER — CYCLOBENZAPRINE HCL 10 MG
TABLET ORAL
COMMUNITY
Start: 2024-02-12

## 2024-04-11 RX ORDER — PREDNISOLONE ACETATE 10 MG/ML
SUSPENSION/ DROPS OPHTHALMIC
COMMUNITY
Start: 2024-03-07

## 2024-04-11 NOTE — TELEPHONE ENCOUNTER
Future Appointments   Date Time Provider Department Center   4/11/2024  9:00 AM Ronaldo Malave MD [facility] - DYD   4/25/2024 10:00 AM Karis Fisher, APRN - [facility]       Last appt 1/15/24

## 2024-04-11 NOTE — PROGRESS NOTES
Post-Discharge Transitional Care  Follow Up      Juan Carlos Wong Sr   YOB: 1965    Date of Office Visit:  4/11/2024  Date of Hospital Admission: 3/27/24  Date of Hospital Discharge: 4/3/24  Risk of hospital readmission (high >=14%. Medium >=10%) :Readmission Risk Score: 15.2      Care management risk score Rising risk (score 2-5) and Complex Care (Scores >=6): No Risk Score On File     Non face to face  following discharge, date last encounter closed (first attempt may have been earlier): *No documented post hospital discharge outreach found in the last 14 days    Call initiated 2 business days of discharge: *No response recorded in the last 14 days    ASSESSMENT/PLAN:   Hospital discharge follow-up  -     MT DISCHARGE MEDS RECONCILED W/ CURRENT OUTPATIENT MED LIST  Type 2 diabetes mellitus with hyperglycemia, with long-term current use of insulin ([facility])  -     Dulaglutide (TRULICITY) 3 MG/0.5ML SOPN; Inject 3 mg into the skin once a week, Disp-4 Adjustable Dose Pre-filled Pen Syringe, R-1Normal  Acute on chronic systolic congestive heart failure (HCC)  -     furosemide (LASIX) 40 MG tablet; Take 1 tablet by mouth daily, Disp-90 tablet, R-1Normal    Medical Decision Making: moderate complexity  No follow-ups on file.         Resume furosemide.  Follow-up with nephrology.  Will need creatinine within the next week.  Subjective:   HPI:  Follow up of Hospital problems/diagnosis(es): Heart failure and MIGUEL    Inpatient course: Discharge summary reviewed- see chart.    Interval history/Current status: Patient feels okay today.  He reports a lot of swelling in his legs.    Patient Active Problem List   Diagnosis    Sepsis ([facility])    Generalized weakness    Acute on chronic congestive heart failure (HCC)    Coronary artery disease involving native coronary artery of native heart without angina pectoris    Fall    Non-traumatic rhabdomyolysis    Neutrophilia    Diabetic foot infection ([facility])    Stage 3b chronic

## 2024-04-17 ENCOUNTER — TELEPHONE (OUTPATIENT)
Dept: INTERNAL MEDICINE CLINIC | Age: 59
End: 2024-04-17

## 2024-04-17 NOTE — TELEPHONE ENCOUNTER
Yesterday sugar 233, random check, sugars have been running above 200  ..      [facility] -[phone]

## 2024-04-17 NOTE — TELEPHONE ENCOUNTER
Please check with the patient how much insulin is he taking and also if he is still using the Trulicity

## 2024-04-18 NOTE — TELEPHONE ENCOUNTER
Pt is using trulicity, at the moment blood sugar is back to normal.    10 units at night, then short acting insulin to get blood sugar regulated.    Pt I snot between 130-223.    Fasting this morning 179

## 2024-04-19 NOTE — TELEPHONE ENCOUNTER
Please advise patient to Continue Trulicity.  He can increase the dose of insulin glargine to 12 units at night and continue short acting insulin based on blood sugar levels.
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
## 2024-04-30 DIAGNOSIS — I50.9 ACUTE ON CHRONIC CONGESTIVE HEART FAILURE, UNSPECIFIED HEART FAILURE TYPE (HCC): ICD-10-CM

## 2024-04-30 DIAGNOSIS — N18.32 STAGE 3B CHRONIC KIDNEY DISEASE (HCC): Primary | ICD-10-CM

## 2024-04-30 DIAGNOSIS — I25.10 CORONARY ARTERY DISEASE INVOLVING NATIVE CORONARY ARTERY OF NATIVE HEART WITHOUT ANGINA PECTORIS: ICD-10-CM

## 2024-05-20 ENCOUNTER — TELEPHONE (OUTPATIENT)
Dept: INTERNAL MEDICINE CLINIC | Age: 59
End: 2024-05-20

## 2024-05-20 DIAGNOSIS — E11.65 TYPE 2 DIABETES MELLITUS WITH HYPERGLYCEMIA, WITH LONG-TERM CURRENT USE OF INSULIN (HCC): ICD-10-CM

## 2024-05-20 DIAGNOSIS — Z79.4 TYPE 2 DIABETES MELLITUS WITH HYPERGLYCEMIA, WITH LONG-TERM CURRENT USE OF INSULIN (HCC): ICD-10-CM

## 2024-05-20 DIAGNOSIS — R11.2 NAUSEA AND VOMITING, UNSPECIFIED VOMITING TYPE: Primary | ICD-10-CM

## 2024-05-20 DIAGNOSIS — E11.41 DIABETIC MONONEUROPATHY ASSOCIATED WITH TYPE 2 DIABETES MELLITUS (HCC): ICD-10-CM

## 2024-05-20 RX ORDER — GABAPENTIN 300 MG/1
300 CAPSULE ORAL 2 TIMES DAILY
Qty: 180 CAPSULE | Refills: 1 | Status: SHIPPED | OUTPATIENT
Start: 2024-05-20 | End: 2024-11-16

## 2024-05-20 RX ORDER — DULAGLUTIDE 3 MG/.5ML
3 INJECTION, SOLUTION SUBCUTANEOUS WEEKLY
Qty: 4 ADJUSTABLE DOSE PRE-FILLED PEN SYRINGE | Refills: 1 | Status: SHIPPED | OUTPATIENT
Start: 2024-05-20

## 2024-05-20 RX ORDER — ONDANSETRON 4 MG/1
4 TABLET, ORALLY DISINTEGRATING ORAL 3 TIMES DAILY PRN
Qty: 21 TABLET | Refills: 0 | Status: SHIPPED | OUTPATIENT
Start: 2024-05-20

## 2024-05-20 NOTE — TELEPHONE ENCOUNTER
Last office visit 4/11/2024       Next office visit scheduled 5/20/2024    Requested Prescriptions     Pending Prescriptions Disp Refills    Dulaglutide (TRULICITY) 3 MG/0.5ML SOPN 4 Adjustable Dose Pre-filled Pen Syringe 1     Sig: Inject 3 mg into the skin once a week

## 2024-05-20 NOTE — TELEPHONE ENCOUNTER
I sent a prescription of ondansetron to patient's pharmacy.  Please advise him to start taking it as soon as possible for nausea and vomiting.  Please advise patient's daughter to take him to emergency if there is no improvement symptoms with ondansetron.

## 2024-05-20 NOTE — TELEPHONE ENCOUNTER
Last office visit 4/11/2024       Next office visit scheduled Visit date not found    Requested Prescriptions     Pending Prescriptions Disp Refills    gabapentin (NEURONTIN) 300 MG capsule 180 capsule 1     Sig: Take 1 capsule by mouth 2 times daily for 180 days.

## 2024-05-20 NOTE — TELEPHONE ENCOUNTER
----- Message from [employer]  sent at 5/20/2024 11:04 AM EDT -----  Regarding: Ondansetron  Contact: [phone]  Dr. Sims- could you possibly call in a prescription ondansetron for my dad? He has been throwing up all night and I am afraid he is going to get dehydrated. No other symptoms just vomit no diarrhea. Thank you- Martha

## 2024-06-11 ENCOUNTER — HOSPITAL ENCOUNTER (INPATIENT)
Age: 59
LOS: 16 days | Discharge: NURSING, REHAB OR LONG-TERM CARE | End: 2024-06-27
Attending: INTERNAL MEDICINE | Admitting: INTERNAL MEDICINE
Payer: COMMERCIAL

## 2024-06-11 ENCOUNTER — APPOINTMENT (OUTPATIENT)
Dept: GENERAL RADIOLOGY | Age: 59
End: 2024-06-11
Payer: COMMERCIAL

## 2024-06-11 ENCOUNTER — OFFICE VISIT (OUTPATIENT)
Dept: INTERNAL MEDICINE CLINIC | Age: 59
End: 2024-06-11
Payer: COMMERCIAL

## 2024-06-11 ENCOUNTER — APPOINTMENT (OUTPATIENT)
Dept: CT IMAGING | Age: 59
End: 2024-06-11
Payer: COMMERCIAL

## 2024-06-11 VITALS
WEIGHT: 179 LBS | DIASTOLIC BLOOD PRESSURE: 71 MMHG | TEMPERATURE: 100.2 F | HEART RATE: 86 BPM | OXYGEN SATURATION: 97 % | SYSTOLIC BLOOD PRESSURE: 140 MMHG | BODY MASS INDEX: 26.42 KG/M2

## 2024-06-11 DIAGNOSIS — E11.65 TYPE 2 DIABETES MELLITUS WITH HYPERGLYCEMIA, WITH LONG-TERM CURRENT USE OF INSULIN (HCC): Primary | ICD-10-CM

## 2024-06-11 DIAGNOSIS — L02.413 ABSCESS OF ARM, RIGHT: ICD-10-CM

## 2024-06-11 DIAGNOSIS — I25.118 CORONARY ARTERY DISEASE OF NATIVE ARTERY OF NATIVE HEART WITH STABLE ANGINA PECTORIS (HCC): ICD-10-CM

## 2024-06-11 DIAGNOSIS — I50.22 CHRONIC SYSTOLIC CONGESTIVE HEART FAILURE (HCC): ICD-10-CM

## 2024-06-11 DIAGNOSIS — N18.4 STAGE 4 CHRONIC KIDNEY DISEASE (HCC): ICD-10-CM

## 2024-06-11 DIAGNOSIS — Z79.4 TYPE 2 DIABETES MELLITUS WITH HYPERGLYCEMIA, WITH LONG-TERM CURRENT USE OF INSULIN (HCC): ICD-10-CM

## 2024-06-11 DIAGNOSIS — I63.9 CEREBROVASCULAR ACCIDENT (CVA), UNSPECIFIED MECHANISM (HCC): ICD-10-CM

## 2024-06-11 DIAGNOSIS — I10 ESSENTIAL HYPERTENSION: ICD-10-CM

## 2024-06-11 DIAGNOSIS — M86.172 ACUTE OSTEOMYELITIS OF LEFT FOOT (HCC): ICD-10-CM

## 2024-06-11 DIAGNOSIS — Z79.4 TYPE 2 DIABETES MELLITUS WITH HYPERGLYCEMIA, WITH LONG-TERM CURRENT USE OF INSULIN (HCC): Primary | ICD-10-CM

## 2024-06-11 DIAGNOSIS — E11.41 DIABETIC MONONEUROPATHY ASSOCIATED WITH TYPE 2 DIABETES MELLITUS (HCC): ICD-10-CM

## 2024-06-11 DIAGNOSIS — E11.65 TYPE 2 DIABETES MELLITUS WITH HYPERGLYCEMIA, WITH LONG-TERM CURRENT USE OF INSULIN (HCC): ICD-10-CM

## 2024-06-11 DIAGNOSIS — L03.113 CELLULITIS OF RIGHT UPPER ARM: Primary | ICD-10-CM

## 2024-06-11 DIAGNOSIS — M86.9 OSTEOMYELITIS OF LEFT FOOT, UNSPECIFIED TYPE (HCC): ICD-10-CM

## 2024-06-11 PROBLEM — L03.119 CELLULITIS AND ABSCESS OF UPPER EXTREMITY: Status: ACTIVE | Noted: 2024-06-11

## 2024-06-11 PROBLEM — L02.419 CELLULITIS AND ABSCESS OF UPPER EXTREMITY: Status: ACTIVE | Noted: 2024-06-11

## 2024-06-11 LAB
ALBUMIN SERPL-MCNC: 2.4 G/DL (ref 3.4–5)
ALBUMIN/GLOB SERPL: 0.5 {RATIO} (ref 1.1–2.2)
ALP SERPL-CCNC: 165 U/L (ref 40–129)
ALT SERPL-CCNC: 7 U/L (ref 10–40)
ANION GAP SERPL CALCULATED.3IONS-SCNC: 12 MMOL/L (ref 3–16)
AST SERPL-CCNC: 10 U/L (ref 15–37)
BACTERIA URNS QL MICRO: ABNORMAL /HPF
BASOPHILS # BLD: 0.1 K/UL (ref 0–0.2)
BASOPHILS NFR BLD: 0.8 %
BILIRUB SERPL-MCNC: 0.6 MG/DL (ref 0–1)
BILIRUB UR QL STRIP.AUTO: NEGATIVE
BUN SERPL-MCNC: 49 MG/DL (ref 7–20)
CALCIUM SERPL-MCNC: 8.5 MG/DL (ref 8.3–10.6)
CHLORIDE SERPL-SCNC: 92 MMOL/L (ref 99–110)
CLARITY UR: CLEAR
CO2 SERPL-SCNC: 20 MMOL/L (ref 21–32)
COLOR UR: YELLOW
CREAT SERPL-MCNC: 2.6 MG/DL (ref 0.8–1.3)
CREAT UR-MCNC: 38.1 MG/DL (ref 39–259)
DEPRECATED RDW RBC AUTO: 13.9 % (ref 12.4–15.4)
EOSINOPHIL # BLD: 0.1 K/UL (ref 0–0.6)
EOSINOPHIL NFR BLD: 0.6 %
EPI CELLS #/AREA URNS AUTO: 0 /HPF (ref 0–5)
GFR SERPLBLD CREATININE-BSD FMLA CKD-EPI: 27 ML/MIN/{1.73_M2}
GLUCOSE BLD-MCNC: 207 MG/DL (ref 70–99)
GLUCOSE SERPL-MCNC: 93 MG/DL (ref 70–99)
GLUCOSE UR STRIP.AUTO-MCNC: 500 MG/DL
HCT VFR BLD AUTO: 29.4 % (ref 40.5–52.5)
HGB BLD-MCNC: 10.3 G/DL (ref 13.5–17.5)
HGB UR QL STRIP.AUTO: ABNORMAL
HYALINE CASTS #/AREA URNS AUTO: 2 /LPF (ref 0–8)
KETONES UR STRIP.AUTO-MCNC: NEGATIVE MG/DL
LACTATE BLDV-SCNC: 0.9 MMOL/L (ref 0.4–2)
LEUKOCYTE ESTERASE UR QL STRIP.AUTO: NEGATIVE
LYMPHOCYTES # BLD: 0.9 K/UL (ref 1–5.1)
LYMPHOCYTES NFR BLD: 5.9 %
MCH RBC QN AUTO: 30.4 PG (ref 26–34)
MCHC RBC AUTO-ENTMCNC: 35 G/DL (ref 31–36)
MCV RBC AUTO: 86.9 FL (ref 80–100)
MONOCYTES # BLD: 1.3 K/UL (ref 0–1.3)
MONOCYTES NFR BLD: 8.5 %
NEUTROPHILS # BLD: 12.5 K/UL (ref 1.7–7.7)
NEUTROPHILS NFR BLD: 84.2 %
NITRITE UR QL STRIP.AUTO: NEGATIVE
OSMOLALITY UR: 294 MOSM/KG (ref 390–1070)
PERFORMED ON: ABNORMAL
PH UR STRIP.AUTO: 6 [PH] (ref 5–8)
PLATELET # BLD AUTO: 329 K/UL (ref 135–450)
PMV BLD AUTO: 8.4 FL (ref 5–10.5)
POTASSIUM SERPL-SCNC: 3.6 MMOL/L (ref 3.5–5.1)
PROCALCITONIN SERPL IA-MCNC: 0.21 NG/ML (ref 0–0.15)
PROT SERPL-MCNC: 7.6 G/DL (ref 6.4–8.2)
PROT UR STRIP.AUTO-MCNC: 300 MG/DL
RBC # BLD AUTO: 3.38 M/UL (ref 4.2–5.9)
RBC #/AREA URNS HPF: ABNORMAL /HPF (ref 0–4)
SODIUM SERPL-SCNC: 124 MMOL/L (ref 136–145)
SODIUM SERPL-SCNC: 124 MMOL/L (ref 136–145)
SODIUM SERPL-SCNC: 126 MMOL/L (ref 136–145)
SODIUM UR-SCNC: 47 MMOL/L
SP GR UR STRIP.AUTO: 1.01 (ref 1–1.03)
UA DIPSTICK W REFLEX MICRO PNL UR: YES
URN SPEC COLLECT METH UR: ABNORMAL
UROBILINOGEN UR STRIP-ACNC: 1 E.U./DL
UUN UR-MCNC: 332.9 MG/DL (ref 800–1666)
WBC # BLD AUTO: 14.8 K/UL (ref 4–11)
WBC #/AREA URNS AUTO: 1 /HPF (ref 0–5)

## 2024-06-11 PROCEDURE — 3017F COLORECTAL CA SCREEN DOC REV: CPT | Performed by: INTERNAL MEDICINE

## 2024-06-11 PROCEDURE — 99285 EMERGENCY DEPT VISIT HI MDM: CPT

## 2024-06-11 PROCEDURE — 81001 URINALYSIS AUTO W/SCOPE: CPT

## 2024-06-11 PROCEDURE — 83036 HEMOGLOBIN GLYCOSYLATED A1C: CPT

## 2024-06-11 PROCEDURE — 71046 X-RAY EXAM CHEST 2 VIEWS: CPT

## 2024-06-11 PROCEDURE — 2580000003 HC RX 258: Performed by: INTERNAL MEDICINE

## 2024-06-11 PROCEDURE — G8427 DOCREV CUR MEDS BY ELIG CLIN: HCPCS | Performed by: INTERNAL MEDICINE

## 2024-06-11 PROCEDURE — G8417 CALC BMI ABV UP PARAM F/U: HCPCS | Performed by: INTERNAL MEDICINE

## 2024-06-11 PROCEDURE — 6360000002 HC RX W HCPCS: Performed by: PHYSICIAN ASSISTANT

## 2024-06-11 PROCEDURE — 84540 ASSAY OF URINE/UREA-N: CPT

## 2024-06-11 PROCEDURE — 99214 OFFICE O/P EST MOD 30 MIN: CPT | Performed by: INTERNAL MEDICINE

## 2024-06-11 PROCEDURE — 83935 ASSAY OF URINE OSMOLALITY: CPT

## 2024-06-11 PROCEDURE — 2060000000 HC ICU INTERMEDIATE R&B

## 2024-06-11 PROCEDURE — 3077F SYST BP >= 140 MM HG: CPT | Performed by: INTERNAL MEDICINE

## 2024-06-11 PROCEDURE — 84145 PROCALCITONIN (PCT): CPT

## 2024-06-11 PROCEDURE — 2580000003 HC RX 258

## 2024-06-11 PROCEDURE — 87040 BLOOD CULTURE FOR BACTERIA: CPT

## 2024-06-11 PROCEDURE — 84295 ASSAY OF SERUM SODIUM: CPT

## 2024-06-11 PROCEDURE — 80053 COMPREHEN METABOLIC PANEL: CPT

## 2024-06-11 PROCEDURE — 36415 COLL VENOUS BLD VENIPUNCTURE: CPT

## 2024-06-11 PROCEDURE — 1036F TOBACCO NON-USER: CPT | Performed by: INTERNAL MEDICINE

## 2024-06-11 PROCEDURE — 6360000002 HC RX W HCPCS: Performed by: INTERNAL MEDICINE

## 2024-06-11 PROCEDURE — 73630 X-RAY EXAM OF FOOT: CPT

## 2024-06-11 PROCEDURE — 83605 ASSAY OF LACTIC ACID: CPT

## 2024-06-11 PROCEDURE — 6370000000 HC RX 637 (ALT 250 FOR IP): Performed by: INTERNAL MEDICINE

## 2024-06-11 PROCEDURE — 73200 CT UPPER EXTREMITY W/O DYE: CPT

## 2024-06-11 PROCEDURE — 3052F HG A1C>EQUAL 8.0%<EQUAL 9.0%: CPT | Performed by: INTERNAL MEDICINE

## 2024-06-11 PROCEDURE — 85025 COMPLETE CBC W/AUTO DIFF WBC: CPT

## 2024-06-11 PROCEDURE — 3078F DIAST BP <80 MM HG: CPT | Performed by: INTERNAL MEDICINE

## 2024-06-11 PROCEDURE — 84300 ASSAY OF URINE SODIUM: CPT

## 2024-06-11 PROCEDURE — 87070 CULTURE OTHR SPECIMN AEROBIC: CPT

## 2024-06-11 PROCEDURE — 96365 THER/PROPH/DIAG IV INF INIT: CPT

## 2024-06-11 PROCEDURE — 2022F DILAT RTA XM EVC RTNOPTHY: CPT | Performed by: INTERNAL MEDICINE

## 2024-06-11 PROCEDURE — 2580000003 HC RX 258: Performed by: PHYSICIAN ASSISTANT

## 2024-06-11 PROCEDURE — G2211 COMPLEX E/M VISIT ADD ON: HCPCS | Performed by: INTERNAL MEDICINE

## 2024-06-11 PROCEDURE — 82570 ASSAY OF URINE CREATININE: CPT

## 2024-06-11 RX ORDER — 0.9 % SODIUM CHLORIDE 0.9 %
500 INTRAVENOUS SOLUTION INTRAVENOUS ONCE
Status: COMPLETED | OUTPATIENT
Start: 2024-06-11 | End: 2024-06-11

## 2024-06-11 RX ORDER — POLYETHYLENE GLYCOL 3350 17 G/17G
17 POWDER, FOR SOLUTION ORAL DAILY PRN
Status: DISCONTINUED | OUTPATIENT
Start: 2024-06-11 | End: 2024-06-27 | Stop reason: HOSPADM

## 2024-06-11 RX ORDER — ONDANSETRON 2 MG/ML
4 INJECTION INTRAMUSCULAR; INTRAVENOUS EVERY 6 HOURS PRN
Status: DISCONTINUED | OUTPATIENT
Start: 2024-06-11 | End: 2024-06-27 | Stop reason: HOSPADM

## 2024-06-11 RX ORDER — SODIUM CHLORIDE 0.9 % (FLUSH) 0.9 %
5-40 SYRINGE (ML) INJECTION EVERY 12 HOURS SCHEDULED
Status: DISCONTINUED | OUTPATIENT
Start: 2024-06-11 | End: 2024-06-27 | Stop reason: HOSPADM

## 2024-06-11 RX ORDER — SODIUM CHLORIDE 0.9 % (FLUSH) 0.9 %
5-40 SYRINGE (ML) INJECTION PRN
Status: DISCONTINUED | OUTPATIENT
Start: 2024-06-11 | End: 2024-06-27 | Stop reason: HOSPADM

## 2024-06-11 RX ORDER — ONDANSETRON 4 MG/1
4 TABLET, ORALLY DISINTEGRATING ORAL EVERY 8 HOURS PRN
Status: DISCONTINUED | OUTPATIENT
Start: 2024-06-11 | End: 2024-06-27 | Stop reason: HOSPADM

## 2024-06-11 RX ORDER — ACETAMINOPHEN 325 MG/1
650 TABLET ORAL EVERY 6 HOURS PRN
Status: DISCONTINUED | OUTPATIENT
Start: 2024-06-11 | End: 2024-06-27 | Stop reason: HOSPADM

## 2024-06-11 RX ORDER — PREDNISOLONE ACETATE 10 MG/ML
1 SUSPENSION/ DROPS OPHTHALMIC 4 TIMES DAILY
Status: DISCONTINUED | OUTPATIENT
Start: 2024-06-11 | End: 2024-06-13

## 2024-06-11 RX ORDER — SODIUM CHLORIDE 9 MG/ML
INJECTION, SOLUTION INTRAVENOUS PRN
Status: DISCONTINUED | OUTPATIENT
Start: 2024-06-11 | End: 2024-06-27 | Stop reason: HOSPADM

## 2024-06-11 RX ORDER — GABAPENTIN 300 MG/1
300 CAPSULE ORAL 2 TIMES DAILY
Status: DISCONTINUED | OUTPATIENT
Start: 2024-06-11 | End: 2024-06-27 | Stop reason: HOSPADM

## 2024-06-11 RX ORDER — CLOPIDOGREL BISULFATE 75 MG/1
75 TABLET ORAL NIGHTLY
Status: DISCONTINUED | OUTPATIENT
Start: 2024-06-11 | End: 2024-06-22

## 2024-06-11 RX ORDER — ATORVASTATIN CALCIUM 40 MG/1
40 TABLET, FILM COATED ORAL NIGHTLY
Status: DISCONTINUED | OUTPATIENT
Start: 2024-06-11 | End: 2024-06-15

## 2024-06-11 RX ORDER — CIPROFLOXACIN HYDROCHLORIDE 3.5 MG/ML
1 SOLUTION/ DROPS TOPICAL 4 TIMES DAILY
Status: DISCONTINUED | OUTPATIENT
Start: 2024-06-11 | End: 2024-06-13

## 2024-06-11 RX ORDER — ASPIRIN 81 MG/1
81 TABLET ORAL DAILY
Status: DISCONTINUED | OUTPATIENT
Start: 2024-06-12 | End: 2024-06-27 | Stop reason: HOSPADM

## 2024-06-11 RX ORDER — INSULIN LISPRO 100 [IU]/ML
0-4 INJECTION, SOLUTION INTRAVENOUS; SUBCUTANEOUS
Status: DISCONTINUED | OUTPATIENT
Start: 2024-06-12 | End: 2024-06-27 | Stop reason: HOSPADM

## 2024-06-11 RX ORDER — SODIUM CHLORIDE 9 MG/ML
INJECTION, SOLUTION INTRAVENOUS CONTINUOUS
Status: DISCONTINUED | OUTPATIENT
Start: 2024-06-11 | End: 2024-06-13

## 2024-06-11 RX ORDER — ISOSORBIDE MONONITRATE 30 MG/1
15 TABLET, EXTENDED RELEASE ORAL NIGHTLY
Status: DISCONTINUED | OUTPATIENT
Start: 2024-06-11 | End: 2024-06-27 | Stop reason: HOSPADM

## 2024-06-11 RX ORDER — ENOXAPARIN SODIUM 100 MG/ML
40 INJECTION SUBCUTANEOUS DAILY
Status: DISCONTINUED | OUTPATIENT
Start: 2024-06-12 | End: 2024-06-14

## 2024-06-11 RX ORDER — CARVEDILOL 12.5 MG/1
12.5 TABLET ORAL 2 TIMES DAILY WITH MEALS
Status: DISCONTINUED | OUTPATIENT
Start: 2024-06-11 | End: 2024-06-13

## 2024-06-11 RX ORDER — DEXTROSE MONOHYDRATE 100 MG/ML
INJECTION, SOLUTION INTRAVENOUS CONTINUOUS PRN
Status: DISCONTINUED | OUTPATIENT
Start: 2024-06-11 | End: 2024-06-27 | Stop reason: HOSPADM

## 2024-06-11 RX ORDER — MAGNESIUM SULFATE IN WATER 40 MG/ML
2000 INJECTION, SOLUTION INTRAVENOUS PRN
Status: DISCONTINUED | OUTPATIENT
Start: 2024-06-11 | End: 2024-06-14

## 2024-06-11 RX ORDER — INSULIN GLARGINE 100 [IU]/ML
10 INJECTION, SOLUTION SUBCUTANEOUS NIGHTLY
Status: DISCONTINUED | OUTPATIENT
Start: 2024-06-11 | End: 2024-06-13

## 2024-06-11 RX ORDER — PANTOPRAZOLE SODIUM 40 MG/1
40 TABLET, DELAYED RELEASE ORAL
Status: DISCONTINUED | OUTPATIENT
Start: 2024-06-12 | End: 2024-06-27 | Stop reason: HOSPADM

## 2024-06-11 RX ORDER — SODIUM CHLORIDE 9 MG/ML
INJECTION, SOLUTION INTRAVENOUS CONTINUOUS
Status: DISCONTINUED | OUTPATIENT
Start: 2024-06-11 | End: 2024-06-11

## 2024-06-11 RX ORDER — GLUCAGON 1 MG/ML
1 KIT INJECTION PRN
Status: DISCONTINUED | OUTPATIENT
Start: 2024-06-11 | End: 2024-06-27 | Stop reason: HOSPADM

## 2024-06-11 RX ORDER — VANCOMYCIN HYDROCHLORIDE 125 MG/1
125 CAPSULE ORAL DAILY
Status: DISCONTINUED | OUTPATIENT
Start: 2024-06-11 | End: 2024-06-11

## 2024-06-11 RX ORDER — OFLOXACIN 3 MG/ML
1 SOLUTION/ DROPS OPHTHALMIC 4 TIMES DAILY
Status: DISCONTINUED | OUTPATIENT
Start: 2024-06-11 | End: 2024-06-11 | Stop reason: CLARIF

## 2024-06-11 RX ORDER — METRONIDAZOLE 500 MG/100ML
500 INJECTION, SOLUTION INTRAVENOUS EVERY 8 HOURS
Status: DISCONTINUED | OUTPATIENT
Start: 2024-06-11 | End: 2024-06-13

## 2024-06-11 RX ORDER — HYDRALAZINE HYDROCHLORIDE 10 MG/1
10 TABLET, FILM COATED ORAL 3 TIMES DAILY
Status: DISCONTINUED | OUTPATIENT
Start: 2024-06-11 | End: 2024-06-27 | Stop reason: HOSPADM

## 2024-06-11 RX ORDER — INSULIN LISPRO 100 [IU]/ML
0-4 INJECTION, SOLUTION INTRAVENOUS; SUBCUTANEOUS NIGHTLY
Status: DISCONTINUED | OUTPATIENT
Start: 2024-06-11 | End: 2024-06-27 | Stop reason: HOSPADM

## 2024-06-11 RX ORDER — POTASSIUM CHLORIDE 7.45 MG/ML
10 INJECTION INTRAVENOUS PRN
Status: DISCONTINUED | OUTPATIENT
Start: 2024-06-11 | End: 2024-06-14

## 2024-06-11 RX ORDER — LINEZOLID 2 MG/ML
600 INJECTION, SOLUTION INTRAVENOUS EVERY 12 HOURS
Status: DISCONTINUED | OUTPATIENT
Start: 2024-06-12 | End: 2024-06-14

## 2024-06-11 RX ORDER — CYCLOBENZAPRINE HCL 10 MG
5 TABLET ORAL 2 TIMES DAILY PRN
Status: DISCONTINUED | OUTPATIENT
Start: 2024-06-11 | End: 2024-06-27 | Stop reason: HOSPADM

## 2024-06-11 RX ORDER — ACETAMINOPHEN 650 MG/1
650 SUPPOSITORY RECTAL EVERY 6 HOURS PRN
Status: DISCONTINUED | OUTPATIENT
Start: 2024-06-11 | End: 2024-06-27 | Stop reason: HOSPADM

## 2024-06-11 RX ORDER — POTASSIUM CHLORIDE 20 MEQ/1
40 TABLET, EXTENDED RELEASE ORAL PRN
Status: DISCONTINUED | OUTPATIENT
Start: 2024-06-11 | End: 2024-06-14

## 2024-06-11 RX ORDER — TIRZEPATIDE 5 MG/.5ML
5 INJECTION, SOLUTION SUBCUTANEOUS WEEKLY
Qty: 2 ML | Refills: 1 | Status: ON HOLD | OUTPATIENT
Start: 2024-06-11 | End: 2024-08-06

## 2024-06-11 RX ADMIN — VANCOMYCIN HYDROCHLORIDE 1500 MG: 1.5 INJECTION, POWDER, LYOPHILIZED, FOR SOLUTION INTRAVENOUS at 17:04

## 2024-06-11 RX ADMIN — SODIUM CHLORIDE: 9 INJECTION, SOLUTION INTRAVENOUS at 17:06

## 2024-06-11 RX ADMIN — SODIUM CHLORIDE, PRESERVATIVE FREE 10 ML: 5 INJECTION INTRAVENOUS at 23:25

## 2024-06-11 RX ADMIN — SODIUM CHLORIDE 500 ML: 9 INJECTION, SOLUTION INTRAVENOUS at 16:06

## 2024-06-11 RX ADMIN — CLOPIDOGREL BISULFATE 75 MG: 75 TABLET ORAL at 23:00

## 2024-06-11 RX ADMIN — INSULIN GLARGINE 10 UNITS: 100 INJECTION, SOLUTION SUBCUTANEOUS at 23:03

## 2024-06-11 RX ADMIN — HYDRALAZINE HYDROCHLORIDE 10 MG: 10 TABLET, FILM COATED ORAL at 23:16

## 2024-06-11 RX ADMIN — CIPROFLOXACIN 1 DROP: 3 SOLUTION OPHTHALMIC at 23:01

## 2024-06-11 RX ADMIN — PREDNISOLONE ACETATE 1 DROP: 10 SUSPENSION/ DROPS OPHTHALMIC at 23:01

## 2024-06-11 RX ADMIN — GABAPENTIN 300 MG: 300 CAPSULE ORAL at 23:00

## 2024-06-11 RX ADMIN — CARVEDILOL 12.5 MG: 12.5 TABLET, FILM COATED ORAL at 23:10

## 2024-06-11 RX ADMIN — ATORVASTATIN CALCIUM 40 MG: 40 TABLET, FILM COATED ORAL at 23:00

## 2024-06-11 RX ADMIN — METRONIDAZOLE 500 MG: 500 INJECTION, SOLUTION INTRAVENOUS at 23:24

## 2024-06-11 RX ADMIN — ISOSORBIDE MONONITRATE 15 MG: 30 TABLET, EXTENDED RELEASE ORAL at 23:10

## 2024-06-11 RX ADMIN — CEFEPIME 2000 MG: 2 INJECTION, POWDER, FOR SOLUTION INTRAVENOUS at 15:35

## 2024-06-11 ASSESSMENT — ENCOUNTER SYMPTOMS
ABDOMINAL PAIN: 0
SORE THROAT: 0
SHORTNESS OF BREATH: 0
VOMITING: 1
NAUSEA: 1
COUGH: 0
BLOOD IN STOOL: 0

## 2024-06-11 ASSESSMENT — PAIN DESCRIPTION - ORIENTATION: ORIENTATION: RIGHT

## 2024-06-11 ASSESSMENT — PAIN - FUNCTIONAL ASSESSMENT
PAIN_FUNCTIONAL_ASSESSMENT: 0-10
PAIN_FUNCTIONAL_ASSESSMENT: PREVENTS OR INTERFERES SOME ACTIVE ACTIVITIES AND ADLS

## 2024-06-11 ASSESSMENT — PAIN DESCRIPTION - LOCATION: LOCATION: ARM

## 2024-06-11 ASSESSMENT — PAIN DESCRIPTION - ONSET: ONSET: SUDDEN

## 2024-06-11 ASSESSMENT — PAIN DESCRIPTION - DESCRIPTORS: DESCRIPTORS: TIGHTNESS;HEAVINESS

## 2024-06-11 ASSESSMENT — PAIN SCALES - GENERAL: PAINLEVEL_OUTOF10: 9

## 2024-06-11 ASSESSMENT — PAIN DESCRIPTION - FREQUENCY: FREQUENCY: INTERMITTENT

## 2024-06-11 ASSESSMENT — PAIN DESCRIPTION - PAIN TYPE: TYPE: ACUTE PAIN

## 2024-06-11 NOTE — PROGRESS NOTES
EDSBAR report has been reviewed.    Room is in process of being cleaned.    Electronically signed by Pam Daly RN on 6/11/2024 at 5:45 PM

## 2024-06-11 NOTE — CONSULTS
Clinical Pharmacy Note  Vancomycin Consult    Pharmacy consult received for one-time dose of vancomycin in the Emergency Department    Ht Readings from Last 1 Encounters:   06/11/24 1.753 m (5' 9.02\")        Wt Readings from Last 1 Encounters:   06/11/24 80.7 kg (177 lb 14.6 oz)         Assessment/Plan:  Vancomycin 1500 mg x 1 in ED.  If vancomycin is to continue on admission and pharmacy is to manage dosing, please re-consult with admission orders.

## 2024-06-11 NOTE — ED PROVIDER NOTES
CULTURE, BLOOD 2   CULTURE, BLOOD 1   LACTIC ACID   OSMOLALITY, URINE   SODIUM, URINE, RANDOM   URINALYSIS WITH MICROSCOPIC   SODIUM   SODIUM        Remainder of labs reviewed and were negative at this time or not returned at the time of this note.    RADIOLOGY:   Non-plain film images such as CT, Ultrasound and MRI are read by the radiologist. Vasyl MAYEN PA-C have directly visualized the radiologic plain film image(s) with the below findings:      Interpretation per the Radiologist below, if available at the time of this note:    CT HUMERUS RIGHT WO CONTRAST   Final Result   Diffuse edema and skin thickening overlying the proximal to mid right arm   suggestive of cellulitis. No discrete fluid collection.      Advanced acromioclavicular and moderate glenohumeral joint osteoarthritis.         XR FOOT LEFT (MIN 3 VIEWS)   Final Result   1. Findings suggestive of osteomyelitis involving the base of the 5th   metatarsal.   2. 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   3. Soft tissue ulcer along the lateral aspect of the stump.         XR CHEST (2 VW)   Final Result   No radiographic evidence of acute cardiopulmonary disease.           CT HUMERUS RIGHT WO CONTRAST    Result Date: 6/11/2024  EXAMINATION: CT OF THE RIGHT HUMERUS WITHOUT CONTRAST 6/11/2024 1:47 pm TECHNIQUE: CT of the right humerus was performed without the administration of intravenous contrast.  Multiplanar reformatted images are provided for review. Automated exposure control, iterative reconstruction, and/or weight based adjustment of the mA/kV was utilized to reduce the radiation dose to as low as reasonably achievable. COMPARISON: 02/15/2017 HISTORY ORDERING SYSTEM PROVIDED HISTORY: Concern for abscess and cellulitis of right upper humerus TECHNOLOGIST PROVIDED HISTORY: Reason for exam:->Concern for abscess and cellulitis of right upper humerus Decision Support Exception - unselect if not a suspected or confirmed  emergency medical condition->Emergency Medical Condition (MA) Reason for Exam: Concern for abscess and cellulitis of right upper humerus FINDINGS: Bones: No evidence of acute fracture or dislocation. No aggressive appearing osseous abnormality or periostitis.  A few subacute/old right rib fractures. Soft Tissue: Diffuse edema and skin thickening overlying the proximal to mid right arm.  No discrete fluid collection.  Mild vascular calcifications. Mild supraspinatus atrophy. Joint: Advanced acromioclavicular and moderate glenohumeral joint osteoarthritis.  Prominent subacromial spur.     Diffuse edema and skin thickening overlying the proximal to mid right arm suggestive of cellulitis. No discrete fluid collection. Advanced acromioclavicular and moderate glenohumeral joint osteoarthritis.     XR FOOT LEFT (MIN 3 VIEWS)    Result Date: 6/11/2024  EXAMINATION: THREE XRAY VIEWS OF THE LEFT FOOT 6/11/2024 1:10 pm COMPARISON: None. HISTORY: ORDERING SYSTEM PROVIDED HISTORY: Ulcer lateral side of foot.  Concern for possible osteomyelitis. TECHNOLOGIST PROVIDED HISTORY: Reason for exam:->Ulcer lateral side of foot.  Concern for possible osteomyelitis. Reason for Exam: Ulcer lateral side of foot. Concern for possible osteomyelitis. FINDINGS: The patient had a prior amputation of the foot at the proximal metatarsal level.  There is evidence of an ulcer along the lateral aspect of the stump. There is some bony lysis involving the base of the 5th metatarsal, suggesting osteomyelitis.  There is no gas within the soft tissues.     1. Findings suggestive of osteomyelitis involving the base of the 5th metatarsal. 2. 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 3. Soft tissue ulcer along the lateral aspect of the stump.     XR CHEST (2 VW)    Result Date: 6/11/2024  EXAMINATION: TWO XRAY VIEWS OF THE CHEST 6/11/2024 1:10 pm COMPARISON: 03/27/2024 HISTORY: ORDERING SYSTEM PROVIDED HISTORY: wheezing on PE R lower

## 2024-06-11 NOTE — CONSULTS
Nephrology Consult Note   [facility] [employer]      Reason for consultation: Hyponatremia / MIGUEL on CKD 3/4 -- baseline Cr ~ 2.0-2.3 mg/dL    History of Present Illness: Juan Carlos Wong is a 58 yo male with a PMHx of CKD 3/4, CHF, DM, diabetic foot ulcers, blindness, CAD. Patient presents to  ED on 2024 with a new wound on his R deltoid. States wound appeared 3 days ago. Wound is red with purulent drainage. Endorses poor oral intake the past 3 days, fever (unknown Tmax), emesis x2 today, fatigue. Patient lethargic upon exam. Vitals are stable. On RA. No fever in ED. CT R humerus is suggestive of cellulitis. CXR negative. WBC 14.8. Lactic acid 0.9. Received cefepime.    We have been consulted for MIGUEL on CKD 3/4 and hyponatremia management. Cr 2.6 mg/dL and Na+ 124 mmol/L upon admission. Oral intake and GI symptoms per above. Denies  symptoms. Taking torsemide PTA. Denies NSAID use. ED weight is roughly the same as what patient was when discharged from 3/2024 hospitalization.      Subjective:      Patient seen and examined. Labs and chart reviewed. Resting in bed on RA.     Patient review of systems: Endorses intermittent SOB. See HPI for other pertinent ROS.     Past Medical History:   Diagnosis Date    Back pain     Diabetes mellitus (HCC)     Pneumonia        Past Surgical History:   Procedure Laterality Date    COLONOSCOPY      LEG SURGERY Left     LIVER BIOPSY         Allergies:  No Known Allergies     Social Determinants of Health with Concerns     Physical Activity: Not on file   Stress: Not on file   Social Connections: Not on file   Intimate Partner Violence: Not on file        Scheduled Meds:   cefepime  2,000 mg IntraVENous Once    sodium chloride  500 mL IntraVENous Once        sodium chloride         PRN Meds:    Physical Exam:    TEMPERATURE:  Current - Temp: 98.4 °F (36.9 °C); Max - Temp  Av.3 °F (37.4 °C)  Min: 98.4 °F (36.9 °C)  Max: 100.2 °F (37.9 °C)  RESPIRATIONS RANGE: Resp  Av.4  Min:  05/09/2024 09:17 AM    MUCUS 1+ 03/27/2024 08:03 PM    BACTERIA SEE NOTES 05/09/2024 09:17 AM    CLARITYU Clear 05/09/2024 09:17 AM    LEUKOCYTESUR NEGATIVE 05/09/2024 09:17 AM    LEUKOCYTESUR <6 05/09/2024 09:17 AM    UROBILINOGEN NORMAL 05/09/2024 09:17 AM    BILIRUBINUR NEGATIVE 05/09/2024 09:17 AM    BLOODU LARGE 03/27/2024 08:03 PM    GLUCOSEU 3+ (300-500 mg/dL) 05/09/2024 09:17 AM         IMPRESSION/RECOMMENDATIONS:      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 Follows with Dr. Howell in office. Etiology likely 2/2 volume depletion in the setting of poor oral intake, emesis, and diuretic use.   - Cr 2.6 mg/dL   - UA and urine studies ordered   - Bladder scan and place elliott for PVR > 300 mL   - Hold torsemide   - Will start gentle continuous IVF following 500 L NS bolus   - No NSAIDs. No ACE/ARBs. No SGLT2i.   - Daily RFTs    Hyponatremia: etiology likely 2/2 volume depletion in the setting of poor oral intake, emesis, and diuretic use.   - Na+ 124 mmol/L   - Urine studies ordered   - Gentle continuous IVF following 500 L NS bolus   - Will need 1.5 L FR once diet ordered   - Q6 Na+ checks   - Goal correction rate: 6-8 meq/24 hrs    HFrEF   - TTE (3/27/2024): LVEF 20-25%.    - CXR unremarkable   - Holding torsemide   - Carefully monitor volume status while receiving IVF   - Strict I/Os. Daily weights.    Cellulitis / RUE Wound / OM L Foot   - Receiving cefepime   - Management per primary    Hypertension   - Controlled   - Continue current regimen    CKD-MBD   - Monitor phos    Anemia   - Hgb within target   - Monitor    Will discuss with nephrology attending physician, Dr. Otero.  See attestation for additional recommendations.    Greer Fernández, CHANELLE - CNP

## 2024-06-11 NOTE — PROGRESS NOTES
Juan Carlos Wong Sr (:  1965) is a 59 y.o. male, Established patient, here for evaluation of the following chief complaint(s):  Abscess (Right arm )      Assessment & Plan   ASSESSMENT/PLAN:  1. Type 2 diabetes mellitus with hyperglycemia, with long-term current use of insulin (HCC)  Chronic, uncontrolled.  Switching Trulicity to mounjaro since patient was not able to get Trulicity for the past 2 weeks from the pharmacy due to backorder of the medication.  Advised patient to increase the dose of insulin Lantus to 28 units nightly and adjust the dose based on fasting glucose levels.  -     Tirzepatide (MOUNJARO) 5 MG/0.5ML SOPN SC injection; Inject 0.5 mLs into the skin once a week, Disp-2 mL, R-1Normal    2. Diabetic mononeuropathy associated with type 2 diabetes mellitus (HCC)  - Stable   - Continue current dose of gabapentin    3. Essential hypertension  - Stable   - Continue current dose of carvedilol, hydralazine and Imdur    4. Stage 4 chronic kidney disease (HCC)  Follows with nephrology    5. Coronary artery disease of native artery of native heart with stable angina pectoris (HCC)  - Stable   - Continue current dose of aspirin, atorvastatin, carvedilol, clopidogrel, Imdur  Continue nitroglycerin as needed for chest pain.  Patient follows with cardiology.    6. Chronic systolic congestive heart failure (HCC)  - Stable   - Continue current dose of furosemide and carvedilol  -Follows with cardiology    7. Abscess of arm, right  Acute, uncontrolled  Patient has a painful swelling over right upper arm, he also has fatigue, fever, nausea and vomiting that started few days ago and is worsening.  On exam abscess present over right arm with surrounding erythema and tenderness  Advised patient and his daughter to go to [facility] emergency as soon as possible for urgent evaluation and treatment.  Patient agreed to go to the emergency.    Return if symptoms worsen or fail to improve.         Subjective

## 2024-06-11 NOTE — PROGRESS NOTES
Medication Reconciliation     List of medications patient is currently taking is complete.     Source of information: 1. Conversation with patient at bedside                                      2. EPIC records      Allergies  Patient has no known allergies.     Notes regarding home medications:  1. Patient did not receive any doses of his home medications today prior to arrival in the ED.  2. Pt's PCP switched him from Trulicity to Mounjaro today, pt has not yet started this medication  3. Pt's PCP increased dose of Lantus from 10 u to 28 u nightly today    Heather Nunn, Pharmacy Intern  6/11/2024 7:25 PM

## 2024-06-11 NOTE — H&P
Hospital Medicine History & Physical      PCP: Idris Laws MD    Date of Admission: 6/11/2024    Date of Service: Pt seen/examined on 06/11/2024 and Admitted to Inpatient with expected LOS greater than two midnights due to medical therapy.   Chief Complaint: Right arm pain      History Of Present Illness:      59 y.o. male who presented to [facility] with right shoulder pain and drainage for the last 3 to 4 days, worsening, no obvious elevated or elevating factors, associated with some chills and fever associated also with weakness, no obvious etiology, per ED provider likely patient was injecting insulin at that part, also having ulcer on his plantar aspect of left foot draining pus.  Denies vomiting but stated he was having nausea this morning, no abdominal pain no diarrhea discussed with ED provider agree with plan to admit for further management and treatment.    Past Medical History:          Diagnosis Date    Back pain     Diabetes mellitus (HCC)     Pneumonia        Past Surgical History:          Procedure Laterality Date    COLONOSCOPY      LEG SURGERY Left     LIVER BIOPSY         Medications Prior to Admission:      Prior to Admission medications    Medication Sig Start Date End Date Taking? Authorizing Provider   Tirzepatide (MOUNJARO) 5 MG/0.5ML SOPN SC injection Inject 0.5 mLs into the skin once a week 6/11/24 8/6/24  Idris Laws MD   Dulaglutide (TRULICITY) 3 MG/0.5ML SOPN Inject 3 mg into the skin once a week 5/20/24   Idris Laws MD   gabapentin (NEURONTIN) 300 MG capsule Take 1 capsule by mouth 2 times daily for 180 days. 5/20/24 11/16/24  Idris Laws MD   ondansetron (ZOFRAN-ODT) 4 MG disintegrating tablet Take 1 tablet by mouth 3 times daily as needed for Nausea or Vomiting 5/20/24   Idris Laws MD   cefdinir (OMNICEF) 300 MG capsule TAKE 1 CAPSULE BY MOUTH EVERY 12 (TWELVE) HOURS FOR 6 DAYS. 2/12/24   Provider, MD Serina

## 2024-06-11 NOTE — ED TRIAGE NOTES
Patient arrived to the ED ambulatory w/ assistance of wheelchair for support w/ complaints of wound infection.   Patient/EMS reports states PCP sent pt here for wound check and wound infection on the right bicep; pt reports wound has been there for a few days w/ redness and swelling; reports wound is painful; drainage has been yellow and pus like; PCP wants admitted for IV ABX    Patient A&O x 4, VSS,    HX of being a type 2 diabetic

## 2024-06-12 ENCOUNTER — ANESTHESIA EVENT (OUTPATIENT)
Dept: OPERATING ROOM | Age: 59
End: 2024-06-12
Payer: COMMERCIAL

## 2024-06-12 ENCOUNTER — ANESTHESIA (OUTPATIENT)
Dept: OPERATING ROOM | Age: 59
End: 2024-06-12
Payer: COMMERCIAL

## 2024-06-12 PROBLEM — L03.113 CELLULITIS OF RIGHT UPPER ARM: Status: ACTIVE | Noted: 2024-06-12

## 2024-06-12 PROBLEM — E11.8 DM (DIABETES MELLITUS), TYPE 2 WITH COMPLICATIONS (HCC): Status: ACTIVE | Noted: 2024-06-12

## 2024-06-12 PROBLEM — Z79.02 ANTIPLATELET OR ANTITHROMBOTIC LONG-TERM USE: Status: ACTIVE | Noted: 2024-06-12

## 2024-06-12 PROBLEM — L02.413 ABSCESS OF RIGHT ARM: Status: ACTIVE | Noted: 2024-06-12

## 2024-06-12 LAB
ALBUMIN SERPL-MCNC: 1.9 G/DL (ref 3.4–5)
ALBUMIN/GLOB SERPL: 0.4 {RATIO} (ref 1.1–2.2)
ALP SERPL-CCNC: 130 U/L (ref 40–129)
ALT SERPL-CCNC: <5 U/L (ref 10–40)
ANION GAP SERPL CALCULATED.3IONS-SCNC: 10 MMOL/L (ref 3–16)
AST SERPL-CCNC: 9 U/L (ref 15–37)
BASOPHILS # BLD: 0.1 K/UL (ref 0–0.2)
BASOPHILS NFR BLD: 0.5 %
BILIRUB SERPL-MCNC: 0.5 MG/DL (ref 0–1)
BUN SERPL-MCNC: 46 MG/DL (ref 7–20)
CALCIUM SERPL-MCNC: 8 MG/DL (ref 8.3–10.6)
CHLORIDE SERPL-SCNC: 96 MMOL/L (ref 99–110)
CO2 SERPL-SCNC: 20 MMOL/L (ref 21–32)
CREAT SERPL-MCNC: 2.6 MG/DL (ref 0.8–1.3)
DEPRECATED RDW RBC AUTO: 13.6 % (ref 12.4–15.4)
EOSINOPHIL # BLD: 0.1 K/UL (ref 0–0.6)
EOSINOPHIL NFR BLD: 1.1 %
EST. AVERAGE GLUCOSE BLD GHB EST-MCNC: 280.5 MG/DL
GFR SERPLBLD CREATININE-BSD FMLA CKD-EPI: 27 ML/MIN/{1.73_M2}
GLUCOSE BLD-MCNC: 176 MG/DL (ref 70–99)
GLUCOSE BLD-MCNC: 177 MG/DL (ref 70–99)
GLUCOSE BLD-MCNC: 178 MG/DL (ref 70–99)
GLUCOSE BLD-MCNC: 182 MG/DL (ref 70–99)
GLUCOSE BLD-MCNC: 238 MG/DL (ref 70–99)
GLUCOSE SERPL-MCNC: 177 MG/DL (ref 70–99)
HBA1C MFR BLD: 11.4 %
HCT VFR BLD AUTO: 25 % (ref 40.5–52.5)
HGB BLD-MCNC: 9 G/DL (ref 13.5–17.5)
LYMPHOCYTES # BLD: 0.7 K/UL (ref 1–5.1)
LYMPHOCYTES NFR BLD: 5.9 %
MCH RBC QN AUTO: 30.6 PG (ref 26–34)
MCHC RBC AUTO-ENTMCNC: 36 G/DL (ref 31–36)
MCV RBC AUTO: 85.1 FL (ref 80–100)
MONOCYTES # BLD: 1 K/UL (ref 0–1.3)
MONOCYTES NFR BLD: 8.1 %
NEUTROPHILS # BLD: 10.6 K/UL (ref 1.7–7.7)
NEUTROPHILS NFR BLD: 84.4 %
OSMOLALITY UR: 350 MOSM/KG (ref 390–1070)
PERFORMED ON: ABNORMAL
PHOSPHATE SERPL-MCNC: 4.6 MG/DL (ref 2.5–4.9)
PLATELET # BLD AUTO: 293 K/UL (ref 135–450)
PMV BLD AUTO: 8.4 FL (ref 5–10.5)
POTASSIUM SERPL-SCNC: 3.6 MMOL/L (ref 3.5–5.1)
POTASSIUM SERPL-SCNC: 3.6 MMOL/L (ref 3.5–5.1)
PROT SERPL-MCNC: 6.2 G/DL (ref 6.4–8.2)
RBC # BLD AUTO: 2.94 M/UL (ref 4.2–5.9)
REASON FOR REJECTION: NORMAL
REJECTED TEST: NORMAL
SODIUM SERPL-SCNC: 125 MMOL/L (ref 136–145)
SODIUM SERPL-SCNC: 126 MMOL/L (ref 136–145)
SODIUM SERPL-SCNC: 127 MMOL/L (ref 136–145)
SODIUM SERPL-SCNC: 129 MMOL/L (ref 136–145)
SODIUM UR-SCNC: 57 MMOL/L
WBC # BLD AUTO: 12.5 K/UL (ref 4–11)

## 2024-06-12 PROCEDURE — 97162 PT EVAL MOD COMPLEX 30 MIN: CPT

## 2024-06-12 PROCEDURE — 97530 THERAPEUTIC ACTIVITIES: CPT

## 2024-06-12 PROCEDURE — 6360000002 HC RX W HCPCS: Performed by: SURGERY

## 2024-06-12 PROCEDURE — 87077 CULTURE AEROBIC IDENTIFY: CPT

## 2024-06-12 PROCEDURE — APPNB15 APP NON BILLABLE TIME 0-15 MINS: Performed by: PHYSICIAN ASSISTANT

## 2024-06-12 PROCEDURE — 80053 COMPREHEN METABOLIC PANEL: CPT

## 2024-06-12 PROCEDURE — 85025 COMPLETE CBC W/AUTO DIFF WBC: CPT

## 2024-06-12 PROCEDURE — 3600000012 HC SURGERY LEVEL 2 ADDTL 15MIN: Performed by: SURGERY

## 2024-06-12 PROCEDURE — 6370000000 HC RX 637 (ALT 250 FOR IP): Performed by: SURGERY

## 2024-06-12 PROCEDURE — 2060000000 HC ICU INTERMEDIATE R&B

## 2024-06-12 PROCEDURE — 99223 1ST HOSP IP/OBS HIGH 75: CPT | Performed by: INTERNAL MEDICINE

## 2024-06-12 PROCEDURE — 97166 OT EVAL MOD COMPLEX 45 MIN: CPT

## 2024-06-12 PROCEDURE — 86403 PARTICLE AGGLUT ANTBDY SCRN: CPT

## 2024-06-12 PROCEDURE — 87205 SMEAR GRAM STAIN: CPT

## 2024-06-12 PROCEDURE — 36415 COLL VENOUS BLD VENIPUNCTURE: CPT

## 2024-06-12 PROCEDURE — APPSS15 APP SPLIT SHARED TIME 0-15 MINUTES: Performed by: PHYSICIAN ASSISTANT

## 2024-06-12 PROCEDURE — 2580000003 HC RX 258: Performed by: INTERNAL MEDICINE

## 2024-06-12 PROCEDURE — 6360000002 HC RX W HCPCS

## 2024-06-12 PROCEDURE — 0J9G0ZZ DRAINAGE OF RIGHT LOWER ARM SUBCUTANEOUS TISSUE AND FASCIA, OPEN APPROACH: ICD-10-PCS | Performed by: SURGERY

## 2024-06-12 PROCEDURE — 2580000003 HC RX 258: Performed by: SURGERY

## 2024-06-12 PROCEDURE — 3700000001 HC ADD 15 MINUTES (ANESTHESIA): Performed by: SURGERY

## 2024-06-12 PROCEDURE — 7100000000 HC PACU RECOVERY - FIRST 15 MIN: Performed by: SURGERY

## 2024-06-12 PROCEDURE — 6370000000 HC RX 637 (ALT 250 FOR IP): Performed by: INTERNAL MEDICINE

## 2024-06-12 PROCEDURE — 87070 CULTURE OTHR SPECIMN AEROBIC: CPT

## 2024-06-12 PROCEDURE — 10061 I&D ABSCESS COMP/MULTIPLE: CPT | Performed by: SURGERY

## 2024-06-12 PROCEDURE — 2500000003 HC RX 250 WO HCPCS

## 2024-06-12 PROCEDURE — A4217 STERILE WATER/SALINE, 500 ML: HCPCS | Performed by: SURGERY

## 2024-06-12 PROCEDURE — 6360000002 HC RX W HCPCS: Performed by: INTERNAL MEDICINE

## 2024-06-12 PROCEDURE — 99222 1ST HOSP IP/OBS MODERATE 55: CPT | Performed by: SURGERY

## 2024-06-12 PROCEDURE — 94760 N-INVAS EAR/PLS OXIMETRY 1: CPT

## 2024-06-12 PROCEDURE — 87186 SC STD MICRODIL/AGAR DIL: CPT

## 2024-06-12 PROCEDURE — 2709999900 HC NON-CHARGEABLE SUPPLY: Performed by: SURGERY

## 2024-06-12 PROCEDURE — 3600000002 HC SURGERY LEVEL 2 BASE: Performed by: SURGERY

## 2024-06-12 PROCEDURE — 88304 TISSUE EXAM BY PATHOLOGIST: CPT

## 2024-06-12 PROCEDURE — 7100000001 HC PACU RECOVERY - ADDTL 15 MIN: Performed by: SURGERY

## 2024-06-12 PROCEDURE — 83935 ASSAY OF URINE OSMOLALITY: CPT

## 2024-06-12 PROCEDURE — 3700000000 HC ANESTHESIA ATTENDED CARE: Performed by: SURGERY

## 2024-06-12 PROCEDURE — 84300 ASSAY OF URINE SODIUM: CPT

## 2024-06-12 PROCEDURE — 84295 ASSAY OF SERUM SODIUM: CPT

## 2024-06-12 RX ORDER — FENTANYL CITRATE 50 UG/ML
INJECTION, SOLUTION INTRAMUSCULAR; INTRAVENOUS PRN
Status: DISCONTINUED | OUTPATIENT
Start: 2024-06-12 | End: 2024-06-12 | Stop reason: SDUPTHER

## 2024-06-12 RX ORDER — OXYCODONE HYDROCHLORIDE AND ACETAMINOPHEN 5; 325 MG/1; MG/1
2 TABLET ORAL EVERY 4 HOURS PRN
Status: DISCONTINUED | OUTPATIENT
Start: 2024-06-12 | End: 2024-06-27 | Stop reason: HOSPADM

## 2024-06-12 RX ORDER — MORPHINE SULFATE 2 MG/ML
2 INJECTION, SOLUTION INTRAMUSCULAR; INTRAVENOUS
Status: DISCONTINUED | OUTPATIENT
Start: 2024-06-12 | End: 2024-06-27 | Stop reason: HOSPADM

## 2024-06-12 RX ORDER — PROPOFOL 10 MG/ML
INJECTION, EMULSION INTRAVENOUS PRN
Status: DISCONTINUED | OUTPATIENT
Start: 2024-06-12 | End: 2024-06-12 | Stop reason: SDUPTHER

## 2024-06-12 RX ORDER — OXYCODONE HYDROCHLORIDE AND ACETAMINOPHEN 5; 325 MG/1; MG/1
1 TABLET ORAL EVERY 4 HOURS PRN
Status: DISCONTINUED | OUTPATIENT
Start: 2024-06-12 | End: 2024-06-27 | Stop reason: HOSPADM

## 2024-06-12 RX ORDER — SODIUM CHLORIDE 9 MG/ML
INJECTION, SOLUTION INTRAVENOUS PRN
Status: DISCONTINUED | OUTPATIENT
Start: 2024-06-12 | End: 2024-06-12 | Stop reason: HOSPADM

## 2024-06-12 RX ORDER — OXYCODONE HYDROCHLORIDE 5 MG/1
5 TABLET ORAL PRN
Status: DISCONTINUED | OUTPATIENT
Start: 2024-06-12 | End: 2024-06-12 | Stop reason: HOSPADM

## 2024-06-12 RX ORDER — NALOXONE HYDROCHLORIDE 0.4 MG/ML
INJECTION, SOLUTION INTRAMUSCULAR; INTRAVENOUS; SUBCUTANEOUS PRN
Status: DISCONTINUED | OUTPATIENT
Start: 2024-06-12 | End: 2024-06-12 | Stop reason: HOSPADM

## 2024-06-12 RX ORDER — MORPHINE SULFATE 4 MG/ML
4 INJECTION, SOLUTION INTRAMUSCULAR; INTRAVENOUS
Status: DISCONTINUED | OUTPATIENT
Start: 2024-06-12 | End: 2024-06-27 | Stop reason: HOSPADM

## 2024-06-12 RX ORDER — KETAMINE HCL IN NACL, ISO-OSM 100MG/10ML
SYRINGE (ML) INJECTION PRN
Status: DISCONTINUED | OUTPATIENT
Start: 2024-06-12 | End: 2024-06-12 | Stop reason: SDUPTHER

## 2024-06-12 RX ORDER — DEXAMETHASONE SODIUM PHOSPHATE 4 MG/ML
INJECTION, SOLUTION INTRA-ARTICULAR; INTRALESIONAL; INTRAMUSCULAR; INTRAVENOUS; SOFT TISSUE PRN
Status: DISCONTINUED | OUTPATIENT
Start: 2024-06-12 | End: 2024-06-12 | Stop reason: SDUPTHER

## 2024-06-12 RX ORDER — SODIUM CHLORIDE 0.9 % (FLUSH) 0.9 %
5-40 SYRINGE (ML) INJECTION EVERY 12 HOURS SCHEDULED
Status: DISCONTINUED | OUTPATIENT
Start: 2024-06-12 | End: 2024-06-12 | Stop reason: HOSPADM

## 2024-06-12 RX ORDER — MAGNESIUM HYDROXIDE 1200 MG/15ML
LIQUID ORAL CONTINUOUS PRN
Status: DISCONTINUED | OUTPATIENT
Start: 2024-06-12 | End: 2024-06-12 | Stop reason: HOSPADM

## 2024-06-12 RX ORDER — ONDANSETRON 2 MG/ML
INJECTION INTRAMUSCULAR; INTRAVENOUS PRN
Status: DISCONTINUED | OUTPATIENT
Start: 2024-06-12 | End: 2024-06-12 | Stop reason: SDUPTHER

## 2024-06-12 RX ORDER — SODIUM CHLORIDE 0.9 % (FLUSH) 0.9 %
5-40 SYRINGE (ML) INJECTION PRN
Status: DISCONTINUED | OUTPATIENT
Start: 2024-06-12 | End: 2024-06-12 | Stop reason: HOSPADM

## 2024-06-12 RX ORDER — OXYCODONE HYDROCHLORIDE 10 MG/1
10 TABLET ORAL PRN
Status: DISCONTINUED | OUTPATIENT
Start: 2024-06-12 | End: 2024-06-12 | Stop reason: HOSPADM

## 2024-06-12 RX ORDER — LIDOCAINE HYDROCHLORIDE 20 MG/ML
INJECTION, SOLUTION EPIDURAL; INFILTRATION; INTRACAUDAL; PERINEURAL PRN
Status: DISCONTINUED | OUTPATIENT
Start: 2024-06-12 | End: 2024-06-12 | Stop reason: SDUPTHER

## 2024-06-12 RX ORDER — ONDANSETRON 2 MG/ML
4 INJECTION INTRAMUSCULAR; INTRAVENOUS
Status: DISCONTINUED | OUTPATIENT
Start: 2024-06-12 | End: 2024-06-12 | Stop reason: HOSPADM

## 2024-06-12 RX ORDER — BUPIVACAINE HYDROCHLORIDE 5 MG/ML
INJECTION, SOLUTION EPIDURAL; INTRACAUDAL
Status: COMPLETED | OUTPATIENT
Start: 2024-06-12 | End: 2024-06-12

## 2024-06-12 RX ADMIN — CIPROFLOXACIN 1 DROP: 3 SOLUTION OPHTHALMIC at 18:04

## 2024-06-12 RX ADMIN — Medication 30 MG: at 12:10

## 2024-06-12 RX ADMIN — PREDNISOLONE ACETATE 1 DROP: 10 SUSPENSION/ DROPS OPHTHALMIC at 14:10

## 2024-06-12 RX ADMIN — LINEZOLID 600 MG: 600 INJECTION, SOLUTION INTRAVENOUS at 23:24

## 2024-06-12 RX ADMIN — INSULIN GLARGINE 10 UNITS: 100 INJECTION, SOLUTION SUBCUTANEOUS at 20:56

## 2024-06-12 RX ADMIN — CEFEPIME 2000 MG: 2 INJECTION, POWDER, FOR SOLUTION INTRAVENOUS at 18:14

## 2024-06-12 RX ADMIN — ONDANSETRON 4 MG: 2 INJECTION INTRAMUSCULAR; INTRAVENOUS at 12:20

## 2024-06-12 RX ADMIN — ASPIRIN 81 MG: 81 TABLET, COATED ORAL at 08:38

## 2024-06-12 RX ADMIN — PREDNISOLONE ACETATE 1 DROP: 10 SUSPENSION/ DROPS OPHTHALMIC at 08:38

## 2024-06-12 RX ADMIN — PROPOFOL 50 MG: 10 INJECTION, EMULSION INTRAVENOUS at 12:10

## 2024-06-12 RX ADMIN — CIPROFLOXACIN 1 DROP: 3 SOLUTION OPHTHALMIC at 20:55

## 2024-06-12 RX ADMIN — CIPROFLOXACIN 1 DROP: 3 SOLUTION OPHTHALMIC at 14:10

## 2024-06-12 RX ADMIN — SODIUM CHLORIDE, PRESERVATIVE FREE 10 ML: 5 INJECTION INTRAVENOUS at 08:38

## 2024-06-12 RX ADMIN — ISOSORBIDE MONONITRATE 15 MG: 30 TABLET, EXTENDED RELEASE ORAL at 20:55

## 2024-06-12 RX ADMIN — SODIUM CHLORIDE, PRESERVATIVE FREE 10 ML: 5 INJECTION INTRAVENOUS at 20:56

## 2024-06-12 RX ADMIN — ENOXAPARIN SODIUM 40 MG: 100 INJECTION SUBCUTANEOUS at 08:39

## 2024-06-12 RX ADMIN — PANTOPRAZOLE SODIUM 40 MG: 40 TABLET, DELAYED RELEASE ORAL at 06:15

## 2024-06-12 RX ADMIN — GABAPENTIN 300 MG: 300 CAPSULE ORAL at 08:38

## 2024-06-12 RX ADMIN — METRONIDAZOLE 500 MG: 500 INJECTION, SOLUTION INTRAVENOUS at 12:05

## 2024-06-12 RX ADMIN — GABAPENTIN 300 MG: 300 CAPSULE ORAL at 20:55

## 2024-06-12 RX ADMIN — METRONIDAZOLE 500 MG: 500 INJECTION, SOLUTION INTRAVENOUS at 05:06

## 2024-06-12 RX ADMIN — FENTANYL CITRATE 25 MCG: 50 INJECTION INTRAMUSCULAR; INTRAVENOUS at 12:39

## 2024-06-12 RX ADMIN — HYDRALAZINE HYDROCHLORIDE 10 MG: 10 TABLET, FILM COATED ORAL at 20:55

## 2024-06-12 RX ADMIN — PREDNISOLONE ACETATE 1 DROP: 10 SUSPENSION/ DROPS OPHTHALMIC at 18:03

## 2024-06-12 RX ADMIN — HYDRALAZINE HYDROCHLORIDE 10 MG: 10 TABLET, FILM COATED ORAL at 08:37

## 2024-06-12 RX ADMIN — DEXAMETHASONE SODIUM PHOSPHATE 4 MG: 4 INJECTION, SOLUTION INTRAMUSCULAR; INTRAVENOUS at 12:20

## 2024-06-12 RX ADMIN — LINEZOLID 600 MG: 600 INJECTION, SOLUTION INTRAVENOUS at 10:30

## 2024-06-12 RX ADMIN — CARVEDILOL 12.5 MG: 12.5 TABLET, FILM COATED ORAL at 08:38

## 2024-06-12 RX ADMIN — LIDOCAINE HYDROCHLORIDE 100 MG: 20 INJECTION, SOLUTION EPIDURAL; INFILTRATION; INTRACAUDAL; PERINEURAL at 12:10

## 2024-06-12 RX ADMIN — FENTANYL CITRATE 50 MCG: 50 INJECTION INTRAMUSCULAR; INTRAVENOUS at 12:08

## 2024-06-12 RX ADMIN — CEFEPIME 2000 MG: 2 INJECTION, POWDER, FOR SOLUTION INTRAVENOUS at 06:15

## 2024-06-12 RX ADMIN — CIPROFLOXACIN 1 DROP: 3 SOLUTION OPHTHALMIC at 08:38

## 2024-06-12 RX ADMIN — CARVEDILOL 12.5 MG: 12.5 TABLET, FILM COATED ORAL at 18:03

## 2024-06-12 RX ADMIN — PREDNISOLONE ACETATE 1 DROP: 10 SUSPENSION/ DROPS OPHTHALMIC at 20:55

## 2024-06-12 RX ADMIN — ATORVASTATIN CALCIUM 40 MG: 40 TABLET, FILM COATED ORAL at 20:55

## 2024-06-12 RX ADMIN — CLOPIDOGREL BISULFATE 75 MG: 75 TABLET ORAL at 20:55

## 2024-06-12 ASSESSMENT — PAIN - FUNCTIONAL ASSESSMENT
PAIN_FUNCTIONAL_ASSESSMENT: NONE - DENIES PAIN
PAIN_FUNCTIONAL_ASSESSMENT: NONE - DENIES PAIN

## 2024-06-12 ASSESSMENT — ENCOUNTER SYMPTOMS: SHORTNESS OF BREATH: 0

## 2024-06-12 NOTE — PROGRESS NOTES
Department of Internal Medicine  Nephrology Progress Note            REASON FOR CONSULTATION:  Acute kidney injury.     HISTORY OF PRESENTING ILLNESS:  59-year-old male with past medical history significant for coronary artery disease, congestive heart failure, longstanding history of diabetes mellitus type 2 with multiple end-organ damage including chronic kidney disease stage 3B/4, peripheral vascular disease, came to ER complaining of a diabetic foot ulcer.  The patient was recently discharged, but came back also complaining of right shoulder cellulitis.  The patient was complaining of fever, chills, rigors with some nausea and poor fluid oral intake.  Routine labs showed hyponatremia and acute kidney injury with a creatinine of 2.6 and sodium of 124.  Admitted for further workup and management.  Renal consultation has been called for above-mentioned reason.         Events noted , OR later today .   REVIEW OF SYSTEMS:  No SOB.;MENDOZA   No family present     Physical Exam:    VITALS:  /73   Pulse 78   Temp 98.8 °F (37.1 °C) (Temporal)   Resp 18   Ht 1.753 m (5' 9.02\")   Wt 79.4 kg (175 lb 0.7 oz)   SpO2 96%   BMI 25.83 kg/m²   24HR INTAKE/OUTPUT:    Intake/Output Summary (Last 24 hours) at 6/12/2024 1140  Last data filed at 6/12/2024 1025  Gross per 24 hour   Intake 950.49 ml   Output 1650 ml   Net -699.51 ml       Constitutional:  resting   Respiratory:  CTA  Gastrointestinal:  No  tenderness.  Normal Bowel Sounds  Cardiovascular:  S1, S2 RRR   Edema:  +  edema    DATA:    CBC:  Lab Results   Component Value Date/Time    WBC 12.5 06/12/2024 05:18 AM    RBC 2.94 06/12/2024 05:18 AM    HGB 9.0 06/12/2024 05:18 AM    HCT 25.0 06/12/2024 05:18 AM    MCV 85.1 06/12/2024 05:18 AM    MCH 30.6 06/12/2024 05:18 AM    MCHC 36.0 06/12/2024 05:18 AM    RDW 13.6 06/12/2024 05:18 AM     06/12/2024 05:18 AM    MPV 8.4 06/12/2024 05:18 AM     CMP:  Lab Results   Component Value Date/Time     06/12/2024         - Strict I/Os. Daily weights.     4- Cellulitis / RUE Wound / OM L Foot              - On flagyl, linezolid, vancomycin              - ID consulted     5- Hypertension              - Controlled              - Continue current regimen     6- CKD-MBD              - Monitor phos     7- Anemia              - Hgb within target              - Monitor    Jennifer Otero MD, FACP

## 2024-06-12 NOTE — PROGRESS NOTES
Physical Therapy  Facility/Department: [address] PROGRESSIVE CARE  Physical Therapy Initial Assessment    Name: Juan Carlos Wong Sr  : 1965  MRN: [id number]  Date of Service: 2024    Discharge Recommendations:  Home with assist PRN, Home with Home health PT, Continue to assess pending progress          Patient Diagnosis(es): The primary encounter diagnosis was Cellulitis of right upper arm. A diagnosis of Osteomyelitis of left foot, unspecified type (HCC) was also pertinent to this visit.  Past Medical History:  has a past medical history of Back pain, Diabetes mellitus (HCC), and Pneumonia.  Past Surgical History:  has a past surgical history that includes liver biopsy; Leg Surgery (Left); and Colonoscopy.    Assessment   Assessment: 59 y.o. male who presented to [facility] ON 24 with right shoulder pain and drainage for the last 3 to 4 days. Prior to admission, pt living alone in apt setting, independent with ADLs and ambulatin without device. Pt currently functioning slightly below baseline. Anticipate return home with PRN assist and level 1 HHPT.  Treatment Diagnosis: impaired mobility  Therapy Prognosis: Fair  Decision Making: Medium Complexity  History: see above  Exam: see below  Clinical Presentation: evolving  Requires PT Follow-Up: Yes  Activity Tolerance  Activity Tolerance: Patient tolerated treatment well     Plan   Physical Therapy Plan  General Plan: 2-3 times per week  Current Treatment Recommendations: Strengthening, Balance training, Functional mobility training, Transfer training, Gait training, Endurance training, Patient/Caregiver education & training, Safety education & training, Equipment evaluation, education, & procurement, Therapeutic activities, Neuromuscular re-education  Safety Devices  Type of Devices: Chair alarm in place, Left in chair, Call light within reach, Nurse notified  Restraints  Restraints Initially in Place: No      Restrictions  Restrictions/Precautions  Restrictions/Precautions: Fall Risk  Position Activity Restriction  Other position/activity restrictions: transmet amp L foot, toe amp R foot     Subjective   General  Chart Reviewed: Yes  Patient assessed for rehabilitation services?: Yes  Additional Pertinent Hx: 59 y.o. male who presented to [facility]/11/[facility] with right shoulder pain and drainage for the last 3 to 4 days.  Response To Previous Treatment: Not applicable  Family / Caregiver Present: No  Referring Practitioner: Reji Prabhakar MD  Referral Date : 06/11/24  Diagnosis: Cellulitis of UE  Follows Commands: Within Functional Limits  Subjective  Subjective: Pt is agreeable to PT.         Social/Functional History  Social/Functional History  Lives With: Alone  Type of Home: Apartment (6th floor apartment)  Home Layout: One level  Home Access: Elevator  Bathroom Shower/Tub: Tub/Shower unit, Shower chair with back  Bathroom Toilet: Standard  Bathroom Equipment: Grab bars in shower, Grab bars around toilet, Shower chair  Home Equipment: Cane, Walker - Rolling, Wheelchair - Manual (transport chair)  ADL Assistance: Needs assistance (PRN assist from daughter)  Homemaking Assistance: Needs assistance (daughter does grocery shopping and laundry and cleaning; pt does light meal prep)  Homemaking Responsibilities: No  Ambulation Assistance: Independent (no AD but reports furniture walks in home)  Transfer Assistance: Independent  Active : No  Occupation: On disability  Leisure & Hobbies: fishing    Vision/Hearing  Vision  Vision: Within Functional Limits  Hearing  Hearing: Within functional limits    Cognition   Orientation  Overall Orientation Status: Within Functional Limits  Cognition  Overall Cognitive Status: WFL     Objective   Gross Assessment  Strength: Generally decreased, functional       Bed mobility  Supine to Sit: Stand by assistance  Sit to Supine: Unable to assess (in recliner at end of

## 2024-06-12 NOTE — PROGRESS NOTES
Nephrology Progress Note   [email or web address]      Reason for consultation: Hyponatremia / MIGUEL on CKD 3/ -- baseline Cr ~ 2.0-2.3 mg/dL     Subjective:    The patient has been seen and examined. Labs and chart reviewed. Resting in chair on RA. Cr unchanged this AM. Na+ up to 126 mmol/L this AM.     There were not complications overnight.    Patient review of systems: Denies SOB.       Allergies:  No Known Allergies     Scheduled Meds:   aspirin  81 mg Oral Daily    atorvastatin  40 mg Oral Nightly    carvedilol  12.5 mg Oral BID WC    clopidogrel  75 mg Oral Nightly    gabapentin  300 mg Oral BID    hydrALAZINE  10 mg Oral TID    insulin glargine  10 Units SubCUTAneous Nightly    isosorbide mononitrate  15 mg Oral Nightly    pantoprazole  40 mg Oral QAM AC    prednisoLONE acetate  1 drop Right Eye 4x daily    linezolid  600 mg IntraVENous Q12H    metroNIDAZOLE  500 mg IntraVENous Q8H    cefepime  2,000 mg IntraVENous Q12H    sodium chloride flush  5-40 mL IntraVENous 2 times per day    enoxaparin  40 mg SubCUTAneous Daily    insulin lispro  0-4 Units SubCUTAneous TID WC    insulin lispro  0-4 Units SubCUTAneous Nightly    ciprofloxacin  1 drop Right Eye 4x daily        sodium chloride      dextrose      sodium chloride 50 mL/hr at 24 2325       PRN Meds:cyclobenzaprine, sodium chloride flush, sodium chloride, potassium chloride **OR** potassium alternative oral replacement **OR** potassium chloride, magnesium sulfate, ondansetron **OR** ondansetron, polyethylene glycol, acetaminophen **OR** acetaminophen, glucose, dextrose bolus **OR** dextrose bolus, glucagon (rDNA), dextrose    Physical Exam:    TEMPERATURE:  Current - Temp: 97.4 °F (36.3 °C); Max - Temp  Av.5 °F (36.9 °C)  Min: 97.4 °F (36.3 °C)  Max: 100.2 °F (37.9 °C)  RESPIRATIONS RANGE: Resp  Av.4  Min: 14  Max: 22  PULSE RANGE: Pulse  Av.4  Min: 78  Max: 98  BLOOD PRESSURE RANGE:  Systolic (24hrs), Av , Min:116 , Max:141   ; Diastolic  (24hrs), Av, Min:48, Max:84    24HR INTAKE/OUTPUT:    Intake/Output Summary (Last 24 hours) at 2024 0956  Last data filed at 2024 0735  Gross per 24 hour   Intake 950.49 ml   Output 1450 ml   Net -499.51 ml       Patient Vitals for the past 96 hrs (Last 3 readings):   Weight   24 0225 79.4 kg (175 lb 0.7 oz)   24 1136 80.7 kg (177 lb 14.6 oz)       General: drowsy, NAD, Obese  Chest: diminished to auscultation, no intercostal retractions  CVS: RRR, no murmur, no rub  Abdomen: soft, non tender  Extremities: 1+ edema, wound to R upper arm  Skin: normal texture, normal skin turgor, no rash  Psych: drowsy    LAB DATA:    CBC:   Lab Results   Component Value Date/Time    WBC 12.5 2024 05:18 AM    RBC 2.94 2024 05:18 AM    HGB 9.0 2024 05:18 AM    HCT 25.0 2024 05:18 AM    MCV 85.1 2024 05:18 AM    MCH 30.6 2024 05:18 AM    MCHC 36.0 2024 05:18 AM    RDW 13.6 2024 05:18 AM     2024 05:18 AM    MPV 8.4 2024 05:18 AM     BMP:    Lab Results   Component Value Date/Time     2024 05:18 AM    K 3.6 2024 05:18 AM    K 3.6 2024 05:18 AM    CL 96 2024 05:18 AM    CO2 20 2024 05:18 AM    BUN 46 2024 05:18 AM    CREATININE 2.6 2024 05:18 AM    CALCIUM 8.0 2024 05:18 AM    GFRAA >60 2017 09:36 PM    LABGLOM 27 2024 05:18 AM    LABGLOM 32 2024 04:27 AM    GLUCOSE 177 2024 05:18 AM     Ionized Calcium:  No components found for: \"IONCA\"  Magnesium:    Lab Results   Component Value Date/Time    MG 1.7 2024 12:21 PM     Phosphorus:    Lab Results   Component Value Date/Time    PHOS 4.6 2024 05:18 AM     U/A:    Lab Results   Component Value Date/Time    COLORU Yellow 2024 04:48 PM    PHUR 6.0 2024 04:48 PM    PHUR 6.0 2024 08:03 PM    LABCAST SEE NOTES 2024 09:17 AM    WBCUA 1 2024 04:48 PM    RBCUA 0-2 2024 04:48 PM

## 2024-06-12 NOTE — PROGRESS NOTES
Handoff received from Debra GARCIA. Pt awake, alert, and oriented. O2 at 2L/NC. Denies pain. Vss.

## 2024-06-12 NOTE — CONSULTS
Neutrophilia D72.9    Diabetic foot infection ([facility]) E11.628, L08.9    Stage 3b chronic kidney disease ([facility]) N18.32    Cellulitis of foot L03.119    Cellulitis and abscess of upper extremity L03.119, L02.419    Cellulitis of right upper arm L03.113    DM (diabetes mellitus), type 2 with complications ([facility]) E11.8    Antiplatelet or antithrombotic long-term use Z79.02    Abscess of right arm L02.413        ICD-10-CM    1. Cellulitis of right upper arm  L03.113 Culture, Tissue     Culture, Tissue     Surgical Pathology     Surgical Pathology      2. Osteomyelitis of left foot, unspecified type ([facility])  M86.9          Complicated left diabetic foot infection  Left foot osteomyelitis  Fevers  WBC elevation  Right shoulder cellulitis with abscess formation  Chronic kidney disease stage IIIb  Diabetes with neuropathy  Diabetes with nephropathy  Status post incision and drainage of the proximal forearm abscess ON 6/12/24    operative culture in process/11/24  Coronary artery disease  Hyponatremia  Diabetes poor control hemoglobin A1c greater than 11.4        Labs, Microbiology, Radiology and pertinent results from current hospitalization and care every where were reviewed by me as a part of the consultation.    PLAN :  Continue IV cefepime 1 g every 12 lower the dose due to CKD  Continue IV linezolid 600 g every 12  Operative culture in process  Left foot wound culture in process  Trend WBC  Hemoglobin A1c is elevated  ESR  CRP  Control diabetes  Continue local care    Discussed with patient/Family and Nursing     Medical Decision Making:  The following items were considered in medical decision making:  Discussion of patient care with other providers  Reviewed clinical lab tests  Reviewed radiology tests  Reviewed other diagnostic tests/interventions  Independent review of radiologic images  Independent review of  Microbiology cultures and other micro tests reviewed     Risk of Complications/Morbidity: High       Illness(es)/ Infection present that pose threat to bodily function.   There is potential for severe exacerbation of infection/side effects of treatment.  Therapy requires intensive monitoring for antimicrobial agent toxicity.     Thanks for allowing me to participate in your patient's care please call me with any questions or concerns.    Dr. Kristian Allen MD  Infectious Disease  [facility] Physician  Phone: [phone]   Fax : [phone]

## 2024-06-12 NOTE — ANESTHESIA POSTPROCEDURE EVALUATION
Department of Anesthesiology  Postprocedure Note    Patient: Juan Carlos Wong Sr  MRN: [id number]  YOB: 1965  Date of evaluation: 6/12/2024    Procedure Summary       Date: 06/12/24 Room / Location: [address]    Anesthesia Start: 1205 Anesthesia Stop: 1253    Procedure: INCISION AND DRAINAGE RIGHT PROXIMAL ARM ABSCESS (Right: Arm Upper) Diagnosis:       Cellulitis of right upper arm      (Cellulitis of right upper arm [L03.113])    Surgeons: Dann Nuñez MD Responsible Provider: Bimal Cancino MD    Anesthesia Type: general ASA Status: 3            Anesthesia Type: No value filed.    Javier Phase I: Javier Score: 9    Javier Phase II:      Anesthesia Post Evaluation    Patient location during evaluation: PACU  Level of consciousness: awake and alert  Airway patency: patent  Nausea & Vomiting: no nausea and no vomiting  Cardiovascular status: blood pressure returned to baseline  Respiratory status: acceptable  Hydration status: euvolemic  Comments: Postoperative Anesthesia Note    Name:    Juan Carlos Wong Sr  MRN:      [id number]    Patient Vitals in the past 12 hrs:  06/12/24 1505, BP:133/87, Temp:(!) 96.6 °F (35.9 °C), Temp src:Oral, Pulse:65, Resp:18, SpO2:96 %  06/12/24 1347, BP:113/61, Temp:97.6 °F (36.4 °C), Pulse:72, Resp:18, SpO2:95 %  06/12/24 1345, SpO2:96 %  06/12/24 1337, SpO2:100 %  06/12/24 1335, Pulse:71, Resp:18, SpO2:100 %  06/12/24 1330, BP:120/63, Pulse:70, Resp:16, SpO2:100 %  06/12/24 1327, Pulse:70, Resp:16, SpO2:100 %  06/12/24 1326, Pulse:70, Resp:16, SpO2:100 %  06/12/24 1325, Pulse:70, Resp:16, SpO2:100 %  06/12/24 1324, Pulse:70, Resp:17, SpO2:100 %  06/12/24 1323, Pulse:70, Resp:17, SpO2:100 %  06/12/24 1322, Pulse:70, Resp:17, SpO2:100 %  06/12/24 1321, Pulse:70, Resp:17, SpO2:100 %  06/12/24 1320, Pulse:70, Resp:15, SpO2:100 %  06/12/24 1319, Pulse:71, Resp:16, SpO2:100 %  06/12/24 1318, Pulse:70, Resp:16, SpO2:100

## 2024-06-12 NOTE — CARE COORDINATION
Wound care consulted for L foot diabetic ulcer POA. Podiatry consulted. Please defer to podiatry for all wound care orders and management. Will not continue to follow. Please re-consult if needed.  Electronically signed by Kay Trejo RN CWOCN on 6/12/2024 at 12:40 PM

## 2024-06-12 NOTE — PROGRESS NOTES
Occupational Therapy  Facility/Department: [address] PROGRESSIVE CARE  Occupational Therapy Initial Assessment and Tentative D/C      Name: Juan Carlos Wong Sr  : 1965  MRN: [id number]  Date of Service: 2024    Discharge Recommendations: Juan Carlos Wong Sr scored a 18/24 on the AM-PAC ADL Inpatient form. Current research shows that an AM-PAC score of 18 or greater is typically associated with a discharge to the patient's home setting.    If patient discharges prior to next session this note will serve as a discharge summary.  Please see below for the latest assessment towards goals.     Home with assist PRN  OT Equipment Recommendations  Equipment Needed: No       Patient Diagnosis(es): The primary encounter diagnosis was Cellulitis of right upper arm. A diagnosis of Osteomyelitis of left foot, unspecified type (HCC) was also pertinent to this visit.  Past Medical History:  has a past medical history of Back pain, Diabetes mellitus (HCC), and Pneumonia.  Past Surgical History:  has a past surgical history that includes liver biopsy; Leg Surgery (Left); and Colonoscopy.           Assessment   Performance deficits / Impairments: Decreased functional mobility ;Decreased high-level IADLs;Decreased ADL status;Decreased endurance;Decreased strength;Decreased balance  Assessment: PTA pt from home where pt was Ind with mobility and needing assist for ADLs. Pt currently functioning below baseline completing mobility and transfers with CGA. Pt with no overt LOB. Anticipate pt needing up to Min/Mod A for ADLs based on ROM, strength, and balance. Pt reports ongoing R shoulder pain limiting activity. Pt reports good support from daughter. Pt will benefit from skilled OT services at this time. Anticipate pt able to return home at time of D/C.  Prognosis: Good  Decision Making: Medium Complexity  Exam: see above  REQUIRES OT FOLLOW-UP: Yes  Activity Tolerance  Activity Tolerance: Patient Tolerated treatment well

## 2024-06-12 NOTE — PROGRESS NOTES
4 Eyes Skin Assessment     NAME:  Juan Carlos Wong Sr  YOB: 1965  MEDICAL RECORD NUMBER:  [id number]    The patient is being assessed for  Admission    I agree that at least one RN has performed a thorough Head to Toe Skin Assessment on the patient. ALL assessment sites listed below have been assessed.      Areas assessed by both nurses:    Head, Face, Ears, Shoulders, Back, Chest, Arms, Elbows, Hands, Sacrum. Buttock, Coccyx, Ischium, Legs. Feet and Heels, and Under Medical Devices         Does the Patient have a Wound? Yes wound(s) were present on assessment. LDA wound assessment was Initiated and completed by RN       Jaxon Prevention initiated by RN: No  Wound Care Orders initiated by RN: yes    Pressure Injury (Stage 3,4, Unstageable, DTI, NWPT, and Complex wounds) if present, place Wound referral order by RN under : Yes    New Ostomies, if present place, Ostomy referral order under : No     Nurse 1 eSignature: Electronically signed by Lori Montes De Oca RN on 6/12/24 at 1:55 AM EDT    **SHARE this note so that the co-signing nurse can place an eSignature**    Nurse 2 eSignature: Electronically signed by Sergei Kan RN on 6/12/24 at 4:57 AM EDT

## 2024-06-12 NOTE — PROGRESS NOTES
Patient admitted to PACU from OR. Patient opens eyes to name, drowsy. Resp easy unlabored on 3L NC with SaO2 94%. Right shoulder dressing dry and intact. IV patent left wrist. Patient denies C/O pain or nausea. Urinary catheter intact patent to gravity drainage with clear yellow urine in drainage bag. VSS.

## 2024-06-12 NOTE — BRIEF OP NOTE
Brief Postoperative Note      Patient: Juan Carlos Wong Sr  YOB: 1965  MRN: [id number]    Date of Procedure: 6/12/2024    Pre-Op Diagnosis Codes:     * Cellulitis of right upper arm [L03.113]    Post-Op Diagnosis: Same       Procedure(s):  INCISION AND DRAINAGE RIGHT PROXIMAL ARM ABSCESS    Surgeon(s):  Dann Nuñez MD    Assistant:  Surgical Assistant: Sandra Weiss; Carlos Shankar    Anesthesia: General    Estimated Blood Loss (mL): less than 50     Complications: None    Specimens:   ID Type Source Tests Collected by Time Destination   1 : 1) RIGHT proximal arm abscess Tissue Tissue CULTURE, TISSUE Dann Nuñez MD 6/12/2024 1233    A : A) RIGHT proximal arm abscess Tissue Tissue SURGICAL PATHOLOGY Dann Nuñez MD 6/12/2024 1241        Implants:  * No implants in log *      Drains:   Urinary Catheter 06/12/24 Wheeler (Active)       Findings:  Infection Present At Time Of Surgery (PATOS) (choose all levels that have infection present):  - Superficial Infection (skin/subcutaneous) present as evidenced by pus  Other Findings: right upper arm abscess, 2 x 4.5 cm elliptical incision, multiple loculated pockets of purulent fluid     Electronically signed by Dann Nuñez MD on 6/12/2024 at 12:53 PM

## 2024-06-12 NOTE — PLAN OF CARE
Problem: Discharge Planning  Goal: Discharge to home or other facility with appropriate resources  Outcome: Progressing  Flowsheets (Taken 6/11/2024 2015)  Discharge to home or other facility with appropriate resources: Identify barriers to discharge with patient and caregiver     Problem: Safety - Adult  Goal: Free from fall injury  Outcome: Progressing     Problem: ABCDS Injury Assessment  Goal: Absence of physical injury  Outcome: Progressing

## 2024-06-12 NOTE — PROGRESS NOTES
Bladder scan performed per nephrology order.  Pt has 538 ml after voiding 200 ml.  Wheeler attempted per order but meeting resistance.  Pt is going down to surgery for abscess.  Surgery notified of situation.  Will attempt a coude when pt returns from surgery and will consult urology if unable to get it.

## 2024-06-12 NOTE — ACP (ADVANCE CARE PLANNING)
Advance Care Planning     Advance Care Planning Activator (Inpatient)  Conversation Note      Date of ACP Conversation: 6/12/2024     Conversation Conducted with: Patient with Decision Making Capacity    ACP Activator: Rosario Baldwin RN    Health Care Decision Maker:     Current Designated Health Care Decision Maker:     Primary Decision Maker: DimitriosElmira - Konstantin - [phone]    Care Preferences    Ventilation:  \"If you were in your present state of health and suddenly became very ill and were unable to breathe on your own, what would your preference be about the use of a ventilator (breathing machine) if it were available to you?\"      Would the patient desire the use of ventilator (breathing machine)?: yes    \"If your health worsens and it becomes clear that your chance of recovery is unlikely, what would your preference be about the use of a ventilator (breathing machine) if it were available to you?\"     Would the patient desire the use of ventilator (breathing machine)?: Yes      Resuscitation  \"CPR works best to restart the heart when there is a sudden event, like a heart attack, in someone who is otherwise healthy. Unfortunately, CPR does not typically restart the heart for people who have serious health conditions or who are very sick.\"    \"In the event your heart stopped as a result of an underlying serious health condition, would you want attempts to be made to restart your heart (answer \"yes\" for attempt to resuscitate) or would you prefer a natural death (answer \"no\" for do not attempt to resuscitate)?\" yes       [] Yes   [x] No   Educated Patient / Decision Maker regarding differences between Advance Directives and portable DNR orders.    Length of ACP Conversation in minutes:  5 minutes    Conversation Outcomes:  ACP discussion completed. Reviewed prior ACP confirmed no changes.    Electronically signed by Rosario Baldwin RN Case Management on 6/12/2024 at 4:17 PM

## 2024-06-12 NOTE — CARE COORDINATION
Case Management Assessment  Initial Evaluation    Date/Time of Evaluation: 6/12/2024 4:02 PM  Assessment Completed by: Rosario Baldwin RN    If patient is discharged prior to next notation, then this note serves as note for discharge by case management.    Patient Name: Juan Carlos Wong Sr                   YOB: 1965  Diagnosis: Cellulitis of right upper arm [L03.113]  Osteomyelitis of left foot, unspecified type (HCC) [M86.9]  Cellulitis and abscess of upper extremity [L03.119, L02.419]                   Date / Time: 6/11/2024 12:03 PM    Patient Admission Status: Inpatient   Readmission Risk (Low < 19, Mod (19-27), High > 27): Readmission Risk Score: 25.1    Current PCP: Idris Laws MD  PCP verified by ? Yes    Chart Reviewed: Yes      History Provided by: Patient  Patient Orientation: Alert and Oriented    Patient Cognition: Alert    Hospitalization in the last 30 days (Readmission):  No    If yes, Readmission Assessment in  Navigator will be completed.    Advance Directives:      Code Status: Full Code   Patient's Primary Decision Maker is: Legal Next of Kin    Primary Decision Maker: [place]mira - Child - [phone]    Discharge Planning:    Patient lives with: Alone (daughter checks in daily) Type of Home: Apartment (6th floor with elevator access)  Primary Care Giver: Self  Patient Support Systems include: Children   Current Financial resources: Medicaid, Medicare  Current community resources: None  Current services prior to admission: Durable Medical Equipment, Home Care (says he is active with home care visiting nurse - agency unknown at this time)            Current DME: Walker, Shower Chair, Cane, Glucometer (motorized scooter, continuous glucose monitor)            Type of Home Care services:  Nursing Services    ADLS  Prior functional level: Assistance with the following:, Housework, Shopping, Cooking  Current functional level: Assistance with the following:,

## 2024-06-12 NOTE — DISCHARGE INSTR - COC
Continuity of Care Form    Patient Name: Juan Carlos Wong Sr   :  1965  MRN:  [id number]    Admit date:  2024  Discharge date:  2024      Code Status Order: Full Code   Advance Directives:   Advance Care Flowsheet Documentation       Date/Time Healthcare Directive Type of Healthcare Directive Copy in Chart Healthcare Agent Appointed Healthcare Agent's Name Healthcare Agent's Phone Number    [phone] No, patient does not have an advance directive for healthcare treatment -- -- -- -- --            Admitting Physician:  Reji Prabhakar MD  PCP: Idris Laws MD    Discharging Nurse: Hernan Call RN  Discharging Hospital Unit/Room#: W9M-6322/5266-01  Discharging Unit Phone Number: [phone]    Emergency Contact:   Extended Emergency Contact Information  Primary Emergency Contact: Elmira Plunkett  [place] Phone: [phone]  Relation: Child    Past Surgical History:  Past Surgical History:   Procedure Laterality Date    COLONOSCOPY      LEG SURGERY Left     LIVER BIOPSY         Immunization History:     There is no immunization history on file for this patient.    Active Problems:  Patient Active Problem List   Diagnosis Code    Sepsis ([facility]) A41.9    Generalized weakness R53.1    Acute on chronic congestive heart failure (HCC) I50.9    Coronary artery disease involving native coronary artery of native heart without angina pectoris I25.10    Non-traumatic rhabdomyolysis M62.82    Neutrophilia D72.9    Diabetic foot infection (HCC) E11.628, L08.9    Stage 3b chronic kidney disease (HCC) N18.32    Cellulitis of foot L03.119    Cellulitis and abscess of upper extremity L03.119, L02.419    Cellulitis of right upper arm L03.113    DM (diabetes mellitus), type 2 with complications (HCC) E11.8    Antiplatelet or antithrombotic long-term use Z79.02    Abscess of right arm L02.413       Isolation/Infection:   Isolation            No Isolation          Patient Infection Status       None to

## 2024-06-12 NOTE — PROGRESS NOTES
[place]2.[place]    [facility] [place]      Name:  Juan Carlos Wong Sr /Age/Sex: 1965  (59 y.o. male)   MRN & CSN:  [id number] & [id number] Encounter Date/Time: 2024 8:28 AM EDT   Location:  [id number]-[id number]/5266-01 PCP: Idris Laws MD     Attending:Reji Prabhakar MD       Hospital Day: 2    Assessment and Recommendations   Juan Carlos Wong Sr is a 59 y.o. male who presents with Cellulitis and abscess of upper extremity      Plan:   Sepsis with white count 14 heart rate 97, due to cellulitis and abscess of right shoulder and osteomyelitis and cellulitis of left foot admit to the hospital, IV vancomycin IV Zyvox, IV Flagyl, general surgery consulted on admission will follow further recommendations likely patient will need I&D, white count 12.5 down from 14.8 on admission  Cellulitis and abscess right arm, surgical consult IV antibiotics as above likely will need I&D.  Osteomyelitis and cellulitis of left foot and diabetic foot infection, status post partial amputation podiatry consulted IV antibiotics as above  Diabetes mellitus, sliding scale and long-acting insulin diabetic diet, blood sugar 177 this morning  Hyponatremia, nephrology consulted and following sodium 126 relatively flat from yesterday  CKD with possible mild MIGUEL on top of it probably consulted creatinine flat at 2.6      Diet Diet NPO   DVT Prophylaxis [] Lovenox, []  Heparin, [] SCDs, [] Ambulation,  [] Eliquis, [] Xarelto  [] Coumadin   Code Status Full Code             Personally reviewed Lab Studies and Imaging     Discussed management of the case with nephrology     Telemetry strip reviewed by myself no ST elevation      Drugs that require monitoring for toxicity include vancomycin and the method of monitoring was creatinine    Medical Decision Making:  The following items were considered in medical decision making:  Discussion of patient care with other providers  Reviewed clinical lab tests  Reviewed radiology tests  Reviewed other  Medical Condition (MA) Reason for Exam: Concern for abscess and cellulitis of right upper humerus FINDINGS: Bones: No evidence of acute fracture or dislocation. No aggressive appearing osseous abnormality or periostitis.  A few subacute/old right rib fractures. Soft Tissue: Diffuse edema and skin thickening overlying the proximal to mid right arm.  No discrete fluid collection.  Mild vascular calcifications. Mild supraspinatus atrophy. Joint: Advanced acromioclavicular and moderate glenohumeral joint osteoarthritis.  Prominent subacromial spur.     Diffuse edema and skin thickening overlying the proximal to mid right arm suggestive of cellulitis. No discrete fluid collection. Advanced acromioclavicular and moderate glenohumeral joint osteoarthritis.     XR FOOT LEFT (MIN 3 VIEWS)    Result Date: 6/11/2024  EXAMINATION: THREE XRAY VIEWS OF THE LEFT FOOT 6/11/2024 1:10 pm COMPARISON: None. HISTORY: ORDERING SYSTEM PROVIDED HISTORY: Ulcer lateral side of foot.  Concern for possible osteomyelitis. TECHNOLOGIST PROVIDED HISTORY: Reason for exam:->Ulcer lateral side of foot.  Concern for possible osteomyelitis. Reason for Exam: Ulcer lateral side of foot. Concern for possible osteomyelitis. FINDINGS: The patient had a prior amputation of the foot at the proximal metatarsal level.  There is evidence of an ulcer along the lateral aspect of the stump. There is some bony lysis involving the base of the 5th metatarsal, suggesting osteomyelitis.  There is no gas within the soft tissues.     1. Findings suggestive of osteomyelitis involving the base of the 5th metatarsal. 2. 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 3. Soft tissue ulcer along the lateral aspect of the stump.     XR CHEST (2 VW)    Result Date: 6/11/2024  EXAMINATION: TWO XRAY VIEWS OF THE CHEST 6/11/2024 1:10 pm COMPARISON: 03/27/2024 HISTORY: ORDERING SYSTEM PROVIDED HISTORY: wheezing on PE R lower lung base TECHNOLOGIST PROVIDED HISTORY:

## 2024-06-12 NOTE — H&P
[facility]          [address] [place] [address]                            Renal Consult       PATIENT NAME: MARIEL LOCKHART SR           : 1965  MED REC NO: [id number]                      ROOM: [place] [address]  ACCOUNT NO: [id number]                       ADMIT DATE: 2024  PROVIDER: Jennifer Otero MD      REASON FOR CONSULTATION:  Acute kidney injury.    HISTORY OF PRESENTING ILLNESS:  59-year-old male with past medical history significant for coronary artery disease, congestive heart failure, longstanding history of diabetes mellitus type 2 with multiple end-organ damage including chronic kidney disease stage 3B/4, peripheral vascular disease, came to ER complaining of a diabetic foot ulcer.  The patient was recently discharged, but came back also complaining of right shoulder cellulitis.  The patient was complaining of fever, chills, rigors with some nausea and poor fluid oral intake.  Routine labs showed hyponatremia and acute kidney injury with a creatinine of 2.6 and sodium of 124.  Admitted for further workup and management.  Renal consultation has been called for above-mentioned reason.    PAST MEDICAL HISTORY:    1. Longstanding history of diabetes mellitus type 2.  2. Diabetic retinopathy.  3. Hypertension.  4. Coronary artery disease.  5. Chronic kidney disease stage 3B/4.  6. Congestive heart failure.  7. Diabetic retinopathy.  8. Diabetic nephropathy, chronic.  9. Recent history of pneumonia.    PAST SURGICAL HISTORY:    1. Status post liver biopsy.  2. Status post colonoscopy.    ALLERGIES:  NONE.      MEDICATIONS:  See the list for details.    FAMILY HISTORY:  Significant for diabetes and hypertension.    REVIEW OF SYSTEMS:  Denies any headache.  No hearing problem.  No vision problem.  Has some nausea, but no vomiting or diarrhea.  Has some fever, chills, and rigors.  Denies any urinary symptoms.  Feeling weak, tired, and decreased level

## 2024-06-12 NOTE — CONSULTS
Surgery Consult Note     Urban Lee PA-C  Pt Name: Juan Carlos Wong Sr  MRN: [id number]  YOB: 1965  Date of evaluation: 6/12/2024  Primary Care Physician: Idris Laws MD  Referred By: Vasyl Irby   Reason for Consultation: Consult for cellulitis right upper extremity. Per Hospitalist request.   Chief Complaint:Right shoulder pain and drainage  IMPRESSIONS:   Right shoulder cellulitis/Abscess  Diabetic foot ulcer  Leukocytosis: WBC count 14.8 --> 12.5  On Plavix  MIGUEL on CKD: Cr: 2.6 --> 2.6. Nephrology following.  CAD, CHF  PLANS:   NPO  IVF  IV antibiotics  Treatment consent  Monitor and control pain  SUBJECTIVE:   History of Chief Complaint:    Juan Carlos Wong Sr is a 59 y.o. male who presents with cellulitis of the right shoulder. He stated he noticed drainage from this site 4 days ago. He had a CT scan performed and that showed diffuse edema and skin thickening overlying the proximal to mid right arm suggestive of cellulitis with no discrete fluid collection. Upon evaluation he has purulent drainage, pain, and erythema at the abscess site.   Past Medical History  Reviewed  has a past medical history of Back pain, Diabetes mellitus (HCC), and Pneumonia.  Past Surgical History  Reviewed has a past surgical history that includes liver biopsy; Leg Surgery (Left); and Colonoscopy.  Medications  Prior to Admission medications    Medication Sig Start Date End Date Taking? Authorizing Provider   Tirzepatide (MOUNJARO) 5 MG/0.5ML SOPN SC injection Inject 0.5 mLs into the skin once a week 6/11/24 8/6/24  Idris Laws MD   gabapentin (NEURONTIN) 300 MG capsule Take 1 capsule by mouth 2 times daily for 180 days. 5/20/24 11/16/24  Idris Laws MD   ondansetron (ZOFRAN-ODT) 4 MG disintegrating tablet Take 1 tablet by mouth 3 times daily as needed for Nausea or Vomiting 5/20/24   Idris Laws MD   cyclobenzaprine (FLEXERIL) 10 MG tablet Take 1 tablet by mouth 3

## 2024-06-13 PROBLEM — M86.9 OSTEOMYELITIS OF LEFT FOOT (HCC): Status: ACTIVE | Noted: 2024-06-13

## 2024-06-13 PROBLEM — E11.628 TYPE 2 DIABETES MELLITUS WITH DIABETIC FOOT INFECTION (HCC): Status: ACTIVE | Noted: 2024-06-13

## 2024-06-13 PROBLEM — L08.9 TYPE 2 DIABETES MELLITUS WITH DIABETIC FOOT INFECTION (HCC): Status: ACTIVE | Noted: 2024-06-13

## 2024-06-13 PROBLEM — E11.65 POORLY CONTROLLED DIABETES MELLITUS (HCC): Status: ACTIVE | Noted: 2024-06-13

## 2024-06-13 LAB
ALBUMIN SERPL-MCNC: 1.9 G/DL (ref 3.4–5)
ANION GAP SERPL CALCULATED.3IONS-SCNC: 14 MMOL/L (ref 3–16)
BUN SERPL-MCNC: 53 MG/DL (ref 7–20)
CALCIUM SERPL-MCNC: 7.9 MG/DL (ref 8.3–10.6)
CHLORIDE SERPL-SCNC: 96 MMOL/L (ref 99–110)
CHLORIDE UR-SCNC: 48 MMOL/L
CO2 SERPL-SCNC: 18 MMOL/L (ref 21–32)
CORTIS AM PEAK SERPL-MCNC: 2.4 UG/DL (ref 4.3–22.4)
CORTIS PM SERPL-MCNC: 2 UG/DL (ref 3.1–16.7)
CREAT SERPL-MCNC: 2.6 MG/DL (ref 0.8–1.3)
DEPRECATED RDW RBC AUTO: 13.6 % (ref 12.4–15.4)
GFR SERPLBLD CREATININE-BSD FMLA CKD-EPI: 27 ML/MIN/{1.73_M2}
GLUCOSE BLD-MCNC: 339 MG/DL (ref 70–99)
GLUCOSE BLD-MCNC: 380 MG/DL (ref 70–99)
GLUCOSE BLD-MCNC: 383 MG/DL (ref 70–99)
GLUCOSE BLD-MCNC: 407 MG/DL (ref 70–99)
GLUCOSE SERPL-MCNC: 415 MG/DL (ref 70–99)
HCT VFR BLD AUTO: 29.5 % (ref 40.5–52.5)
HGB BLD-MCNC: 10 G/DL (ref 13.5–17.5)
MCH RBC QN AUTO: 29.4 PG (ref 26–34)
MCHC RBC AUTO-ENTMCNC: 33.8 G/DL (ref 31–36)
MCV RBC AUTO: 87.1 FL (ref 80–100)
OSMOLALITY SERPL: 310 MOSM/KG (ref 280–301)
OSMOLALITY UR: 394 MOSM/KG (ref 390–1070)
PERFORMED ON: ABNORMAL
PHOSPHATE SERPL-MCNC: 5.3 MG/DL (ref 2.5–4.9)
PLATELET # BLD AUTO: 344 K/UL (ref 135–450)
PMV BLD AUTO: 8.5 FL (ref 5–10.5)
POTASSIUM SERPL-SCNC: 4.1 MMOL/L (ref 3.5–5.1)
POTASSIUM UR-SCNC: 17.1 MMOL/L
PROT UR-MCNC: 0.24 G/DL
PROT UR-MCNC: 242 MG/DL
RBC # BLD AUTO: 3.39 M/UL (ref 4.2–5.9)
SODIUM SERPL-SCNC: 126 MMOL/L (ref 136–145)
SODIUM SERPL-SCNC: 127 MMOL/L (ref 136–145)
SODIUM SERPL-SCNC: 128 MMOL/L (ref 136–145)
SODIUM UR-SCNC: 65 MMOL/L
SODIUM UR-SCNC: 74 MMOL/L
TSH SERPL DL<=0.005 MIU/L-ACNC: 0.39 UIU/ML (ref 0.27–4.2)
URATE SERPL-MCNC: 6.6 MG/DL (ref 3.5–7.2)
WBC # BLD AUTO: 10.3 K/UL (ref 4–11)

## 2024-06-13 PROCEDURE — 6360000002 HC RX W HCPCS: Performed by: SURGERY

## 2024-06-13 PROCEDURE — 94760 N-INVAS EAR/PLS OXIMETRY 1: CPT

## 2024-06-13 PROCEDURE — 2500000003 HC RX 250 WO HCPCS

## 2024-06-13 PROCEDURE — 6370000000 HC RX 637 (ALT 250 FOR IP): Performed by: INTERNAL MEDICINE

## 2024-06-13 PROCEDURE — 97535 SELF CARE MNGMENT TRAINING: CPT

## 2024-06-13 PROCEDURE — 99233 SBSQ HOSP IP/OBS HIGH 50: CPT | Performed by: INTERNAL MEDICINE

## 2024-06-13 PROCEDURE — 97110 THERAPEUTIC EXERCISES: CPT

## 2024-06-13 PROCEDURE — 2580000003 HC RX 258: Performed by: INTERNAL MEDICINE

## 2024-06-13 PROCEDURE — 6370000000 HC RX 637 (ALT 250 FOR IP): Performed by: SURGERY

## 2024-06-13 PROCEDURE — 82436 ASSAY OF URINE CHLORIDE: CPT

## 2024-06-13 PROCEDURE — 84156 ASSAY OF PROTEIN URINE: CPT

## 2024-06-13 PROCEDURE — 84300 ASSAY OF URINE SODIUM: CPT

## 2024-06-13 PROCEDURE — 84165 PROTEIN E-PHORESIS SERUM: CPT

## 2024-06-13 PROCEDURE — 84166 PROTEIN E-PHORESIS/URINE/CSF: CPT

## 2024-06-13 PROCEDURE — 6370000000 HC RX 637 (ALT 250 FOR IP)

## 2024-06-13 PROCEDURE — 2580000003 HC RX 258

## 2024-06-13 PROCEDURE — 84155 ASSAY OF PROTEIN SERUM: CPT

## 2024-06-13 PROCEDURE — 2580000003 HC RX 258: Performed by: SURGERY

## 2024-06-13 PROCEDURE — 86335 IMMUNFIX E-PHORSIS/URINE/CSF: CPT

## 2024-06-13 PROCEDURE — APPSS15 APP SPLIT SHARED TIME 0-15 MINUTES: Performed by: PHYSICIAN ASSISTANT

## 2024-06-13 PROCEDURE — 84295 ASSAY OF SERUM SODIUM: CPT

## 2024-06-13 PROCEDURE — 2060000000 HC ICU INTERMEDIATE R&B

## 2024-06-13 PROCEDURE — 36415 COLL VENOUS BLD VENIPUNCTURE: CPT

## 2024-06-13 PROCEDURE — APPNB15 APP NON BILLABLE TIME 0-15 MINS: Performed by: PHYSICIAN ASSISTANT

## 2024-06-13 PROCEDURE — 84550 ASSAY OF BLOOD/URIC ACID: CPT

## 2024-06-13 PROCEDURE — 97530 THERAPEUTIC ACTIVITIES: CPT

## 2024-06-13 PROCEDURE — 80069 RENAL FUNCTION PANEL: CPT

## 2024-06-13 PROCEDURE — 99024 POSTOP FOLLOW-UP VISIT: CPT | Performed by: SURGERY

## 2024-06-13 PROCEDURE — 83935 ASSAY OF URINE OSMOLALITY: CPT

## 2024-06-13 PROCEDURE — 84443 ASSAY THYROID STIM HORMONE: CPT

## 2024-06-13 PROCEDURE — 84133 ASSAY OF URINE POTASSIUM: CPT

## 2024-06-13 PROCEDURE — 99024 POSTOP FOLLOW-UP VISIT: CPT | Performed by: PHYSICIAN ASSISTANT

## 2024-06-13 PROCEDURE — 6360000002 HC RX W HCPCS: Performed by: INTERNAL MEDICINE

## 2024-06-13 PROCEDURE — 83930 ASSAY OF BLOOD OSMOLALITY: CPT

## 2024-06-13 PROCEDURE — 85027 COMPLETE CBC AUTOMATED: CPT

## 2024-06-13 PROCEDURE — 82533 TOTAL CORTISOL: CPT

## 2024-06-13 RX ORDER — INSULIN GLARGINE 100 [IU]/ML
13 INJECTION, SOLUTION SUBCUTANEOUS NIGHTLY
Status: DISCONTINUED | OUTPATIENT
Start: 2024-06-13 | End: 2024-06-21

## 2024-06-13 RX ORDER — INSULIN GLARGINE 100 [IU]/ML
3 INJECTION, SOLUTION SUBCUTANEOUS ONCE
Status: COMPLETED | OUTPATIENT
Start: 2024-06-13 | End: 2024-06-13

## 2024-06-13 RX ORDER — METRONIDAZOLE 500 MG/1
500 TABLET ORAL EVERY 8 HOURS SCHEDULED
Status: DISCONTINUED | OUTPATIENT
Start: 2024-06-13 | End: 2024-06-16

## 2024-06-13 RX ORDER — CARVEDILOL 6.25 MG/1
6.25 TABLET ORAL 2 TIMES DAILY WITH MEALS
Status: DISCONTINUED | OUTPATIENT
Start: 2024-06-13 | End: 2024-06-27 | Stop reason: HOSPADM

## 2024-06-13 RX ORDER — INSULIN LISPRO 100 [IU]/ML
5 INJECTION, SOLUTION INTRAVENOUS; SUBCUTANEOUS
Status: DISCONTINUED | OUTPATIENT
Start: 2024-06-13 | End: 2024-06-26

## 2024-06-13 RX ADMIN — CIPROFLOXACIN 1 DROP: 3 SOLUTION OPHTHALMIC at 08:12

## 2024-06-13 RX ADMIN — LINEZOLID 600 MG: 600 INJECTION, SOLUTION INTRAVENOUS at 08:11

## 2024-06-13 RX ADMIN — SODIUM CHLORIDE, PRESERVATIVE FREE 10 ML: 5 INJECTION INTRAVENOUS at 22:04

## 2024-06-13 RX ADMIN — INSULIN LISPRO 4 UNITS: 100 INJECTION, SOLUTION INTRAVENOUS; SUBCUTANEOUS at 12:57

## 2024-06-13 RX ADMIN — PREDNISOLONE ACETATE 1 DROP: 10 SUSPENSION/ DROPS OPHTHALMIC at 08:17

## 2024-06-13 RX ADMIN — CYCLOBENZAPRINE 5 MG: 10 TABLET, FILM COATED ORAL at 08:24

## 2024-06-13 RX ADMIN — SODIUM BICARBONATE: 84 INJECTION, SOLUTION INTRAVENOUS at 11:38

## 2024-06-13 RX ADMIN — ATORVASTATIN CALCIUM 40 MG: 40 TABLET, FILM COATED ORAL at 21:48

## 2024-06-13 RX ADMIN — GABAPENTIN 300 MG: 300 CAPSULE ORAL at 08:13

## 2024-06-13 RX ADMIN — METRONIDAZOLE 500 MG: 500 TABLET ORAL at 12:59

## 2024-06-13 RX ADMIN — ENOXAPARIN SODIUM 40 MG: 100 INJECTION SUBCUTANEOUS at 08:15

## 2024-06-13 RX ADMIN — INSULIN LISPRO 4 UNITS: 100 INJECTION, SOLUTION INTRAVENOUS; SUBCUTANEOUS at 17:03

## 2024-06-13 RX ADMIN — INSULIN LISPRO 4 UNITS: 100 INJECTION, SOLUTION INTRAVENOUS; SUBCUTANEOUS at 21:54

## 2024-06-13 RX ADMIN — METRONIDAZOLE 500 MG: 500 TABLET ORAL at 21:48

## 2024-06-13 RX ADMIN — METRONIDAZOLE 500 MG: 500 TABLET ORAL at 08:24

## 2024-06-13 RX ADMIN — HYDRALAZINE HYDROCHLORIDE 10 MG: 10 TABLET, FILM COATED ORAL at 13:00

## 2024-06-13 RX ADMIN — METRONIDAZOLE 500 MG: 500 INJECTION, SOLUTION INTRAVENOUS at 05:11

## 2024-06-13 RX ADMIN — SODIUM CHLORIDE: 9 INJECTION, SOLUTION INTRAVENOUS at 00:33

## 2024-06-13 RX ADMIN — CEFEPIME 1000 MG: 1 INJECTION, POWDER, FOR SOLUTION INTRAMUSCULAR; INTRAVENOUS at 16:45

## 2024-06-13 RX ADMIN — HYDRALAZINE HYDROCHLORIDE 10 MG: 10 TABLET, FILM COATED ORAL at 08:14

## 2024-06-13 RX ADMIN — CARVEDILOL 6.25 MG: 6.25 TABLET, FILM COATED ORAL at 16:46

## 2024-06-13 RX ADMIN — LINEZOLID 600 MG: 600 INJECTION, SOLUTION INTRAVENOUS at 22:08

## 2024-06-13 RX ADMIN — INSULIN GLARGINE 13 UNITS: 100 INJECTION, SOLUTION SUBCUTANEOUS at 21:54

## 2024-06-13 RX ADMIN — CIPROFLOXACIN 1 DROP: 3 SOLUTION OPHTHALMIC at 16:47

## 2024-06-13 RX ADMIN — CIPROFLOXACIN 1 DROP: 3 SOLUTION OPHTHALMIC at 13:03

## 2024-06-13 RX ADMIN — PREDNISOLONE ACETATE 1 DROP: 10 SUSPENSION/ DROPS OPHTHALMIC at 16:45

## 2024-06-13 RX ADMIN — ASPIRIN 81 MG: 81 TABLET, COATED ORAL at 08:13

## 2024-06-13 RX ADMIN — INSULIN LISPRO 4 UNITS: 100 INJECTION, SOLUTION INTRAVENOUS; SUBCUTANEOUS at 08:15

## 2024-06-13 RX ADMIN — INSULIN LISPRO 5 UNITS: 100 INJECTION, SOLUTION INTRAVENOUS; SUBCUTANEOUS at 17:03

## 2024-06-13 RX ADMIN — INSULIN GLARGINE 3 UNITS: 100 INJECTION, SOLUTION SUBCUTANEOUS at 16:38

## 2024-06-13 RX ADMIN — PREDNISOLONE ACETATE 1 DROP: 10 SUSPENSION/ DROPS OPHTHALMIC at 12:59

## 2024-06-13 RX ADMIN — CLOPIDOGREL BISULFATE 75 MG: 75 TABLET ORAL at 21:50

## 2024-06-13 RX ADMIN — GABAPENTIN 300 MG: 300 CAPSULE ORAL at 21:48

## 2024-06-13 RX ADMIN — PANTOPRAZOLE SODIUM 40 MG: 40 TABLET, DELAYED RELEASE ORAL at 06:14

## 2024-06-13 RX ADMIN — CEFEPIME 2000 MG: 2 INJECTION, POWDER, FOR SOLUTION INTRAVENOUS at 06:15

## 2024-06-13 RX ADMIN — SODIUM CHLORIDE, PRESERVATIVE FREE 10 ML: 5 INJECTION INTRAVENOUS at 08:14

## 2024-06-13 RX ADMIN — CARVEDILOL 12.5 MG: 12.5 TABLET, FILM COATED ORAL at 08:24

## 2024-06-13 RX ADMIN — METRONIDAZOLE 500 MG: 500 INJECTION, SOLUTION INTRAVENOUS at 00:40

## 2024-06-13 ASSESSMENT — PAIN DESCRIPTION - ORIENTATION: ORIENTATION: RIGHT

## 2024-06-13 ASSESSMENT — PAIN SCALES - GENERAL
PAINLEVEL_OUTOF10: 7
PAINLEVEL_OUTOF10: 4
PAINLEVEL_OUTOF10: 0

## 2024-06-13 ASSESSMENT — PAIN - FUNCTIONAL ASSESSMENT: PAIN_FUNCTIONAL_ASSESSMENT: PREVENTS OR INTERFERES SOME ACTIVE ACTIVITIES AND ADLS

## 2024-06-13 ASSESSMENT — PAIN DESCRIPTION - PAIN TYPE: TYPE: ACUTE PAIN;SURGICAL PAIN

## 2024-06-13 ASSESSMENT — PAIN DESCRIPTION - DESCRIPTORS: DESCRIPTORS: TENDER

## 2024-06-13 ASSESSMENT — PAIN DESCRIPTION - LOCATION: LOCATION: ARM

## 2024-06-13 ASSESSMENT — PAIN DESCRIPTION - FREQUENCY: FREQUENCY: INTERMITTENT

## 2024-06-13 NOTE — PROGRESS NOTES
V2.0    [facility] [place] [place] Progress Note      Name:  Juan Carlos Wong Sr /Age/Sex: 1965  (59 y.o. male)   MRN & CSN:  [id number] & [id number] Encounter Date/Time: 2024 8:53 AM EDT   Location:  [id number] PCP: Idris Laws MD     Attending:Reji Prabhakar MD       Hospital Day: 3    Assessment and Recommendations   Juan Carlos Wong Sr is a 59 y.o. male who presents with Cellulitis and abscess of upper extremity  59-year-old patient admitted to the hospital with cellulitis and abscess of right shoulder and diabetic foot infection on the left foot General surgery ID consulted status post I&D continue to be on IV antibiotics    Plan:     Sepsis with white count 14 heart rate 97 on admission, due to cellulitis and abscess of right shoulder and osteomyelitis and cellulitis of left foot currently on IV Zyvox, Flagyl, and cefepime ID following postop day 1.  White count improved to 10.3  cellulitis and abscess right shoulder status post I&D discussed with surgery this morning, postop day 1  Osteomyelitis and cellulitis of left foot and diabetic foot infection, status post partial amputation podiatry consulted IV antibiotics as above.  Diabetes mellitus, sliding scale and long-acting insulin diabetic diet.  Adjusting Lantus and adding preprandial  Hyponatremia, nephrology consulted and following sodium increased to 128  CKD with possible mild MIGUEL on top of it probably consulted creatinine still flat at 2.6  Anemia, appears chronic likely due to chronic disease follow-up as outpatient no signs of bleeding at this time        Diet ADULT DIET; Regular; 3 carb choices (45 gm/meal); 1500 ml   DVT Prophylaxis [] Lovenox, []  Heparin, [] SCDs, [] Ambulation,  [] Eliquis, [] Xarelto  [] Coumadin   Code Status Full Code             Personally reviewed Lab Studies and Imaging     Discussed management of the case with general surgery who recommended keep on IV antibiotics pending cultures  Telemetry strip  palpable, equal bilaterally       Medications:   Medications:    cefepime  1,000 mg IntraVENous Q12H    metroNIDAZOLE  500 mg Oral 3 times per day    carvedilol  6.25 mg Oral BID WC    aspirin  81 mg Oral Daily    atorvastatin  40 mg Oral Nightly    clopidogrel  75 mg Oral Nightly    gabapentin  300 mg Oral BID    hydrALAZINE  10 mg Oral TID    insulin glargine  10 Units SubCUTAneous Nightly    isosorbide mononitrate  15 mg Oral Nightly    pantoprazole  40 mg Oral QAM AC    prednisoLONE acetate  1 drop Right Eye 4x daily    linezolid  600 mg IntraVENous Q12H    sodium chloride flush  5-40 mL IntraVENous 2 times per day    enoxaparin  40 mg SubCUTAneous Daily    insulin lispro  0-4 Units SubCUTAneous TID WC    insulin lispro  0-4 Units SubCUTAneous Nightly    ciprofloxacin  1 drop Right Eye 4x daily      Infusions:    sodium chloride      dextrose      sodium chloride Stopped (06/13/24 0511)     PRN Meds: oxyCODONE-acetaminophen, 1 tablet, Q4H PRN   Or  oxyCODONE-acetaminophen, 2 tablet, Q4H PRN  morphine, 2 mg, Q2H PRN   Or  morphine, 4 mg, Q2H PRN  cyclobenzaprine, 5 mg, BID PRN  sodium chloride flush, 5-40 mL, PRN  sodium chloride, , PRN  potassium chloride, 40 mEq, PRN   Or  potassium alternative oral replacement, 40 mEq, PRN   Or  potassium chloride, 10 mEq, PRN  magnesium sulfate, 2,000 mg, PRN  ondansetron, 4 mg, Q8H PRN   Or  ondansetron, 4 mg, Q6H PRN  polyethylene glycol, 17 g, Daily PRN  acetaminophen, 650 mg, Q6H PRN   Or  acetaminophen, 650 mg, Q6H PRN  glucose, 4 tablet, PRN  dextrose bolus, 125 mL, PRN   Or  dextrose bolus, 250 mL, PRN  glucagon (rDNA), 1 mg, PRN  dextrose, , Continuous PRN        Labs and Imaging   CT HUMERUS RIGHT WO CONTRAST    Result Date: 6/11/2024  EXAMINATION: CT OF THE RIGHT HUMERUS WITHOUT CONTRAST 6/11/2024 1:47 pm TECHNIQUE: CT of the right humerus was performed without the administration of intravenous contrast.  Multiplanar reformatted images are provided for review.

## 2024-06-13 NOTE — PROGRESS NOTES
wound with some erythema, decreased induration, wound base as above      WBC10.3  Cr 2.6  Wound culture with staph aureus, cultures pending    A/P: 60 yo male with right upper arm abscess s/p I&D    Continue local wound care  Continue IV antibiotics.  Follow up on wound culture results  Hopeful discharge home tomorrow    Dann Nuñez MD

## 2024-06-13 NOTE — PROGRESS NOTES
Department of Internal Medicine  Nephrology Progress Note            REASON FOR CONSULTATION:  Acute kidney injury.     HISTORY OF PRESENTING ILLNESS:  59-year-old male with past medical history significant for coronary artery disease, congestive heart failure, longstanding history of diabetes mellitus type 2 with multiple end-organ damage including chronic kidney disease stage 3B/4, peripheral vascular disease, came to ER complaining of a diabetic foot ulcer.  The patient was recently discharged, but came back also complaining of right shoulder cellulitis.  The patient was complaining of fever, chills, rigors with some nausea and poor fluid oral intake.  Routine labs showed hyponatremia and acute kidney injury with a creatinine of 2.6 and sodium of 124.  Admitted for further workup and management.  Renal consultation has been called for above-mentioned reason.         Events noted , labs noted   S/p elliott cath placement for retention   REVIEW OF SYSTEMS:  No SOB.;MENDOZA   No family present     Physical Exam:    VITALS:  /65   Pulse 67   Temp 97.6 °F (36.4 °C) (Oral)   Resp 17   Ht 1.753 m (5' 9.02\")   Wt 81.1 kg (178 lb 12.7 oz)   SpO2 98%   BMI 26.39 kg/m²   24HR INTAKE/OUTPUT:    Intake/Output Summary (Last 24 hours) at 6/13/2024 1222  Last data filed at 6/13/2024 1051  Gross per 24 hour   Intake 2914.61 ml   Output 1700 ml   Net 1214.61 ml         Constitutional:  resting   Respiratory:  CTA  Gastrointestinal:  No  tenderness.  Normal Bowel Sounds  Cardiovascular:  S1, S2 RRR   Edema:  +  edema    DATA:    CBC:  Lab Results   Component Value Date/Time    WBC 10.3 06/13/2024 05:17 AM    RBC 3.39 06/13/2024 05:17 AM    HGB 10.0 06/13/2024 05:17 AM    HCT 29.5 06/13/2024 05:17 AM    MCV 87.1 06/13/2024 05:17 AM    MCH 29.4 06/13/2024 05:17 AM    MCHC 33.8 06/13/2024 05:17 AM    RDW 13.6 06/13/2024 05:17 AM     06/13/2024 05:17 AM    MPV 8.5 06/13/2024 05:17 AM     CMP:  Lab Results   Component

## 2024-06-13 NOTE — PROGRESS NOTES
Dr Reji Prabhakar updated on pts FSBS and urology suggestion of a urology consult due to elliott/retention. Electronically signed by Ashley Tsang RN on 6/13/2024 at 2:10 PM

## 2024-06-13 NOTE — PLAN OF CARE
Problem: Discharge Planning  Goal: Discharge to home or other facility with appropriate resources  Outcome: Progressing  Flowsheets (Taken 6/12/2024 2045)  Discharge to home or other facility with appropriate resources: Identify barriers to discharge with patient and caregiver     Problem: Safety - Adult  Goal: Free from fall injury  Outcome: Progressing     Problem: ABCDS Injury Assessment  Goal: Absence of physical injury  Outcome: Progressing     Problem: Chronic Conditions and Co-morbidities  Goal: Patient's chronic conditions and co-morbidity symptoms are monitored and maintained or improved  Outcome: Progressing  Flowsheets (Taken 6/12/2024 2045)  Care Plan - Patient's Chronic Conditions and Co-Morbidity Symptoms are Monitored and Maintained or Improved: Monitor and assess patient's chronic conditions and comorbid symptoms for stability, deterioration, or improvement     Problem: Infection - Adult  Goal: Absence of infection at discharge  Outcome: Progressing  Flowsheets (Taken 6/12/2024 2045)  Absence of infection at discharge: Administer medications as ordered     Problem: Metabolic/Fluid and Electrolytes - Adult  Goal: Electrolytes maintained within normal limits  Outcome: Progressing  Flowsheets (Taken 6/12/2024 2045)  Electrolytes maintained within normal limits: Monitor labs and assess patient for signs and symptoms of electrolyte imbalances  Goal: Glucose maintained within prescribed range  Outcome: Progressing  Flowsheets (Taken 6/12/2024 2045)  Glucose maintained within prescribed range: Assess for signs and symptoms of hyperglycemia and hypoglycemia     Problem: Skin/Tissue Integrity - Adult  Goal: Oral mucous membranes remain intact  Outcome: Progressing  Flowsheets (Taken 6/12/2024 2045)  Oral Mucous Membranes Remain Intact: Assess oral mucosa and hygiene practices

## 2024-06-13 NOTE — PROGRESS NOTES
nasal mucosa and turbinates normal without polyps  Neck: supple and non-tender without mass, no thyromegaly  no cervical lymphadenopathy  Pulmonary/Chest: clear to auscultation bilaterally- no wheezes, rales or rhonchi, normal air movement, no respiratory distress  Cardiovascular: normal rate, regular rhythm, normal S1 and S2, no murmurs, rubs, clicks, or gallops, no carotid bruits  Abdomen: soft, non-tender, non-distended, normal bowel sounds, no masses or organomegaly  Extremities: no cyanosis, clubbing or edema  Musculoskeletal: normal range of motion, no joint swelling, deformity or tenderness  Integumentary: No rashes, no abnormal skin lesions, no petechiae  Neurologic: reflexes normal and symmetric, no cranial nerve deficit  Psych:  Orientation, sensorium, mood normal   Lines:IV  Left foot plantar ulceration ongoing drainage with local cellulitis    Rt shoulder dressing ++     DATA:    CBC:   Lab Results   Component Value Date    WBC 10.3 06/13/2024    HGB 10.0 (L) 06/13/2024    HCT 29.5 (L) 06/13/2024    MCV 87.1 06/13/2024     06/13/2024     RENAL:   Lab Results   Component Value Date    CREATININE 2.6 (H) 06/13/2024    BUN 53 (H) 06/13/2024     (L) 06/13/2024    K 4.1 06/13/2024    CL 96 (L) 06/13/2024    CO2 18 (L) 06/13/2024     SED RATE:   Lab Results   Component Value Date/Time    SEDRATE 27 03/30/2024 09:04 AM     CK:   Lab Results   Component Value Date/Time    CKTOTAL 972 03/27/2024 07:25 PM     CRP:   Lab Results   Component Value Date/Time    CRP 67.6 03/30/2024 05:25 AM     Hepatic Function Panel:   Lab Results   Component Value Date/Time    ALKPHOS 130 06/12/2024 05:18 AM    ALT <5 06/12/2024 05:18 AM    AST 9 06/12/2024 05:18 AM    BILITOT 0.5 06/12/2024 05:18 AM     UA:  Lab Results   Component Value Date/Time    COLORU Yellow 06/11/2024 04:48 PM    CLARITYU Clear 06/11/2024 04:48 PM    GLUCOSEU 500 06/11/2024 04:48 PM    BILIRUBINUR Negative 06/11/2024 04:48 PM    KETUA Negative    Final Result   Diffuse edema and skin thickening overlying the proximal to mid right arm   suggestive of cellulitis. No discrete fluid collection.      Advanced acromioclavicular and moderate glenohumeral joint osteoarthritis.         XR FOOT LEFT (MIN 3 VIEWS)   Final Result   1. Findings suggestive of osteomyelitis involving the base of the 5th   metatarsal.   2. 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   3. Soft tissue ulcer along the lateral aspect of the stump.         XR CHEST (2 VW)   Final Result   No radiographic evidence of acute cardiopulmonary disease.             All pertinent images and reports for the current Hospitalization were reviewed by me.    IMPRESSION:    Patient Active Problem List   Diagnosis Code    Sepsis ([facility]) A41.9    Generalized weakness R53.1    Acute on chronic congestive heart failure ([facility]) I50.9    Coronary artery disease involving native coronary artery of native heart without angina pectoris I25.10    Non-traumatic rhabdomyolysis M62.82    Neutrophilia D72.9    Diabetic foot infection ([facility]) E11.628, L08.9    Stage 3b chronic kidney disease ([facility]) N18.32    Cellulitis of foot L03.119    Cellulitis and abscess of upper extremity L03.119, L02.419    Cellulitis of right upper arm L03.113    DM (diabetes mellitus), type 2 with complications ([facility]) E11.8    Antiplatelet or antithrombotic long-term use Z79.02    Abscess of right arm L02.413    Osteomyelitis of left foot ([facility]) M86.9    Poorly controlled diabetes mellitus ([facility]) E11.65    Type 2 diabetes mellitus with diabetic foot infection ([facility]) E11.628, L08.9        ICD-10-CM    1. Cellulitis of right upper arm  L03.113 Culture, Tissue     Culture, Tissue     Surgical Pathology     Surgical Pathology      2. Osteomyelitis of left foot, unspecified type ([facility])  M86.9          Complicated left diabetic foot infection  Left foot osteomyelitis  Fevers  WBC elevation  Right shoulder cellulitis with abscess

## 2024-06-13 NOTE — PROGRESS NOTES
Occupational Therapy  Facility/Department: [address] PROGRESSIVE CARE  Occupational Therapy Daily Treatment    Name: Juan Carlos Wong Sr  : 1965  MRN: [id number]  Date of Service: 2024    Discharge Recommendations:  Home with assist PRN  Juan Carlos Wong Sr scored a 18/24 on the AM-PAC ADL Inpatient form.  At this time, no further OT is recommended upon discharge due to good family/friend support.  Recommend patient returns to prior setting with prior services.           Patient Diagnosis(es): The primary encounter diagnosis was Cellulitis of right upper arm. A diagnosis of Osteomyelitis of left foot, unspecified type (HCC) was also pertinent to this visit.  Past Medical History:  has a past medical history of Back pain, Diabetes mellitus (HCC), and Pneumonia.  Past Surgical History:  has a past surgical history that includes liver biopsy; Leg Surgery (Left); Colonoscopy; and Arm Surgery (Right, 2024).           Assessment   Performance deficits / Impairments: Decreased functional mobility ;Decreased high-level IADLs;Decreased ADL status;Decreased endurance;Decreased strength;Decreased balance  Assessment: PTA pt from home where pt was Ind with mobility and needing assist for ADLs. Today- pt completed bed mob, fxl transfers/mob w/ RW, toileting, and grooming w/ SBA. Cues for safe fxl mob w/ RW. Pt reporting no needs for AD or ADL assistance outside of support at home. Pt declined to work on LB dressing, anticipate still needing up to Mod A per pt report this date. Anticipate pt able to return home at time of D/C.  Prognosis: Good  Exam: see above  REQUIRES OT FOLLOW-UP: Yes  Activity Tolerance  Activity Tolerance: Patient Tolerated treatment well        Plan   Occupational Therapy Plan  Times Per Week: 3-5=x  Current Treatment Recommendations: Strengthening, Balance training, Functional mobility training, Patient/Caregiver education & training, Endurance training, Self-Care / ADL, Safety education &

## 2024-06-13 NOTE — PROGRESS NOTES
Physical Therapy  Facility/Department: [address] PROGRESSIVE CARE  Daily Treatment Note  NAME: Juan Carlos Wong Sr  : 1965  MRN: [id number]    Date of Service: 2024    Discharge Recommendations:  Home with assist PRN, Home with Home health PT, Patient would benefit from continued therapy after discharge        Patient Diagnosis(es): The primary encounter diagnosis was Cellulitis of right upper arm. A diagnosis of Osteomyelitis of left foot, unspecified type (HCC) was also pertinent to this visit.    Assessment   Assessment: Pt agreeable to activity, mildly unsteady ambulating without walker.  With RW, he demonstrated improved stability.  Recommend continued therapy to improve balance, strength, endurance, independence ambulating.  Anticipate return home with HHPT.     Juan Carlos Wong Sr scored a 18/24 on the AM-PAC short mobility form.       Plan    Physical Therapy Plan  General Plan: 2-3 times per week  Current Treatment Recommendations: Strengthening;Balance training;Functional mobility training;Transfer training;Gait training;Endurance training;Patient/Caregiver education & training;Safety education & training;Equipment evaluation, education, & procurement;Therapeutic activities;Neuromuscular re-education     Restrictions  Restrictions/Precautions  Restrictions/Precautions: Fall Risk  Position Activity Restriction  Other position/activity restrictions: transmet amp L foot, toe amp R foot     Subjective    Subjective  Subjective: Pt agreeable to activity.  Orientation  Overall Orientation Status: Within Functional Limits     Objective     Transfer Training  Transfer Training: Yes  Overall Level of Assistance: Stand-by assistance  Sit to Stand: Stand-by assistance  Stand to Sit: Stand-by assistance    Gait  Gait Training: Yes  Overall Level of Assistance: Stand-by assistance  Distance (ft): 50 Feet (50' x 3 (50' without device, 50' x 2 with RW))  Assistive Device: Walker, rolling;None  Speed/Krissy:

## 2024-06-13 NOTE — PROGRESS NOTES
Results   Component Value Date/Time    COLORU Yellow 06/11/2024 04:48 PM    PHUR 6.0 06/11/2024 04:48 PM    PHUR 6.0 03/27/2024 08:03 PM    LABCAST SEE NOTES 05/09/2024 09:17 AM    WBCUA 1 06/11/2024 04:48 PM    RBCUA 0-2 06/11/2024 04:48 PM    RBCUA 1+ (0.06-0.1 mg/dL) 05/09/2024 09:17 AM    MUCUS 1+ 03/27/2024 08:03 PM    BACTERIA None Seen 06/11/2024 04:48 PM    CLARITYU Clear 06/11/2024 04:48 PM    LEUKOCYTESUR Negative 06/11/2024 04:48 PM    UROBILINOGEN 1.0 06/11/2024 04:48 PM    BILIRUBINUR Negative 06/11/2024 04:48 PM    BLOODU MODERATE 06/11/2024 04:48 PM    GLUCOSEU 500 06/11/2024 04:48 PM         IMPRESSION/RECOMMENDATIONS:      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 Follows with Dr. Howell in office. Etiology likely 2/2 volume depletion in the setting of poor oral intake, emesis, and diuretic use.              - Cr unchanged   - Episodes of hypotension noted 6/12              - UA and and urine studies noted              - Wheeler catheter placed 6/12 for urinary retention              - Continue to hold torsemide              - Change IVF solution and reduce rate              - No NSAIDs. No ACE/ARBs. No SGLT2i.              - Daily RFTs     Hyponatremia: etiology likely 2/2 volume depletion in the setting of poor oral intake, emesis, and diuretic use.              - Na+ corrected for hyperglycemia is 133 mmol/L this AM   - AM cortisol noted to be low. Will check PM cortisol. TSH WNL.               - Continue 1.5 L FR              - Goal Na+ >/= 130     HFrEF              - TTE (3/27/2024): LVEF 20-25%.               - CXR unremarkable              - Holding torsemide              - Carefully monitor volume status while receiving IVF              - Strict I/Os. Daily weights.     Cellulitis / RUE Wound / OM L Foot   - I&D of R arm abscess 6/12 with surgery              - On flagyl and cefepime              - ID and gen surgery following     Hypertension              - Hypotensive yesterday

## 2024-06-13 NOTE — OP NOTE
[facility]          [address] [place] [address]                            OPERATIVE REPORT      PATIENT NAME: MARIEL LOCKHART SR           : 1965  MED REC NO: [id number]                      ROOM: [place] [address]  ACCOUNT NO: [id number]                       ADMIT DATE: 2024  PROVIDER: Dann Nuñez MD      DATE OF PROCEDURE:  2024    SURGEON:  Dann Nuñez MD    PREOPERATIVE DIAGNOSIS:  Right proximal arm abscess.    POSTOPERATIVE DIAGNOSIS:  Right proximal arm abscess.    PROCEDURE PERFORMED:  Incision and drainage of right proximal arm abscess.    ANESTHESIA:  General with LMA.    ASA CLASS:  III.    DVT PROPHYLAXIS:  Patient was wearing bilateral SCDs.    ANTIBIOTICS:  Patient is on therapeutic cefepime, Flagyl, and vancomycin IV.    INDICATIONS:  The patient is a 59-year-old male who presented with 4- to 5-day history of right upper arm abscess.  This had been progressively getting more painful and started draining purulent fluid.  He presented to the emergency department for further evaluation and care.  CT imaging revealed thickening of the skin without any obvious fluid collection, however, on exam, he had purulent fluid draining from multiple pin holes.  Due to these findings, the risks, benefits, and alternatives were explained to the patient and he is willing to consent for the procedure.  He was on therapeutic Plavix which is one of the reasons why we proceed to the operating room for incision and drainage.    PROCEDURE IN DETAIL:  After informed consent was obtained, the patient was brought to the operating room and placed in the supine position.  General anesthesia was induced.  An LMA was placed.  He was then transferred into the left lateral decubitus position, and his right arm was prepped and draped in the usual sterile fashion.  A time-out was then performed.  We first started with an elliptical incision

## 2024-06-14 PROBLEM — A49.01 STAPH AUREUS INFECTION: Status: ACTIVE | Noted: 2024-06-14

## 2024-06-14 LAB
ALBUMIN SERPL ELPH-MCNC: 1.6 G/DL (ref 3.1–4.9)
ALBUMIN SERPL-MCNC: 1.9 G/DL (ref 3.4–5)
ALPHA1 GLOB SERPL ELPH-MCNC: 0.4 G/DL (ref 0.2–0.4)
ALPHA2 GLOB SERPL ELPH-MCNC: 1 G/DL (ref 0.4–1.1)
ANION GAP SERPL CALCULATED.3IONS-SCNC: 13 MMOL/L (ref 3–16)
B-GLOBULIN SERPL ELPH-MCNC: 1.1 G/DL (ref 0.9–1.6)
BACTERIA SPEC AEROBE CULT: ABNORMAL
BUN SERPL-MCNC: 52 MG/DL (ref 7–20)
CALCIUM SERPL-MCNC: 7.4 MG/DL (ref 8.3–10.6)
CHLORIDE SERPL-SCNC: 96 MMOL/L (ref 99–110)
CO2 SERPL-SCNC: 19 MMOL/L (ref 21–32)
CREAT SERPL-MCNC: 2.8 MG/DL (ref 0.8–1.3)
DEPRECATED RDW RBC AUTO: 14 % (ref 12.4–15.4)
GAMMA GLOB SERPL ELPH-MCNC: 2 G/DL (ref 0.6–1.8)
GFR SERPLBLD CREATININE-BSD FMLA CKD-EPI: 25 ML/MIN/{1.73_M2}
GLUCOSE BLD-MCNC: 191 MG/DL (ref 70–99)
GLUCOSE BLD-MCNC: 219 MG/DL (ref 70–99)
GLUCOSE BLD-MCNC: 296 MG/DL (ref 70–99)
GLUCOSE BLD-MCNC: 327 MG/DL (ref 70–99)
GLUCOSE SERPL-MCNC: 311 MG/DL (ref 70–99)
GRAM STN SPEC: ABNORMAL
HCT VFR BLD AUTO: 28 % (ref 40.5–52.5)
HGB BLD-MCNC: 9.8 G/DL (ref 13.5–17.5)
MCH RBC QN AUTO: 30 PG (ref 26–34)
MCHC RBC AUTO-ENTMCNC: 34.9 G/DL (ref 31–36)
MCV RBC AUTO: 85.8 FL (ref 80–100)
NT-PROBNP SERPL-MCNC: ABNORMAL PG/ML (ref 0–124)
ORGANISM: ABNORMAL
PERFORMED ON: ABNORMAL
PHOSPHATE SERPL-MCNC: 4.2 MG/DL (ref 2.5–4.9)
PLATELET # BLD AUTO: 328 K/UL (ref 135–450)
PMV BLD AUTO: 8.6 FL (ref 5–10.5)
POTASSIUM SERPL-SCNC: 3.6 MMOL/L (ref 3.5–5.1)
PROT SERPL-MCNC: 6.1 G/DL (ref 6.4–8.2)
RBC # BLD AUTO: 3.27 M/UL (ref 4.2–5.9)
SODIUM SERPL-SCNC: 128 MMOL/L (ref 136–145)
SODIUM SERPL-SCNC: 129 MMOL/L (ref 136–145)
SPE/IFE INTERPRETATION: NORMAL
WBC # BLD AUTO: 9.7 K/UL (ref 4–11)

## 2024-06-14 PROCEDURE — 6370000000 HC RX 637 (ALT 250 FOR IP): Performed by: INTERNAL MEDICINE

## 2024-06-14 PROCEDURE — 2060000000 HC ICU INTERMEDIATE R&B

## 2024-06-14 PROCEDURE — 6360000002 HC RX W HCPCS: Performed by: INTERNAL MEDICINE

## 2024-06-14 PROCEDURE — 6370000000 HC RX 637 (ALT 250 FOR IP): Performed by: SURGERY

## 2024-06-14 PROCEDURE — 84295 ASSAY OF SERUM SODIUM: CPT

## 2024-06-14 PROCEDURE — 2500000003 HC RX 250 WO HCPCS

## 2024-06-14 PROCEDURE — APPSS15 APP SPLIT SHARED TIME 0-15 MINUTES: Performed by: PHYSICIAN ASSISTANT

## 2024-06-14 PROCEDURE — APPNB15 APP NON BILLABLE TIME 0-15 MINS: Performed by: PHYSICIAN ASSISTANT

## 2024-06-14 PROCEDURE — 99024 POSTOP FOLLOW-UP VISIT: CPT | Performed by: SURGERY

## 2024-06-14 PROCEDURE — 2580000003 HC RX 258: Performed by: INTERNAL MEDICINE

## 2024-06-14 PROCEDURE — 36415 COLL VENOUS BLD VENIPUNCTURE: CPT

## 2024-06-14 PROCEDURE — 2580000003 HC RX 258

## 2024-06-14 PROCEDURE — 80069 RENAL FUNCTION PANEL: CPT

## 2024-06-14 PROCEDURE — 83880 ASSAY OF NATRIURETIC PEPTIDE: CPT

## 2024-06-14 PROCEDURE — 2580000003 HC RX 258: Performed by: SURGERY

## 2024-06-14 PROCEDURE — 6370000000 HC RX 637 (ALT 250 FOR IP)

## 2024-06-14 PROCEDURE — 99233 SBSQ HOSP IP/OBS HIGH 50: CPT | Performed by: INTERNAL MEDICINE

## 2024-06-14 PROCEDURE — 94760 N-INVAS EAR/PLS OXIMETRY 1: CPT

## 2024-06-14 PROCEDURE — 85027 COMPLETE CBC AUTOMATED: CPT

## 2024-06-14 PROCEDURE — 6360000002 HC RX W HCPCS: Performed by: SURGERY

## 2024-06-14 PROCEDURE — 99024 POSTOP FOLLOW-UP VISIT: CPT | Performed by: PHYSICIAN ASSISTANT

## 2024-06-14 RX ORDER — COSYNTROPIN 0.25 MG/ML
250 INJECTION, POWDER, FOR SOLUTION INTRAMUSCULAR; INTRAVENOUS ONCE
Status: DISCONTINUED | OUTPATIENT
Start: 2024-06-15 | End: 2024-06-15

## 2024-06-14 RX ORDER — CEPHALEXIN 500 MG/1
500 CAPSULE ORAL 3 TIMES DAILY
Qty: 30 CAPSULE | Refills: 0 | Status: SHIPPED | OUTPATIENT
Start: 2024-06-14 | End: 2024-06-24

## 2024-06-14 RX ORDER — ENOXAPARIN SODIUM 100 MG/ML
30 INJECTION SUBCUTANEOUS DAILY
Status: DISCONTINUED | OUTPATIENT
Start: 2024-06-15 | End: 2024-06-22

## 2024-06-14 RX ORDER — OXYCODONE HYDROCHLORIDE AND ACETAMINOPHEN 5; 325 MG/1; MG/1
1 TABLET ORAL EVERY 6 HOURS PRN
Qty: 15 TABLET | Refills: 0 | Status: SHIPPED | OUTPATIENT
Start: 2024-06-14 | End: 2024-06-21

## 2024-06-14 RX ADMIN — METRONIDAZOLE 500 MG: 500 TABLET ORAL at 06:01

## 2024-06-14 RX ADMIN — INSULIN LISPRO 5 UNITS: 100 INJECTION, SOLUTION INTRAVENOUS; SUBCUTANEOUS at 08:31

## 2024-06-14 RX ADMIN — INSULIN LISPRO 3 UNITS: 100 INJECTION, SOLUTION INTRAVENOUS; SUBCUTANEOUS at 12:19

## 2024-06-14 RX ADMIN — HYDRALAZINE HYDROCHLORIDE 10 MG: 10 TABLET, FILM COATED ORAL at 21:15

## 2024-06-14 RX ADMIN — HYDRALAZINE HYDROCHLORIDE 10 MG: 10 TABLET, FILM COATED ORAL at 08:30

## 2024-06-14 RX ADMIN — CLOPIDOGREL BISULFATE 75 MG: 75 TABLET ORAL at 21:15

## 2024-06-14 RX ADMIN — GABAPENTIN 300 MG: 300 CAPSULE ORAL at 21:15

## 2024-06-14 RX ADMIN — SODIUM CHLORIDE 2000 MG: 900 INJECTION INTRAVENOUS at 12:19

## 2024-06-14 RX ADMIN — LINEZOLID 600 MG: 600 INJECTION, SOLUTION INTRAVENOUS at 08:26

## 2024-06-14 RX ADMIN — SODIUM CHLORIDE, PRESERVATIVE FREE 10 ML: 5 INJECTION INTRAVENOUS at 08:31

## 2024-06-14 RX ADMIN — PANTOPRAZOLE SODIUM 40 MG: 40 TABLET, DELAYED RELEASE ORAL at 06:01

## 2024-06-14 RX ADMIN — INSULIN LISPRO 5 UNITS: 100 INJECTION, SOLUTION INTRAVENOUS; SUBCUTANEOUS at 17:51

## 2024-06-14 RX ADMIN — INSULIN LISPRO 2 UNITS: 100 INJECTION, SOLUTION INTRAVENOUS; SUBCUTANEOUS at 08:31

## 2024-06-14 RX ADMIN — INSULIN GLARGINE 13 UNITS: 100 INJECTION, SOLUTION SUBCUTANEOUS at 21:15

## 2024-06-14 RX ADMIN — METRONIDAZOLE 500 MG: 500 TABLET ORAL at 21:15

## 2024-06-14 RX ADMIN — SODIUM BICARBONATE: 84 INJECTION, SOLUTION INTRAVENOUS at 11:13

## 2024-06-14 RX ADMIN — INSULIN LISPRO 5 UNITS: 100 INJECTION, SOLUTION INTRAVENOUS; SUBCUTANEOUS at 12:20

## 2024-06-14 RX ADMIN — CARVEDILOL 6.25 MG: 6.25 TABLET, FILM COATED ORAL at 08:30

## 2024-06-14 RX ADMIN — HYDRALAZINE HYDROCHLORIDE 10 MG: 10 TABLET, FILM COATED ORAL at 14:25

## 2024-06-14 RX ADMIN — GABAPENTIN 300 MG: 300 CAPSULE ORAL at 08:29

## 2024-06-14 RX ADMIN — METRONIDAZOLE 500 MG: 500 TABLET ORAL at 14:25

## 2024-06-14 RX ADMIN — CEFEPIME 1000 MG: 1 INJECTION, POWDER, FOR SOLUTION INTRAMUSCULAR; INTRAVENOUS at 05:16

## 2024-06-14 RX ADMIN — ASPIRIN 81 MG: 81 TABLET, COATED ORAL at 08:29

## 2024-06-14 RX ADMIN — ENOXAPARIN SODIUM 40 MG: 100 INJECTION SUBCUTANEOUS at 08:31

## 2024-06-14 RX ADMIN — SODIUM BICARBONATE: 84 INJECTION, SOLUTION INTRAVENOUS at 05:56

## 2024-06-14 RX ADMIN — ATORVASTATIN CALCIUM 40 MG: 40 TABLET, FILM COATED ORAL at 21:15

## 2024-06-14 ASSESSMENT — PAIN SCALES - GENERAL: PAINLEVEL_OUTOF10: 0

## 2024-06-14 NOTE — PROGRESS NOTES
Dr Bhupinder Hutchison and Jennifer Otero updated on pts Pro-BNP-[id number]. Electronically signed by Ashley Tsang RN on 6/14/2024 at 8:22 AM

## 2024-06-14 NOTE — CARE COORDINATION
[facility] Nurse  Consult Note       NAME:  Juan Carlos Wong Sr  MEDICAL RECORD NUMBER:  [id number]  AGE: 59 y.o.   GENDER: male  : 1965  TODAY'S DATE:  2024    Subjective   Reason for WOCN Evaluation and Assessment: left lateral foot      Juan Carlos Wong Sr is a 59 y.o. male referred by:   [] Physician  [x] Nursing  [] Other:     Wound Identification:  Wound Type: diabetic  Contributing Factors: diabetes and poor glucose control    Wound History: chronic diabetic foot wound, now with osteo  Current Wound Care Treatment:  dry dressing    Patient Goal of Care:  [x] Wound Healing  [] Odor Control  [] Palliative Care  [] Pain Control   [] Other:         PAST MEDICAL HISTORY        Diagnosis Date    Back pain     Diabetes mellitus (HCC)     Pneumonia        PAST SURGICAL HISTORY    Past Surgical History:   Procedure Laterality Date    ARM SURGERY Right 2024    INCISION AND DRAINAGE RIGHT PROXIMAL ARM ABSCESS performed by Dann Nuñez MD at [facility] OR    COLONOSCOPY      LEG SURGERY Left     LIVER BIOPSY         FAMILY HISTORY    History reviewed. No pertinent family history.    SOCIAL HISTORY    Social History     Tobacco Use    Smoking status: Never    Smokeless tobacco: Never   Substance Use Topics    Alcohol use: No    Drug use: No       ALLERGIES    No Known Allergies    MEDICATIONS    No current facility-administered medications on file prior to encounter.     Current Outpatient Medications on File Prior to Encounter   Medication Sig Dispense Refill    Tirzepatide (MOUNJARO) 5 MG/0.5ML SOPN SC injection Inject 0.5 mLs into the skin once a week 2 mL 1    gabapentin (NEURONTIN) 300 MG capsule Take 1 capsule by mouth 2 times daily for 180 days. 180 capsule 1    ondansetron (ZOFRAN-ODT) 4 MG disintegrating tablet Take 1 tablet by mouth 3 times daily as needed for Nausea or Vomiting 21 tablet 0    cyclobenzaprine (FLEXERIL) 10 MG tablet Take 1 tablet by mouth 3 times

## 2024-06-14 NOTE — CARE COORDINATION
Met with patient, patient confirms plan is to return home & resume [facility]. Will need home care orders.  I was trying to get phone number updated for patient (# listed is his daughter) but says he doesn't know his number we should call his daughter. LM w/ daughter Elmira to see if patient has a working home number the HC agency can reach patient at, await return call.  General surgery signed off.   ID following, watch recs for antibiotics at discharge.  Nephro following for MIGUEL, patient remains on IVF at this time.  Will continue to monitor for discharge needs.    Electronically signed by Rosario Baldwin RN Case Management on 6/14/2024 at 12:26 PM      Spoke to daughter Elmira, I was able to get patient's cell phone number added but daughter says patient typically doesn't answer his phone so usually best to get in touch with her. She says [facility] reaches out to her and she tells her dad when they are coming.    Electronically signed by Rosario Baldwin RN Case Management on 6/14/2024 at 2:09 PM

## 2024-06-14 NOTE — PROGRESS NOTES
CLINICAL PHARMACY NOTE: MEDS TO BEDS    Discharge medications are ready in inpatient pharmacy for weekend delivery.    06/14/24 3:09 PM

## 2024-06-14 NOTE — CONSULTS
Comprehensive Nutrition Assessment    Type and Reason for Visit:  Initial, Consult    Nutrition Recommendations/Plan:   Continue 3 carb diet, 1500 ml fluid restriction per provider, adding expedite wound healing ONS daily     Malnutrition Assessment:  Malnutrition Status:  No malnutrition (06/14/24 1528)    Context:  Chronic Illness       Nutrition Assessment:    Consult for wounds. Pt w/ diabetic foot wound. Pt w/ mild wt loss pta. Intake has been good while inpatient. Finishing meals. Pt w/ 1500 ml fluid restriction. Will order expedite ONS once daily to assist w/ would healing while abiding by fluid restriction.    Nutrition Related Findings:    129 Na, 327 glucose Wound Type: Diabetic Ulcer       Current Nutrition Intake & Therapies:    Average Meal Intake: %  Average Supplements Intake: None Ordered  ADULT DIET; Regular; 3 carb choices (45 gm/meal); 1500 ml    Anthropometric Measures:  Height: 175.3 cm (5' 9.02\")  Ideal Body Weight (IBW): 160 lbs (73 kg)       Current Body Weight: 81 kg (178 lb 9.2 oz),   IBW.    Current BMI (kg/m2): 26.4                               Estimated Daily Nutrient Needs:  Energy Requirements Based On: Kcal/kg  Weight Used for Energy Requirements: Current  Energy (kcal/day): 1620 - 2025 (20 - 25 kcal/kg)  Weight Used for Protein Requirements: Ideal  Protein (g/day): 87 - 109 (1.2 - 1.5 g/kg IBW)  Method Used for Fluid Requirements: 1 ml/kcal  Fluid (ml/day): or per provider    Nutrition Diagnosis:   Increased nutrient needs related to increase demand for energy/nutrients as evidenced by wounds    Nutrition Interventions:   Food and/or Nutrient Delivery: Continue Current Diet, Start Oral Nutrition Supplement  Nutrition Education/Counseling: Education not indicated  Coordination of Nutrition Care: Continue to monitor while inpatient       Goals:     Goals: Meet at least 75% of estimated needs, by next RD assessment       Nutrition Monitoring and Evaluation:

## 2024-06-14 NOTE — PROGRESS NOTES
General and Vascular Surgery                                                           Daily Progress Note                                                             Urban Lee PA-C    Pt Name: Juan Carlos Wong Sr  Medical Record Number: [id number]  Date of Birth 1965   Today's Date: 6/14/2024    ASSESSMENT/PLAN  S/P (6/12/24): Incision and drainage of right proximal arm abscess.   Pain controlled  Leukocytosis resolved: WBC count 14.8 --> 12.5 -->10.3-->9.7  Cr: 2.8  Dressing and packing changed. Wound site looks good. No necrotic tissue or purulent drainage.   IV antibiotic  Monitor and control pain  No further general surgery plans at this time  OK to D/C home on antibiotics and we-dry dressing changes from a general surgery standpoint.      EDUCATION  Patient educated about their illness/diagnosis, stated above, and all questions answered. We discussed the importance of nutrition, medications they are taking, and healthy lifestyle.  SUBJECTIVE  Gilchrist has improved from yesterday. Pain is well controlled. He has no nausea and no vomiting.  He is tolerating regular diet. Current activity is up with assistance  OBJECTIVE  VITALS:  height is 1.753 m (5' 9.02\") and weight is 81 kg (178 lb 9.2 oz). His oral temperature is 97.7 °F (36.5 °C). His blood pressure is 108/65 and his pulse is 71. His respiration is 20 and oxygen saturation is 95%. VITALS:  /65   Pulse 71   Temp 97.7 °F (36.5 °C) (Oral)   Resp 20   Ht 1.753 m (5' 9.02\")   Wt 81 kg (178 lb 9.2 oz)   SpO2 95%   BMI 26.36 kg/m²   GENERAL: alert, no distress  LUNGS: clear to ausculation, without wheezes, rales or rhonci  HEART: normal rate and regular rhythm  ABDOMEN: soft, non-tender, non-distended  EXTREMITY: Right shoulder wound from I&D of abscess looks good. No necrotic tissue or purulent drainage.   I/O last 3 completed shifts:  In: 2854.6 [P.O.:720; I.V.:1045.7; IV

## 2024-06-14 NOTE — PROGRESS NOTES
Infectious Diseases Inpatient Progress Note    CHIEF COMPLAINT:     Right shoulder abscess  Right upper extremity cellulitis  Left foot osteomyelitis  Diabetes poor control  Chronic kidney disease stage IIIb        HISTORY OF PRESENT ILLNESS:  59 y.o. man with a significant history for diabetes, chronic kidney disease stage IIIb, coronary artery disease, congestive heart failure, diabetes with neuropathy, peripheral vascular disease admitted to the hospital secondary to left diabetic foot ulcer.  Patient was seen by me recently when admitted in April for left foot infection was treated with IV antibiotic followed by oral antibiotic.  Now x-ray indicating progression to osteomyelitis of the left fifth metatarsal base.  He was also found to have cellulitis of the right shoulder was evaluated by general surgery given the progression to possible abscess formation there is a plan for incision and drainage.  Given the need for IV antibiotics we are consulted for recommendations.  Blood culture in process left foot wound culture in process, labs indicate creatinine 2.6 sodium 124 procalcitonin 0.21 hemoglobin A1c 11.4 with poor diabetes control WBC 14.8 hemoglobin 10.3      Interval history: Ongoing right upper extremity swelling redness and operative culture now positive for Staph aureus left foot ulcer ongoing drainage seen by podiatry there is a plan for surgery    Past Medical History:    Past Medical History:   Diagnosis Date    Back pain     Diabetes mellitus (HCC)     Pneumonia        Past Surgical History:    Past Surgical History:   Procedure Laterality Date    ARM SURGERY Right 6/12/2024    INCISION AND DRAINAGE RIGHT PROXIMAL ARM ABSCESS performed by Dann Nuñez MD at [facility] OR    COLONOSCOPY      LEG SURGERY Left     LIVER BIOPSY         Current Medications:    Outpatient Medications Marked as Taking for the 6/11/24 encounter (Hospital Encounter)   Medication Sig Dispense Refill     arm  L03.113 Culture, Tissue     Culture, Tissue     Surgical Pathology     Surgical Pathology     oxyCODONE-acetaminophen (PERCOCET) 5-325 MG per tablet      2. Osteomyelitis of left foot, unspecified type ([facility])  M86.9          Complicated left diabetic foot infection  Left foot osteomyelitis  Fevers  WBC elevation  Right shoulder cellulitis with abscess formation  Chronic kidney disease stage IIIb  Diabetes with neuropathy  Diabetes with nephropathy  Status post incision and drainage of the proximal forearm abscess ON 6/12/24    operative culture in process/11/24  Coronary artery disease  Hyponatremia  Diabetes poor control hemoglobin A1c greater than 11.4      Operative culture positive for Staph aureus MSSA will adjust antibiotic therapy ongoing left foot infection with osteomyelitis noted    Seen by Dr. Alvares, plan for surgery resection of the fifth metatarsal base on the left foot      Labs, Microbiology, Radiology and pertinent results from current hospitalization and care every where were reviewed by me as a part of the consultation.    PLAN :  Cont  IV Cefazolin x  2 gm Q 12 hr for now  Operative culture Staph aureus MSSA  Trend WBC  Watch creatinine  Hemoglobin A1c is elevated  ESR  CRP  Control diabetes  Continue local care  BNP elevated  Creatinine rising  May go for left foot surgery on Monday    Discussed with patient/Family and Nursing     Medical Decision Making:  The following items were considered in medical decision making:  Discussion of patient care with other providers  Reviewed clinical lab tests  Reviewed radiology tests  Reviewed other diagnostic tests/interventions  Independent review of radiologic images  Independent review of  Microbiology cultures and other micro tests reviewed     Risk of Complications/Morbidity: High      Illness(es)/ Infection present that pose threat to bodily function.   There is potential for severe exacerbation of infection/side effects of treatment.  Therapy

## 2024-06-14 NOTE — PLAN OF CARE
Problem: Discharge Planning  Goal: Discharge to home or other facility with appropriate resources  6/14/2024 0924 by Ashley Tsang RN  Outcome: Progressing     Problem: Safety - Adult  Goal: Free from fall injury  6/14/2024 0924 by Ashley Tsang RN  Outcome: Progressing     Problem: ABCDS Injury Assessment  Goal: Absence of physical injury  6/14/2024 0924 by Ashley Tsang RN  Outcome: Progressing     Problem: Chronic Conditions and Co-morbidities  Goal: Patient's chronic conditions and co-morbidity symptoms are monitored and maintained or improved  6/14/2024 0924 by Ashley Tsang RN  Outcome: Progressing     Problem: Infection - Adult  Goal: Absence of infection at discharge  6/14/2024 0924 by Ashley Tsang RN  Outcome: Progressing     Problem: Metabolic/Fluid and Electrolytes - Adult  Goal: Electrolytes maintained within normal limits  6/14/2024 0924 by Ashley Tsang RN  Outcome: Progressing     Problem: Metabolic/Fluid and Electrolytes - Adult  Goal: Glucose maintained within prescribed range  6/14/2024 0924 by Ashley Tsang RN  Outcome: Progressing     Problem: Skin/Tissue Integrity - Adult  Goal: Oral mucous membranes remain intact  6/14/2024 0924 by Ashley Tsang RN  Outcome: Progressing     Problem: Pain  Goal: Verbalizes/displays adequate comfort level or baseline comfort level  6/14/2024 0924 by Ashley Tsang RN  Outcome: Progressing     Problem: Skin/Tissue Integrity  Goal: Absence of new skin breakdown  Description: 1.  Monitor for areas of redness and/or skin breakdown  2.  Assess vascular access sites hourly  3.  Every 4-6 hours minimum:  Change oxygen saturation probe site  4.  Every 4-6 hours:  If on nasal continuous positive airway pressure, respiratory therapy assess nares and determine need for appliance change or resting period.  Outcome: Progressing

## 2024-06-14 NOTE — CONSULTS
Department of Podiatry Consult Note  Natanael Alvares DPM Attending       Reason for Consult:  Wound to plantar LEFT foot suspected osteomyelitis   Requesting Physician:  Bhupinder Hutchison MD    CHIEF COMPLAINT:  Wound with drainage, plantar LEFT foot with suspected osteomyelitis of 5th metatarsal    HISTORY OF PRESENT ILLNESS:                The patient is a 59 y.o. male with significant past medical history of Diabetes with peripheral neuropathy and  previos amputation status who is consulted for a several weeks history of wound of plantar LEFT foot with foul drainage and considerable depth that probes to bone. Patient relates his blood glucose has been in the 300's as he has dealt with his wound    Past Medical History:        Diagnosis Date    Back pain     Diabetes mellitus (HCC)     Pneumonia      Past Surgical History:        Procedure Laterality Date    ARM SURGERY Right 6/12/2024    INCISION AND DRAINAGE RIGHT PROXIMAL ARM ABSCESS performed by Dann Nuñez MD at [facility] OR    COLONOSCOPY      LEG SURGERY Left     LIVER BIOPSY       Current Medications:    Current Facility-Administered Medications: ceFAZolin (ANCEF) 2,000 mg in sodium chloride 0.9 % 50 mL IVPB (mini-bag), 2,000 mg, IntraVENous, Q12H  sodium bicarbonate 75 mEq in sodium chloride 0.45 % 1,000 mL infusion, , IntraVENous, Continuous  [START ON 6/15/2024] cosyntropin (CORTROSYN) injection 250 mcg, 250 mcg, IntraVENous, Once  [START ON 6/15/2024] enoxaparin Sodium (LOVENOX) injection 30 mg, 30 mg, SubCUTAneous, Daily  metroNIDAZOLE (FLAGYL) tablet 500 mg, 500 mg, Oral, 3 times per day  [Held by provider] carvedilol (COREG) tablet 6.25 mg, 6.25 mg, Oral, BID WC  insulin glargine (LANTUS) injection vial 13 Units, 13 Units, SubCUTAneous, Nightly  insulin lispro (HUMALOG,ADMELOG) injection vial 5 Units, 5 Units, SubCUTAneous, TID WC  oxyCODONE-acetaminophen (PERCOCET) 5-325 MG per tablet 1 tablet, 1 tablet, Oral, Q4H PRN **OR**  oxyCODONE-acetaminophen (PERCOCET) 5-325 MG per tablet 2 tablet, 2 tablet, Oral, Q4H PRN  morphine (PF) injection 2 mg, 2 mg, IntraVENous, Q2H PRN **OR** morphine (PF) injection 4 mg, 4 mg, IntraVENous, Q2H PRN  aspirin EC tablet 81 mg, 81 mg, Oral, Daily  atorvastatin (LIPITOR) tablet 40 mg, 40 mg, Oral, Nightly  clopidogrel (PLAVIX) tablet 75 mg, 75 mg, Oral, Nightly  cyclobenzaprine (FLEXERIL) tablet 5 mg, 5 mg, Oral, BID PRN  gabapentin (NEURONTIN) capsule 300 mg, 300 mg, Oral, BID  hydrALAZINE (APRESOLINE) tablet 10 mg, 10 mg, Oral, TID  [Held by provider] isosorbide mononitrate (IMDUR) extended release tablet 15 mg, 15 mg, Oral, Nightly  pantoprazole (PROTONIX) tablet 40 mg, 40 mg, Oral, QAM AC  sodium chloride flush 0.9 % injection 5-40 mL, 5-40 mL, IntraVENous, 2 times per day  sodium chloride flush 0.9 % injection 5-40 mL, 5-40 mL, IntraVENous, PRN  0.9 % sodium chloride infusion, , IntraVENous, PRN  ondansetron (ZOFRAN-ODT) disintegrating tablet 4 mg, 4 mg, Oral, Q8H PRN **OR** ondansetron (ZOFRAN) injection 4 mg, 4 mg, IntraVENous, Q6H PRN  polyethylene glycol (GLYCOLAX) packet 17 g, 17 g, Oral, Daily PRN  acetaminophen (TYLENOL) tablet 650 mg, 650 mg, Oral, Q6H PRN **OR** acetaminophen (TYLENOL) suppository 650 mg, 650 mg, Rectal, Q6H PRN  glucose chewable tablet 16 g, 4 tablet, Oral, PRN  dextrose bolus 10% 125 mL, 125 mL, IntraVENous, PRN **OR** dextrose bolus 10% 250 mL, 250 mL, IntraVENous, PRN  glucagon injection 1 mg, 1 mg, SubCUTAneous, PRN  dextrose 10 % infusion, , IntraVENous, Continuous PRN  insulin lispro (HUMALOG,ADMELOG) injection vial 0-4 Units, 0-4 Units, SubCUTAneous, TID WC  insulin lispro (HUMALOG,ADMELOG) injection vial 0-4 Units, 0-4 Units, SubCUTAneous, Nightly    Allergies:   Patient has no known allergies.    Social History:    TOBACCO:  Never used tobacco  ETOH:  Never drank alcohol    Family History:   History reviewed. No pertinent family history.    REVIEW OF SYSTEMS:

## 2024-06-14 NOTE — PROGRESS NOTES
Min:105 , Max:117   ; Diastolic (24hrs), Av, Min:59, Max:70    24HR INTAKE/OUTPUT:    Intake/Output Summary (Last 24 hours) at 2024 0958  Last data filed at 2024 0659  Gross per 24 hour   Intake 720 ml   Output 1125 ml   Net -405 ml         Patient Vitals for the past 96 hrs (Last 3 readings):   Weight   24 0403 81 kg (178 lb 9.2 oz)   24 0527 81.1 kg (178 lb 12.7 oz)   24 0225 79.4 kg (175 lb 0.7 oz)         General: drowsy, NAD, Obese  Chest: diminished to auscultation, no intercostal retractions  CVS: RRR, no murmur, no rub  Abdomen: soft, non tender  Extremities: no to trace edema, wound to R upper arm  Skin: normal texture, normal skin turgor, no rash  Psych: drowsy    LAB DATA:    CBC:   Lab Results   Component Value Date/Time    WBC 9.7 2024 05:16 AM    RBC 3.27 2024 05:16 AM    HGB 9.8 2024 05:16 AM    HCT 28.0 2024 05:16 AM    MCV 85.8 2024 05:16 AM    MCH 30.0 2024 05:16 AM    MCHC 34.9 2024 05:16 AM    RDW 14.0 2024 05:16 AM     2024 05:16 AM    MPV 8.6 2024 05:16 AM     BMP:    Lab Results   Component Value Date/Time     2024 05:16 AM    K 3.6 2024 05:16 AM    K 3.6 2024 05:18 AM    CL 96 2024 05:16 AM    CO2 19 2024 05:16 AM    BUN 52 2024 05:16 AM    CREATININE 2.8 2024 05:16 AM    CALCIUM 7.4 2024 05:16 AM    GFRAA >60 2017 09:36 PM    LABGLOM 25 2024 05:16 AM    LABGLOM 32 2024 04:27 AM    GLUCOSE 311 2024 05:16 AM     Ionized Calcium:  No components found for: \"IONCA\"  Magnesium:    Lab Results   Component Value Date/Time    MG 1.7 2024 12:21 PM     Phosphorus:    Lab Results   Component Value Date/Time    PHOS 4.2 2024 05:16 AM     U/A:    Lab Results   Component Value Date/Time    COLORU Yellow 2024 04:48 PM    PHUR 6.0 2024 04:48 PM    PHUR 6.0 2024 08:03 PM    LABCAST SEE NOTES 2024  09:17 AM    WBCUA 1 06/11/2024 04:48 PM    RBCUA 0-2 06/11/2024 04:48 PM    RBCUA 1+ (0.06-0.1 mg/dL) 05/09/2024 09:17 AM    MUCUS 1+ 03/27/2024 08:03 PM    BACTERIA None Seen 06/11/2024 04:48 PM    CLARITYU Clear 06/11/2024 04:48 PM    LEUKOCYTESUR Negative 06/11/2024 04:48 PM    UROBILINOGEN 1.0 06/11/2024 04:48 PM    BILIRUBINUR Negative 06/11/2024 04:48 PM    BLOODU MODERATE 06/11/2024 04:48 PM    GLUCOSEU 500 06/11/2024 04:48 PM         IMPRESSION/RECOMMENDATIONS:      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 Follows with Dr. Howell in office. Etiology likely 2/2 volume depletion in the setting of poor oral intake, emesis, and diuretic use.              - Cr trending up -- episodes of hypotension noted 6/12 and 6/13              - UA and and urine studies noted              - Wheeler catheter placed 6/12 for urinary retention              - Continue to hold torsemide              - Continue gentle IVF               - No NSAIDs. No ACE/ARBs. No SGLT2i.              - Daily RFTs     Hyponatremia: etiology likely 2/2 volume depletion in the setting of poor oral intake, emesis, and diuretic use.              - Na+ corrected for hyperglycemia is 131 mmol/L this AM   - AM and PM cortisol low -- ACTH stim test ordered.               - Continue 1.5 L FR              - Goal Na+ >/= 130     HFrEF              - TTE (3/27/2024): LVEF 20-25%.               - CXR unremarkable              - Holding torsemide              - Carefully monitor volume status while receiving IVF              - Strict I/Os. Daily weights.     Cellulitis / RUE Wound / OM L Foot   - I&D of R arm abscess 6/12 with surgery              - On cefazolin and flagyl              - ID and gen surgery following     Hypertension              - Hypotensive               - Hold BP meds     CKD-MBD              - Monitor phos     Anemia              - Hgb within target              - Monitor     Will discuss with nephrology attending physician, Dr. Otero.  See

## 2024-06-14 NOTE — PLAN OF CARE
Problem: Discharge Planning  Goal: Discharge to home or other facility with appropriate resources  Outcome: Progressing  Flowsheets (Taken 6/13/2024 2215)  Discharge to home or other facility with appropriate resources:   Identify barriers to discharge with patient and caregiver   Arrange for needed discharge resources and transportation as appropriate   Identify discharge learning needs (meds, wound care, etc)     Problem: Safety - Adult  Goal: Free from fall injury  Outcome: Progressing     Problem: ABCDS Injury Assessment  Goal: Absence of physical injury  Outcome: Progressing     Problem: Chronic Conditions and Co-morbidities  Goal: Patient's chronic conditions and co-morbidity symptoms are monitored and maintained or improved  Outcome: Progressing  Flowsheets (Taken 6/13/2024 2215)  Care Plan - Patient's Chronic Conditions and Co-Morbidity Symptoms are Monitored and Maintained or Improved: Monitor and assess patient's chronic conditions and comorbid symptoms for stability, deterioration, or improvement     Problem: Infection - Adult  Goal: Absence of infection at discharge  Outcome: Progressing  Flowsheets (Taken 6/13/2024 2215)  Absence of infection at discharge:   Assess and monitor for signs and symptoms of infection   Monitor lab/diagnostic results     Problem: Metabolic/Fluid and Electrolytes - Adult  Goal: Electrolytes maintained within normal limits  Outcome: Progressing  Flowsheets (Taken 6/13/2024 2215)  Electrolytes maintained within normal limits:   Monitor labs and assess patient for signs and symptoms of electrolyte imbalances   Administer electrolyte replacement as ordered  Goal: Glucose maintained within prescribed range  Outcome: Progressing  Flowsheets (Taken 6/13/2024 2215)  Glucose maintained within prescribed range:   Monitor blood glucose as ordered   Assess for signs and symptoms of hyperglycemia and hypoglycemia     Problem: Skin/Tissue Integrity - Adult  Goal: Oral mucous membranes

## 2024-06-14 NOTE — PROGRESS NOTES
Clinical Pharmacy Note  Renal Dose Adjustment    Juan Carlos Wong Sr is receiving Lovenox 40 mg SQ Q24H.  This medication is renally eliminated.  Based on the patient's Estimated Creatinine Clearance: 28 mL/min (A) (based on SCr of 2.8 mg/dL (H)). and urine output, the dose has been adjusted to Lovenox 30 mg SQ Q24H per protocol.    Pharmacy will continue to monitor and adjust dose as needed for changes in renal function.       Damon Melo [facility], PharmD, BCPS  6/14/2024 4:29 PM

## 2024-06-14 NOTE — DISCHARGE INSTRUCTIONS
Continue wet to dry saline gauze dressings to right upper arm wound.  Change twice daily.     Ok to remove dressing and shower, letting soapy water run over wound. Do not scrub your wound.  Pat dry afterwards and replace wet to dry dressing.

## 2024-06-14 NOTE — PROGRESS NOTES
Physical Therapy  Attempt    Hold therapy until podiatry consult is completed.    Electronically signed by Tien Hernandez, [place] [id number] on 6/14/2024 at 1:03 PM

## 2024-06-14 NOTE — PROGRESS NOTES
AM    K 3.6 06/14/2024 05:16 AM    K 3.6 06/12/2024 05:18 AM    CL 96 06/14/2024 05:16 AM    CO2 19 06/14/2024 05:16 AM    BUN 52 06/14/2024 05:16 AM    CREATININE 2.8 06/14/2024 05:16 AM    GFRAA >60 01/03/2017 09:36 PM    AGRATIO 0.4 06/12/2024 05:18 AM    LABGLOM 25 06/14/2024 05:16 AM    LABGLOM 32 04/03/2024 04:27 AM    GLUCOSE 311 06/14/2024 05:16 AM    CALCIUM 7.4 06/14/2024 05:16 AM    BILITOT 0.5 06/12/2024 05:18 AM    ALKPHOS 130 06/12/2024 05:18 AM    AST 9 06/12/2024 05:18 AM    ALT <5 06/12/2024 05:18 AM      Hepatic Function Panel:   Lab Results   Component Value Date/Time    ALKPHOS 130 06/12/2024 05:18 AM    ALT <5 06/12/2024 05:18 AM    AST 9 06/12/2024 05:18 AM    BILITOT 0.5 06/12/2024 05:18 AM      Phosphorus:   Lab Results   Component Value Date/Time    PHOS 4.2 06/14/2024 05:16 AM       ASSESSMENT:  Principal Problem:    Cellulitis and abscess of upper extremity  Active Problems:    Cellulitis of right upper arm    DM (diabetes mellitus), type 2 with complications (HCC)    Antiplatelet or antithrombotic long-term use    Abscess of right arm    Osteomyelitis of left foot (HCC)    Poorly controlled diabetes mellitus (HCC)    Type 2 diabetes mellitus with diabetic foot infection (HCC)    Staph aureus infection  Resolved Problems:    * No resolved hospital problems. *      PLAN:    1- 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 Follows with Dr. Howell in office. Etiology likely 2/2 volume depletion in the setting of poor oral intake, emesis, and diuretic use.              - Increase Cr noted .  On IVF  .    Most likely ATN from hypotension         - Off torsemide              - No NSAIDs. No ACE/ARBs. No SGLT2i.                   2- Hyponatremia: etiology likely 2/2 volume depletion in the setting of poor oral intake, emesis, and diuretic use.              - Na+ 128 .      3- HFrEF              - TTE (3/27/2024): LVEF 20-25%.               - CXR unremarkable              - Holding torsemide               - Carefully monitor volume status while receiving IVF              - Strict I/Os. Daily weights.     4- Cellulitis / RUE Wound / OM L Foot              - On flagyl, linezolid, vancomycin              - ID consulted     5- Hypertension              - Controlled              - Continue current regimen     6- CKD-MBD              -labs reviewed      7- Anemia              - Hgb within target              - Monitor  Pt  updated .        Jennifer Otero MD, FACP

## 2024-06-14 NOTE — PROGRESS NOTES
V2.0    [facility] [place]      Name:  Juan Carlos Wong Sr /Age/Sex: 1965  (59 y.o. male)   MRN & CSN:  [id number] & [id number] Encounter Date/Time: 2024 12:40 PM EDT   Location:  [id number] PCP: Idris Laws MD     Attending:Bhupinder Hutchison MD       Hospital Day: 4    Assessment and Recommendations         Juan Carlos Wong Sr is a 59 y.o. male who presents with Cellulitis and abscess of upper extremity  59-year-old patient admitted to the hospital with cellulitis and abscess of right shoulder and diabetic foot infection on the left foot General surgery ID consulted status post I&D continue to be on IV antibiotics        Sepsis with white count 14 heart rate 97 on admission, due to cellulitis and abscess of right shoulder and osteomyelitis and cellulitis of left foot currently on IV Zyvox, Flagyl, and cefepime ID following postop day 1.  White count improved to 10.3  cellulitis and abscess right shoulder status post I&D.  Postop care per surgery.    Osteomyelitis and cellulitis of left foot and diabetic foot infection, status post partial amputation podiatry consulted IV antibiotics as above.  Diabetes mellitus, sliding scale and long-acting insulin diabetic diet.  Adjusting Lantus and adding preprandial  Hyponatremia, nephrology consulted and following sodium increased to 128  CKD with possible mild MIGUEL on top of it probably consulted creatinine still flat at 2.6  Anemia, appears chronic likely due to chronic disease follow-up as outpatient no signs of bleeding at this time       DVT Prophylaxis: Lovenox.   Code Status: Total Support.   Barrier to DC: IV antibiotics, surgery, ID clearance          Subjective:     Patient was seen and examined today.  No acute events overnight.  Patient jason afebrile with stable vital signs.    Review of Systems:      Pertinent positives and negatives discussed in HPI    Objective:     Intake/Output Summary (Last 24 hours) at 2024 1240  Last data  mid right arm suggestive of cellulitis. No discrete fluid collection. Advanced acromioclavicular and moderate glenohumeral joint osteoarthritis.     XR FOOT LEFT (MIN 3 VIEWS)    Result Date: 6/11/2024  EXAMINATION: THREE XRAY VIEWS OF THE LEFT FOOT 6/11/2024 1:10 pm COMPARISON: None. HISTORY: ORDERING SYSTEM PROVIDED HISTORY: Ulcer lateral side of foot.  Concern for possible osteomyelitis. TECHNOLOGIST PROVIDED HISTORY: Reason for exam:->Ulcer lateral side of foot.  Concern for possible osteomyelitis. Reason for Exam: Ulcer lateral side of foot. Concern for possible osteomyelitis. FINDINGS: The patient had a prior amputation of the foot at the proximal metatarsal level.  There is evidence of an ulcer along the lateral aspect of the stump. There is some bony lysis involving the base of the 5th metatarsal, suggesting osteomyelitis.  There is no gas within the soft tissues.     1. Findings suggestive of osteomyelitis involving the base of the 5th metatarsal. 2. 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 3. Soft tissue ulcer along the lateral aspect of the stump.     XR CHEST (2 VW)    Result Date: 6/11/2024  EXAMINATION: TWO XRAY VIEWS OF THE CHEST 6/11/2024 1:10 pm COMPARISON: 03/27/2024 HISTORY: ORDERING SYSTEM PROVIDED HISTORY: wheezing on PE R lower lung base TECHNOLOGIST PROVIDED HISTORY: Reason for exam:->wheezing on PE R lower lung base Reason for Exam: wheezing on PE R lower lung base FINDINGS: The lungs appear clear.  The heart and mediastinal structures unremarkable. Bony thorax appears normal.     No radiographic evidence of acute cardiopulmonary disease.       CBC:   Recent Labs     06/12/24  0518 06/13/24  0517 06/14/24  0516   WBC 12.5* 10.3 9.7   HGB 9.0* 10.0* 9.8*    344 328     BMP:    Recent Labs     06/12/24  0518 06/12/24  1000 06/13/24  0517 06/13/24  1338 06/13/24  2145 06/14/24  0516   *   < > 128* 127* 126* 128*   K 3.6  3.6  --  4.1  --   --  3.6   CL 96*  --

## 2024-06-15 ENCOUNTER — APPOINTMENT (OUTPATIENT)
Dept: CT IMAGING | Age: 59
End: 2024-06-15
Payer: COMMERCIAL

## 2024-06-15 ENCOUNTER — APPOINTMENT (OUTPATIENT)
Dept: MRI IMAGING | Age: 59
End: 2024-06-15
Payer: COMMERCIAL

## 2024-06-15 LAB
ALBUMIN SERPL-MCNC: 1.8 G/DL (ref 3.4–5)
ANION GAP SERPL CALCULATED.3IONS-SCNC: 13 MMOL/L (ref 3–16)
BACTERIA BLD CULT ORG #2: NORMAL
BACTERIA BLD CULT: NORMAL
BUN SERPL-MCNC: 47 MG/DL (ref 7–20)
CALCIUM SERPL-MCNC: 7.4 MG/DL (ref 8.3–10.6)
CHLORIDE SERPL-SCNC: 99 MMOL/L (ref 99–110)
CO2 SERPL-SCNC: 18 MMOL/L (ref 21–32)
CORTIS 1H P 250 UG ACTH RIV SERPL-MCNC: 6.6 UG/DL
CORTIS SERPL-MCNC: 9 UG/DL
CREAT SERPL-MCNC: 2.8 MG/DL (ref 0.8–1.3)
DEPRECATED RDW RBC AUTO: 14 % (ref 12.4–15.4)
GFR SERPLBLD CREATININE-BSD FMLA CKD-EPI: 25 ML/MIN/{1.73_M2}
GLUCOSE BLD-MCNC: 139 MG/DL (ref 70–99)
GLUCOSE BLD-MCNC: 164 MG/DL (ref 70–99)
GLUCOSE BLD-MCNC: 172 MG/DL (ref 70–99)
GLUCOSE SERPL-MCNC: 171 MG/DL (ref 70–99)
HCT VFR BLD AUTO: 29.9 % (ref 40.5–52.5)
HGB BLD-MCNC: 10.4 G/DL (ref 13.5–17.5)
INR PPP: 1.4 (ref 0.85–1.15)
MCH RBC QN AUTO: 29.8 PG (ref 26–34)
MCHC RBC AUTO-ENTMCNC: 34.8 G/DL (ref 31–36)
MCV RBC AUTO: 85.6 FL (ref 80–100)
PERFORMED ON: ABNORMAL
PHOSPHATE SERPL-MCNC: 4 MG/DL (ref 2.5–4.9)
PLATELET # BLD AUTO: 395 K/UL (ref 135–450)
PMV BLD AUTO: 8.3 FL (ref 5–10.5)
POTASSIUM SERPL-SCNC: 3.2 MMOL/L (ref 3.5–5.1)
PROTHROMBIN TIME: 17.3 SEC (ref 11.9–14.9)
RBC # BLD AUTO: 3.49 M/UL (ref 4.2–5.9)
SODIUM SERPL-SCNC: 130 MMOL/L (ref 136–145)
TROPONIN, HIGH SENSITIVITY: 207 NG/L (ref 0–22)
WBC # BLD AUTO: 8.4 K/UL (ref 4–11)

## 2024-06-15 PROCEDURE — 6360000002 HC RX W HCPCS: Performed by: INTERNAL MEDICINE

## 2024-06-15 PROCEDURE — 6370000000 HC RX 637 (ALT 250 FOR IP): Performed by: INTERNAL MEDICINE

## 2024-06-15 PROCEDURE — 6360000002 HC RX W HCPCS: Performed by: SURGERY

## 2024-06-15 PROCEDURE — 85027 COMPLETE CBC AUTOMATED: CPT

## 2024-06-15 PROCEDURE — 2500000003 HC RX 250 WO HCPCS

## 2024-06-15 PROCEDURE — 70450 CT HEAD/BRAIN W/O DYE: CPT

## 2024-06-15 PROCEDURE — 6370000000 HC RX 637 (ALT 250 FOR IP): Performed by: SURGERY

## 2024-06-15 PROCEDURE — 2580000003 HC RX 258: Performed by: INTERNAL MEDICINE

## 2024-06-15 PROCEDURE — 80069 RENAL FUNCTION PANEL: CPT

## 2024-06-15 PROCEDURE — 84484 ASSAY OF TROPONIN QUANT: CPT

## 2024-06-15 PROCEDURE — 2580000003 HC RX 258: Performed by: SURGERY

## 2024-06-15 PROCEDURE — 6360000002 HC RX W HCPCS: Performed by: PEDIATRICS

## 2024-06-15 PROCEDURE — 80400 ACTH STIMULATION PANEL: CPT

## 2024-06-15 PROCEDURE — 95717 EEG PHYS/QHP 2-12 HR W/O VID: CPT | Performed by: PSYCHIATRY & NEUROLOGY

## 2024-06-15 PROCEDURE — 70551 MRI BRAIN STEM W/O DYE: CPT

## 2024-06-15 PROCEDURE — 70498 CT ANGIOGRAPHY NECK: CPT

## 2024-06-15 PROCEDURE — 2700000000 HC OXYGEN THERAPY PER DAY

## 2024-06-15 PROCEDURE — 6360000004 HC RX CONTRAST MEDICATION: Performed by: INTERNAL MEDICINE

## 2024-06-15 PROCEDURE — 94760 N-INVAS EAR/PLS OXIMETRY 1: CPT

## 2024-06-15 PROCEDURE — 2580000003 HC RX 258

## 2024-06-15 PROCEDURE — 85610 PROTHROMBIN TIME: CPT

## 2024-06-15 PROCEDURE — 2100000000 HC CCU R&B

## 2024-06-15 PROCEDURE — 36415 COLL VENOUS BLD VENIPUNCTURE: CPT

## 2024-06-15 RX ORDER — ATORVASTATIN CALCIUM 80 MG/1
80 TABLET, FILM COATED ORAL NIGHTLY
Status: DISCONTINUED | OUTPATIENT
Start: 2024-06-15 | End: 2024-06-27 | Stop reason: HOSPADM

## 2024-06-15 RX ORDER — POTASSIUM CHLORIDE 7.45 MG/ML
10 INJECTION INTRAVENOUS ONCE
Status: COMPLETED | OUTPATIENT
Start: 2024-06-15 | End: 2024-06-15

## 2024-06-15 RX ORDER — COSYNTROPIN 0.25 MG/ML
250 INJECTION, POWDER, FOR SOLUTION INTRAMUSCULAR; INTRAVENOUS ONCE
Status: COMPLETED | OUTPATIENT
Start: 2024-06-16 | End: 2024-06-16

## 2024-06-15 RX ADMIN — SODIUM BICARBONATE: 84 INJECTION, SOLUTION INTRAVENOUS at 07:14

## 2024-06-15 RX ADMIN — GABAPENTIN 300 MG: 300 CAPSULE ORAL at 11:55

## 2024-06-15 RX ADMIN — METRONIDAZOLE 500 MG: 500 TABLET ORAL at 06:15

## 2024-06-15 RX ADMIN — PANTOPRAZOLE SODIUM 40 MG: 40 TABLET, DELAYED RELEASE ORAL at 06:15

## 2024-06-15 RX ADMIN — SODIUM CHLORIDE 2000 MG: 900 INJECTION INTRAVENOUS at 00:08

## 2024-06-15 RX ADMIN — IOPAMIDOL 75 ML: 755 INJECTION, SOLUTION INTRAVENOUS at 11:30

## 2024-06-15 RX ADMIN — HYDRALAZINE HYDROCHLORIDE 10 MG: 10 TABLET, FILM COATED ORAL at 21:32

## 2024-06-15 RX ADMIN — SODIUM CHLORIDE, PRESERVATIVE FREE 10 ML: 5 INJECTION INTRAVENOUS at 12:04

## 2024-06-15 RX ADMIN — HYDRALAZINE HYDROCHLORIDE 10 MG: 10 TABLET, FILM COATED ORAL at 11:55

## 2024-06-15 RX ADMIN — SODIUM CHLORIDE 2000 MG: 900 INJECTION INTRAVENOUS at 12:57

## 2024-06-15 RX ADMIN — INSULIN GLARGINE 13 UNITS: 100 INJECTION, SOLUTION SUBCUTANEOUS at 21:32

## 2024-06-15 RX ADMIN — ASPIRIN 81 MG: 81 TABLET, COATED ORAL at 11:55

## 2024-06-15 RX ADMIN — GABAPENTIN 300 MG: 300 CAPSULE ORAL at 21:32

## 2024-06-15 RX ADMIN — METRONIDAZOLE 500 MG: 500 TABLET ORAL at 21:32

## 2024-06-15 RX ADMIN — ATORVASTATIN CALCIUM 80 MG: 80 TABLET, FILM COATED ORAL at 21:32

## 2024-06-15 RX ADMIN — ENOXAPARIN SODIUM 30 MG: 100 INJECTION SUBCUTANEOUS at 12:03

## 2024-06-15 RX ADMIN — POTASSIUM CHLORIDE 10 MEQ: 7.46 INJECTION, SOLUTION INTRAVENOUS at 07:29

## 2024-06-15 ASSESSMENT — PAIN SCALES - GENERAL
PAINLEVEL_OUTOF10: 0
PAINLEVEL_OUTOF10: 0

## 2024-06-15 NOTE — CODE DOCUMENTATION
Pt is having expressive aphasia while speaking with MD . STAT head CT order without contrast due to CKD and elevated creatinine

## 2024-06-15 NOTE — CODE DOCUMENTATION
Daughter states mental status change happened at 0830, she called nurse when she realized father was having difficulty speaking

## 2024-06-15 NOTE — PROGRESS NOTES
Speech Therapy note regarding Evaluation and Treatment orders:     SLP rceived SLP evaluation orders at 6/15/2024 at 1200 am while on PCU following an 0915 am medical rapid response/medical code due to medical status change.   Pt was later transferred to CVICU due to medical status.   Will need new orders when medically stable; if SLP evaluation is still wanted by Medical team    Signed  Debra Mckeon MS,CCC,SLP [id number]  Speech and Language Pathologist  6/15/2024 at 1529 pm    : Yes

## 2024-06-15 NOTE — PROGRESS NOTES
General Surgery    Code stroke underway,     Continue wound care to I&D of arm abscess    Will follow along    Electronically signed by ERIKA HERNANDEZ MD on 6/15/2024 at 10:00 AM

## 2024-06-15 NOTE — PROGRESS NOTES
4 Eyes Skin Assessment     NAME:  Juan Carlos Wong Sr  YOB: 1965  MEDICAL RECORD NUMBER:  [id number]    The patient is being assessed for  Transfer from other unit     I agree that at least one RN has performed a thorough Head to Toe Skin Assessment on the patient. ALL assessment sites listed below have been assessed.      Areas assessed by both nurses:    Head, Face, Ears, Shoulders, Back, Chest, Arms, Elbows, Hands, Sacrum. Buttock, Coccyx, Ischium, Legs. Feet and Heels, and Under Medical Devices         Does the Patient have a Wound? Yes- L diabetic foot wound        Jaxon Prevention initiated by RN: Yes  Wound Care Orders initiated by RN: Yes    Pressure Injury (Stage 3,4, Unstageable, DTI, NWPT, and Complex wounds) if present, place Wound referral order by RN under : No    New Ostomies, if present place, Ostomy referral order under : No     Nurse 1 eSignature: Electronically signed by Lianet Benz RN on 6/15/24 at 2:06 PM EDT    **SHARE this note so that the co-signing nurse can place an eSignature**    Nurse 2 eSignature: {Esignature:[id number]}

## 2024-06-15 NOTE — CODE DOCUMENTATION
Pt had drift in left leg but not new. Pt has surgery site to LLE. Pt was given ASA, Plavix, and Lovenox for blood thinning

## 2024-06-15 NOTE — PLAN OF CARE
Problem: Discharge Planning  Goal: Discharge to home or other facility with appropriate resources  Outcome: Progressing     Problem: Safety - Adult  Goal: Free from fall injury  Outcome: Progressing     Problem: ABCDS Injury Assessment  Goal: Absence of physical injury  Outcome: Progressing     Problem: Chronic Conditions and Co-morbidities  Goal: Patient's chronic conditions and co-morbidity symptoms are monitored and maintained or improved  Outcome: Progressing     Problem: Infection - Adult  Goal: Absence of infection at discharge  Outcome: Progressing     Problem: Metabolic/Fluid and Electrolytes - Adult  Goal: Electrolytes maintained within normal limits  Outcome: Progressing     Problem: Metabolic/Fluid and Electrolytes - Adult  Goal: Glucose maintained within prescribed range  Outcome: Progressing     Problem: Skin/Tissue Integrity - Adult  Goal: Oral mucous membranes remain intact  Outcome: Progressing     Problem: Pain  Goal: Verbalizes/displays adequate comfort level or baseline comfort level  Outcome: Progressing     Problem: Skin/Tissue Integrity  Goal: Absence of new skin breakdown  Description: 1.  Monitor for areas of redness and/or skin breakdown  2.  Assess vascular access sites hourly  3.  Every 4-6 hours minimum:  Change oxygen saturation probe site  4.  Every 4-6 hours:  If on nasal continuous positive airway pressure, respiratory therapy assess nares and determine need for appliance change or resting period.  Outcome: Progressing     Problem: Nutrition Deficit:  Goal: Optimize nutritional status  Outcome: Progressing

## 2024-06-15 NOTE — PROGRESS NOTES
Department of Internal Medicine  Nephrology Progress Note            REASON FOR CONSULTATION:  Acute kidney injury.     HISTORY OF PRESENTING ILLNESS:  59-year-old male with past medical history significant for coronary artery disease, congestive heart failure, longstanding history of diabetes mellitus type 2 with multiple end-organ damage including chronic kidney disease stage 3B/4, peripheral vascular disease, came to ER complaining of a diabetic foot ulcer.  The patient was recently discharged, but came back also complaining of right shoulder cellulitis.  The patient was complaining of fever, chills, rigors with some nausea and poor fluid oral intake.  Routine labs showed hyponatremia and acute kidney injury with a creatinine of 2.6 and sodium of 124.  Admitted for further workup and management.  Renal consultation has been called for above-mentioned reason.         Events noted , labs noted .   Uop Noted   Exp aphasia , being tx to ICU   REVIEW OF SYSTEMS:  No SOB.;MENDOZA   No family present     Physical Exam:    VITALS:  BP (!) 146/76   Pulse 79   Temp 97.6 °F (36.4 °C) (Oral)   Resp 24   Ht 1.753 m (5' 9.02\")   Wt 83.5 kg (184 lb 1.4 oz)   SpO2 99%   BMI 27.17 kg/m²   24HR INTAKE/OUTPUT:    Intake/Output Summary (Last 24 hours) at 6/15/2024 1151  Last data filed at 6/15/2024 0607  Gross per 24 hour   Intake 480 ml   Output 2255 ml   Net -1775 ml         Constitutional:  resting   Respiratory:  CTA  Gastrointestinal:  No  tenderness.  Normal Bowel Sounds  Cardiovascular:  S1, S2 RRR   Edema:  +  edema    DATA:    CBC:  Lab Results   Component Value Date/Time    WBC 8.4 06/15/2024 05:07 AM    RBC 3.49 06/15/2024 05:07 AM    HGB 10.4 06/15/2024 05:07 AM    HCT 29.9 06/15/2024 05:07 AM    MCV 85.6 06/15/2024 05:07 AM    MCH 29.8 06/15/2024 05:07 AM    MCHC 34.8 06/15/2024 05:07 AM    RDW 14.0 06/15/2024 05:07 AM     06/15/2024 05:07 AM    MPV 8.3 06/15/2024 05:07 AM     CMP:  Lab Results   Component  Value Date/Time     06/15/2024 05:07 AM    K 3.2 06/15/2024 05:07 AM    K 3.6 06/12/2024 05:18 AM    CL 99 06/15/2024 05:07 AM    CO2 18 06/15/2024 05:07 AM    BUN 47 06/15/2024 05:07 AM    CREATININE 2.8 06/15/2024 05:07 AM    GFRAA >60 01/03/2017 09:36 PM    AGRATIO 0.4 06/12/2024 05:18 AM    LABGLOM 25 06/15/2024 05:07 AM    LABGLOM 32 04/03/2024 04:27 AM    GLUCOSE 171 06/15/2024 05:07 AM    CALCIUM 7.4 06/15/2024 05:07 AM    BILITOT 0.5 06/12/2024 05:18 AM    ALKPHOS 130 06/12/2024 05:18 AM    AST 9 06/12/2024 05:18 AM    ALT <5 06/12/2024 05:18 AM      Hepatic Function Panel:   Lab Results   Component Value Date/Time    ALKPHOS 130 06/12/2024 05:18 AM    ALT <5 06/12/2024 05:18 AM    AST 9 06/12/2024 05:18 AM    BILITOT 0.5 06/12/2024 05:18 AM      Phosphorus:   Lab Results   Component Value Date/Time    PHOS 4.0 06/15/2024 05:07 AM       ASSESSMENT:  Principal Problem:    Cellulitis and abscess of upper extremity  Active Problems:    Cellulitis of right upper arm    DM (diabetes mellitus), type 2 with complications (HCC)    Antiplatelet or antithrombotic long-term use    Abscess of right arm    Osteomyelitis of left foot (HCC)    Poorly controlled diabetes mellitus (HCC)    Type 2 diabetes mellitus with diabetic foot infection (HCC)    Staph aureus infection  Resolved Problems:    * No resolved hospital problems. *      PLAN:    1- 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 Follows with Dr. Howell in office. Etiology likely 2/2 volume depletion in the setting of poor oral intake, emesis, and diuretic use.              - no change in Cr noted .  On IVF  .    Most likely ATN from hypotension         - Off torsemide              - No NSAIDs. No ACE/ARBs. No SGLT2i.                   2- Hyponatremia: etiology likely 2/2 volume depletion in the setting of poor oral intake, emesis, and diuretic use.              - Na+ 128 .      3- HFrEF              - TTE (3/27/2024): LVEF 20-25%.               - CXR

## 2024-06-15 NOTE — PROGRESS NOTES
NAME:  Juan Carlos Wong Sr  YOB: 1965  MEDICAL RECORD NUMBER:  [id number]    Shift Summary: 6/11: Admitted to PCU for R shoulder cellulitis/abscess and L foot osteomyelitis  6/12: I&D of R shoulder abscess  6/15: Code stroke called, no TNK. Transferred to CVU for cerebell. NIH Q4 x6 occurrences and then every shift/handoff. Most recent NIH 5- pt awakes when called to but very drowsy since admission to cvu    Family updated: Yes- daughter at bedside     Rhythm: Normal Sinus Rhythm     Most recent vitals:   Visit Vitals  /73   Pulse 68   Temp 97.6 °F (36.4 °C) (Oral)   Resp 15   Ht 1.753 m (5' 9.02\")   Wt 83.5 kg (184 lb 1.4 oz)   SpO2 95%   BMI 27.17 kg/m²           No data found.    No data found.      Respiratory support needed (if any):  - RA    Admission weight Weight - Scale: 80.7 kg (177 lb 14.6 oz) (06/11/24 1136)    Today's weight    Wt Readings from Last 1 Encounters:   06/15/24 83.5 kg (184 lb 1.4 oz)        Elliott need assessed each shift: YES -  - continue elliott r/t - I&O  UOP >30ml/hr: YES  Last documented BM (in last 48 hrs):  Patient Vitals for the past 48 hrs:   Last BM (including prior to admit)   06/15/24 0908 06/13/24   06/15/24 1335 06/13/24                Restraints (in use currently or dc'd in last 12 hrs): No    Order current and documentation up to date? N/A    Lines/Drains reviewed @ bedside.  Peripheral IV 06/11/24 Left Forearm (Active)   Number of days: 4       Urinary Catheter 06/12/24 Elliott (Active)   Number of days: 3         Drip rates at handoff:    sodium bicarbonate 75 mEq in sodium chloride 0.45 % 1,000 mL infusion 50 mL/hr at 06/15/24 1438    sodium chloride 5 mL/hr at 06/15/24 1438    dextrose         Lab Data:   CBC:   Recent Labs     06/14/24  0516 06/15/24  0507   WBC 9.7 8.4   HGB 9.8* 10.4*   HCT 28.0* 29.9*   MCV 85.8 85.6    395     BMP:    Recent Labs     06/14/24  0516 06/14/24  1351 06/15/24  0507   * 129* 130*   K 3.6  --  3.2*   CO2 19*   --  18*   BUN 52*  --  47*   CREATININE 2.8*  --  2.8*     LIVR: No results for input(s): \"AST\", \"ALT\" in the last 72 hours.  PT/INR:   Recent Labs     06/15/24  0922   INR 1.40*     APTT: No results for input(s): \"APTT\" in the last 72 hours.  ABG: No results for input(s): \"PHART\", \"[id number]\", \"PO2ART\" in the last 72 hours.    Any consults during the shift? No    Any signed and held orders to be released?  No        4 Eyes Skin Assessment       The patient is being assessed for  Shift Handoff    I agree that at least one RN has performed a thorough Head to Toe Skin Assessment on the patient. ALL assessment sites listed below have been assessed.      Areas assessed by both nurses: Head, Face, Ears, Shoulders, Back, Chest, Arms, Elbows, Hands, Sacrum. Buttock, Coccyx, Ischium, Legs. Feet and Heels, and Under Medical Devices         Does the Patient have a Wound? No noted wound(s)    Wound Care Orders initiated by RN: Yes       Jaxon Prevention initiated by RN: Yes    Pressure Injury (Stage 3,4, Unstageable, DTI, NWPT, and Complex wounds) if present, place Wound referral order by RN under : No    New Ostomies, if present place, Ostomy referral order under : No     Nurse 1 eSignature: Electronically signed by Lianet Benz RN on 6/15/24 at 6:45 PM EDT    **SHARE this note so that the co-signing nurse can place an eSignature**    Nurse 2 eSignature: Electronically signed by Odalys Bhatia RN on 6/15/24 at 7:32 PM EDT

## 2024-06-15 NOTE — PROGRESS NOTES
Neurology rounded on the pt after pt back from CTA and requested ICU upgrade for cerebellar EEG. Rn notified Dr. Neff, charge RN, and supervisor.     Pt to be xfr'd to 2124. RN called report to ICU RN. Shortly after, pt's bed assignment changed to 1308. RN called report to Greer RN. She requested that RN leave the F/C in place since pt is being upgraded to ICU. She states she will follow up with Dr. Neff about the order to remove.     RN checked fridge and patient med drawer for cosyntropin and it was not present. ICU RN will need to call Rx for medication when pt is ready for med admin and the scheduled 30 & 60 minute labs can be done.     Pt and his daughter (Martha) are at bedside and aware of transfer.

## 2024-06-15 NOTE — PROGRESS NOTES
Oral, Daily  atorvastatin (LIPITOR) tablet 40 mg, 40 mg, Oral, Nightly  clopidogrel (PLAVIX) tablet 75 mg, 75 mg, Oral, Nightly  cyclobenzaprine (FLEXERIL) tablet 5 mg, 5 mg, Oral, BID PRN  gabapentin (NEURONTIN) capsule 300 mg, 300 mg, Oral, BID  hydrALAZINE (APRESOLINE) tablet 10 mg, 10 mg, Oral, TID  [Held by provider] isosorbide mononitrate (IMDUR) extended release tablet 15 mg, 15 mg, Oral, Nightly  pantoprazole (PROTONIX) tablet 40 mg, 40 mg, Oral, QAM AC  sodium chloride flush 0.9 % injection 5-40 mL, 5-40 mL, IntraVENous, 2 times per day  sodium chloride flush 0.9 % injection 5-40 mL, 5-40 mL, IntraVENous, PRN  0.9 % sodium chloride infusion, , IntraVENous, PRN  ondansetron (ZOFRAN-ODT) disintegrating tablet 4 mg, 4 mg, Oral, Q8H PRN **OR** ondansetron (ZOFRAN) injection 4 mg, 4 mg, IntraVENous, Q6H PRN  polyethylene glycol (GLYCOLAX) packet 17 g, 17 g, Oral, Daily PRN  acetaminophen (TYLENOL) tablet 650 mg, 650 mg, Oral, Q6H PRN **OR** acetaminophen (TYLENOL) suppository 650 mg, 650 mg, Rectal, Q6H PRN  glucose chewable tablet 16 g, 4 tablet, Oral, PRN  dextrose bolus 10% 125 mL, 125 mL, IntraVENous, PRN **OR** dextrose bolus 10% 250 mL, 250 mL, IntraVENous, PRN  glucagon injection 1 mg, 1 mg, SubCUTAneous, PRN  dextrose 10 % infusion, , IntraVENous, Continuous PRN  insulin lispro (HUMALOG,ADMELOG) injection vial 0-4 Units, 0-4 Units, SubCUTAneous, TID WC  insulin lispro (HUMALOG,ADMELOG) injection vial 0-4 Units, 0-4 Units, SubCUTAneous, Nightly    Allergies:   Patient has no known allergies.    Social History:    TOBACCO:  Never used tobacco  ETOH:  Never drank alcohol    Family History:   History reviewed. No pertinent family history.    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 Osteomyelitis of LEFT 5th metatarsal nearest the Styloid process    4. Diabetes with uncontrolled glucose levels  +Hg A1c 11.4    5. Dr. Alvares Will plan for surgical resection of 5th metatarsal base with transposition of Peroneus brevis tendon Monday / Tusday        Maikol De La Fuente DPLINDSAY  [facility]  [phone]

## 2024-06-15 NOTE — CODE DOCUMENTATION
Pt has new onset of expressive aphasia. Nurse completed NIH of 5. Pt has impaired vision already and is having a hard time completing NIH. Charge Nurse at bedside. Pt was noted to be repeating self which alerted nurse.

## 2024-06-15 NOTE — PLAN OF CARE
MRI brain available.  Shows late subacute left frontal lobe cortical infarct with associated petechial   hemorrhage. No significant mass effect or midline shift.  Discussed with  stroke team.  No TNK.      Will remain on PCU and initiated stroke orderset NIHSS monitoring.     Dylan Neff MD

## 2024-06-15 NOTE — PLAN OF CARE
Problem: Discharge Planning  Goal: Discharge to home or other facility with appropriate resources  6/15/2024 1849 by Lianet Benz RN  Outcome: Progressing  Flowsheets (Taken 6/15/2024 0908 by Allison Cadena RN)  Discharge to home or other facility with appropriate resources: Identify barriers to discharge with patient and caregiver  6/15/2024 0505 by Billy Gonzalez RN  Outcome: Progressing     Problem: Safety - Adult  Goal: Free from fall injury  6/15/2024 1849 by Lianet Benz RN  Outcome: Progressing  6/15/2024 0505 by Billy Gonzalez RN  Outcome: Progressing     Problem: ABCDS Injury Assessment  Goal: Absence of physical injury  6/15/2024 1849 by Lianet Benz RN  Outcome: Progressing  6/15/2024 0505 by Billy Gonzalez RN  Outcome: Progressing     Problem: Chronic Conditions and Co-morbidities  Goal: Patient's chronic conditions and co-morbidity symptoms are monitored and maintained or improved  6/15/2024 1849 by Lianet Benz RN  Outcome: Progressing  Flowsheets (Taken 6/15/2024 0908 by Allison Cadena RN)  Care Plan - Patient's Chronic Conditions and Co-Morbidity Symptoms are Monitored and Maintained or Improved: Monitor and assess patient's chronic conditions and comorbid symptoms for stability, deterioration, or improvement  6/15/2024 0505 by Billy Gonzalez RN  Outcome: Progressing     Problem: Infection - Adult  Goal: Absence of infection at discharge  6/15/2024 1849 by Lianet Benz RN  Outcome: Progressing  Flowsheets (Taken 6/15/2024 0908 by Allison Cadena RN)  Absence of infection at discharge:   Assess and monitor for signs and symptoms of infection   Monitor lab/diagnostic results   Monitor all insertion sites i.e., indwelling lines, tubes and drains   Administer medications as ordered   Instruct and encourage patient and family to use good hand hygiene technique  6/15/2024 0505 by Billy Gonzalez RN  Outcome: Progressing     Problem: Metabolic/Fluid and

## 2024-06-15 NOTE — PROGRESS NOTES
LKW:  0739   Code stroke:  0915    Chat summary with Dr. Neff after code stroke this AM:    1011:  pt with elliott and not active order. Place order to keep or remove?  10:13: notifying Dr. Neff that pt's CT was positive for stroke.   1031:  remove elliott per Dr. Neff and he read message about CT    1055:  Clarifying if pt is getting xfr'd to ICU.   1056:  Upgrade to ICU for possible TNK per Dr. Neff    RN called charge nurse and supervisor. Pt going to ICU.     11:11:  CT tech asking RN to verify with Dr. Neff that he wants the CTA with the pt's GRF being 25.   11:12:  Yes per Dr. Neff. RN made CT tech aware.    11:19:  Clarifying if pt will be a TNK candidate and need ICU xfr? RN calling report to Maple RN and she requested RN clarify if pt is going to receive TNK. If not, pt may not require xfr to ICU.   11:33:  Hold xfr for ICU per Dr. Neff  11:38:  Requested call back to discuss plan of care with physician. Dr. Neff called RN after speaking with  Stroke Team. Holding off on ICU xfr and take pt to PCU. He is not a TNK candidate.     Neurology rounded on the pt and requested a xfr to ICU got cerebellar EEG monitoring. They are concerned for Sz.     12:07:  RN notified Dr. Neff of neurology request.     Pt assigned at bed in ICU 2124. RN called report. Shortly after, bed changed to ICU 1308. Rn called report.    RN transferred pt off unit via bed with all of his belongings. His daughter, Martha, was also present and escorted to the ICU waiting room. RN left pt with 2 RNs present at bedside.

## 2024-06-15 NOTE — CONSULTS
Neurology Consult Note  Reason for Consult: possible stroke    Chief complaint: nothing specific    Dr Neff, Eloy Honeycutt MD asked me to see Juan Carlos Wong Sr in consultation for evaluation of possible stroke    History of Present Illness:  Juan Carlos Wong Sr is a 59 y.o. male who presented w/ a right arm abscess.     I obtained my information via interview w/ the patient and his family at the bedside, supplemented by chart review.     The patient originally presented to the hospital on 6/11 for infection.  He has been treated for R shoulder cellulitis and abscess as well as L foot osteomyelitis.  He is s/p incision and drainage of the R arm abscess.  Nephrology is following for MIGUEL/CKD.      Per RN, during handoff this morning there did not appear to be any obvious neurologic issue.  The patient's daughter arrived around 0830 and noticed that he was having trouble getting words out and seemed confused.  A stroke alert was then called.     CT head was concerning for a newer L frontal lobe lesion.  CTA head/neck no LVO.  A WAKE UP MRI brain was pursued which was read as a late subacute stroke w/ petechial hemorrhage.      Currently the patient remains aphasic.      No other known history of stroke.      Medical History:  Past Medical History:   Diagnosis Date    Back pain     Diabetes mellitus (HCC)     Pneumonia      Past Surgical History:   Procedure Laterality Date    ARM SURGERY Right 6/12/2024    INCISION AND DRAINAGE RIGHT PROXIMAL ARM ABSCESS performed by Dann Nuñez MD at [facility] OR    COLONOSCOPY      LEG SURGERY Left     LIVER BIOPSY       Scheduled Meds:   atorvastatin  80 mg Oral Nightly    ceFAZolin  2,000 mg IntraVENous Q12H    cosyntropin  250 mcg IntraVENous Once    enoxaparin  30 mg SubCUTAneous Daily    metroNIDAZOLE  500 mg Oral 3 times per day    [Held by provider] carvedilol  6.25 mg Oral BID     insulin glargine  13 Units SubCUTAneous Nightly    insulin lispro  5 Units  independently reviewed  1. Unremarkable CTA of the head and neck.  2. Moderate bilateral pleural effusions, incompletely imaged.    Impression/Recommendations  Aphasia.    Abnormal brain MRI.   Sepsis.   Uncontrolled DM.   CHF w/ reduced EF.     The patient acutely was noted to be aphasic this morning.  There is a L frontal lobe lesion which would correlate w/ the patient's symptoms though looks more like a late subacute stroke w/ petechial hemorrhage.      He could be at risk for focal seizures.  rEEG is unavailable this weekend.  Would recommend he be transferred to the ICU for rapid EEG. Discussed w/ RN.  Will consider ativan trial.      STAT CT head w/o for any acute neurologic changes or deterioration.     Ideally would hold antiplatelet therapy though given cardiac history and stent placements last year would have to weigh risks/benefits.      TTE from March reviewed.  Limited study.  Would recommend repeating in order to r/o intracardiac thrombus.     Better glycemic control.  Goal < 7.0.   Statin.  LDL < 70.   Telemetry.      Lev Ambrosio NP  [facility]    A copy of this note was provided for Dr Neff, Eloy Honeycutt MD

## 2024-06-15 NOTE — PROGRESS NOTES
Late entry due to patient care    Pt transferred to CVU 1308. Bedside handoff with Allison GARCIA. NIH 3 at bedside handoff.  Assessment was completed. Wound care of L foot dressing was changed per order. Cerebell was placed on patient and started.

## 2024-06-15 NOTE — PROGRESS NOTES
Hospitalist Progress Note  6/15/2024 10:30 AM  Subjective:   Admit Date: 6/11/2024  PCP: Idris Laws MD Status: Inpatient   Interval History: Hospital Day: 5, admitted with sepsis secondary to left diabetic foot ulcer with open wound with suspected osteomyelitis of the left fifth metatarsal base with plan for surgical resection of 5th metatarsal base with transposition of Peroneus brevis tendon on Monday or Tuesday.  Right proximal arm abscess s/p incision and drainage (6/12). Operative culture positive for Staph aureus MSSA. Antibiotic therapy with cefazolin and metronidazole per infectious disease.     Stroke alert (6/15) with findings concerning for acute ischemic change involving the left frontal lobe. MRI brain pending and  Stroke team recommends CTA head / neck.  MRI brain with subacute left frontal lobe cortical infarct with associated petechial hemorrhage. NO significant mass effect or midline shift.  Discussed with  stroke team.  No TNK   Neurology evaluation recommends transfer to ICU for Cerebell EEG monitoring.  Nephrology evaluation due to CKD stage 3b and need for IV contrast.     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     Diet: controlled carbohydrate (45 gm/meal)  Left forearm peripheral IV (6/11, day #5)  Urinary catheter (6/12, day #4)    Medications:     sodium bicarbonate 75 mEq in sodium chloride 0.45%  50 mL/hr      cefazolin  2,000 mg IntraVENous Q12H   enoxaparin  30 mg SubCUTAneous Daily   metronidazole  500 mg Oral TID   carvedilol  6.25 mg Oral BID [held]\   insulin glargine  13 Units SubCUTAneous Nightly   insulin lispro SSI  5 + 0-4 Units SubCUTAneous TID WC   aspirin  81 mg Oral Daily   atorvastatin  40 mg Oral Nightly   clopidogrel  75 mg Oral Nightly   gabapentin  300 mg Oral BID   hydralazine  10 mg Oral TID   isosorbide mononitrate  15 mg Oral    Hypokalemia:  Potassium chloride IV replacement to goal K+ > 4.0 mmol/L  Gastric reflux and stress ulcer prophylaxis with pantoprazole (Protonix) 40 mg daily.      Advance Directive: Full Code  DVT prophylaxis with enoxaparin 30 mg sub-Q daily.   Discharge planning: > 48 hours      FELIPE BLUNT MD  Rounding Hospitalist

## 2024-06-16 ENCOUNTER — APPOINTMENT (OUTPATIENT)
Dept: CT IMAGING | Age: 59
End: 2024-06-16
Payer: COMMERCIAL

## 2024-06-16 LAB
ALBUMIN SERPL-MCNC: 1.7 G/DL (ref 3.4–5)
ANION GAP SERPL CALCULATED.3IONS-SCNC: 11 MMOL/L (ref 3–16)
BASOPHILS # BLD: 0.1 K/UL (ref 0–0.2)
BASOPHILS NFR BLD: 1.4 %
BUN SERPL-MCNC: 38 MG/DL (ref 7–20)
CALCIUM SERPL-MCNC: 7.6 MG/DL (ref 8.3–10.6)
CHLORIDE SERPL-SCNC: 104 MMOL/L (ref 99–110)
CO2 SERPL-SCNC: 19 MMOL/L (ref 21–32)
CORTIS 1H P 250 UG ACTH RIV SERPL-MCNC: 22.4 UG/DL
CORTIS 30M P 250 UG ACTH RIV SERPL-MCNC: 19.3 UG/DL
CORTIS SERPL-MCNC: 8.2 UG/DL
CREAT SERPL-MCNC: 2.5 MG/DL (ref 0.8–1.3)
DEPRECATED RDW RBC AUTO: 14 % (ref 12.4–15.4)
EOSINOPHIL # BLD: 0.6 K/UL (ref 0–0.6)
EOSINOPHIL NFR BLD: 8.1 %
GFR SERPLBLD CREATININE-BSD FMLA CKD-EPI: 28 ML/MIN/{1.73_M2}
GLUCOSE BLD-MCNC: 109 MG/DL (ref 70–99)
GLUCOSE BLD-MCNC: 71 MG/DL (ref 70–99)
GLUCOSE BLD-MCNC: 80 MG/DL (ref 70–99)
GLUCOSE BLD-MCNC: 89 MG/DL (ref 70–99)
GLUCOSE BLD-MCNC: 98 MG/DL (ref 70–99)
GLUCOSE SERPL-MCNC: 111 MG/DL (ref 70–99)
HCT VFR BLD AUTO: 32.3 % (ref 40.5–52.5)
HGB BLD-MCNC: 11 G/DL (ref 13.5–17.5)
LYMPHOCYTES # BLD: 1 K/UL (ref 1–5.1)
LYMPHOCYTES NFR BLD: 13.5 %
MAGNESIUM SERPL-MCNC: 1.9 MG/DL (ref 1.8–2.4)
MCH RBC QN AUTO: 29.4 PG (ref 26–34)
MCHC RBC AUTO-ENTMCNC: 34.1 G/DL (ref 31–36)
MCV RBC AUTO: 86.4 FL (ref 80–100)
MONOCYTES # BLD: 0.7 K/UL (ref 0–1.3)
MONOCYTES NFR BLD: 9.6 %
NEUTROPHILS # BLD: 5 K/UL (ref 1.7–7.7)
NEUTROPHILS NFR BLD: 67.4 %
PERFORMED ON: ABNORMAL
PERFORMED ON: NORMAL
PHOSPHATE SERPL-MCNC: 3.8 MG/DL (ref 2.5–4.9)
PLATELET # BLD AUTO: 377 K/UL (ref 135–450)
PMV BLD AUTO: 7.9 FL (ref 5–10.5)
POTASSIUM SERPL-SCNC: 3.4 MMOL/L (ref 3.5–5.1)
RBC # BLD AUTO: 3.74 M/UL (ref 4.2–5.9)
SODIUM SERPL-SCNC: 134 MMOL/L (ref 136–145)
WBC # BLD AUTO: 7.5 K/UL (ref 4–11)

## 2024-06-16 PROCEDURE — 36415 COLL VENOUS BLD VENIPUNCTURE: CPT

## 2024-06-16 PROCEDURE — 2580000003 HC RX 258: Performed by: INTERNAL MEDICINE

## 2024-06-16 PROCEDURE — 6370000000 HC RX 637 (ALT 250 FOR IP): Performed by: INTERNAL MEDICINE

## 2024-06-16 PROCEDURE — 6360000002 HC RX W HCPCS: Performed by: INTERNAL MEDICINE

## 2024-06-16 PROCEDURE — 80400 ACTH STIMULATION PANEL: CPT

## 2024-06-16 PROCEDURE — 80069 RENAL FUNCTION PANEL: CPT

## 2024-06-16 PROCEDURE — 70450 CT HEAD/BRAIN W/O DYE: CPT

## 2024-06-16 PROCEDURE — 6360000002 HC RX W HCPCS: Performed by: NURSE PRACTITIONER

## 2024-06-16 PROCEDURE — 85025 COMPLETE CBC W/AUTO DIFF WBC: CPT

## 2024-06-16 PROCEDURE — 83735 ASSAY OF MAGNESIUM: CPT

## 2024-06-16 PROCEDURE — 2500000003 HC RX 250 WO HCPCS: Performed by: INTERNAL MEDICINE

## 2024-06-16 PROCEDURE — 94760 N-INVAS EAR/PLS OXIMETRY 1: CPT

## 2024-06-16 PROCEDURE — 2100000000 HC CCU R&B

## 2024-06-16 RX ORDER — METRONIDAZOLE 500 MG/100ML
500 INJECTION, SOLUTION INTRAVENOUS EVERY 8 HOURS
Status: DISCONTINUED | OUTPATIENT
Start: 2024-06-16 | End: 2024-06-21

## 2024-06-16 RX ORDER — LORAZEPAM 2 MG/ML
1 INJECTION INTRAMUSCULAR ONCE
Status: COMPLETED | OUTPATIENT
Start: 2024-06-16 | End: 2024-06-16

## 2024-06-16 RX ORDER — POTASSIUM CHLORIDE 7.45 MG/ML
10 INJECTION INTRAVENOUS ONCE
Status: COMPLETED | OUTPATIENT
Start: 2024-06-16 | End: 2024-06-16

## 2024-06-16 RX ADMIN — SODIUM CHLORIDE 2000 MG: 900 INJECTION INTRAVENOUS at 12:13

## 2024-06-16 RX ADMIN — GABAPENTIN 300 MG: 300 CAPSULE ORAL at 08:13

## 2024-06-16 RX ADMIN — COSYNTROPIN 250 MCG: 0.25 INJECTION, POWDER, LYOPHILIZED, FOR SOLUTION INTRAMUSCULAR; INTRAVENOUS at 08:04

## 2024-06-16 RX ADMIN — METRONIDAZOLE 500 MG: 500 TABLET ORAL at 06:06

## 2024-06-16 RX ADMIN — ENOXAPARIN SODIUM 30 MG: 100 INJECTION SUBCUTANEOUS at 08:13

## 2024-06-16 RX ADMIN — POTASSIUM CHLORIDE 10 MEQ: 7.46 INJECTION, SOLUTION INTRAVENOUS at 06:06

## 2024-06-16 RX ADMIN — SODIUM CHLORIDE 2000 MG: 900 INJECTION INTRAVENOUS at 23:32

## 2024-06-16 RX ADMIN — SODIUM CHLORIDE 2000 MG: 900 INJECTION INTRAVENOUS at 00:00

## 2024-06-16 RX ADMIN — INSULIN LISPRO 5 UNITS: 100 INJECTION, SOLUTION INTRAVENOUS; SUBCUTANEOUS at 12:15

## 2024-06-16 RX ADMIN — INSULIN GLARGINE 13 UNITS: 100 INJECTION, SOLUTION SUBCUTANEOUS at 20:30

## 2024-06-16 RX ADMIN — ATORVASTATIN CALCIUM 80 MG: 80 TABLET, FILM COATED ORAL at 20:29

## 2024-06-16 RX ADMIN — GABAPENTIN 300 MG: 300 CAPSULE ORAL at 20:30

## 2024-06-16 RX ADMIN — PANTOPRAZOLE SODIUM 40 MG: 40 TABLET, DELAYED RELEASE ORAL at 06:06

## 2024-06-16 RX ADMIN — HYDRALAZINE HYDROCHLORIDE 10 MG: 10 TABLET, FILM COATED ORAL at 08:13

## 2024-06-16 RX ADMIN — SODIUM BICARBONATE: 84 INJECTION, SOLUTION INTRAVENOUS at 23:33

## 2024-06-16 RX ADMIN — LORAZEPAM 1 MG: 2 INJECTION INTRAMUSCULAR; INTRAVENOUS at 11:28

## 2024-06-16 RX ADMIN — METRONIDAZOLE 500 MG: 500 INJECTION, SOLUTION INTRAVENOUS at 15:51

## 2024-06-16 RX ADMIN — SODIUM CHLORIDE, PRESERVATIVE FREE 10 ML: 5 INJECTION INTRAVENOUS at 08:18

## 2024-06-16 RX ADMIN — HYDRALAZINE HYDROCHLORIDE 10 MG: 10 TABLET, FILM COATED ORAL at 20:30

## 2024-06-16 RX ADMIN — SODIUM BICARBONATE: 84 INJECTION, SOLUTION INTRAVENOUS at 02:08

## 2024-06-16 RX ADMIN — LORAZEPAM 1 MG: 2 INJECTION INTRAMUSCULAR; INTRAVENOUS at 11:16

## 2024-06-16 NOTE — PROGRESS NOTES
Neurology Progress Note    Updates  Patient remains altered/aphasic during my encounter.   RN says that he has been waxing and waning.   Family at the bedside.      No rapid EEG documentation in the chart though RN stated that there were no concerning findings.      Medical History:  Past Medical History:   Diagnosis Date    Back pain     Diabetes mellitus (HCC)     Pneumonia      Past Surgical History:   Procedure Laterality Date    ARM SURGERY Right 6/12/2024    INCISION AND DRAINAGE RIGHT PROXIMAL ARM ABSCESS performed by Dann Nuñez MD at [facility] OR    COLONOSCOPY      LEG SURGERY Left     LIVER BIOPSY       Scheduled Meds:   atorvastatin  80 mg Oral Nightly    ceFAZolin  2,000 mg IntraVENous Q12H    enoxaparin  30 mg SubCUTAneous Daily    metroNIDAZOLE  500 mg Oral 3 times per day    [Held by provider] carvedilol  6.25 mg Oral BID WC    insulin glargine  13 Units SubCUTAneous Nightly    insulin lispro  5 Units SubCUTAneous TID WC    [Held by provider] aspirin  81 mg Oral Daily    [Held by provider] clopidogrel  75 mg Oral Nightly    gabapentin  300 mg Oral BID    hydrALAZINE  10 mg Oral TID    [Held by provider] isosorbide mononitrate  15 mg Oral Nightly    pantoprazole  40 mg Oral QAM AC    sodium chloride flush  5-40 mL IntraVENous 2 times per day    insulin lispro  0-4 Units SubCUTAneous TID WC    insulin lispro  0-4 Units SubCUTAneous Nightly     Continuous Infusions:   sodium bicarbonate 75 mEq in sodium chloride 0.45 % 1,000 mL infusion 50 mL/hr at 06/16/24 0600    sodium chloride Stopped (06/16/24 0053)    dextrose       Medications Prior to Admission:   Tirzepatide (MOUNJARO) 5 MG/0.5ML SOPN SC injection, Inject 0.5 mLs into the skin once a week  gabapentin (NEURONTIN) 300 MG capsule, Take 1 capsule by mouth 2 times daily for 180 days.  ondansetron (ZOFRAN-ODT) 4 MG disintegrating tablet, Take 1 tablet by mouth 3 times daily as needed for Nausea or Vomiting  cyclobenzaprine (FLEXERIL) 10 MG

## 2024-06-16 NOTE — PLAN OF CARE
Problem: Discharge Planning  Goal: Discharge to home or other facility with appropriate resources  Outcome: Progressing     Problem: Safety - Adult  Goal: Free from fall injury  Outcome: Progressing     Problem: ABCDS Injury Assessment  Goal: Absence of physical injury  Outcome: Progressing     Problem: Chronic Conditions and Co-morbidities  Goal: Patient's chronic conditions and co-morbidity symptoms are monitored and maintained or improved  Outcome: Progressing     Problem: Infection - Adult  Goal: Absence of infection at discharge  Outcome: Progressing     Problem: Metabolic/Fluid and Electrolytes - Adult  Goal: Electrolytes maintained within normal limits  Outcome: Progressing  Goal: Glucose maintained within prescribed range  Outcome: Progressing     Problem: Skin/Tissue Integrity - Adult  Goal: Oral mucous membranes remain intact  Outcome: Progressing     Problem: Pain  Goal: Verbalizes/displays adequate comfort level or baseline comfort level  Outcome: Progressing     Problem: Skin/Tissue Integrity  Goal: Absence of new skin breakdown  Description: 1.  Monitor for areas of redness and/or skin breakdown  2.  Assess vascular access sites hourly  3.  Every 4-6 hours minimum:  Change oxygen saturation probe site  4.  Every 4-6 hours:  If on nasal continuous positive airway pressure, respiratory therapy assess nares and determine need for appliance change or resting period.  Outcome: Progressing     Problem: Nutrition Deficit:  Goal: Optimize nutritional status  Outcome: Progressing     Problem: Neurosensory - Adult  Goal: Achieves stable or improved neurological status  Outcome: Progressing  Goal: Absence of seizures  Outcome: Progressing

## 2024-06-16 NOTE — PLAN OF CARE
Problem: Discharge Planning  Goal: Discharge to home or other facility with appropriate resources  6/15/2024 2247 by Odalys Bhatia RN  Outcome: Progressing     Problem: Safety - Adult  Goal: Free from fall injury  6/15/2024 2247 by Odalys Bhatia RN  Outcome: Progressing  Flowsheets (Taken 6/15/2024 2245)  Free From Fall Injury: Instruct family/caregiver on patient safety     Problem: ABCDS Injury Assessment  Goal: Absence of physical injury  6/15/2024 2247 by Odalys Bhatia RN  Outcome: Progressing  Flowsheets (Taken 6/15/2024 2245)  Absence of Physical Injury: Implement safety measures based on patient assessment     Problem: Chronic Conditions and Co-morbidities  Goal: Patient's chronic conditions and co-morbidity symptoms are monitored and maintained or improved  6/15/2024 2247 by Odalys Bhatia RN  Outcome: Progressing     Problem: Infection - Adult  Goal: Absence of infection at discharge  6/15/2024 2247 by Odalys Bhatia RN  Outcome: Progressing     Problem: Metabolic/Fluid and Electrolytes - Adult  Goal: Electrolytes maintained within normal limits  6/15/2024 2247 by Odalys Bhatia RN  Outcome: Progressing     Problem: Metabolic/Fluid and Electrolytes - Adult  Goal: Glucose maintained within prescribed range  6/15/2024 2247 by Odalys Bhatia RN  Outcome: Progressing     Problem: Skin/Tissue Integrity - Adult  Goal: Oral mucous membranes remain intact  6/15/2024 2247 by Odalys Bhatia RN  Outcome: Progressing  Flowsheets  Taken 6/15/2024 2245  Oral Mucous Membranes Remain Intact: Assess oral mucosa and hygiene practices  Taken 6/15/2024 2000  Oral Mucous Membranes Remain Intact: Assess oral mucosa and hygiene practices     Problem: Neurosensory - Adult  Goal: Achieves stable or improved neurological status  6/15/2024 2247 by Odalys Bhatia RN  Outcome: Progressing  Flowsheets (Taken 6/15/2024 2000)  Achieves stable or improved neurological status: Assess for and report changes in

## 2024-06-16 NOTE — FLOWSHEET NOTE
24 0800   NIHSS Stroke Scale   Interval Hand-off/Transfer   Level of Consciousness (1a) 0   LOC Questions (1b) 2   LOC Commands (1c) 0   Best Gaze (2) 0   Visual (3) 0   Facial Palsy (4) 0   Motor Arm, Left (5a) 0   Motor Arm, Right (5b) 0   Motor Leg, Left (6a) 0   Motor Leg, Right (6b) 0   Limb Ataxia (7) 0   Sensory (8) 0   Best Language (9) 2   Dysarthria (10) 1   Extinction and Inattention (11) 0   Total 5   NIHSS Stroke Scale Assessed Yes     NIH remains 5. Patient does require a lot of directions, easily distracted. Expressive aphasia- stated  and then just repeats 63 at any question asked

## 2024-06-16 NOTE — PROGRESS NOTES
Plavix still ordered. Neurology notes states to hold secondary to petechial hemorrhage. Candy Vital notified and plavix held per provider.

## 2024-06-16 NOTE — PROGRESS NOTES
Hospitalist Progress Note  6/16/2024 9:07 AM  Subjective:   Admit Date: 6/11/2024  PCP: Idris Laws MD Status: Inpatient   Interval History: Hospital Day: 6, NIHSS at 5.  Patient easily distracted.  Per nursing, he repeats 63 as answer to questions.  Diet advanced from NPO to full liquids.  SLP evaluation pending.  No concerning findings on rapid EEG.      History of present illness:  Admitted with sepsis secondary to left diabetic foot ulcer with open wound with suspected osteomyelitis of the left fifth metatarsal base with plan for surgical resection of 5th metatarsal base with transposition of Peroneus brevis tendon on Monday or Tuesday.  Right proximal arm abscess s/p incision and drainage (6/12). Operative culture positive for Staph aureus MSSA. Antibiotic therapy with cefazolin and metronidazole per infectious disease.      Stroke alert (6/15) with findings concerning for acute ischemic change involving the left frontal lobe. MRI brain pending and  Stroke team recommends CTA head / neck.  MRI brain with subacute left frontal lobe cortical infarct with associated petechial hemorrhage. NO significant mass effect or midline shift.  Discussed with  stroke team.  No TNK   Neurology evaluation recommends transfer to ICU for [facility] EEG monitoring.  Nephrology evaluation due to CKD stage 3b and need for IV contrast.      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      Diet: NPO -> full liquids (6/16)  Left forearm peripheral IV (6/11, day #6)  Urinary catheter (6/12, day #5)    Medications:     sodium bicarbonate 75 mEq in sodium chloride 0.45%  50 mL/hr      atorvastatin  80 mg Oral Nightly   cefazolin  2,000 mg IntraVENous Q12H   enoxaparin  30 mg SubCUTAneous Daily   metronidazole  500 mg Oral 3 times per day   carvedilol  6.25 mg Oral BID [held]   insulin glargine  13 Units  unremarkable.  No TNK per  stroke team.  Aspirin and clopidogrel held x 14 days per neurology.  Increased atorvastatin from 40 mg to 80 mg nightly.  PT/OT/SLP evaluation.    Type 2 diabetes (uncontrolled, HgbA1c 11.4%) with neuropathy and nephropathy.  Basal glargine insulin 13 units daily.  Cover with a \"sliding scale\" lispro moderate scale prandial correction insulin.    CKD stage 3b s/p IV contrast CTA head / neck (6/15) with volume expansion IV crystalloid.  Nephrology following.   Coronary artery disease on aspirin and clopidogrel - held x 14 days per neurology due to petechial hemorrhage noted on MRI brain.   Hypokalemia:  Potassium chloride IV replacement to goal K+ > 4.0 mmol/L  Gastric reflux and stress ulcer prophylaxis with pantoprazole (Protonix) 40 mg daily.   Low cortisol with ACTH stimulation testing ongoing.  May need stress steroids.  Not hypotensive.       Advance Directive: Full Code  DVT prophylaxis with enoxaparin 30 mg sub-Q daily.   Discharge planning: > 48 hours      FELIPE BLUNT MD  [facility] Hospitalist

## 2024-06-16 NOTE — PROGRESS NOTES
Department of Podiatry Note  Natanael Alvares DPM Attending       CHIEF COMPLAINT:  Wound with drainage, plantar LEFT foot with suspected osteomyelitis of 5th metatarsal    HISTORY OF PRESENT ILLNESS:                The patient is a 59 y.o. male with significant past medical history of Diabetes with peripheral neuropathy and  previos amputation status who is consulted for a several weeks history of wound of plantar LEFT foot with foul drainage and considerable depth that probes to bone. Dressings intact left     Past Medical History:        Diagnosis Date    Back pain     Diabetes mellitus (HCC)     Pneumonia      Past Surgical History:        Procedure Laterality Date    ARM SURGERY Right 6/12/2024    INCISION AND DRAINAGE RIGHT PROXIMAL ARM ABSCESS performed by Dann Nuñez MD at [facility] OR    COLONOSCOPY      LEG SURGERY Left     LIVER BIOPSY       Current Medications:    Current Facility-Administered Medications: atorvastatin (LIPITOR) tablet 80 mg, 80 mg, Oral, Nightly  ceFAZolin (ANCEF) 2,000 mg in sodium chloride 0.9 % 50 mL IVPB (mini-bag), 2,000 mg, IntraVENous, Q12H  sodium bicarbonate 75 mEq in sodium chloride 0.45 % 1,000 mL infusion, , IntraVENous, Continuous  enoxaparin Sodium (LOVENOX) injection 30 mg, 30 mg, SubCUTAneous, Daily  metroNIDAZOLE (FLAGYL) tablet 500 mg, 500 mg, Oral, 3 times per day  [Held by provider] carvedilol (COREG) tablet 6.25 mg, 6.25 mg, Oral, BID WC  insulin glargine (LANTUS) injection vial 13 Units, 13 Units, SubCUTAneous, Nightly  insulin lispro (HUMALOG,ADMELOG) injection vial 5 Units, 5 Units, SubCUTAneous, TID WC  oxyCODONE-acetaminophen (PERCOCET) 5-325 MG per tablet 1 tablet, 1 tablet, Oral, Q4H PRN **OR** oxyCODONE-acetaminophen (PERCOCET) 5-325 MG per tablet 2 tablet, 2 tablet, Oral, Q4H PRN  morphine (PF) injection 2 mg, 2 mg, IntraVENous, Q2H PRN **OR** morphine (PF) injection 4 mg, 4 mg, IntraVENous, Q2H PRN  [Held by provider] aspirin EC tablet 81 mg, 81 mg,

## 2024-06-16 NOTE — PROGRESS NOTES
NAME:  Juan Carlos Wong Sr  YOB: 1965  MEDICAL RECORD NUMBER:  [id number]    Shift Summary: Pt remains aphasic and NIH remains 5. Able to swallow his pills with water.     Family updated: Yes:  daughter Elmira updated via phone last night    Rhythm: Normal Sinus Rhythm     Most recent vitals:   Visit Vitals  BP (!) 151/78   Pulse 68   Temp 97.4 °F (36.3 °C) (Oral)   Resp 17   Ht 1.753 m (5' 9.02\")   Wt 83.1 kg (183 lb 3.2 oz)   SpO2 98%   BMI 27.04 kg/m²           No data found.    No data found.      Respiratory support needed (if any):  - O2 - NC - 1 lpm    Admission weight Weight - Scale: 80.7 kg (177 lb 14.6 oz) (06/11/24 1136)    Today's weight    Wt Readings from Last 1 Encounters:   06/16/24 83.1 kg (183 lb 3.2 oz)        Elliott need assessed each shift: YES -  - continue elliott r/t - strict I/O  UOP >30ml/hr: YES  Last documented BM (in last 48 hrs):  Patient Vitals for the past 48 hrs:   Last BM (including prior to admit)   06/15/24 0908 06/13/24   06/15/24 1335 06/13/24   06/15/24 2000 06/13/24                Restraints (in use currently or dc'd in last 12 hrs): No    Order current and documentation up to date? No    Lines/Drains reviewed @ bedside.  Peripheral IV 06/11/24 Left Forearm (Active)   Number of days: 4       Urinary Catheter 06/12/24 Elliott (Active)   Number of days: 3         Drip rates at handoff:    sodium bicarbonate 75 mEq in sodium chloride 0.45 % 1,000 mL infusion 50 mL/hr at 06/16/24 0600    sodium chloride Stopped (06/16/24 0053)    dextrose         Lab Data:   CBC:   Recent Labs     06/15/24  0507 06/16/24 0315   WBC 8.4 7.5   HGB 10.4* 11.0*   HCT 29.9* 32.3*   MCV 85.6 86.4    377     BMP:    Recent Labs     06/15/24  0507 06/16/24 0315   * 134*   K 3.2* 3.4*   CO2 18* 19*   BUN 47* 38*   CREATININE 2.8* 2.5*     LIVR: No results for input(s): \"AST\", \"ALT\" in the last 72 hours.  PT/INR:   Recent Labs     06/15/24  0922   INR 1.40*     APTT: No results  for input(s): \"APTT\" in the last 72 hours.  ABG: No results for input(s): \"PHART\", \"RMS7FKZ\", \"PO2ART\" in the last 72 hours.    Any consults during the shift? No    Any signed and held orders to be released?  No        4 Eyes Skin Assessment       The patient is being assessed for  Shift Handoff    I agree that at least one RN has performed a thorough Head to Toe Skin Assessment on the patient. ALL assessment sites listed below have been assessed.      Areas assessed by both nurses: Head, Face, Ears, Shoulders, Back, Chest, Arms, Elbows, Hands, Sacrum. Buttock, Coccyx, Ischium, Legs. Feet and Heels, and Under Medical Devices         Does the Patient have a Wound? Yes wound(s) were present on assessment. LDA wound assessment was Initiated and completed by RN    Wound Care Orders initiated by RN: No       Jaxon Prevention initiated by RN: No    Pressure Injury (Stage 3,4, Unstageable, DTI, NWPT, and Complex wounds) if present, place Wound referral order by RN under : No    New Ostomies, if present place, Ostomy referral order under : No     Nurse 1 eSignature: Electronically signed by Odalys Bhatia RN on 6/16/24 at 6:53 AM EDT    **SHARE this note so that the co-signing nurse can place an eSignature**    Nurse 2 eSignature: Electronically signed by Lianet Benz RN on 6/16/24 at 7:39 AM EDT

## 2024-06-16 NOTE — FLOWSHEET NOTE
06/16/24 1200   NIHSS Stroke Scale   Interval Reassessment   Level of Consciousness (1a) 1   LOC Questions (1b) 1   LOC Commands (1c) 0   Best Gaze (2) 0   Visual (3) 0   Facial Palsy (4) 0   Motor Arm, Left (5a) 0   Motor Arm, Right (5b) 0   Motor Leg, Left (6a) 0   Motor Leg, Right (6b) 0   Limb Ataxia (7) 0   Sensory (8) 0   Best Language (9) 1   Dysarthria (10) 0   Extinction and Inattention (11) 0   Total 3   NIHSS Stroke Scale Assessed Yes     NIH improved from 5 to 3. Pt still with some asphasia but speech was much more clear. Pt was very tired from ativan and did need some frequent cues to stay awake. Pt was alert to self, knew he was in a hospital, and was able to state the correct year however incorrect month.

## 2024-06-16 NOTE — PROGRESS NOTES
Department of Internal Medicine  Nephrology Progress Note            REASON FOR CONSULTATION:  Acute kidney injury.     HISTORY OF PRESENTING ILLNESS:  59-year-old male with past medical history significant for coronary artery disease, congestive heart failure, longstanding history of diabetes mellitus type 2 with multiple end-organ damage including chronic kidney disease stage 3B/4, peripheral vascular disease, came to ER complaining of a diabetic foot ulcer.  The patient was recently discharged, but came back also complaining of right shoulder cellulitis.  The patient was complaining of fever, chills, rigors with some nausea and poor fluid oral intake.  Routine labs showed hyponatremia and acute kidney injury with a creatinine of 2.6 and sodium of 124.  Admitted for further workup and management.  Renal consultation has been called for above-mentioned reason.         Events noted , labs noted .   Uop Noted   Exp aphasia , being tx to ICU   REVIEW OF SYSTEMS:  No SOB.;MENDOZA   family present     Physical Exam:    VITALS:  /65   Pulse 73   Temp 97.6 °F (36.4 °C) (Axillary)   Resp 14   Ht 1.753 m (5' 9.02\")   Wt 83.1 kg (183 lb 3.2 oz)   SpO2 95%   BMI 27.04 kg/m²   24HR INTAKE/OUTPUT:    Intake/Output Summary (Last 24 hours) at 6/16/2024 1232  Last data filed at 6/16/2024 1200  Gross per 24 hour   Intake 2599.74 ml   Output 4930 ml   Net -2330.26 ml         Constitutional:  resting   Respiratory:  CTA  Gastrointestinal:  No  tenderness.  Normal Bowel Sounds  Cardiovascular:  S1, S2 RRR   Edema:  +  edema    DATA:    CBC:  Lab Results   Component Value Date/Time    WBC 7.5 06/16/2024 03:15 AM    RBC 3.74 06/16/2024 03:15 AM    HGB 11.0 06/16/2024 03:15 AM    HCT 32.3 06/16/2024 03:15 AM    MCV 86.4 06/16/2024 03:15 AM    MCH 29.4 06/16/2024 03:15 AM    MCHC 34.1 06/16/2024 03:15 AM    RDW 14.0 06/16/2024 03:15 AM     06/16/2024 03:15 AM    MPV 7.9 06/16/2024 03:15 AM     CMP:  Lab Results   Component  unremarkable              - Holding torsemide              - Carefully monitor volume status while receiving IVF              - Strict I/Os. Daily weights.     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      5- Hypertension              - Controlled              - Continue current regimen     6- Ac CVA with Exp aphasia               -Neuro on board .      7- Anemia              - Hgb within target              - Monitor      Will follow         Jenniefr Otero MD, FACP

## 2024-06-16 NOTE — PROGRESS NOTES
General Surgery    Dressing change to right arm done today  Some slough in the wound base but otherwise OK    Continue wet to dry  Will monitor    Electronically signed by ERIKA HERNANDEZ MD on 6/16/2024 at 8:09 AM

## 2024-06-17 DIAGNOSIS — E11.41 DIABETIC MONONEUROPATHY ASSOCIATED WITH TYPE 2 DIABETES MELLITUS (HCC): ICD-10-CM

## 2024-06-17 PROBLEM — I63.322 CEREBROVASCULAR ACCIDENT (CVA) DUE TO THROMBOSIS OF LEFT ANTERIOR CEREBRAL ARTERY (HCC): Status: ACTIVE | Noted: 2024-06-17

## 2024-06-17 LAB
ALBUMIN SERPL-MCNC: 2 G/DL (ref 3.4–5)
ANION GAP SERPL CALCULATED.3IONS-SCNC: 9 MMOL/L (ref 3–16)
BACTERIA SPEC AEROBE CULT: ABNORMAL
BACTERIA SPEC ANAEROBE CULT: ABNORMAL
BUN SERPL-MCNC: 34 MG/DL (ref 7–20)
CALCIUM SERPL-MCNC: 7.7 MG/DL (ref 8.3–10.6)
CHLORIDE SERPL-SCNC: 102 MMOL/L (ref 99–110)
CO2 SERPL-SCNC: 22 MMOL/L (ref 21–32)
CORTIS SERPL-MCNC: 6 UG/DL
CREAT SERPL-MCNC: 2.3 MG/DL (ref 0.8–1.3)
DEPRECATED RDW RBC AUTO: 14.1 % (ref 12.4–15.4)
GFR SERPLBLD CREATININE-BSD FMLA CKD-EPI: 31 ML/MIN/{1.73_M2}
GLUCOSE BLD-MCNC: 148 MG/DL (ref 70–99)
GLUCOSE BLD-MCNC: 156 MG/DL (ref 70–99)
GLUCOSE BLD-MCNC: 163 MG/DL (ref 70–99)
GLUCOSE BLD-MCNC: 169 MG/DL (ref 70–99)
GLUCOSE SERPL-MCNC: 175 MG/DL (ref 70–99)
GRAM STN SPEC: ABNORMAL
HCT VFR BLD AUTO: 30.7 % (ref 40.5–52.5)
HGB BLD-MCNC: 10.7 G/DL (ref 13.5–17.5)
MCH RBC QN AUTO: 29.9 PG (ref 26–34)
MCHC RBC AUTO-ENTMCNC: 34.8 G/DL (ref 31–36)
MCV RBC AUTO: 86 FL (ref 80–100)
ORGANISM: ABNORMAL
PERFORMED ON: ABNORMAL
PHOSPHATE SERPL-MCNC: 3.2 MG/DL (ref 2.5–4.9)
PLATELET # BLD AUTO: 407 K/UL (ref 135–450)
PMV BLD AUTO: 7.7 FL (ref 5–10.5)
POTASSIUM SERPL-SCNC: 3.1 MMOL/L (ref 3.5–5.1)
RBC # BLD AUTO: 3.57 M/UL (ref 4.2–5.9)
SODIUM SERPL-SCNC: 133 MMOL/L (ref 136–145)
WBC # BLD AUTO: 8.4 K/UL (ref 4–11)

## 2024-06-17 PROCEDURE — APPNB15 APP NON BILLABLE TIME 0-15 MINS: Performed by: PHYSICIAN ASSISTANT

## 2024-06-17 PROCEDURE — 2580000003 HC RX 258: Performed by: INTERNAL MEDICINE

## 2024-06-17 PROCEDURE — 97535 SELF CARE MNGMENT TRAINING: CPT

## 2024-06-17 PROCEDURE — 2100000000 HC CCU R&B

## 2024-06-17 PROCEDURE — 6360000002 HC RX W HCPCS: Performed by: INTERNAL MEDICINE

## 2024-06-17 PROCEDURE — 99024 POSTOP FOLLOW-UP VISIT: CPT | Performed by: PHYSICIAN ASSISTANT

## 2024-06-17 PROCEDURE — 92523 SPEECH SOUND LANG COMPREHEN: CPT

## 2024-06-17 PROCEDURE — 6370000000 HC RX 637 (ALT 250 FOR IP): Performed by: INTERNAL MEDICINE

## 2024-06-17 PROCEDURE — 85027 COMPLETE CBC AUTOMATED: CPT

## 2024-06-17 PROCEDURE — 95819 EEG AWAKE AND ASLEEP: CPT

## 2024-06-17 PROCEDURE — 97530 THERAPEUTIC ACTIVITIES: CPT

## 2024-06-17 PROCEDURE — 36415 COLL VENOUS BLD VENIPUNCTURE: CPT

## 2024-06-17 PROCEDURE — 82533 TOTAL CORTISOL: CPT

## 2024-06-17 PROCEDURE — 2500000003 HC RX 250 WO HCPCS: Performed by: INTERNAL MEDICINE

## 2024-06-17 PROCEDURE — 99233 SBSQ HOSP IP/OBS HIGH 50: CPT | Performed by: INTERNAL MEDICINE

## 2024-06-17 PROCEDURE — 92610 EVALUATE SWALLOWING FUNCTION: CPT

## 2024-06-17 PROCEDURE — APPSS15 APP SPLIT SHARED TIME 0-15 MINUTES: Performed by: PHYSICIAN ASSISTANT

## 2024-06-17 PROCEDURE — 80069 RENAL FUNCTION PANEL: CPT

## 2024-06-17 RX ORDER — POTASSIUM CHLORIDE 7.45 MG/ML
10 INJECTION INTRAVENOUS PRN
Status: DISCONTINUED | OUTPATIENT
Start: 2024-06-17 | End: 2024-06-27 | Stop reason: HOSPADM

## 2024-06-17 RX ORDER — POTASSIUM CHLORIDE 20 MEQ/1
40 TABLET, EXTENDED RELEASE ORAL PRN
Status: DISCONTINUED | OUTPATIENT
Start: 2024-06-17 | End: 2024-06-27 | Stop reason: HOSPADM

## 2024-06-17 RX ADMIN — SODIUM CHLORIDE 2000 MG: 900 INJECTION INTRAVENOUS at 23:36

## 2024-06-17 RX ADMIN — ENOXAPARIN SODIUM 30 MG: 100 INJECTION SUBCUTANEOUS at 07:57

## 2024-06-17 RX ADMIN — METRONIDAZOLE 500 MG: 500 INJECTION, SOLUTION INTRAVENOUS at 00:06

## 2024-06-17 RX ADMIN — METRONIDAZOLE 500 MG: 500 INJECTION, SOLUTION INTRAVENOUS at 16:47

## 2024-06-17 RX ADMIN — HYDRALAZINE HYDROCHLORIDE 10 MG: 10 TABLET, FILM COATED ORAL at 21:20

## 2024-06-17 RX ADMIN — SODIUM BICARBONATE: 84 INJECTION, SOLUTION INTRAVENOUS at 20:11

## 2024-06-17 RX ADMIN — SODIUM CHLORIDE 2000 MG: 900 INJECTION INTRAVENOUS at 11:39

## 2024-06-17 RX ADMIN — INSULIN GLARGINE 13 UNITS: 100 INJECTION, SOLUTION SUBCUTANEOUS at 21:20

## 2024-06-17 RX ADMIN — GABAPENTIN 300 MG: 300 CAPSULE ORAL at 21:20

## 2024-06-17 RX ADMIN — HYDRALAZINE HYDROCHLORIDE 10 MG: 10 TABLET, FILM COATED ORAL at 13:52

## 2024-06-17 RX ADMIN — SODIUM CHLORIDE, PRESERVATIVE FREE 10 ML: 5 INJECTION INTRAVENOUS at 21:26

## 2024-06-17 RX ADMIN — GABAPENTIN 300 MG: 300 CAPSULE ORAL at 07:57

## 2024-06-17 RX ADMIN — INSULIN LISPRO 5 UNITS: 100 INJECTION, SOLUTION INTRAVENOUS; SUBCUTANEOUS at 09:25

## 2024-06-17 RX ADMIN — PANTOPRAZOLE SODIUM 40 MG: 40 TABLET, DELAYED RELEASE ORAL at 06:10

## 2024-06-17 RX ADMIN — INSULIN LISPRO 5 UNITS: 100 INJECTION, SOLUTION INTRAVENOUS; SUBCUTANEOUS at 12:58

## 2024-06-17 RX ADMIN — ATORVASTATIN CALCIUM 80 MG: 80 TABLET, FILM COATED ORAL at 21:20

## 2024-06-17 RX ADMIN — HYDRALAZINE HYDROCHLORIDE 10 MG: 10 TABLET, FILM COATED ORAL at 07:57

## 2024-06-17 RX ADMIN — METRONIDAZOLE 500 MG: 500 INJECTION, SOLUTION INTRAVENOUS at 07:59

## 2024-06-17 RX ADMIN — POTASSIUM BICARBONATE 40 MEQ: 782 TABLET, EFFERVESCENT ORAL at 07:57

## 2024-06-17 RX ADMIN — INSULIN LISPRO 5 UNITS: 100 INJECTION, SOLUTION INTRAVENOUS; SUBCUTANEOUS at 16:45

## 2024-06-17 RX ADMIN — SODIUM CHLORIDE, PRESERVATIVE FREE 10 ML: 5 INJECTION INTRAVENOUS at 07:58

## 2024-06-17 ASSESSMENT — PAIN SCALES - GENERAL: PAINLEVEL_OUTOF10: 0

## 2024-06-17 NOTE — PROGRESS NOTES
Neurology Progress Note    Updates  Doing much better today.   Still a bit confused.     Medical History:  Past Medical History:   Diagnosis Date    Back pain     Diabetes mellitus (HCC)     Pneumonia      Past Surgical History:   Procedure Laterality Date    ARM SURGERY Right 6/12/2024    INCISION AND DRAINAGE RIGHT PROXIMAL ARM ABSCESS performed by Dann Nuñez MD at [facility] OR    COLONOSCOPY      LEG SURGERY Left     LIVER BIOPSY       Scheduled Meds:   metroNIDAZOLE  500 mg IntraVENous Q8H    atorvastatin  80 mg Oral Nightly    ceFAZolin  2,000 mg IntraVENous Q12H    enoxaparin  30 mg SubCUTAneous Daily    [Held by provider] carvedilol  6.25 mg Oral BID WC    insulin glargine  13 Units SubCUTAneous Nightly    insulin lispro  5 Units SubCUTAneous TID WC    [Held by provider] aspirin  81 mg Oral Daily    [Held by provider] clopidogrel  75 mg Oral Nightly    gabapentin  300 mg Oral BID    hydrALAZINE  10 mg Oral TID    [Held by provider] isosorbide mononitrate  15 mg Oral Nightly    pantoprazole  40 mg Oral QAM AC    sodium chloride flush  5-40 mL IntraVENous 2 times per day    insulin lispro  0-4 Units SubCUTAneous TID     insulin lispro  0-4 Units SubCUTAneous Nightly     Continuous Infusions:   sodium bicarbonate 75 mEq in sodium chloride 0.45 % 1,000 mL infusion 50 mL/hr at 06/17/24 0600    sodium chloride Stopped (06/16/24 1052)    dextrose       Medications Prior to Admission:   Tirzepatide (MOUNJARO) 5 MG/0.5ML SOPN SC injection, Inject 0.5 mLs into the skin once a week  gabapentin (NEURONTIN) 300 MG capsule, Take 1 capsule by mouth 2 times daily for 180 days.  ondansetron (ZOFRAN-ODT) 4 MG disintegrating tablet, Take 1 tablet by mouth 3 times daily as needed for Nausea or Vomiting  cyclobenzaprine (FLEXERIL) 10 MG tablet, Take 1 tablet by mouth 3 times daily as needed  ketorolac (ACULAR) 0.5 % ophthalmic solution, Place 1 drop into the right eye 4 times daily (Patient not taking: Reported on

## 2024-06-17 NOTE — PLAN OF CARE
Problem: Safety - Adult  Goal: Free from fall injury  6/17/2024 0100 by Odalys Bhatia RN  Outcome: Progressing  Flowsheets (Taken 6/17/2024 0058)  Free From Fall Injury: Instruct family/caregiver on patient safety     Problem: ABCDS Injury Assessment  Goal: Absence of physical injury  6/17/2024 0100 by Odalys Bhatia RN  Outcome: Progressing  Flowsheets (Taken 6/17/2024 0058)  Absence of Physical Injury: Implement safety measures based on patient assessment     Problem: Chronic Conditions and Co-morbidities  Goal: Patient's chronic conditions and co-morbidity symptoms are monitored and maintained or improved  6/17/2024 0100 by Odalys Bhatia RN  Outcome: Progressing     Problem: Metabolic/Fluid and Electrolytes - Adult  Goal: Electrolytes maintained within normal limits  6/17/2024 0100 by Odalys Bhatia RN  Outcome: Progressing     Problem: Metabolic/Fluid and Electrolytes - Adult  Goal: Glucose maintained within prescribed range  6/17/2024 0100 by Odalys Bhatia RN  Outcome: Progressing     Problem: Skin/Tissue Integrity - Adult  Goal: Oral mucous membranes remain intact  6/17/2024 0100 by Odalys Bhatia RN  Outcome: Progressing     Problem: Neurosensory - Adult  Goal: Achieves stable or improved neurological status  6/17/2024 0100 by Odalys Bhatia RN  Outcome: Progressing  Flowsheets (Taken 6/16/2024 2000)  Achieves stable or improved neurological status: Assess for and report changes in neurological status     Problem: Neurosensory - Adult  Goal: Absence of seizures  6/17/2024 0100 by Odalys Bhatia RN  Outcome: Progressing  Flowsheets (Taken 6/16/2024 2000)  Absence of seizures: Monitor for seizure activity.  If seizure occurs, document type and location of movements and any associated apnea     Problem: Pain  Goal: Verbalizes/displays adequate comfort level or baseline comfort level  6/17/2024 0100 by Odalys Bhatia RN  Outcome: Progressing     Problem: Skin/Tissue Integrity  Goal: Absence  of new skin breakdown  Description: 1.  Monitor for areas of redness and/or skin breakdown  2.  Assess vascular access sites hourly  3.  Every 4-6 hours minimum:  Change oxygen saturation probe site  4.  Every 4-6 hours:  If on nasal continuous positive airway pressure, respiratory therapy assess nares and determine need for appliance change or resting period.  6/17/2024 0100 by Odalys Bhatia, RN  Outcome: Progressing     Problem: Nutrition Deficit:  Goal: Optimize nutritional status  6/17/2024 0100 by Odalys Bhatia RN  Outcome: Progressing

## 2024-06-17 NOTE — PROGRESS NOTES
General and Vascular Surgery                                                           Daily Progress Note                                                             Urban Lee PA-C    Pt Name: Juan Carlos Wong   Medical Record Number: [id number]  Date of Birth 1965   Today's Date: 6/17/2024    ASSESSMENT/PLAN  S/P (6/12/24): Incision and drainage of right proximal arm abscess.   Pain controlled  Being worked up for CVA symptoms over the weekend.  Leukocytosis resolved:   Dressing and packing changed. Wound site looks good. No necrotic tissue or purulent drainage.   IV antibiotic  Monitor and control pain  No further general surgery plans at this time        EDUCATION  Patient educated about their illness/diagnosis, stated above, and all questions answered. We discussed the importance of nutrition, medications they are taking, and healthy lifestyle.  SUBJECTIVE  Juan Carlos has improved from yesterday. Pain is well controlled. He has no nausea and no vomiting.  He is tolerating a full liquid diet Current activity is up with assistance  OBJECTIVE  VITALS:  height is 1.753 m (5' 9.02\") and weight is 82.6 kg (182 lb 1.6 oz). His oral temperature is 98.1 °F (36.7 °C). His blood pressure is 125/63 and his pulse is 80. His respiration is 20 and oxygen saturation is 98%. VITALS:  /63   Pulse 80   Temp 98.1 °F (36.7 °C) (Oral)   Resp 20   Ht 1.753 m (5' 9.02\")   Wt 82.6 kg (182 lb 1.6 oz)   SpO2 98%   BMI 26.88 kg/m²   GENERAL: alert, no distress  LUNGS: clear to ausculation, without wheezes, rales or rhonci  HEART: normal rate and regular rhythm  ABDOMEN: soft, non-tender, non-distended  EXTREMITY: Right shoulder wound from I&D of abscess looks good. No necrotic tissue or purulent drainage.   I/O last 3 completed shifts:  In: 3022.3 [P.O.:640; I.V.:1931.1; IV Piggyback:451.2]  Out: 3860 [Urine:3860]  I/O this shift:  In: 400 [P.O.:400]  Out:

## 2024-06-17 NOTE — PROGRESS NOTES
NAME:  Juan Carlos Wong Sr  YOB: 1965  MEDICAL RECORD NUMBER:  [id number]    Shift Summary: NIH now 1. More alert. O2 weaned off. BM x 2.    Family updated: No pt updated brother via phone      Rhythm: Normal Sinus Rhythm     Most recent vitals:   Visit Vitals  BP (!) 154/77   Pulse 72   Temp 97.7 °F (36.5 °C) (Oral)   Resp 16   Ht 1.753 m (5' 9.02\")   Wt 82.6 kg (182 lb 1.6 oz)   SpO2 99%   BMI 26.88 kg/m²           No data found.    No data found.      Respiratory support needed (if any):  - RA    Admission weight Weight - Scale: 80.7 kg (177 lb 14.6 oz) (06/11/24 1136)    Today's weight    Wt Readings from Last 1 Encounters:   06/17/24 82.6 kg (182 lb 1.6 oz)        Elliott need assessed each shift: YES -  - continue elliott r/t - strict I/O  UOP >30ml/hr: YES  Last documented BM (in last 48 hrs):  Patient Vitals for the past 48 hrs:   Last BM (including prior to admit) Stool Occurrence   06/15/24 0908 06/13/24 --   06/15/24 1335 06/13/24 --   06/15/24 2000 06/13/24 --   06/16/24 0800 06/13/24 --   06/16/24 2000 06/13/24 --   06/16/24 2100 06/16/24 --   06/16/24 2200 06/16/24 --   06/17/24 0000 06/16/24 --   06/17/24 0400 06/17/24 1                Restraints (in use currently or dc'd in last 12 hrs): No    Order current and documentation up to date? No    Lines/Drains reviewed @ bedside.  Peripheral IV 06/11/24 Left Forearm (Active)   Number of days: 5       Urinary Catheter 06/12/24 Elliott (Active)   Number of days: 4         Drip rates at handoff:    sodium bicarbonate 75 mEq in sodium chloride 0.45 % 1,000 mL infusion 50 mL/hr at 06/17/24 0600    sodium chloride Stopped (06/16/24 1052)    dextrose         Lab Data:   CBC:   Recent Labs     06/16/24 0315 06/17/24  0346   WBC 7.5 8.4   HGB 11.0* 10.7*   HCT 32.3* 30.7*   MCV 86.4 86.0    407     BMP:    Recent Labs     06/16/24 0315 06/17/24 0346   * 133*   K 3.4* 3.1*   CO2 19* 22   BUN 38* 34*   CREATININE 2.5* 2.3*     LIVR: No

## 2024-06-17 NOTE — PROGRESS NOTES
Physical Therapy  Facility/Department: [address] CVICU  Physical Therapy Re-Assessment    Name: Juan Carlos Wong Sr  : 1965  MRN: [id number]  Date of Service: 2024    Discharge Recommendations:  Home with assist PRN, Home with Home health PT, Continue to assess pending progress          Patient Diagnosis(es): The primary encounter diagnosis was Cellulitis of right upper arm. A diagnosis of Osteomyelitis of left foot, unspecified type (HCC) was also pertinent to this visit.  Past Medical History:  has a past medical history of Back pain, Diabetes mellitus (HCC), and Pneumonia.  Past Surgical History:  has a past surgical history that includes liver biopsy; Leg Surgery (Left); Colonoscopy; and Arm Surgery (Right, 2024).    Assessment   Assessment: 59 y.o. male who presented to [facility] ON 24 with right shoulder pain and drainage. Pt transferred to CVICU on 6/15/24 following code stroke. Prior to admission, pt living alone in apt setting, independent with ADLs and ambulatin without device. Pt currently functioning below baseline. Anticipate return home with 24hr supv and level 3 HHPT. Recommend continued skilled therapy 3-5x/week if unable to have 24hr supv provided.  Treatment Diagnosis: impaired mobility  Therapy Prognosis: Fair  Decision Making: Medium Complexity  History: see above  Exam: see below  Clinical Presentation: evolving  Activity Tolerance  Activity Tolerance: Patient tolerated treatment well     Plan   Physical Therapy Plan  General Plan: 2-3 times per week  Current Treatment Recommendations: Strengthening, Balance training, Functional mobility training, Transfer training, Gait training, Endurance training, Patient/Caregiver education & training, Safety education & training, Equipment evaluation, education, & procurement, Therapeutic activities, Neuromuscular re-education  Safety Devices  Type of Devices: All fall risk precautions in place, Call light within reach, Bed alarm in

## 2024-06-17 NOTE — CARE COORDINATION
Discharge Planning:  Therapy notes reviewed.  SW contacted pts [facility], [facility] [phone].  HIPAA compliant message left requesting a return call.   DARRION Kitchen [facility]  [phone]  Electronically signed by DARRION Walker on 6/17/2024 at 3:55 PM

## 2024-06-17 NOTE — PROGRESS NOTES
Nephrology Progress Note   [email or web address]      Reason for consultation: Hyponatremia / MIGUEL on CKD 3/ -- baseline Cr ~ 2.0-2.3 mg/dL     Subjective:    The patient has been seen and examined. Labs and chart reviewed. Cr within baseline. Na+ acceptable.     There were not complications overnight.    Patient review of systems: Denies SOB.       Allergies:  No Known Allergies     Scheduled Meds:   metroNIDAZOLE  500 mg IntraVENous Q8H    atorvastatin  80 mg Oral Nightly    ceFAZolin  2,000 mg IntraVENous Q12H    enoxaparin  30 mg SubCUTAneous Daily    [Held by provider] carvedilol  6.25 mg Oral BID WC    insulin glargine  13 Units SubCUTAneous Nightly    insulin lispro  5 Units SubCUTAneous TID WC    [Held by provider] aspirin  81 mg Oral Daily    [Held by provider] clopidogrel  75 mg Oral Nightly    gabapentin  300 mg Oral BID    hydrALAZINE  10 mg Oral TID    [Held by provider] isosorbide mononitrate  15 mg Oral Nightly    pantoprazole  40 mg Oral QAM AC    sodium chloride flush  5-40 mL IntraVENous 2 times per day    insulin lispro  0-4 Units SubCUTAneous TID WC    insulin lispro  0-4 Units SubCUTAneous Nightly        sodium bicarbonate 75 mEq in sodium chloride 0.45 % 1,000 mL infusion 50 mL/hr at 24 0600    sodium chloride Stopped (24 1052)    dextrose         PRN Meds:potassium chloride **OR** potassium alternative oral replacement **OR** potassium chloride, oxyCODONE-acetaminophen **OR** oxyCODONE-acetaminophen, morphine **OR** morphine, cyclobenzaprine, sodium chloride flush, sodium chloride, ondansetron **OR** ondansetron, polyethylene glycol, acetaminophen **OR** acetaminophen, glucose, dextrose bolus **OR** dextrose bolus, glucagon (rDNA), dextrose    Physical Exam:    TEMPERATURE:  Current - Temp: 97.7 °F (36.5 °C); Max - Temp  Av.8 °F (36.6 °C)  Min: 97.6 °F (36.4 °C)  Max: 98.1 °F (36.7 °C)  RESPIRATIONS RANGE: Resp  Av.7  Min: 12  Max: 26  PULSE RANGE: Pulse  Av.6  Min: 60

## 2024-06-17 NOTE — TELEPHONE ENCOUNTER
Medication:   Requested Prescriptions     Pending Prescriptions Disp Refills    Continuous Glucose Sensor (DEXCOM G7 SENSOR) MISC [Pharmacy Med Name: DEXCOM G7 SENSOR]  1     Sig: USE AS DIRECTED FOR CONTINUOUS GLUCOSE MONITORING       Last Filled:      Patient Phone Number: [phone] (home)     Last appt: 6/11/2024   Next appt: Visit date not found  No future appointments.

## 2024-06-17 NOTE — PROGRESS NOTES
Facility/Department: [address] CVICU  SLP Clinical Swallow Evaluation and Speech Language Cognitive Assessment     Patient: Juan Carlos Wong Sr   : 1965   MRN: [id number]      Evaluation Date: 2024      Admitting Dx:   Cellulitis of right upper arm [L03.113]  Osteomyelitis of left foot, unspecified type (HCC) [M86.9]  Cellulitis and abscess of upper extremity [L03.119, L02.419]   has a past medical history of Back pain, Diabetes mellitus (HCC), and Pneumonia.   has a past surgical history that includes liver biopsy; Leg Surgery (Left); Colonoscopy; and Arm Surgery (Right, 2024).  Allergies: NKA                          Onset: 2024     CHART REVIEW:  2024 admitted with c/o R arm pain  MD ADMISSION H&P HPI:  59 y.o. male who presented to [facility] with right shoulder pain and drainage for the last 3 to 4 days, worsening, no obvious elevated or elevating factors, associated with some chills and fever associated also with weakness, no obvious etiology, per ED provider likely patient was injecting insulin at that part, also having ulcer on his plantar aspect of left foot draining pus.  Denies vomiting but stated he was having nausea this morning, no abdominal pain no diarrhea discussed with ED provider agree with plan to admit for further management and treatment.   Consults noted: Nephrology, General Surgery, ID, Podiatry, Neurology  2024 incision and drainage R proximal arm abscess  2024 podiatry noted plan for surgical resection of 5th metatarsal 2024 or 2024  6/15/2024 new onset expressive aphasia; code stroke  6/15/2024 Neuro: Aphasia, encephalopathy. MRI negative for acute stroke but did show late subacute stroke in region that could cause similar symptoms. There is petechial hemorrhage but unclear how old this is. Would complete stroke workup. As we do not know at what time the petechial hemorrhage occurred, would recommend holding antiplatelet for 14 days from date of  Expressive Language:  SHAMIR, visual imps are a barrier    Pragmatics/Social Functioning: WFL      MOTOR SPEECH/VOICE:   slurred speech appears r/t dentition    COGNITION    Overall Orientation:  Deficit areas of concern with need for further assessment/treatment   Oriented to self, type of place, situation, time    Attention:  Deficit areas of concern with need for further assessment/treatment   Impaired Alternating Attention  Impaired Divided Attention    Memory: Deficit areas of concern with need for further assessment/treatment   Impaired Short-term Memory  Impaired Recall of New Learning     Problem Solving: Difficult to assess due to mental status  Comment: test fatigue is a barrier    Math Language/numeric reasoning: Difficult to assess due to mental status  test fatigue is a barrier    Safety/Judgement: Deficit areas of concern with need for further assessment/treatment   Impaired Insight      If patient discharges prior to next visit, this note will serve as discharge.     Time code minutes: 0  Total Time:  43    Electronically signed by:    Dolores Harden MA, CCC-SLP, #[id number]  Speech-Language Pathologist  Portable phone: [phone]  06/17/24 3:34 PM

## 2024-06-17 NOTE — PROGRESS NOTES
Infectious Diseases Inpatient Progress Note    CHIEF COMPLAINT:     Right shoulder abscess  Right upper extremity cellulitis  Left foot osteomyelitis  Diabetes poor control  Chronic kidney disease stage IIIb  CVA - ACUTE         HISTORY OF PRESENT ILLNESS:  59 y.o. man with a significant history for diabetes, chronic kidney disease stage IIIb, coronary artery disease, congestive heart failure, diabetes with neuropathy, peripheral vascular disease admitted to the hospital secondary to left diabetic foot ulcer.  Patient was seen by me recently when admitted in April for left foot infection was treated with IV antibiotic followed by oral antibiotic.  Now x-ray indicating progression to osteomyelitis of the left fifth metatarsal base.  He was also found to have cellulitis of the right shoulder was evaluated by general surgery given the progression to possible abscess formation there is a plan for incision and drainage.  Given the need for IV antibiotics we are consulted for recommendations.  Blood culture in process left foot wound culture in process, labs indicate creatinine 2.6 sodium 124 procalcitonin 0.21 hemoglobin A1c 11.4 with poor diabetes control WBC 14.8 hemoglobin 10.3      Interval history: Events of the weekend noted now transferred to ICU secondary to acute CVA CT head noted  Left frontal cortical infarct with associated possible petechial hemorrhage.  He was evaluated by neurology-currently in CVICU ongoing right shoulder pain left foot ongoing drainage and dressing intact    Past Medical History:    Past Medical History:   Diagnosis Date    Back pain     Diabetes mellitus (HCC)     Pneumonia        Past Surgical History:    Past Surgical History:   Procedure Laterality Date    ARM SURGERY Right 6/12/2024    INCISION AND DRAINAGE RIGHT PROXIMAL ARM ABSCESS performed by Dann Nuñez MD at [facility] OR    COLONOSCOPY      LEG SURGERY Left     LIVER BIOPSY         Current Medications:   were reviewed by me.    IMPRESSION:    Patient Active Problem List   Diagnosis Code    Sepsis ([facility]) A41.9    Generalized weakness R53.1    Acute on chronic congestive heart failure ([facility]) I50.9    Coronary artery disease involving native coronary artery of native heart without angina pectoris I25.10    Non-traumatic rhabdomyolysis M62.82    Neutrophilia D72.9    Diabetic foot infection ([facility]) E11.628, L08.9    Stage 3b chronic kidney disease ([facility]) N18.32    Cellulitis of foot L03.119    Cellulitis and abscess of upper extremity L03.119, L02.419    Cellulitis of right upper arm L03.113    DM (diabetes mellitus), type 2 with complications ([facility]) E11.8    Antiplatelet or antithrombotic long-term use Z79.02    Abscess of right arm L02.413    Osteomyelitis of left foot ([facility]) M86.9    Poorly controlled diabetes mellitus ([facility]) E11.65    Type 2 diabetes mellitus with diabetic foot infection ([facility]) E11.628, L08.9    Staph aureus infection A49.01        ICD-10-CM    1. Cellulitis of right upper arm  L03.113 Culture, Tissue     Culture, Tissue     Surgical Pathology     Surgical Pathology     oxyCODONE-acetaminophen (PERCOCET) 5-325 MG per tablet      2. Osteomyelitis of left foot, unspecified type ([facility])  M86.9          Complicated left diabetic foot infection  Left foot osteomyelitis  Fevers  WBC elevation  Right shoulder cellulitis with abscess formation  Chronic kidney disease stage IIIb  Diabetes with neuropathy  Diabetes with nephropathy  Status post incision and drainage of the proximal forearm abscess ON 6/12/24    operative culture in process/11/24  Coronary artery disease  Hyponatremia  Diabetes poor control hemoglobin A1c greater than 11.4  Severe left frontal infarct possible petechial hemorrhage FROM 6/16      Operative culture positive for Staph aureus MSSA will adjust antibiotic therapy ongoing left foot infection with osteomyelitis noted    Seen by Dr. Alvares, plan for surgery resection of the fifth metatarsal base

## 2024-06-17 NOTE — PROGRESS NOTES
Occupational Therapy  Facility/Department: [address]  Occupational Therapy Daily Treatment    Name: Juan Carlos Wong Sr  : 1965  MRN: [id number]  Date of Service: 2024    Discharge Recommendations:  24 hour supervision or assist, 3-5 sessions per week, Continue to assess pending progress      Juan Carlos Wong Sr scored a /24 on the AM-PAC ADL Inpatient form.     If patient discharges prior to next session this note will serve as a discharge summary.  Please see below for the latest assessment towards goals.       Patient Diagnosis(es): The primary encounter diagnosis was Cellulitis of right upper arm. A diagnosis of Osteomyelitis of left foot, unspecified type (HCC) was also pertinent to this visit.  Past Medical History:  has a past medical history of Back pain, Diabetes mellitus (HCC), and Pneumonia.  Past Surgical History:  has a past surgical history that includes liver biopsy; Leg Surgery (Left); Colonoscopy; and Arm Surgery (Right, 2024).    Treatment Diagnosis: Decreased: ADLs, functional transfers/mobility      Assessment   Performance deficits / Impairments: Decreased functional mobility ;Decreased high-level IADLs;Decreased ADL status;Decreased endurance;Decreased strength;Decreased balance  Assessment: Pt is a 58 yo M who presented with R UE swelling, s/p I&D R shoulder. Code stroke called 6/15 d/t episode of aphasia. PTA pt from home where pt was Ind with mobility and needing assist for ADLs. Pt is below baseline at this time, requiring CGA for functional transfers and min A functional mobility using RW. He required assist for toileting after incontinence of bowel and assist for LB dressing. Based on performance today, would recommend initial 24/7 supervision at d/c, and if this cannot be provided would recommend ongoing skilled OT 3-5x/wk at d/c to improve his safety and independence.    Treatment Diagnosis: Decreased: ADLs, functional transfers/mobility  Prognosis: Good  Exam: see

## 2024-06-17 NOTE — PROGRESS NOTES
V2.0    [facility] Progress Note      Name:  Juan Carlos Wong Sr /Age/Sex: 1965  (59 y.o. male)   MRN & CSN:  [id number] & [id number] Encounter Date/Time: 2024 7:43 AM EDT   Location:  B2C-2082/1308-01 PCP: Idris Laws MD     Attending:Reji Prabhakar MD       Hospital Day: 7    Assessment and Recommendations   Juan Carlos Wong Sr is a 59 y.o. male who presents with Cellulitis and abscess of upper extremity      59-year-old patient admitted to the hospital with cellulitis and abscess of right shoulder and diabetic foot infection on the left foot General surgery ID consulted status post I&D continue to be on IV antibiotics, had an event of abrupt onset aphasia on Sadie 15 code stroke activated some findings on the imaging with potential changes neurology consulted and following     Plan:      Sepsis with white count 14 heart rate 97 on admission, due to cellulitis and abscess of right shoulder and osteomyelitis and cellulitis of left foot initially on IV Zyvox, Flagyl, and cefepime ID following post I&D, culture MSSA, antibiotics changed to Ancef   cellulitis and abscess right shoulder status post I&D discussed with surgery   Aphasia, abrupt onset, associated with encephalopathy, imaging positive for potential hemorrhage and evolving left frontal lobe cortical infarct, reported waxing and waning symptoms per nursing staff.  And EEG nonrevealing neurology following up and mentioning that this still could be recrudescence.  Neurology recommended to hold DAPT For 14 days from the date of MRI  Osteomyelitis and cellulitis of left foot and diabetic foot infection, status post partial amputation in the past, podiatry consulted IV antibiotics as above.  Diabetes mellitus, sliding scale and long-acting insulin diabetic diet.    Hyponatremia, nephrology consulted and following sodium increased to 128  CKD with possible mild MIGUEL on top of it probably consulted creatinine relatively stable at

## 2024-06-17 NOTE — PROGRESS NOTES
NAME:  Juan Carlos Wong Sr  YOB: 1965  MEDICAL RECORD NUMBER:  [id number]    Shift Summary: Patient received EEG, improvement in mental status through shift, vitals stable.    Family updated: yes    Rhythm: Normal Sinus Rhythm     Most recent vitals:   Visit Vitals  /66   Pulse 78   Temp 98 °F (36.7 °C) (Oral)   Resp 18   Ht 1.753 m (5' 9.02\")   Wt 82.6 kg (182 lb 1.6 oz)   SpO2 99%   BMI 26.88 kg/m²           No data found.    No data found.      Respiratory support needed (if any):  - RA    Admission weight Weight - Scale: 80.7 kg (177 lb 14.6 oz) (06/11/24 1136)    Today's weight    Wt Readings from Last 1 Encounters:   06/17/24 82.6 kg (182 lb 1.6 oz)        Elliott need assessed each shift: YES -  - continue elliott r/t - retention  UOP >30ml/hr: YES  Last documented BM (in last 48 hrs):  Patient Vitals for the past 48 hrs:   Last BM (including prior to admit) Stool Occurrence   06/15/24 2000 06/13/24 --   06/16/24 0800 06/13/24 --   06/16/24 2000 06/13/24 --   06/16/24 2100 06/16/24 --   06/16/24 2200 06/16/24 --   06/17/24 0000 06/16/24 --   06/17/24 0400 06/17/24 1                Restraints (in use currently or dc'd in last 12 hrs): No    Order current and documentation up to date? No    Lines/Drains reviewed @ bedside.  Peripheral IV 06/11/24 Left Forearm (Active)   Number of days: 6       Urinary Catheter 06/12/24 Elliott (Active)   Number of days: 5         Drip rates at handoff:    sodium bicarbonate 75 mEq in sodium chloride 0.45 % 1,000 mL infusion 50 mL/hr at 06/17/24 0600    sodium chloride Stopped (06/16/24 1052)    dextrose         Lab Data:   CBC:   Recent Labs     06/16/24  0315 06/17/24  0346   WBC 7.5 8.4   HGB 11.0* 10.7*   HCT 32.3* 30.7*   MCV 86.4 86.0    407     BMP:    Recent Labs     06/16/24  0315 06/17/24  0346   * 133*   K 3.4* 3.1*   CO2 19* 22   BUN 38* 34*   CREATININE 2.5* 2.3*     LIVR: No results for input(s): \"AST\", \"ALT\" in the last 72

## 2024-06-17 NOTE — PROGRESS NOTES
Department of Podiatry Note  Natanael Alvares DPM Attending       CHIEF COMPLAINT:  Wound with drainage, plantar LEFT foot with suspected osteomyelitis of 5th metatarsal    HISTORY OF PRESENT ILLNESS:                The patient is a 59 y.o. male with significant past medical history of Diabetes with peripheral neuropathy and  previos amputation status who is consulted for a several weeks history of wound of plantar LEFT foot with foul drainage and considerable depth that probes to bone. Dressings intact left foot, no shrike thru noted    Past Medical History:        Diagnosis Date    Back pain     Diabetes mellitus (HCC)     Pneumonia      Past Surgical History:        Procedure Laterality Date    ARM SURGERY Right 6/12/2024    INCISION AND DRAINAGE RIGHT PROXIMAL ARM ABSCESS performed by Dann Nuñez MD at [facility] OR    COLONOSCOPY      LEG SURGERY Left     LIVER BIOPSY       Current Medications:    Current Facility-Administered Medications: potassium chloride (KLOR-CON M) extended release tablet 40 mEq, 40 mEq, Oral, PRN **OR** potassium bicarb-citric acid (EFFER-K) effervescent tablet 40 mEq, 40 mEq, Oral, PRN **OR** potassium chloride 10 mEq/100 mL IVPB (Peripheral Line), 10 mEq, IntraVENous, PRN  metroNIDAZOLE (FLAGYL) 500 mg in 0.9% NaCl 100 mL IVPB premix, 500 mg, IntraVENous, Q8H  atorvastatin (LIPITOR) tablet 80 mg, 80 mg, Oral, Nightly  ceFAZolin (ANCEF) 2,000 mg in sodium chloride 0.9 % 50 mL IVPB (mini-bag), 2,000 mg, IntraVENous, Q12H  sodium bicarbonate 75 mEq in sodium chloride 0.45 % 1,000 mL infusion, , IntraVENous, Continuous  enoxaparin Sodium (LOVENOX) injection 30 mg, 30 mg, SubCUTAneous, Daily  [Held by provider] carvedilol (COREG) tablet 6.25 mg, 6.25 mg, Oral, BID WC  insulin glargine (LANTUS) injection vial 13 Units, 13 Units, SubCUTAneous, Nightly  insulin lispro (HUMALOG,ADMELOG) injection vial 5 Units, 5 Units, SubCUTAneous, TID WC  oxyCODONE-acetaminophen (PERCOCET) 5-325 MG per  ulcer along the lateral aspect of the stump.    IMPRESSION/RECOMMENDATIONS    1. Cellulitis of LEFT foot secondary to open wound of Diabetic ulcer  +Afebrile, absence of Leukocytosis  +Ancef due to chronic Kidney Disease      2. MUGS 3 ulcer of Diabetic Neuropathy etiology  Rx Dakin's to wound with eventual need for surgical intervention    Appreciate Nurse assist with dressing changes     3. Osteomyelitis of LEFT 5th metatarsal nearest the Styloid process    4. Diabetes with uncontrolled glucose levels  +Hg A1c 11.4    5. Plan for resection of osteomyelitic bone once OR time is available, Possibly Wednesday      Natanael Alvares DPM  [facility]  [phone]

## 2024-06-17 NOTE — PROGRESS NOTES
Speech Therapy note regarding SLP Evaluation and treatment due to recent stroke    SLP attempt 6/17/2024 at 1053 am  RN reporting pt getting EEG at this time  RN requesting SLP f/u in the afternoon      Plan: SLP Evaluation and tx 6/17/2024 pm unless otherwise notified. FYI on a full liquid diet at this time. MRI Brain ad f/u CT head noted 6/15/2024 and 6/16/2024    Signed  Debra Mckeon,MS,CCC,SLP [id number]  Speech and Language Pathologist  6/17/2024 1054 am

## 2024-06-18 LAB
ALBUMIN SERPL-MCNC: 1.8 G/DL (ref 3.4–5)
ANION GAP SERPL CALCULATED.3IONS-SCNC: 11 MMOL/L (ref 3–16)
BUN SERPL-MCNC: 26 MG/DL (ref 7–20)
CALCIUM SERPL-MCNC: 7.6 MG/DL (ref 8.3–10.6)
CHLORIDE SERPL-SCNC: 101 MMOL/L (ref 99–110)
CO2 SERPL-SCNC: 22 MMOL/L (ref 21–32)
CREAT SERPL-MCNC: 2.2 MG/DL (ref 0.8–1.3)
DEPRECATED RDW RBC AUTO: 14.4 % (ref 12.4–15.4)
GFR SERPLBLD CREATININE-BSD FMLA CKD-EPI: 33 ML/MIN/{1.73_M2}
GLUCOSE BLD-MCNC: 105 MG/DL (ref 70–99)
GLUCOSE BLD-MCNC: 112 MG/DL (ref 70–99)
GLUCOSE BLD-MCNC: 119 MG/DL (ref 70–99)
GLUCOSE BLD-MCNC: 92 MG/DL (ref 70–99)
GLUCOSE SERPL-MCNC: 111 MG/DL (ref 70–99)
HCT VFR BLD AUTO: 31.9 % (ref 40.5–52.5)
HGB BLD-MCNC: 11 G/DL (ref 13.5–17.5)
MCH RBC QN AUTO: 29.5 PG (ref 26–34)
MCHC RBC AUTO-ENTMCNC: 34.4 G/DL (ref 31–36)
MCV RBC AUTO: 85.9 FL (ref 80–100)
PERFORMED ON: ABNORMAL
PERFORMED ON: NORMAL
PHOSPHATE SERPL-MCNC: 3.1 MG/DL (ref 2.5–4.9)
PLATELET # BLD AUTO: 396 K/UL (ref 135–450)
PMV BLD AUTO: 7.8 FL (ref 5–10.5)
POTASSIUM SERPL-SCNC: 3.8 MMOL/L (ref 3.5–5.1)
RBC # BLD AUTO: 3.71 M/UL (ref 4.2–5.9)
SODIUM SERPL-SCNC: 134 MMOL/L (ref 136–145)
WBC # BLD AUTO: 8.2 K/UL (ref 4–11)

## 2024-06-18 PROCEDURE — 97530 THERAPEUTIC ACTIVITIES: CPT

## 2024-06-18 PROCEDURE — APPNB15 APP NON BILLABLE TIME 0-15 MINS: Performed by: PHYSICIAN ASSISTANT

## 2024-06-18 PROCEDURE — 2140000000 HC CCU INTERMEDIATE R&B

## 2024-06-18 PROCEDURE — 6370000000 HC RX 637 (ALT 250 FOR IP): Performed by: NURSE PRACTITIONER

## 2024-06-18 PROCEDURE — 2580000003 HC RX 258: Performed by: INTERNAL MEDICINE

## 2024-06-18 PROCEDURE — 94761 N-INVAS EAR/PLS OXIMETRY MLT: CPT

## 2024-06-18 PROCEDURE — 6370000000 HC RX 637 (ALT 250 FOR IP): Performed by: INTERNAL MEDICINE

## 2024-06-18 PROCEDURE — 2700000000 HC OXYGEN THERAPY PER DAY

## 2024-06-18 PROCEDURE — 99024 POSTOP FOLLOW-UP VISIT: CPT | Performed by: PHYSICIAN ASSISTANT

## 2024-06-18 PROCEDURE — APPSS15 APP SPLIT SHARED TIME 0-15 MINUTES: Performed by: PHYSICIAN ASSISTANT

## 2024-06-18 PROCEDURE — 36415 COLL VENOUS BLD VENIPUNCTURE: CPT

## 2024-06-18 PROCEDURE — 97110 THERAPEUTIC EXERCISES: CPT

## 2024-06-18 PROCEDURE — 85027 COMPLETE CBC AUTOMATED: CPT

## 2024-06-18 PROCEDURE — 80069 RENAL FUNCTION PANEL: CPT

## 2024-06-18 PROCEDURE — 6360000002 HC RX W HCPCS: Performed by: INTERNAL MEDICINE

## 2024-06-18 PROCEDURE — 99233 SBSQ HOSP IP/OBS HIGH 50: CPT | Performed by: INTERNAL MEDICINE

## 2024-06-18 RX ORDER — LANOLIN ALCOHOL/MO/W.PET/CERES
6 CREAM (GRAM) TOPICAL NIGHTLY PRN
Status: DISCONTINUED | OUTPATIENT
Start: 2024-06-18 | End: 2024-06-27 | Stop reason: HOSPADM

## 2024-06-18 RX ORDER — ACYCLOVIR 400 MG/1
TABLET ORAL
Qty: 6 EACH | Refills: 1 | Status: SHIPPED | OUTPATIENT
Start: 2024-06-18

## 2024-06-18 RX ADMIN — ATORVASTATIN CALCIUM 80 MG: 80 TABLET, FILM COATED ORAL at 20:26

## 2024-06-18 RX ADMIN — METRONIDAZOLE 500 MG: 500 INJECTION, SOLUTION INTRAVENOUS at 15:58

## 2024-06-18 RX ADMIN — HYDRALAZINE HYDROCHLORIDE 10 MG: 10 TABLET, FILM COATED ORAL at 20:26

## 2024-06-18 RX ADMIN — HYDRALAZINE HYDROCHLORIDE 10 MG: 10 TABLET, FILM COATED ORAL at 14:05

## 2024-06-18 RX ADMIN — Medication 6 MG: at 03:13

## 2024-06-18 RX ADMIN — INSULIN GLARGINE 13 UNITS: 100 INJECTION, SOLUTION SUBCUTANEOUS at 20:26

## 2024-06-18 RX ADMIN — METRONIDAZOLE 500 MG: 500 INJECTION, SOLUTION INTRAVENOUS at 09:10

## 2024-06-18 RX ADMIN — PANTOPRAZOLE SODIUM 40 MG: 40 TABLET, DELAYED RELEASE ORAL at 06:08

## 2024-06-18 RX ADMIN — ENOXAPARIN SODIUM 30 MG: 100 INJECTION SUBCUTANEOUS at 09:24

## 2024-06-18 RX ADMIN — HYDRALAZINE HYDROCHLORIDE 10 MG: 10 TABLET, FILM COATED ORAL at 09:05

## 2024-06-18 RX ADMIN — GABAPENTIN 300 MG: 300 CAPSULE ORAL at 20:26

## 2024-06-18 RX ADMIN — METRONIDAZOLE 500 MG: 500 INJECTION, SOLUTION INTRAVENOUS at 00:51

## 2024-06-18 RX ADMIN — CARVEDILOL 6.25 MG: 6.25 TABLET, FILM COATED ORAL at 09:06

## 2024-06-18 RX ADMIN — OXYCODONE HYDROCHLORIDE AND ACETAMINOPHEN 2 TABLET: 5; 325 TABLET ORAL at 20:26

## 2024-06-18 RX ADMIN — INSULIN LISPRO 5 UNITS: 100 INJECTION, SOLUTION INTRAVENOUS; SUBCUTANEOUS at 14:00

## 2024-06-18 RX ADMIN — SODIUM CHLORIDE, PRESERVATIVE FREE 10 ML: 5 INJECTION INTRAVENOUS at 09:26

## 2024-06-18 RX ADMIN — GABAPENTIN 300 MG: 300 CAPSULE ORAL at 09:05

## 2024-06-18 RX ADMIN — SODIUM CHLORIDE 2000 MG: 900 INJECTION INTRAVENOUS at 12:52

## 2024-06-18 RX ADMIN — INSULIN LISPRO 5 UNITS: 100 INJECTION, SOLUTION INTRAVENOUS; SUBCUTANEOUS at 16:58

## 2024-06-18 RX ADMIN — SODIUM CHLORIDE: 9 INJECTION, SOLUTION INTRAVENOUS at 01:58

## 2024-06-18 RX ADMIN — CARVEDILOL 6.25 MG: 6.25 TABLET, FILM COATED ORAL at 16:58

## 2024-06-18 RX ADMIN — INSULIN LISPRO 5 UNITS: 100 INJECTION, SOLUTION INTRAVENOUS; SUBCUTANEOUS at 09:21

## 2024-06-18 ASSESSMENT — PAIN SCALES - GENERAL
PAINLEVEL_OUTOF10: 0
PAINLEVEL_OUTOF10: 8
PAINLEVEL_OUTOF10: 0

## 2024-06-18 ASSESSMENT — PAIN DESCRIPTION - ORIENTATION: ORIENTATION: RIGHT

## 2024-06-18 ASSESSMENT — PAIN DESCRIPTION - LOCATION: LOCATION: ARM

## 2024-06-18 ASSESSMENT — PAIN DESCRIPTION - DESCRIPTORS: DESCRIPTORS: SORE;TENDER

## 2024-06-18 ASSESSMENT — PAIN - FUNCTIONAL ASSESSMENT: PAIN_FUNCTIONAL_ASSESSMENT: ACTIVITIES ARE NOT PREVENTED

## 2024-06-18 NOTE — PLAN OF CARE
electrolyte replacement as ordered   Monitor response to electrolyte replacements, including repeat lab results as appropriate   Fluid restriction as ordered   Instruct patient on fluid and nutrition restrictions as appropriate  6/18/2024 0026 by Олег Mcgill RN  Outcome: Progressing  Flowsheets (Taken 6/17/2024 2000)  Electrolytes maintained within normal limits:   Monitor labs and assess patient for signs and symptoms of electrolyte imbalances   Administer electrolyte replacement as ordered   Monitor response to electrolyte replacements, including repeat lab results as appropriate  Goal: Glucose maintained within prescribed range  6/18/2024 0943 by Lillian Reis RN  Outcome: Progressing  Flowsheets (Taken 6/18/2024 0800)  Glucose maintained within prescribed range:   Monitor blood glucose as ordered   Assess for signs and symptoms of hyperglycemia and hypoglycemia   Administer ordered medications to maintain glucose within target range   Assess barriers to adequate nutritional intake and initiate nutrition consult as needed   Instruct patient on self management of diabetes and initiate consult as needed  6/18/2024 0026 by Олег Mcigll RN  Outcome: Progressing  Flowsheets (Taken 6/17/2024 2000)  Glucose maintained within prescribed range:   Monitor blood glucose as ordered   Assess for signs and symptoms of hyperglycemia and hypoglycemia     Problem: Skin/Tissue Integrity - Adult  Goal: Oral mucous membranes remain intact  6/18/2024 0943 by Lillian Reis RN  Outcome: Progressing  Flowsheets (Taken 6/18/2024 0800)  Oral Mucous Membranes Remain Intact: Assess oral mucosa and hygiene practices  6/18/2024 0026 by Олег Mcgill RN  Outcome: Progressing     Problem: Pain  Goal: Verbalizes/displays adequate comfort level or baseline comfort level  6/18/2024 0943 by Lillian Reis RN  Outcome: Progressing  Flowsheets (Taken 6/18/2024 0943)  Verbalizes/displays adequate comfort level or baseline  lab values   Obtain nutritional consult as needed     Problem: Genitourinary - Adult  Goal: Absence of urinary retention  Outcome: Progressing  Flowsheets (Taken 6/18/2024 0800)  Absence of urinary retention:   Assess patient’s ability to void and empty bladder   Monitor intake/output and perform bladder scan as needed   Place urinary catheter per Licensed Independent Practitioner order if needed   Discuss with Licensed Independent Practitioner  medications to alleviate retention as needed  Goal: Urinary catheter remains patent  Outcome: Progressing  Flowsheets (Taken 6/18/2024 0800)  Urinary catheter remains patent:   Assess patency of urinary catheter   Irrigate catheter per Licensed Independent Practitioner order if indicated and notify Licensed Independent Practitioner if unable to irrigate     Problem: Hematologic - Adult  Goal: Maintains hematologic stability  Outcome: Progressing  Flowsheets (Taken 6/18/2024 0800)  Maintains hematologic stability:   Assess for signs and symptoms of bleeding or hemorrhage   Monitor labs for bleeding or clotting disorders   Administer blood products/factors as ordered

## 2024-06-18 NOTE — PROGRESS NOTES
Comprehensive Nutrition Assessment    Type and Reason for Visit:  Reassess, Consult, Patient Education (increasing protein and diabetic)    Nutrition Recommendations/Plan:   Dysphagia soft and bite sized textures ordered, patient has no teeth  Jamie bid and Glucerna bid due to increased protein needs for wound healing  RD provided verbal guidance on increasing protein and meal balancing, myplate planner handout provided for daughter to review further since patient can not see     Malnutrition Assessment:  Malnutrition Status:  No malnutrition (06/14/24 1528)    Context:  Chronic Illness     Findings of the 6 clinical characteristics of malnutrition:  Energy Intake:  No significant decrease in energy intake  Weight Loss:  Mild weight loss (specify amount and time period)     Body Fat Loss:  No significant body fat loss     Muscle Mass Loss:  No significant muscle mass loss    Fluid Accumulation:  No significant fluid accumulation     Strength:  Not Performed    Nutrition Assessment:    Follow up:  Nurse informed RD of education consult.  Patient needed more guidance regarding meal balancing and good protein sources.  RD provided verbal guidance with handout for daughter to review further using myplate planner handout.  Patient unable to see and daughter takes care of meal preparing and grocery shopping.  RD wrote notes down for daughter as well.  Patient verbalized understanding and appreciated the visit.  Patient reported eating off of breakfast and drank orange Jamie.  Patient agreed to continue to drink Jamie.  Patient reported will be having surgery on left foot later this week, resection of osteomyelitic bone noted by podiatry.    Nutrition Related Findings:    Na 134 on 6/18; last BM on 6/17; visually impaired, no teeth Wound Type: Diabetic Ulcer       Current Nutrition Intake & Therapies:    Average Meal Intake: %  Average Supplements Intake: %  ADULT ORAL NUTRITION SUPPLEMENT; Dinner, Lunch;

## 2024-06-18 NOTE — PROGRESS NOTES
NAME:  Juan Carlos Wong Sr  YOB: 1965  MEDICAL RECORD NUMBER:  [id number]    Shift Summary: Pt A/Ox4 this shift. NIH 0 with no aphasia noted. Dressing to LLE changed overnight per orders. No complaints of pain. VSS. BP somewhat elevated with SBP in 150's. No acute events overnight.     Family updated: No    Rhythm: Normal Sinus Rhythm     Most recent vitals:   Visit Vitals  BP (!) 149/81   Pulse 70   Temp 98.1 °F (36.7 °C) (Oral)   Resp 15   Ht 1.753 m (5' 9.02\")   Wt 82.2 kg (181 lb 3.5 oz)   SpO2 100%   BMI 26.75 kg/m²           No data found.    No data found.      Respiratory support needed (if any):  - RA    Admission weight Weight - Scale: 80.7 kg (177 lb 14.6 oz) (06/11/24 1136)    Today's weight    Wt Readings from Last 1 Encounters:   06/18/24 82.2 kg (181 lb 3.5 oz)        Elliott need assessed each shift: YES -  - continue elliott r/t - Retention  UOP >30ml/hr: YES  Last documented BM (in last 48 hrs):  Patient Vitals for the past 48 hrs:   Last BM (including prior to admit) Stool Occurrence   06/16/24 0800 06/13/24 --   06/16/24 2000 06/13/24 --   06/16/24 2100 06/16/24 --   06/16/24 2200 06/16/24 --   06/17/24 0000 06/16/24 --   06/17/24 0400 06/17/24 1   06/17/24 2000 06/17/24 --                Restraints (in use currently or dc'd in last 12 hrs): No    Order current and documentation up to date? No    Lines/Drains reviewed @ bedside.  Peripheral IV 06/11/24 Left Forearm (Active)   Number of days: 6       Urinary Catheter 06/12/24 Elliott (Active)   Number of days: 5         Drip rates at handoff:    sodium bicarbonate 75 mEq in sodium chloride 0.45 % 1,000 mL infusion 50 mL/hr at 06/18/24 0600    sodium chloride 5 mL/hr at 06/18/24 0600    dextrose         Lab Data:   CBC:   Recent Labs     06/17/24  0346 06/18/24  0308   WBC 8.4 8.2   HGB 10.7* 11.0*   HCT 30.7* 31.9*   MCV 86.0 85.9    396     BMP:    Recent Labs     06/17/24  0346 06/18/24  0308   * 134*   K 3.1* 3.8   CO2 22

## 2024-06-18 NOTE — PROGRESS NOTES
NAME:  Juan Carlos Wong Sr  YOB: 1965  MEDICAL RECORD NUMBER:  [id number]    Shift Summary: Neprhology d/c's Bicarb IVF and resumed PO Coreg. Up to chair and Ambulated in room with PT. Left foot OR scheduled for Friday. IV antibiotics continue. Pt PCU status.    Family updated: Yes:  daughter bedside    Rhythm: Normal Sinus Rhythm     Most recent vitals:   Visit Vitals  /76   Pulse 80   Temp 97.9 °F (36.6 °C) (Oral)   Resp 19   Ht 1.753 m (5' 9.02\")   Wt 82.2 kg (181 lb 3.5 oz)   SpO2 98%   BMI 26.75 kg/m²           No data found.    No data found.      Respiratory support needed (if any):  - RA    Admission weight Weight - Scale: 80.7 kg (177 lb 14.6 oz) (06/11/24 1136)    Today's weight    Wt Readings from Last 1 Encounters:   06/18/24 82.2 kg (181 lb 3.5 oz)        Elliott need assessed each shift: YES -  - continue elliott r/t - retention  UOP >30ml/hr: YES  Last documented BM (in last 48 hrs):  Patient Vitals for the past 48 hrs:   Last BM (including prior to admit) Stool Occurrence   06/16/24 2000 06/13/24 --   06/16/24 2100 06/16/24 --   06/16/24 2200 06/16/24 --   06/17/24 0000 06/16/24 --   06/17/24 0400 06/17/24 1   06/17/24 2000 06/17/24 --   06/18/24 0800 06/17/24 --   06/18/24 1757 06/18/24 1                Restraints (in use currently or dc'd in last 12 hrs): No    Order current and documentation up to date? No    Lines/Drains reviewed @ bedside.  Peripheral IV 06/11/24 Left Forearm (Active)   Number of days: 7       Continuous Glucose Monitor   (Active)   Number of days:        Urinary Catheter 06/12/24 Elliott (Active)   Number of days: 6         Drip rates at handoff:    sodium chloride 5 mL/hr at 06/18/24 1702    dextrose         Lab Data:   CBC:   Recent Labs     06/17/24 0346 06/18/24  0308   WBC 8.4 8.2   HGB 10.7* 11.0*   HCT 30.7* 31.9*   MCV 86.0 85.9    396     BMP:    Recent Labs     06/17/24 0346 06/18/24 0308   * 134*   K 3.1* 3.8   CO2 22 22   BUN 34* 26*

## 2024-06-18 NOTE — PROCEDURES
[facility]          [address] [place] [address]                       ELECTROENCEPHALOGRAM REPORT      PATIENT NAME: MARIEL LOCKHART SR           : 1965  MED REC NO: [id number]                      ROOM: [place] [address]  ACCOUNT NO: [id number]                       ADMIT DATE: 2024  PROVIDER: Natanael Sanchez DO      DATE OF EE2024    REFERRING PHYSICIAN:  Josep Ambrosio NP    REASON FOR STUDY:  Aphasia, fluctuating mental status.    BRIEF HISTORY AND NEUROLOGIC FINDINGS:  Patient is a 59-year-old male being evaluated for reported altered mental status and aphasia.    MEDICATIONS:  The patient's centrally acting medications listed include Percocet, morphine, Flexeril, and Neurontin.    ELECTROENCEPHALOGRAM FINDINGS:  This is a 20-channel digital EEG performed utilizing bipolar and referential montages.  Wakefulness was present throughout the recording.  This was manifested by a posterior background consisting of mostly theta frequencies in the 6 to 7 hertz range.  Significant myogenic artifact was in all leads and obscured many of the underlying background rhythms rendering much of the EEG unreadable.  No definite epileptiform activity was present during the recording, however.  There was some mild attenuation of the waking background rhythm suggesting drowsiness at times during the recording.  No obvious sleep architecture was noted.    Photic stimulation was performed at various flash frequencies and did not appear to significantly alter the underlying background rhythms.  Hyperventilation exercise was not performed due to the patient's clinical history.    A 1-channel EKG rhythm strip was reviewed and was unreadable.  This did not appear physiologic.    ELECTROENCEPHALOGRAM DIAGNOSIS:  Abnormal awake electroencephalogram secondary to mild background slowing and disorganization.    CLINICAL INTERPRETATION:  The background slowing and

## 2024-06-18 NOTE — PROGRESS NOTES
Neurology Progress Note    Updates  Doing well today.   No complaints.     Medical History:  Past Medical History:   Diagnosis Date    Back pain     Diabetes mellitus (HCC)     Pneumonia      Past Surgical History:   Procedure Laterality Date    ARM SURGERY Right 6/12/2024    INCISION AND DRAINAGE RIGHT PROXIMAL ARM ABSCESS performed by Dann Nuñez MD at [facility] OR    COLONOSCOPY      LEG SURGERY Left     LIVER BIOPSY       Scheduled Meds:   metroNIDAZOLE  500 mg IntraVENous Q8H    atorvastatin  80 mg Oral Nightly    ceFAZolin  2,000 mg IntraVENous Q12H    enoxaparin  30 mg SubCUTAneous Daily    carvedilol  6.25 mg Oral BID WC    insulin glargine  13 Units SubCUTAneous Nightly    insulin lispro  5 Units SubCUTAneous TID WC    [Held by provider] aspirin  81 mg Oral Daily    [Held by provider] clopidogrel  75 mg Oral Nightly    gabapentin  300 mg Oral BID    hydrALAZINE  10 mg Oral TID    [Held by provider] isosorbide mononitrate  15 mg Oral Nightly    pantoprazole  40 mg Oral QAM AC    sodium chloride flush  5-40 mL IntraVENous 2 times per day    insulin lispro  0-4 Units SubCUTAneous TID     insulin lispro  0-4 Units SubCUTAneous Nightly     Continuous Infusions:   sodium bicarbonate 75 mEq in sodium chloride 0.45 % 1,000 mL infusion 50 mL/hr at 06/18/24 1010    sodium chloride 5 mL/hr at 06/18/24 1010    dextrose       Medications Prior to Admission:   Tirzepatide (MOUNJARO) 5 MG/0.5ML SOPN SC injection, Inject 0.5 mLs into the skin once a week  gabapentin (NEURONTIN) 300 MG capsule, Take 1 capsule by mouth 2 times daily for 180 days.  ondansetron (ZOFRAN-ODT) 4 MG disintegrating tablet, Take 1 tablet by mouth 3 times daily as needed for Nausea or Vomiting  cyclobenzaprine (FLEXERIL) 10 MG tablet, Take 1 tablet by mouth 3 times daily as needed  ketorolac (ACULAR) 0.5 % ophthalmic solution, Place 1 drop into the right eye 4 times daily (Patient not taking: Reported on 6/13/2024)  ofloxacin (OCUFLOX)

## 2024-06-18 NOTE — PROGRESS NOTES
Temp:    97.6 °F (36.4 °C)   TempSrc:    Oral   SpO2: 100%   100%   Weight: 82.2 kg (181 lb 3.5 oz)      Height:             Physical Exam:      Physical Exam Performed:    BP (!) 158/85   Pulse 71   Temp 97.6 °F (36.4 °C) (Oral)   Resp 18   Ht 1.753 m (5' 9.02\")   Wt 82.2 kg (181 lb 3.5 oz)   SpO2 100%   BMI 26.75 kg/m²     General appearance: No apparent distress, up to chair.  Respiratory:  Normal respiratory effort. Clear to auscultation, bilaterally without Rales/Wheezes/Rhonchi.  Cardiovascular: Regular rate and rhythm with normal S1/S2 without murmurs, rubs or gallops.  Abdomen: Soft, non-tender  Musculoskeletal: No clubbing, cyanosis.  Left foot dressed right shoulder dressed minimal drainage much less edema and erythema  Neurologic: No aphasia no focal weakness  Psychiatric: Alert and oriented  Capillary Refill: Brisk, 3 seconds, normal   Peripheral Pulses: +2 palpable, equal bilaterally       Medications:   Medications:    metroNIDAZOLE  500 mg IntraVENous Q8H    atorvastatin  80 mg Oral Nightly    ceFAZolin  2,000 mg IntraVENous Q12H    enoxaparin  30 mg SubCUTAneous Daily    carvedilol  6.25 mg Oral BID WC    insulin glargine  13 Units SubCUTAneous Nightly    insulin lispro  5 Units SubCUTAneous TID WC    [Held by provider] aspirin  81 mg Oral Daily    [Held by provider] clopidogrel  75 mg Oral Nightly    gabapentin  300 mg Oral BID    hydrALAZINE  10 mg Oral TID    [Held by provider] isosorbide mononitrate  15 mg Oral Nightly    pantoprazole  40 mg Oral QAM AC    sodium chloride flush  5-40 mL IntraVENous 2 times per day    insulin lispro  0-4 Units SubCUTAneous TID WC    insulin lispro  0-4 Units SubCUTAneous Nightly      Infusions:    sodium bicarbonate 75 mEq in sodium chloride 0.45 % 1,000 mL infusion 50 mL/hr at 06/18/24 0600    sodium chloride 5 mL/hr at 06/18/24 0600    dextrose       PRN Meds: melatonin, 6 mg, Nightly PRN  potassium chloride, 40 mEq, PRN   Or  potassium alternative oral  osteomyelitis. Reason for Exam: Ulcer lateral side of foot. Concern for possible osteomyelitis. FINDINGS: The patient had a prior amputation of the foot at the proximal metatarsal level.  There is evidence of an ulcer along the lateral aspect of the stump. There is some bony lysis involving the base of the 5th metatarsal, suggesting osteomyelitis.  There is no gas within the soft tissues.     1. Findings suggestive of osteomyelitis involving the base of the 5th metatarsal. 2. 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 3. Soft tissue ulcer along the lateral aspect of the stump.     XR CHEST (2 VW)    Result Date: 6/11/2024  EXAMINATION: TWO XRAY VIEWS OF THE CHEST 6/11/2024 1:10 pm COMPARISON: 03/27/2024 HISTORY: ORDERING SYSTEM PROVIDED HISTORY: wheezing on PE R lower lung base TECHNOLOGIST PROVIDED HISTORY: Reason for exam:->wheezing on PE R lower lung base Reason for Exam: wheezing on PE R lower lung base FINDINGS: The lungs appear clear.  The heart and mediastinal structures unremarkable. Bony thorax appears normal.     No radiographic evidence of acute cardiopulmonary disease.       CBC:   Recent Labs     06/16/24 0315 06/17/24  0346 06/18/24  0308   WBC 7.5 8.4 8.2   HGB 11.0* 10.7* 11.0*    407 396     BMP:    Recent Labs     06/16/24 0315 06/17/24  0346 06/18/24  0308   * 133* 134*   K 3.4* 3.1* 3.8    102 101   CO2 19* 22 22   BUN 38* 34* 26*   CREATININE 2.5* 2.3* 2.2*   GLUCOSE 111* 175* 111*     Hepatic: No results for input(s): \"AST\", \"ALT\", \"BILITOT\", \"ALKPHOS\" in the last 72 hours.    Invalid input(s): \"ALB\"  Lipids:   Lab Results   Component Value Date/Time    CHOL 101 11/29/2022 11:20 AM    HDL 30 11/29/2022 11:20 AM    TRIG 94 11/29/2022 11:20 AM     Hemoglobin A1C:   Lab Results   Component Value Date/Time    LABA1C 11.4 06/11/2024 09:24 PM     TSH:   Lab Results   Component Value Date/Time    TSH 0.39 06/13/2024 05:17 AM    TSH 2.13 11/29/2022 11:20 AM

## 2024-06-18 NOTE — PLAN OF CARE
Problem: Discharge Planning  Goal: Discharge to home or other facility with appropriate resources  Outcome: Progressing  Flowsheets (Taken 6/17/2024 2000)  Discharge to home or other facility with appropriate resources: Identify barriers to discharge with patient and caregiver     Problem: Safety - Adult  Goal: Free from fall injury  Outcome: Progressing     Problem: ABCDS Injury Assessment  Goal: Absence of physical injury  Outcome: Progressing     Problem: Chronic Conditions and Co-morbidities  Goal: Patient's chronic conditions and co-morbidity symptoms are monitored and maintained or improved  Outcome: Progressing  Flowsheets (Taken 6/17/2024 2000)  Care Plan - Patient's Chronic Conditions and Co-Morbidity Symptoms are Monitored and Maintained or Improved:   Monitor and assess patient's chronic conditions and comorbid symptoms for stability, deterioration, or improvement   Collaborate with multidisciplinary team to address chronic and comorbid conditions and prevent exacerbation or deterioration     Problem: Infection - Adult  Goal: Absence of infection at discharge  Outcome: Progressing  Flowsheets (Taken 6/17/2024 2000)  Absence of infection at discharge:   Assess and monitor for signs and symptoms of infection   Monitor lab/diagnostic results   Monitor all insertion sites i.e., indwelling lines, tubes and drains     Problem: Metabolic/Fluid and Electrolytes - Adult  Goal: Electrolytes maintained within normal limits  Outcome: Progressing  Flowsheets (Taken 6/17/2024 2000)  Electrolytes maintained within normal limits:   Monitor labs and assess patient for signs and symptoms of electrolyte imbalances   Administer electrolyte replacement as ordered   Monitor response to electrolyte replacements, including repeat lab results as appropriate  Goal: Glucose maintained within prescribed range  Outcome: Progressing  Flowsheets (Taken 6/17/2024 2000)  Glucose maintained within prescribed range:   Monitor blood

## 2024-06-18 NOTE — PROGRESS NOTES
Pt awake/alert. Assessment completed. Orientedx4, follows commands.  NIH score 0, pt able to repeat words/phrases clearly but unable to describe picture or read phrases d/t poor eye sight. Pt states does not wear glasses, needs cataract surgery and has been receiving eye injections.

## 2024-06-18 NOTE — PROGRESS NOTES
Infectious Diseases Inpatient Progress Note    CHIEF COMPLAINT:     Right shoulder abscess  Right upper extremity cellulitis  Left foot osteomyelitis  Diabetes poor control  Chronic kidney disease stage IIIb  CVA - ACUTE         HISTORY OF PRESENT ILLNESS:  59 y.o. man with a significant history for diabetes, chronic kidney disease stage IIIb, coronary artery disease, congestive heart failure, diabetes with neuropathy, peripheral vascular disease admitted to the hospital secondary to left diabetic foot ulcer.  Patient was seen by me recently when admitted in April for left foot infection was treated with IV antibiotic followed by oral antibiotic.  Now x-ray indicating progression to osteomyelitis of the left fifth metatarsal base.  He was also found to have cellulitis of the right shoulder was evaluated by general surgery given the progression to possible abscess formation there is a plan for incision and drainage.  Given the need for IV antibiotics we are consulted for recommendations.  Blood culture in process left foot wound culture in process, labs indicate creatinine 2.6 sodium 124 procalcitonin 0.21 hemoglobin A1c 11.4 with poor diabetes control WBC 14.8 hemoglobin 10.3      Interval history: Remains in ICU tolerating antibiotic therapy okay left foot ongoing pain dressing present intermittent cough creatinine remains elevated    Past Medical History:    Past Medical History:   Diagnosis Date    Back pain     Diabetes mellitus (HCC)     Pneumonia        Past Surgical History:    Past Surgical History:   Procedure Laterality Date    ARM SURGERY Right 6/12/2024    INCISION AND DRAINAGE RIGHT PROXIMAL ARM ABSCESS performed by Dann Nuñez MD at [facility] OR    COLONOSCOPY      LEG SURGERY Left     LIVER BIOPSY         Current Medications:    Outpatient Medications Marked as Taking for the 6/11/24 encounter (Hospital Encounter)   Medication Sig Dispense Refill    oxyCODONE-acetaminophen (PERCOCET)                         COLLECTED:  06/11/24 12:55  ANTIBIOTICS AT EPI.:                      RECEIVED :  06/11/24 13:14  If child <=2 yrs old please draw pediatric bottle.~Blood Culture #2   Culture, Blood 1 [[id number]] Collected: 06/11/24 1255   Order Status: Completed Specimen: Blood Updated: 06/12/24 1415    Blood Culture, Routine No Growth to date.  Any change in status will be called.   Narrative:     ORDER#: [id number]                          ORDERED BY: JAYDE VAZQUEZ  SOURCE: Blood                              COLLECTED:  06/11/24 12:55  ANTIBIOTICS AT EPI.:                      RECEIVED :  06/11/24 13:13  If child <=2 yrs old please draw pediatric bottle.~Blood Culture 1   Culture, Wound [[id number]] Collected: 06/12/24 0615   Order Status: No result Specimen: Wound Updated: 06/12/24 1043    Gram Stain Result 2+ Gram positive cocci  4+ WBC's  2+ Epithelial Cells   Narrative:     ORDER#: [id number]                          ORDERED BY: LEV ADAMS  SOURCE: Wound                              COLLECTED:  06/12/24 06:15  ANTIBIOTICS AT EPI.:                      RECEIVED :  06/12/24 06:33   Culture, Wound [[id number]] Collected: 06/11/24 2335   Order Status: Canceled Specimen: Arm, Upper from Abscess        Blood Culture:   Lab Results   Component Value Date/Time    BC No Growth after 4 days of incubation. 06/11/2024 12:55 PM    BLOODCULT2 No Growth after 4 days of incubation. 06/11/2024 12:55 PM       Viral Culture:    Lab Results   Component Value Date/Time    COVID19 Not Detected 03/27/2024 06:00 PM    COVID19 Not Detected 01/30/2023 01:03 PM     Urine Culture: No results for input(s): \"LABURIN\" in the last 72 hours.    Culture, Tissue [[id number]] (Abnormal) Collected: 06/12/24 1233   Order Status: Completed Specimen: Tissue Updated: 06/13/24 1252    Gram Stain Result No organisms seen  2+ WBC's  No Epithelial Cells seen    Organism Staphylococcus aureus Abnormal     WOUND/ABSCESS --    Light

## 2024-06-18 NOTE — PROGRESS NOTES
General and Vascular Surgery                                                           Daily Progress Note                                                             Urban Lee PA-C    Pt Name: Juan Carlos Wogn   Medical Record Number: [id number]  Date of Birth 1965   Today's Date: 6/18/2024    ASSESSMENT/PLAN  S/P (6/12/24): Incision and drainage of right proximal arm abscess.   Pain controlled  CVA   Leukocytosis resolved:   Right shoulder. Dressing and packing changed. Wound site looks good. No necrotic tissue or purulent drainage.   +Foot ulcer surgery tomorrow  IV antibiotic  Monitor and control pain  No further general surgery plans at this time. Will sign off. Continue wet-dry dressing changes on right shoulder.         EDUCATION  Patient educated about their illness/diagnosis, stated above, and all questions answered. We discussed the importance of nutrition, medications they are taking, and healthy lifestyle.  SUBJECTIVE  [place] has improved from yesterday. Pain is well controlled. He has no nausea and no vomiting.  He is tolerating a dysphagia dietCurrent activity is up with assistance  OBJECTIVE  VITALS:  height is 1.753 m (5' 9.02\") and weight is 82.2 kg (181 lb 3.5 oz). His oral temperature is 97.6 °F (36.4 °C). His blood pressure is 111/64 and his pulse is 66. His respiration is 26 and oxygen saturation is 97%. VITALS:  /64   Pulse 66   Temp 97.6 °F (36.4 °C) (Oral)   Resp 26   Ht 1.753 m (5' 9.02\")   Wt 82.2 kg (181 lb 3.5 oz)   SpO2 97%   BMI 26.75 kg/m²   GENERAL: alert, no distress  LUNGS: clear to ausculation, without wheezes, rales or rhonci  HEART: normal rate and regular rhythm  ABDOMEN: soft, non-tender, non-distended  EXTREMITY: Right shoulder wound from I&D of abscess looks good. No necrotic tissue or purulent drainage.   I/O last 3 completed shifts:  In: 3349.6 [P.O.:920; I.V.:1779.2; IV

## 2024-06-18 NOTE — PROGRESS NOTES
Physical Therapy  Facility/Department: [address] CVICU  Daily Treatment Note  NAME: Juan Carlos Wong Sr  : 1965  MRN: [id number]    Date of Service: 2024    Discharge Recommendations:  Continue to assess pending progress, Patient would benefit from continued therapy after discharge        Patient Diagnosis(es): The primary encounter diagnosis was Cellulitis of right upper arm. A diagnosis of Osteomyelitis of left foot, unspecified type (HCC) was also pertinent to this visit.    Assessment   Assessment: Pt agreeable to activity, able to ambulate short distances with walker, CGA.  Per notes, plan for surgery tomorrow.  Will likely require re-assessment pending change in WB status.  Recommend continued therapy to improve balance, strength, endurance, independence ambulating.  Will continue to assess for D/C plan pending pt progress.    Juan Carlos Wong Sr scored a  on the AM-PAC short mobility form.   If patient discharges prior to next session this note will serve as a discharge summary.  Please see below for the latest assessment towards goals.     Activity Tolerance: Patient tolerated treatment well     Plan    Physical Therapy Plan  General Plan: 2-3 times per week  Current Treatment Recommendations: Strengthening;Balance training;Functional mobility training;Transfer training;Gait training;Endurance training;Patient/Caregiver education & training;Safety education & training;Equipment evaluation, education, & procurement;Therapeutic activities;Neuromuscular re-education     Restrictions  Restrictions/Precautions  Restrictions/Precautions: Fall Risk  Position Activity Restriction  Other position/activity restrictions: transmet amp L foot, toe amp R foot     Subjective    Subjective  Subjective: Pt agreeable to activity.  Orientation  Overall Orientation Status: Within Functional Limits     Objective     Bed Mobility Training  Bed Mobility Training: Yes  Overall Level of Assistance: Stand-by  assistance  Supine to Sit: Stand-by assistance    Transfer Training  Transfer Training: Yes  Overall Level of Assistance: Contact-guard assistance  Sit to Stand: Contact-guard assistance  Stand to Sit: Contact-guard assistance    Gait  Gait Training: Yes  Overall Level of Assistance: Contact-guard assistance  Distance (ft): 15 Feet (15', 50')  Assistive Device: Walker, rolling  Speed/Krissy: Shuffled;Slow  Gait Abnormalities: Path deviations (Poor vision)     PT Exercises  Exercise Treatment: Mini-squat x 10, Sit < > stand x 10.    Other Specialty Interventions  Other Treatments/Modalities: Total assist to don new brief.  Total assist to perform gloria-care after BM.    Safety Devices  Type of Devices: All fall risk precautions in place;Call light within reach;Chair alarm in place;Left in chair;Nurse notified  Restraints  Restraints Initially in Place: No       Goals  Short Term Goals  Time Frame for Short Term Goals: by acute discharge - all goals ongoing as of 6/17/24  Short Term Goal 1: bed mobility mod I  Short Term Goal 2: sit<>Stand (I)  Short Term Goal 3: ambulate > 40' (I)  Patient Goals   Patient Goals : none stated    Education  Patient Education  Education Given To: Patient  Education Provided: Plan of Care;Role of Therapy  Education Outcome: Verbalized understanding;Continued education needed    AM-PAC - Mobility    AM-PAC Basic Mobility - Inpatient   How much help is needed turning from your back to your side while in a flat bed without using bedrails?: A Little  How much help is needed moving from lying on your back to sitting on the side of a flat bed without using bedrails?: A Little  How much help is needed moving to and from a bed to a chair?: A Little  How much help is needed standing up from a chair using your arms?: A Little  How much help is needed walking in hospital room?: A Little  How much help is needed climbing 3-5 steps with a railing?: A Lot  AM-PAC Inpatient Mobility Raw Score :

## 2024-06-18 NOTE — PROGRESS NOTES
Comprehensive Nutrition Assessment    Type and Reason for Visit:  Reassess, Consult, Patient Education (increasing protein and diabetic)    Nutrition Recommendations/Plan:   ***     Malnutrition Assessment:  Malnutrition Status:  No malnutrition (06/14/24 1528)    Context:  Chronic Illness     Findings of the 6 clinical characteristics of malnutrition:  Energy Intake:  No significant decrease in energy intake  Weight Loss:  Mild weight loss (specify amount and time period)     Body Fat Loss:  No significant body fat loss     Muscle Mass Loss:  No significant muscle mass loss    Fluid Accumulation:  No significant fluid accumulation     Strength:  Not Performed    Nutrition Assessment:    Follow up:  Nurse informed RD of education consult.  Patient needed more guidance regarding meal balancing and good protein sources.  RD provided verbal guidance with handout for daughter to review further.  Patient unable to see and daughter takes care of meal preparing and grocery shopping.    Nutrition Related Findings:    Na 134 on 6/18; last BM on 6/17; visually impaired, no teeth Wound Type: Diabetic Ulcer       Current Nutrition Intake & Therapies:    Average Meal Intake: %  Average Supplements Intake: %  ADULT ORAL NUTRITION SUPPLEMENT; Dinner, Lunch; Wound Healing Oral Supplement  ADULT ORAL NUTRITION SUPPLEMENT; Breakfast, Dinner, Lunch; Diabetic Oral Supplement  ADULT DIET; Dysphagia - Soft and Bite Sized; 5 carb choices (75 gm/meal)    Anthropometric Measures:  Height: 175.3 cm (5' 9.02\")  Ideal Body Weight (IBW): 160 lbs (73 kg)       Current Body Weight: 82.2 kg (181 lb 3.5 oz),   IBW. Weight Source: Bed Scale  Current BMI (kg/m2): 26.7                               Estimated Daily Nutrient Needs:  Energy Requirements Based On: Kcal/kg  Weight Used for Energy Requirements: Current  Energy (kcal/day): 1620 - 2025 (20 - 25 kcal/kg)  Weight Used for Protein Requirements: Ideal  Protein (g/day): 87 - 109

## 2024-06-18 NOTE — PROCEDURES
INTERPRETATION:  This more than 2-hour, rapid 8-channel EEG recording is abnormal.  It showed mild degree of generalized slowing background activity.  No potentially epileptiform activity was present during the recording.       The EEG findings were consistent with mild degree of generalized non-specific cerebral dysfunction.     CLASSIFICATION:  Dysrhythmia grade 1, generalized.     DESCRIPTION:  BACKGROUND:  The EEG background showed continuous generalized low amplitude intermixed 4 to 11 Hz theta/alpha activity.  The EEG showed fair variability and reactivity.       INTERICTAL DISCHARGES: None     SEIZURE BURDEN: 0%

## 2024-06-18 NOTE — PROGRESS NOTES
Nephrology Progress Note   [facility]      Reason for consultation: Hyponatremia / MIGUEL on CKD 3/4 -- baseline Cr ~ 2.0-2.3 mg/dL     Subjective:    The patient has been seen and examined. Labs and chart reviewed. Cr within baseline. Na+ acceptable.     There were not complications overnight.    Patient review of systems: Denies SOB.       Allergies:  No Known Allergies     Scheduled Meds:   metroNIDAZOLE  500 mg IntraVENous Q8H    atorvastatin  80 mg Oral Nightly    ceFAZolin  2,000 mg IntraVENous Q12H    enoxaparin  30 mg SubCUTAneous Daily    carvedilol  6.25 mg Oral BID WC    insulin glargine  13 Units SubCUTAneous Nightly    insulin lispro  5 Units SubCUTAneous TID WC    [Held by provider] aspirin  81 mg Oral Daily    [Held by provider] clopidogrel  75 mg Oral Nightly    gabapentin  300 mg Oral BID    hydrALAZINE  10 mg Oral TID    [Held by provider] isosorbide mononitrate  15 mg Oral Nightly    pantoprazole  40 mg Oral QAM AC    sodium chloride flush  5-40 mL IntraVENous 2 times per day    insulin lispro  0-4 Units SubCUTAneous TID WC    insulin lispro  0-4 Units SubCUTAneous Nightly        sodium bicarbonate 75 mEq in sodium chloride 0.45 % 1,000 mL infusion 50 mL/hr at 24 0600    sodium chloride 5 mL/hr at 24 0600    dextrose         PRN Meds:melatonin, potassium chloride **OR** potassium alternative oral replacement **OR** potassium chloride, oxyCODONE-acetaminophen **OR** oxyCODONE-acetaminophen, morphine **OR** morphine, cyclobenzaprine, sodium chloride flush, sodium chloride, ondansetron **OR** ondansetron, polyethylene glycol, acetaminophen **OR** acetaminophen, glucose, dextrose bolus **OR** dextrose bolus, glucagon (rDNA), dextrose    Physical Exam:    TEMPERATURE:  Current - Temp: 97.6 °F (36.4 °C); Max - Temp  Av °F (36.7 °C)  Min: 97.6 °F (36.4 °C)  Max: 98.2 °F (36.8 °C)  RESPIRATIONS RANGE: Resp  Av.4  Min: 11  Max: 28  PULSE RANGE: Pulse  Av  Min: 67  Max: 84  BLOOD

## 2024-06-19 LAB
ALBUMIN SERPL-MCNC: 1.9 G/DL (ref 3.4–5)
ANION GAP SERPL CALCULATED.3IONS-SCNC: 6 MMOL/L (ref 3–16)
BUN SERPL-MCNC: 30 MG/DL (ref 7–20)
CALCIUM SERPL-MCNC: 7.8 MG/DL (ref 8.3–10.6)
CHLORIDE SERPL-SCNC: 99 MMOL/L (ref 99–110)
CO2 SERPL-SCNC: 24 MMOL/L (ref 21–32)
CREAT SERPL-MCNC: 2.2 MG/DL (ref 0.8–1.3)
CRP SERPL-MCNC: 19.5 MG/L (ref 0–5.1)
DEPRECATED RDW RBC AUTO: 13.9 % (ref 12.4–15.4)
ERYTHROCYTE [SEDIMENTATION RATE] IN BLOOD BY WESTERGREN METHOD: 72 MM/HR (ref 0–20)
GFR SERPLBLD CREATININE-BSD FMLA CKD-EPI: 33 ML/MIN/{1.73_M2}
GLUCOSE BLD-MCNC: 101 MG/DL (ref 70–99)
GLUCOSE BLD-MCNC: 105 MG/DL (ref 70–99)
GLUCOSE BLD-MCNC: 123 MG/DL (ref 70–99)
GLUCOSE BLD-MCNC: 132 MG/DL (ref 70–99)
GLUCOSE SERPL-MCNC: 172 MG/DL (ref 70–99)
HCT VFR BLD AUTO: 31.8 % (ref 40.5–52.5)
HGB BLD-MCNC: 10.7 G/DL (ref 13.5–17.5)
MCH RBC QN AUTO: 29.3 PG (ref 26–34)
MCHC RBC AUTO-ENTMCNC: 33.7 G/DL (ref 31–36)
MCV RBC AUTO: 86.8 FL (ref 80–100)
OSMOLALITY UR: 385 MOSM/KG (ref 390–1070)
PERFORMED ON: ABNORMAL
PHOSPHATE SERPL-MCNC: 3.3 MG/DL (ref 2.5–4.9)
PLATELET # BLD AUTO: 358 K/UL (ref 135–450)
PMV BLD AUTO: 7.9 FL (ref 5–10.5)
POTASSIUM SERPL-SCNC: 4 MMOL/L (ref 3.5–5.1)
RBC # BLD AUTO: 3.66 M/UL (ref 4.2–5.9)
SODIUM SERPL-SCNC: 129 MMOL/L (ref 136–145)
SODIUM UR-SCNC: 122 MMOL/L
WBC # BLD AUTO: 8.5 K/UL (ref 4–11)

## 2024-06-19 PROCEDURE — 94760 N-INVAS EAR/PLS OXIMETRY 1: CPT

## 2024-06-19 PROCEDURE — 2140000000 HC CCU INTERMEDIATE R&B

## 2024-06-19 PROCEDURE — 97530 THERAPEUTIC ACTIVITIES: CPT

## 2024-06-19 PROCEDURE — 6360000002 HC RX W HCPCS: Performed by: INTERNAL MEDICINE

## 2024-06-19 PROCEDURE — 2580000003 HC RX 258: Performed by: INTERNAL MEDICINE

## 2024-06-19 PROCEDURE — 6370000000 HC RX 637 (ALT 250 FOR IP): Performed by: INTERNAL MEDICINE

## 2024-06-19 PROCEDURE — 36415 COLL VENOUS BLD VENIPUNCTURE: CPT

## 2024-06-19 PROCEDURE — 86140 C-REACTIVE PROTEIN: CPT

## 2024-06-19 PROCEDURE — 6370000000 HC RX 637 (ALT 250 FOR IP): Performed by: NURSE PRACTITIONER

## 2024-06-19 PROCEDURE — 85652 RBC SED RATE AUTOMATED: CPT

## 2024-06-19 PROCEDURE — 80069 RENAL FUNCTION PANEL: CPT

## 2024-06-19 PROCEDURE — 85027 COMPLETE CBC AUTOMATED: CPT

## 2024-06-19 PROCEDURE — 84300 ASSAY OF URINE SODIUM: CPT

## 2024-06-19 PROCEDURE — 51798 US URINE CAPACITY MEASURE: CPT

## 2024-06-19 PROCEDURE — 83935 ASSAY OF URINE OSMOLALITY: CPT

## 2024-06-19 PROCEDURE — 99232 SBSQ HOSP IP/OBS MODERATE 35: CPT | Performed by: INTERNAL MEDICINE

## 2024-06-19 RX ADMIN — HYDRALAZINE HYDROCHLORIDE 10 MG: 10 TABLET, FILM COATED ORAL at 13:38

## 2024-06-19 RX ADMIN — Medication 6 MG: at 21:56

## 2024-06-19 RX ADMIN — ATORVASTATIN CALCIUM 80 MG: 80 TABLET, FILM COATED ORAL at 21:02

## 2024-06-19 RX ADMIN — CARVEDILOL 6.25 MG: 6.25 TABLET, FILM COATED ORAL at 17:13

## 2024-06-19 RX ADMIN — SODIUM CHLORIDE, PRESERVATIVE FREE 10 ML: 5 INJECTION INTRAVENOUS at 08:35

## 2024-06-19 RX ADMIN — ENOXAPARIN SODIUM 30 MG: 100 INJECTION SUBCUTANEOUS at 09:30

## 2024-06-19 RX ADMIN — INSULIN LISPRO 5 UNITS: 100 INJECTION, SOLUTION INTRAVENOUS; SUBCUTANEOUS at 09:30

## 2024-06-19 RX ADMIN — GABAPENTIN 300 MG: 300 CAPSULE ORAL at 08:34

## 2024-06-19 RX ADMIN — INSULIN GLARGINE 13 UNITS: 100 INJECTION, SOLUTION SUBCUTANEOUS at 21:02

## 2024-06-19 RX ADMIN — METRONIDAZOLE 500 MG: 500 INJECTION, SOLUTION INTRAVENOUS at 07:53

## 2024-06-19 RX ADMIN — CARVEDILOL 6.25 MG: 6.25 TABLET, FILM COATED ORAL at 08:34

## 2024-06-19 RX ADMIN — SODIUM CHLORIDE 2000 MG: 900 INJECTION INTRAVENOUS at 00:14

## 2024-06-19 RX ADMIN — HYDRALAZINE HYDROCHLORIDE 10 MG: 10 TABLET, FILM COATED ORAL at 08:34

## 2024-06-19 RX ADMIN — METRONIDAZOLE 500 MG: 500 INJECTION, SOLUTION INTRAVENOUS at 01:06

## 2024-06-19 RX ADMIN — GABAPENTIN 300 MG: 300 CAPSULE ORAL at 21:02

## 2024-06-19 RX ADMIN — METRONIDAZOLE 500 MG: 500 INJECTION, SOLUTION INTRAVENOUS at 16:21

## 2024-06-19 RX ADMIN — INSULIN LISPRO 5 UNITS: 100 INJECTION, SOLUTION INTRAVENOUS; SUBCUTANEOUS at 13:38

## 2024-06-19 RX ADMIN — SODIUM CHLORIDE, PRESERVATIVE FREE 10 ML: 5 INJECTION INTRAVENOUS at 21:03

## 2024-06-19 RX ADMIN — HYDRALAZINE HYDROCHLORIDE 10 MG: 10 TABLET, FILM COATED ORAL at 21:02

## 2024-06-19 RX ADMIN — OXYCODONE HYDROCHLORIDE AND ACETAMINOPHEN 2 TABLET: 5; 325 TABLET ORAL at 02:50

## 2024-06-19 RX ADMIN — INSULIN LISPRO 5 UNITS: 100 INJECTION, SOLUTION INTRAVENOUS; SUBCUTANEOUS at 17:22

## 2024-06-19 RX ADMIN — OXYCODONE HYDROCHLORIDE AND ACETAMINOPHEN 2 TABLET: 5; 325 TABLET ORAL at 21:56

## 2024-06-19 RX ADMIN — SODIUM CHLORIDE 2000 MG: 900 INJECTION INTRAVENOUS at 12:15

## 2024-06-19 RX ADMIN — SODIUM CHLORIDE 2000 MG: 900 INJECTION INTRAVENOUS at 23:48

## 2024-06-19 RX ADMIN — PANTOPRAZOLE SODIUM 40 MG: 40 TABLET, DELAYED RELEASE ORAL at 06:17

## 2024-06-19 ASSESSMENT — PAIN DESCRIPTION - DESCRIPTORS: DESCRIPTORS: SHARP

## 2024-06-19 ASSESSMENT — PAIN SCALES - GENERAL
PAINLEVEL_OUTOF10: 7
PAINLEVEL_OUTOF10: 3
PAINLEVEL_OUTOF10: 9
PAINLEVEL_OUTOF10: 0

## 2024-06-19 ASSESSMENT — PAIN - FUNCTIONAL ASSESSMENT: PAIN_FUNCTIONAL_ASSESSMENT: PREVENTS OR INTERFERES SOME ACTIVE ACTIVITIES AND ADLS

## 2024-06-19 ASSESSMENT — PAIN DESCRIPTION - ORIENTATION
ORIENTATION: RIGHT
ORIENTATION: RIGHT

## 2024-06-19 ASSESSMENT — PAIN DESCRIPTION - LOCATION
LOCATION: SHOULDER
LOCATION: SHOULDER

## 2024-06-19 NOTE — PROGRESS NOTES
NAME:  Juan Carlos Wong Sr  YOB: 1965  MEDICAL RECORD NUMBER:  [id number]    Shift Summary: Uneventful shift. Pain controlled with PRN Percocet. L foot dressing changed. Poor sleep overnight.     Family updated: No    Rhythm: Normal Sinus Rhythm     Most recent vitals:   Visit Vitals  BP (!) 140/82   Pulse 87   Temp 97.9 °F (36.6 °C) (Oral)   Resp 16   Ht 1.753 m (5' 9.02\")   Wt 83 kg (182 lb 15.7 oz)   SpO2 97%   BMI 27.01 kg/m²           No data found.    No data found.      Respiratory support needed (if any):  - RA    Admission weight Weight - Scale: 80.7 kg (177 lb 14.6 oz) (06/11/24 1136)    Today's weight    Wt Readings from Last 1 Encounters:   06/19/24 83 kg (182 lb 15.7 oz)        Elliott need assessed each shift: YES -  - continue elliott r/t - retention  UOP >30ml/hr: YES  Last documented BM (in last 48 hrs):  Patient Vitals for the past 48 hrs:   Last BM (including prior to admit) Stool Occurrence   06/17/24 2000 06/17/24 --   06/18/24 0800 06/17/24 --   06/18/24 1757 06/18/24 1 06/18/24 2000 06/18/24 --                Restraints (in use currently or dc'd in last 12 hrs): No    Order current and documentation up to date? No    Lines/Drains reviewed @ bedside.  Peripheral IV 06/11/24 Left Forearm (Active)   Number of days: 7       Continuous Glucose Monitor   (Active)   Number of days:        Urinary Catheter 06/12/24 Elliott (Active)   Number of days: 6         Drip rates at handoff:    sodium chloride 5 mL/hr at 06/19/24 0611    dextrose         Lab Data:   CBC:   Recent Labs     06/18/24 0308 06/19/24  0321   WBC 8.2 8.5   HGB 11.0* 10.7*   HCT 31.9* 31.8*   MCV 85.9 86.8    358     BMP:    Recent Labs     06/18/24 0308 06/19/24  0321   * 129*   K 3.8 4.0   CO2 22 24   BUN 26* 30*   CREATININE 2.2* 2.2*     LIVR: No results for input(s): \"AST\", \"ALT\" in the last 72 hours.  PT/INR: No results for input(s): \"PROT\", \"INR\" in the last 72 hours.  APTT: No results for input(s):

## 2024-06-19 NOTE — PROGRESS NOTES
NAME:  Juan Carlos Wong Sr  YOB: 1965  MEDICAL RECORD NUMBER:  [id number]    Shift Summary: Elliott catheter removed. Left foot Surgery planned for Friday. Sodium level drop, nephrology added fluid restriction.    Family updated: Yes:  pt updated family via phone    Rhythm: Normal Sinus Rhythm     Most recent vitals:   Visit Vitals  BP (!) 153/83   Pulse 86   Temp 98.1 °F (36.7 °C) (Oral)   Resp 20   Ht 1.753 m (5' 9.02\")   Wt 83 kg (182 lb 15.7 oz)   SpO2 97%   BMI 27.01 kg/m²           No data found.    No data found.      Respiratory support needed (if any):  - RA    Admission weight Weight - Scale: 80.7 kg (177 lb 14.6 oz) (06/11/24 1136)    Today's weight    Wt Readings from Last 1 Encounters:   06/19/24 83 kg (182 lb 15.7 oz)        Elliott need assessed each shift: N/A - no elliott present  UOP >30ml/hr: YES  Last documented BM (in last 48 hrs):  Patient Vitals for the past 48 hrs:   Last BM (including prior to admit) Stool Occurrence   06/17/24 2000 06/17/24 --   06/18/24 0800 06/17/24 --   06/18/24 1757 06/18/24 1   06/18/24 2000 06/18/24 --   06/19/24 0740 06/18/24 --                Restraints (in use currently or dc'd in last 12 hrs): No    Order current and documentation up to date? No    Lines/Drains reviewed @ bedside.  Peripheral IV 06/11/24 Left Forearm (Active)   Number of days: 8       Continuous Glucose Monitor   (Active)   Number of days:          Drip rates at handoff:    sodium chloride 5 mL/hr at 06/19/24 1724    dextrose         Lab Data:   CBC:   Recent Labs     06/18/24 0308 06/19/24  0321   WBC 8.2 8.5   HGB 11.0* 10.7*   HCT 31.9* 31.8*   MCV 85.9 86.8    358     BMP:    Recent Labs     06/18/24 0308 06/19/24  0321   * 129*   K 3.8 4.0   CO2 22 24   BUN 26* 30*   CREATININE 2.2* 2.2*     LIVR: No results for input(s): \"AST\", \"ALT\" in the last 72 hours.  PT/INR: No results for input(s): \"PROT\", \"INR\" in the last 72 hours.  APTT: No results for input(s): \"APTT\" in

## 2024-06-19 NOTE — FLOWSHEET NOTE
06/19/24 1631 06/19/24 1700 06/19/24 1717   Output (mL)   Urine  --  225 mL  --    Urine Assessment   Bladder Scan Volume (mL) 367 mL  --  200 mL     Wheeler catheter removed @ 1135  Bladder scan prior to and after first void.

## 2024-06-19 NOTE — PLAN OF CARE
respiratory effort   Initiate smoking cessation protocol as indicated   Respiratory therapy support as indicated   Assess for changes in mentation and behavior   Oxygen supplementation based on oxygen saturation or arterial blood gases   Encourage broncho-pulmonary hygiene including cough, deep breathe, incentive spirometry   Assess and instruct to report shortness of breath or any respiratory difficulty     Problem: Cardiovascular - Adult  Goal: Maintains optimal cardiac output and hemodynamic stability  Outcome: Progressing  Flowsheets (Taken 6/19/2024 0241)  Maintains optimal cardiac output and hemodynamic stability:   Monitor blood pressure and heart rate   Monitor urine output and notify Licensed Independent Practitioner for values outside of normal range   Assess for signs of decreased cardiac output  Goal: Absence of cardiac dysrhythmias or at baseline  Outcome: Progressing  Flowsheets (Taken 6/19/2024 0241)  Absence of cardiac dysrhythmias or at baseline:   Monitor cardiac rate and rhythm   Administer antiarrhythmia medication and electrolyte replacement as ordered   Assess for signs of decreased cardiac output     Problem: Musculoskeletal - Adult  Goal: Return mobility to safest level of function  Outcome: Progressing  Flowsheets (Taken 6/19/2024 0241)  Return Mobility to Safest Level of Function:   Assess patient stability and activity tolerance for standing, transferring and ambulating with or without assistive devices   Assist with transfers and ambulation using safe patient handling equipment as needed   Ensure adequate protection for wounds/incisions during mobilization   Obtain physical therapy/occupational therapy consults as needed   Instruct patient/family in ordered activity level  Goal: Return ADL status to a safe level of function  Outcome: Progressing  Flowsheets (Taken 6/19/2024 0241)  Return ADL Status to a Safe Level of Function:   Administer medication as ordered   Obtain physical  therapy/occupational therapy consults as needed   Assess activities of daily living deficits and provide assistive devices as needed   Assist and instruct patient to increase activity and self care as tolerated     Problem: Gastrointestinal - Adult  Goal: Maintains adequate nutritional intake  Outcome: Progressing  Flowsheets (Taken 6/19/2024 0241)  Maintains adequate nutritional intake:   Monitor percentage of each meal consumed   Monitor intake and output, weight and lab values   Assist with meals as needed     Problem: Genitourinary - Adult  Goal: Urinary catheter remains patent  Outcome: Progressing  Flowsheets (Taken 6/19/2024 0241)  Urinary catheter remains patent:   Assess patency of urinary catheter   Irrigate catheter per Licensed Independent Practitioner order if indicated and notify Licensed Independent Practitioner if unable to irrigate   Assess need for a larger catheter size or a 3-way catheter for continuous bladder irrigation     Problem: Hematologic - Adult  Goal: Maintains hematologic stability  Outcome: Progressing  Flowsheets (Taken 6/19/2024 0241)  Maintains hematologic stability:   Assess for signs and symptoms of bleeding or hemorrhage   Administer blood products/factors as ordered   Monitor labs for bleeding or clotting disorders     Problem: Pain  Goal: Verbalizes/displays adequate comfort level or baseline comfort level  Outcome: Progressing  Flowsheets (Taken 6/19/2024 0241)  Verbalizes/displays adequate comfort level or baseline comfort level:   Encourage patient to monitor pain and request assistance   Consider cultural and social influences on pain and pain management   Administer analgesics based on type and severity of pain and evaluate response   Assess pain using appropriate pain scale   Implement non-pharmacological measures as appropriate and evaluate response   Notify Licensed Independent Practitioner if interventions unsuccessful or patient reports new pain     Problem:

## 2024-06-19 NOTE — PROGRESS NOTES
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 Labs and chart reviewed. Cr within baseline. Na+ slightly below goal this AM.     There were not complications overnight.    Patient review of systems: Denies SOB.       Allergies:  No Known Allergies     Scheduled Meds:   metroNIDAZOLE  500 mg IntraVENous Q8H    atorvastatin  80 mg Oral Nightly    ceFAZolin  2,000 mg IntraVENous Q12H    enoxaparin  30 mg SubCUTAneous Daily    carvedilol  6.25 mg Oral BID WC    insulin glargine  13 Units SubCUTAneous Nightly    insulin lispro  5 Units SubCUTAneous TID WC    [Held by provider] aspirin  81 mg Oral Daily    [Held by provider] clopidogrel  75 mg Oral Nightly    gabapentin  300 mg Oral BID    hydrALAZINE  10 mg Oral TID    [Held by provider] isosorbide mononitrate  15 mg Oral Nightly    pantoprazole  40 mg Oral QAM AC    sodium chloride flush  5-40 mL IntraVENous 2 times per day    insulin lispro  0-4 Units SubCUTAneous TID     insulin lispro  0-4 Units SubCUTAneous Nightly        sodium chloride 5 mL/hr at 24 0611    dextrose         PRN Meds:melatonin, potassium chloride **OR** potassium alternative oral replacement **OR** potassium chloride, oxyCODONE-acetaminophen **OR** oxyCODONE-acetaminophen, morphine **OR** morphine, cyclobenzaprine, sodium chloride flush, sodium chloride, ondansetron **OR** ondansetron, polyethylene glycol, acetaminophen **OR** acetaminophen, glucose, dextrose bolus **OR** dextrose bolus, glucagon (rDNA), dextrose    Physical Exam:    TEMPERATURE:  Current - Temp: 98 °F (36.7 °C); Max - Temp  Av.9 °F (36.6 °C)  Min: 97.9 °F (36.6 °C)  Max: 98 °F (36.7 °C)  RESPIRATIONS RANGE: Resp  Av.1  Min: 14  Max: 30  PULSE RANGE: Pulse  Av.6  Min: 66  Max: 97  BLOOD PRESSURE RANGE:  Systolic (24hrs), Av , Min:111 , Max:147   ; Diastolic  06/11/2024 04:48 PM    RBCUA 0-2 06/11/2024 04:48 PM    RBCUA 1+ (0.06-0.1 mg/dL) 05/09/2024 09:17 AM    MUCUS 1+ 03/27/2024 08:03 PM    BACTERIA None Seen 06/11/2024 04:48 PM    CLARITYU Clear 06/11/2024 04:48 PM    LEUKOCYTESUR Negative 06/11/2024 04:48 PM    UROBILINOGEN 1.0 06/11/2024 04:48 PM    BILIRUBINUR Negative 06/11/2024 04:48 PM    BLOODU MODERATE 06/11/2024 04:48 PM    GLUCOSEU 500 06/11/2024 04:48 PM         IMPRESSION/RECOMMENDATIONS:      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 Follows with Dr. Howell in office. Etiology likely 2/2 volume depletion in the setting of poor oral intake, emesis, and diuretic use.              - Cr remains at baseline              - Wheeler catheter placed 6/12 for urinary retention -- attempt voiding trial.               - Continue to hold torsemide              - No NSAIDs. No ACE/ARBs. No SGLT2i.              - Daily RFTs     Hyponatremia: etiology likely 2/2 volume depletion in the setting of poor oral intake, emesis, and diuretic use.   - Na+ goal >/= 130   - Na+ slightly below goal this AM   - Repeat urine osm 385 and urine Na 122    - Start 1.5 L FR     HFrEF              - TTE (3/27/2024): LVEF 20-25%.               - CXR unremarkable              - Holding torsemide              - Strict I/Os. Daily weights.     Cellulitis / RUE Wound / OM L Foot   - I&D of R arm abscess 6/12 with surgery   - On ancef and flagyl              - ID and gen surgery following   - Plans for resection of osteomyeltic bone with podiatry sometime this week     Hypertension              - Controlled   - Carvedilol resumed 6/18. On hydralazine.    CVA with expressive aphasia   - Neurology following     CKD-MBD              - Monitor phos     Anemia              - Hgb within target              - Monitor     Will discuss with nephrology attending physician, Dr. Garnett.  See attestation for additional recommendations.    Greer Fernández, CHANELLE - CNP

## 2024-06-19 NOTE — PROGRESS NOTES
Occupational Therapy  Facility/Department: [address]  Occupational Therapy Daily Assessment    Name: Juan Carlos Wong Sr  : 1965  MRN: [id number]  Date of Service: 2024    Discharge Recommendations:  24 hour supervision or assist, 3-5 sessions per week, Continue to assess pending progress  OT Equipment Recommendations  Equipment Needed: No     Juan Carlos Wong Sr scored a 17/24 on the AM-PAC ADL Inpatient form.  At this time, pt hopeful to return home and adamantly refusing skilled placement, however will continue to assess following surgery Friday and weightbear status.     Patient Diagnosis(es): The primary encounter diagnosis was Cellulitis of right upper arm. A diagnosis of Osteomyelitis of left foot, unspecified type (HCC) was also pertinent to this visit.  Past Medical History:  has a past medical history of Back pain, Diabetes mellitus (HCC), and Pneumonia.  Past Surgical History:  has a past surgical history that includes liver biopsy; Leg Surgery (Left); Colonoscopy; and Arm Surgery (Right, 2024).    Treatment Diagnosis: Decreased: ADLs, functional transfers/mobility      Assessment   Performance deficits / Impairments: Decreased functional mobility ;Decreased high-level IADLs;Decreased ADL status;Decreased endurance;Decreased strength;Decreased balance  Assessment: Pt is a 58 yo M who presented with R UE swelling, s/p I&D R shoulder. Code stroke called 6/15 d/t episode of aphasia. PTA pt from home where pt was Ind with mobility and needing prn assist for ADLs. Pt is below baseline at this time, requiring CGA for functional transfers and min A functional mobility using RW. Pt declined ADLs but anticipate will require min/mod A for full ADL. Pt is functioning below baseline and benefit from skilled therapy.  Based on performance today, would recommend initial 24/7 supervision at d/c, and if this cannot be provided would recommend ongoing skilled OT 3-5x/wk at d/c to improve his safety and  assistance (PRN assist from daughter)  Homemaking Assistance: Needs assistance (daughter does grocery shopping and laundry and cleaning; pt does light meal prep)  Homemaking Responsibilities: No  Ambulation Assistance: Independent (no AD but reports furniture walks in home)  Transfer Assistance: Independent  Active : No  Occupation: On disability  Leisure & Hobbies: fishing       Objective   Safety Devices  Type of Devices: All fall risk precautions in place;Call light within reach;Chair alarm in place;Left in chair;Nurse notified;Gait belt           ADL  Functional Mobility: Contact guard assistance  Functional Mobility Skilled Clinical Factors: Pt ambulated around room and in galindo with RW and CGA.  Additional Comments: Pt declined ADLs. Anticipate pt needing up to Min/Mod A for ADLs based on ROM, strength, and balance        Bed mobility  Supine to Sit: Stand by assistance (HOB elevated)  Sit to Supine:  (pt in chair at end of session)    Transfers  Sit to stand: Contact guard assistance  Stand to sit: Contact guard assistance  Transfer Comments: RW, min cues for safe hand placement    Vision  Vision: Within Functional Limits  Hearing  Hearing: Within functional limits    Cognition  Overall Cognitive Status: Exceptions  Safety Judgement: Decreased awareness of need for safety;Decreased awareness of need for assistance  Insights: Decreased awareness of deficits  Orientation  Overall Orientation Status: Within Functional Limits       Education Given To: Patient  Education Provided: Role of Therapy;Plan of Care;Transfer Training;ADL Adaptive Strategies  Education Method: Demonstration;Verbal  Barriers to Learning: None  Education Outcome: Verbalized understanding;Demonstrated understanding    LUE AROM (degrees)  LUE AROM : WFL  Left Hand AROM (degrees)  Left Hand AROM: WFL  RUE AROM (degrees)  RUE AROM : WFL  Right Hand AROM (degrees)  Right Hand AROM: WFL    AM-PAC - ADL  AM-PAC Daily Activity - Inpatient

## 2024-06-19 NOTE — PROGRESS NOTES
V2.0    [facility] [place] Note      Name:  Juan Carlos Wong Sr /Age/Sex: 1965  (59 y.o. male)   MRN & CSN:  [id number] & [id number] Encounter Date/Time: 2024 7:57 AM EDT   Location:  [id number]/1308-01 PCP: Idris Laws MD     Attending:Reji Prabhakar MD       Hospital Day: 9    Assessment and Recommendations   Juan Carlos Wong Sr is a 59 y.o. male who presents with Cellulitis and abscess of upper extremity      Plan:     59-year-old patient admitted to the hospital with cellulitis and abscess of right shoulder and diabetic foot infection on the left foot General surgery ID consulted status post I&D continue to be on IV antibiotics, had an event of abrupt onset aphasia on Sadie 15 code stroke activated some findings on the imaging with potential changes neurology consulted and following     Plan:      Sepsis with white count 14 heart rate 97 on admission, due to cellulitis and abscess of right shoulder and osteomyelitis and cellulitis of left foot initially on IV Zyvox, Flagyl, and cefepime ID following post I&D, culture MSSA, antibiotics changed to Ancef continue to be on both Ancef and Flagyl at this time  Cellulitis and abscess right shoulder status post I&D discussed with surgery.  Continue IV antibiotics no further intervention planned at this time  Aphasia, abrupt onset, associated with encephalopathy, imaging positive for potential hemorrhage and evolving left frontal lobe cortical infarct, reported waxing and waning symptoms per nursing staff.  Per neurology this still could be recrudescence.  Neurology recommended to hold DAPT For 14 days from the date of symptoms started  Osteomyelitis and cellulitis of left foot and diabetic foot infection, status post partial amputation in the past, podiatry consulted IV antibiotics as above and plan for surgical intervention likely Friday  Diabetes mellitus, sliding scale and long-acting insulin diabetic diet.    Hyponatremia, nephrology  12:55 PM     Lab Results   Component Value Date/Time    BLOODCULT2 No Growth after 4 days of incubation. 06/11/2024 12:55 PM     Organism:   Lab Results   Component Value Date/Time    ORG Staphylococcus aureus 06/12/2024 12:33 PM         Electronically signed by Reji Prabhakar MD on 6/19/2024 at 7:57 AM  Comment: Please note this report has been produced using speech recognition software and may contain errors related to that system including errors in grammar, punctuation, and spelling, as well as words and phrases that may be inappropriate. If there are any questions or concerns, please feel free to contact the dictating provider for clarification.

## 2024-06-19 NOTE — PROGRESS NOTES
Infectious Diseases Inpatient Progress Note    CHIEF COMPLAINT:     Right shoulder abscess  Right upper extremity cellulitis  Left foot osteomyelitis  Diabetes poor control  Chronic kidney disease stage IIIb  CVA - ACUTE         HISTORY OF PRESENT ILLNESS:  59 y.o. man with a significant history for diabetes, chronic kidney disease stage IIIb, coronary artery disease, congestive heart failure, diabetes with neuropathy, peripheral vascular disease admitted to the hospital secondary to left diabetic foot ulcer.  Patient was seen by me recently when admitted in April for left foot infection was treated with IV antibiotic followed by oral antibiotic.  Now x-ray indicating progression to osteomyelitis of the left fifth metatarsal base.  He was also found to have cellulitis of the right shoulder was evaluated by general surgery given the progression to possible abscess formation there is a plan for incision and drainage.  Given the need for IV antibiotics we are consulted for recommendations.  Blood culture in process left foot wound culture in process, labs indicate creatinine 2.6 sodium 124 procalcitonin 0.21 hemoglobin A1c 11.4 with poor diabetes control WBC 14.8 hemoglobin 10.3      Interval history: Remains in ICU tolerating antibiotic therapy okay left foot ongoing pain dressing intact and elliott removed and Rt shoulder pain + creat stable for now      Past Medical History:    Past Medical History:   Diagnosis Date    Back pain     Diabetes mellitus (HCC)     Pneumonia        Past Surgical History:    Past Surgical History:   Procedure Laterality Date    ARM SURGERY Right 6/12/2024    INCISION AND DRAINAGE RIGHT PROXIMAL ARM ABSCESS performed by Dann Nuñez MD at [facility] OR    COLONOSCOPY      LEG SURGERY Left     LIVER BIOPSY         Current Medications:    Outpatient Medications Marked as Taking for the 6/11/24 encounter (Hospital Encounter)   Medication Sig Dispense Refill

## 2024-06-19 NOTE — PLAN OF CARE
nutritional intake and initiate nutrition consult as needed  6/19/2024 0241 by Disha Araya, RN  Outcome: Progressing  Flowsheets (Taken 6/19/2024 0241)  Glucose maintained within prescribed range:   Monitor blood glucose as ordered   Administer ordered medications to maintain glucose within target range   Instruct patient on self management of diabetes and initiate consult as needed   Assess for signs and symptoms of hyperglycemia and hypoglycemia   Assess barriers to adequate nutritional intake and initiate nutrition consult as needed     Problem: Skin/Tissue Integrity - Adult  Goal: Oral mucous membranes remain intact  6/19/2024 0822 by Lillian Reis, RN  Outcome: Progressing  Flowsheets (Taken 6/19/2024 0241 by Disha Araya, RN)  Oral Mucous Membranes Remain Intact: Assess oral mucosa and hygiene practices  6/19/2024 0241 by Disha Araya, RN  Outcome: Progressing  Flowsheets (Taken 6/19/2024 0241)  Oral Mucous Membranes Remain Intact: Assess oral mucosa and hygiene practices     Problem: Pain  Goal: Verbalizes/displays adequate comfort level or baseline comfort level  6/19/2024 0822 by Lillian Reis RN  Outcome: Progressing  Flowsheets (Taken 6/19/2024 0241 by Disha Araya, RN)  Verbalizes/displays adequate comfort level or baseline comfort level:   Encourage patient to monitor pain and request assistance   Consider cultural and social influences on pain and pain management   Administer analgesics based on type and severity of pain and evaluate response   Assess pain using appropriate pain scale   Implement non-pharmacological measures as appropriate and evaluate response   Notify Licensed Independent Practitioner if interventions unsuccessful or patient reports new pain  6/19/2024 0241 by Disha Araya, RN  Outcome: Progressing  Flowsheets (Taken 6/19/2024 0241)  Verbalizes/displays adequate comfort level or baseline comfort level:   Encourage patient to monitor pain and request assistance    and lab values   Assist with meals as needed  6/19/2024 0241 by Disha Araya, RN  Outcome: Progressing  Flowsheets (Taken 6/19/2024 0241)  Maintains adequate nutritional intake:   Monitor percentage of each meal consumed   Monitor intake and output, weight and lab values   Assist with meals as needed     Problem: Genitourinary - Adult  Goal: Absence of urinary retention  Outcome: Progressing  Flowsheets (Taken 6/18/2024 0800)  Absence of urinary retention:   Assess patient’s ability to void and empty bladder   Monitor intake/output and perform bladder scan as needed   Place urinary catheter per Licensed Independent Practitioner order if needed   Discuss with Licensed Independent Practitioner  medications to alleviate retention as needed  Goal: Urinary catheter remains patent  6/19/2024 0822 by Lillian Reis RN  Outcome: Progressing  Flowsheets (Taken 6/19/2024 0822)  Urinary catheter remains patent:   Assess patency of urinary catheter   Irrigate catheter per Licensed Independent Practitioner order if indicated and notify Licensed Independent Practitioner if unable to irrigate  6/19/2024 0241 by Disha Araya RN  Outcome: Progressing  Flowsheets (Taken 6/19/2024 0241)  Urinary catheter remains patent:   Assess patency of urinary catheter   Irrigate catheter per Licensed Independent Practitioner order if indicated and notify Licensed Independent Practitioner if unable to irrigate   Assess need for a larger catheter size or a 3-way catheter for continuous bladder irrigation     Problem: Hematologic - Adult  Goal: Maintains hematologic stability  6/19/2024 0822 by Lillian Reis RN  Outcome: Progressing  Flowsheets (Taken 6/19/2024 0241 by Disha Araya, RN)  Maintains hematologic stability:   Assess for signs and symptoms of bleeding or hemorrhage   Administer blood products/factors as ordered   Monitor labs for bleeding or clotting disorders  6/19/2024 0241 by Disha Araya, RN  Outcome:

## 2024-06-20 LAB
ALBUMIN SERPL-MCNC: 2 G/DL (ref 3.4–5)
ALBUMIN SERPL-MCNC: 2.2 G/DL (ref 3.4–5)
ALBUMIN/GLOB SERPL: 0.5 {RATIO} (ref 1.1–2.2)
ALP SERPL-CCNC: 113 U/L (ref 40–129)
ALT SERPL-CCNC: <5 U/L (ref 10–40)
ANION GAP SERPL CALCULATED.3IONS-SCNC: 6 MMOL/L (ref 3–16)
ANION GAP SERPL CALCULATED.3IONS-SCNC: 7 MMOL/L (ref 3–16)
AST SERPL-CCNC: 16 U/L (ref 15–37)
BILIRUB SERPL-MCNC: <0.2 MG/DL (ref 0–1)
BUN SERPL-MCNC: 33 MG/DL (ref 7–20)
BUN SERPL-MCNC: 35 MG/DL (ref 7–20)
CALCIUM SERPL-MCNC: 7.8 MG/DL (ref 8.3–10.6)
CALCIUM SERPL-MCNC: 8.2 MG/DL (ref 8.3–10.6)
CHLORIDE SERPL-SCNC: 100 MMOL/L (ref 99–110)
CHLORIDE SERPL-SCNC: 101 MMOL/L (ref 99–110)
CO2 SERPL-SCNC: 24 MMOL/L (ref 21–32)
CO2 SERPL-SCNC: 24 MMOL/L (ref 21–32)
CREAT SERPL-MCNC: 2.3 MG/DL (ref 0.8–1.3)
CREAT SERPL-MCNC: 2.3 MG/DL (ref 0.8–1.3)
DEPRECATED RDW RBC AUTO: 13.9 % (ref 12.4–15.4)
DEPRECATED RDW RBC AUTO: 14 % (ref 12.4–15.4)
GFR SERPLBLD CREATININE-BSD FMLA CKD-EPI: 31 ML/MIN/{1.73_M2}
GFR SERPLBLD CREATININE-BSD FMLA CKD-EPI: 31 ML/MIN/{1.73_M2}
GLUCOSE BLD-MCNC: 105 MG/DL (ref 70–99)
GLUCOSE BLD-MCNC: 125 MG/DL (ref 70–99)
GLUCOSE BLD-MCNC: 183 MG/DL (ref 70–99)
GLUCOSE BLD-MCNC: 189 MG/DL (ref 70–99)
GLUCOSE BLD-MCNC: 215 MG/DL (ref 70–99)
GLUCOSE BLD-MCNC: 224 MG/DL (ref 70–99)
GLUCOSE BLD-MCNC: 52 MG/DL (ref 70–99)
GLUCOSE BLD-MCNC: 64 MG/DL (ref 70–99)
GLUCOSE BLD-MCNC: 79 MG/DL (ref 70–99)
GLUCOSE BLD-MCNC: 82 MG/DL (ref 70–99)
GLUCOSE BLD-MCNC: 85 MG/DL (ref 70–99)
GLUCOSE SERPL-MCNC: 100 MG/DL (ref 70–99)
GLUCOSE SERPL-MCNC: 85 MG/DL (ref 70–99)
HCT VFR BLD AUTO: 30.5 % (ref 40.5–52.5)
HCT VFR BLD AUTO: 33.4 % (ref 40.5–52.5)
HGB BLD-MCNC: 10.4 G/DL (ref 13.5–17.5)
HGB BLD-MCNC: 11.6 G/DL (ref 13.5–17.5)
LACTATE BLD-SCNC: 0.66 MMOL/L (ref 0.4–2)
MCH RBC QN AUTO: 29.5 PG (ref 26–34)
MCH RBC QN AUTO: 30.1 PG (ref 26–34)
MCHC RBC AUTO-ENTMCNC: 34 G/DL (ref 31–36)
MCHC RBC AUTO-ENTMCNC: 34.7 G/DL (ref 31–36)
MCV RBC AUTO: 86.6 FL (ref 80–100)
MCV RBC AUTO: 86.7 FL (ref 80–100)
PERFORMED ON: ABNORMAL
PERFORMED ON: NORMAL
PHOSPHATE SERPL-MCNC: 3.5 MG/DL (ref 2.5–4.9)
PLATELET # BLD AUTO: 301 K/UL (ref 135–450)
PLATELET # BLD AUTO: 324 K/UL (ref 135–450)
PMV BLD AUTO: 7.7 FL (ref 5–10.5)
PMV BLD AUTO: 7.8 FL (ref 5–10.5)
POC SAMPLE TYPE: NORMAL
POTASSIUM SERPL-SCNC: 4 MMOL/L (ref 3.5–5.1)
POTASSIUM SERPL-SCNC: 4.4 MMOL/L (ref 3.5–5.1)
PROT SERPL-MCNC: 6.9 G/DL (ref 6.4–8.2)
RBC # BLD AUTO: 3.52 M/UL (ref 4.2–5.9)
RBC # BLD AUTO: 3.85 M/UL (ref 4.2–5.9)
SODIUM SERPL-SCNC: 131 MMOL/L (ref 136–145)
SODIUM SERPL-SCNC: 131 MMOL/L (ref 136–145)
WBC # BLD AUTO: 7.4 K/UL (ref 4–11)
WBC # BLD AUTO: 7.6 K/UL (ref 4–11)

## 2024-06-20 PROCEDURE — 2580000003 HC RX 258: Performed by: INTERNAL MEDICINE

## 2024-06-20 PROCEDURE — 82947 ASSAY GLUCOSE BLOOD QUANT: CPT

## 2024-06-20 PROCEDURE — 36415 COLL VENOUS BLD VENIPUNCTURE: CPT

## 2024-06-20 PROCEDURE — 2500000003 HC RX 250 WO HCPCS: Performed by: INTERNAL MEDICINE

## 2024-06-20 PROCEDURE — 6360000002 HC RX W HCPCS: Performed by: INTERNAL MEDICINE

## 2024-06-20 PROCEDURE — 6370000000 HC RX 637 (ALT 250 FOR IP): Performed by: INTERNAL MEDICINE

## 2024-06-20 PROCEDURE — 87040 BLOOD CULTURE FOR BACTERIA: CPT

## 2024-06-20 PROCEDURE — 92507 TX SP LANG VOICE COMM INDIV: CPT

## 2024-06-20 PROCEDURE — 85027 COMPLETE CBC AUTOMATED: CPT

## 2024-06-20 PROCEDURE — 2140000000 HC CCU INTERMEDIATE R&B

## 2024-06-20 PROCEDURE — 83605 ASSAY OF LACTIC ACID: CPT

## 2024-06-20 PROCEDURE — 92526 ORAL FUNCTION THERAPY: CPT

## 2024-06-20 PROCEDURE — 99232 SBSQ HOSP IP/OBS MODERATE 35: CPT | Performed by: INTERNAL MEDICINE

## 2024-06-20 PROCEDURE — 80053 COMPREHEN METABOLIC PANEL: CPT

## 2024-06-20 RX ORDER — DEXTROSE MONOHYDRATE 25 G/50ML
25 INJECTION, SOLUTION INTRAVENOUS ONCE
Status: COMPLETED | OUTPATIENT
Start: 2024-06-20 | End: 2024-06-20

## 2024-06-20 RX ORDER — 0.9 % SODIUM CHLORIDE 0.9 %
1000 INTRAVENOUS SOLUTION INTRAVENOUS ONCE
Status: COMPLETED | OUTPATIENT
Start: 2024-06-20 | End: 2024-06-20

## 2024-06-20 RX ADMIN — SODIUM CHLORIDE 2000 MG: 900 INJECTION INTRAVENOUS at 12:45

## 2024-06-20 RX ADMIN — CARVEDILOL 6.25 MG: 6.25 TABLET, FILM COATED ORAL at 16:42

## 2024-06-20 RX ADMIN — HYDRALAZINE HYDROCHLORIDE 10 MG: 10 TABLET, FILM COATED ORAL at 09:09

## 2024-06-20 RX ADMIN — METRONIDAZOLE 500 MG: 500 INJECTION, SOLUTION INTRAVENOUS at 09:14

## 2024-06-20 RX ADMIN — PANTOPRAZOLE SODIUM 40 MG: 40 TABLET, DELAYED RELEASE ORAL at 09:09

## 2024-06-20 RX ADMIN — SODIUM CHLORIDE 1000 ML: 9 INJECTION, SOLUTION INTRAVENOUS at 17:02

## 2024-06-20 RX ADMIN — INSULIN LISPRO 5 UNITS: 100 INJECTION, SOLUTION INTRAVENOUS; SUBCUTANEOUS at 12:45

## 2024-06-20 RX ADMIN — ATORVASTATIN CALCIUM 80 MG: 80 TABLET, FILM COATED ORAL at 21:10

## 2024-06-20 RX ADMIN — GABAPENTIN 300 MG: 300 CAPSULE ORAL at 21:10

## 2024-06-20 RX ADMIN — CARVEDILOL 6.25 MG: 6.25 TABLET, FILM COATED ORAL at 09:11

## 2024-06-20 RX ADMIN — HYDRALAZINE HYDROCHLORIDE 10 MG: 10 TABLET, FILM COATED ORAL at 17:40

## 2024-06-20 RX ADMIN — SODIUM CHLORIDE, PRESERVATIVE FREE 10 ML: 5 INJECTION INTRAVENOUS at 21:12

## 2024-06-20 RX ADMIN — INSULIN LISPRO 5 UNITS: 100 INJECTION, SOLUTION INTRAVENOUS; SUBCUTANEOUS at 09:47

## 2024-06-20 RX ADMIN — DEXTROSE MONOHYDRATE 25 G: 25 INJECTION, SOLUTION INTRAVENOUS at 17:30

## 2024-06-20 RX ADMIN — HYDRALAZINE HYDROCHLORIDE 10 MG: 10 TABLET, FILM COATED ORAL at 21:10

## 2024-06-20 RX ADMIN — ENOXAPARIN SODIUM 30 MG: 100 INJECTION SUBCUTANEOUS at 09:10

## 2024-06-20 RX ADMIN — DEXTROSE MONOHYDRATE 125 ML: 100 INJECTION, SOLUTION INTRAVENOUS at 16:42

## 2024-06-20 RX ADMIN — OXYCODONE HYDROCHLORIDE AND ACETAMINOPHEN 1 TABLET: 5; 325 TABLET ORAL at 21:11

## 2024-06-20 RX ADMIN — GABAPENTIN 300 MG: 300 CAPSULE ORAL at 09:11

## 2024-06-20 RX ADMIN — METRONIDAZOLE 500 MG: 500 INJECTION, SOLUTION INTRAVENOUS at 00:28

## 2024-06-20 ASSESSMENT — PAIN SCALES - GENERAL
PAINLEVEL_OUTOF10: 0
PAINLEVEL_OUTOF10: 6
PAINLEVEL_OUTOF10: 6
PAINLEVEL_OUTOF10: 0

## 2024-06-20 ASSESSMENT — PAIN DESCRIPTION - ONSET: ONSET: GRADUAL

## 2024-06-20 ASSESSMENT — PAIN DESCRIPTION - PAIN TYPE: TYPE: ACUTE PAIN

## 2024-06-20 ASSESSMENT — PAIN - FUNCTIONAL ASSESSMENT: PAIN_FUNCTIONAL_ASSESSMENT: PREVENTS OR INTERFERES SOME ACTIVE ACTIVITIES AND ADLS

## 2024-06-20 ASSESSMENT — PAIN DESCRIPTION - ORIENTATION
ORIENTATION: RIGHT
ORIENTATION: RIGHT

## 2024-06-20 ASSESSMENT — PAIN DESCRIPTION - DESCRIPTORS
DESCRIPTORS: SHARP
DESCRIPTORS: SHARP

## 2024-06-20 ASSESSMENT — PAIN DESCRIPTION - LOCATION
LOCATION: SHOULDER
LOCATION: SHOULDER

## 2024-06-20 NOTE — PROGRESS NOTES
Facility/Department: [address] CVICU  DYSPHAGIA THERAPY and SPEECH / LANGUAGE / COGNITIVE-LINGUISTIC TREATMENT    Patient: Juan Carlos Wong Sr   : 1965   MRN: [id number]      Treatment Date: 2024      Admitting Dx:   Cellulitis of right upper arm [L03.113]  Osteomyelitis of left foot, unspecified type (HCC) [M86.9]  Cellulitis and abscess of upper extremity [L03.119, L02.419]   has a past medical history of Back pain, Diabetes mellitus (HCC), and Pneumonia.   has a past surgical history that includes liver biopsy; Leg Surgery (Left); Colonoscopy; and Arm Surgery (Right, 2024).  ALLERGIES: No Known Allergies    History/Prior Level of Function  Living Status: admitted from home; lives alone; independent for ADLs; daughter assists with laundry; patient completes cooks (mostly microwave); patient tidies (daughter assists with cleaning d/t patient's visual imps); daughter sets up medications; daughter manages finances; not an active ; completed 9th grade; worked as a ; on disability  Prior Dysphagia History: denies  Prior Speech History: denies          Onset: 2024     Chart Review:   H&P: 2024 admitted with c/o R arm pain  MD ADMISSION H&P HPI:  59 y.o. male who presented to [facility] with right shoulder pain and drainage for the last 3 to 4 days, worsening, no obvious elevated or elevating factors, associated with some chills and fever associated also with weakness, no obvious etiology, per ED provider likely patient was injecting insulin at that part, also having ulcer on his plantar aspect of left foot draining pus.  Denies vomiting but stated he was having nausea this morning, no abdominal pain no diarrhea discussed with ED provider agree with plan to admit for further management and treatment.   Consults noted: Nephrology, General Surgery, ID, Podiatry, Neurology  2024 incision and drainage R proximal arm abscess  2024 podiatry noted plan for surgical resection of

## 2024-06-20 NOTE — PLAN OF CARE
RN  Outcome: Progressing     Problem: Nutrition Deficit:  Goal: Optimize nutritional status  6/20/2024 1114 by Kayy Lizama RN  Outcome: Progressing  6/19/2024 2214 by Meche Levy RN  Outcome: Progressing     Problem: Neurosensory - Adult  Goal: Achieves stable or improved neurological status  6/20/2024 1114 by Kayy Lizama RN  Outcome: Progressing  6/19/2024 2214 by Meche Levy RN  Outcome: Progressing  Flowsheets (Taken 6/19/2024 2000)  Achieves stable or improved neurological status:   Assess for and report changes in neurological status   Initiate measures to prevent increased intracranial pressure   Maintain blood pressure and fluid volume within ordered parameters to optimize cerebral perfusion and minimize risk of hemorrhage   Monitor temperature, glucose, and sodium. Initiate appropriate interventions as ordered  Goal: Absence of seizures  6/19/2024 2214 by Meche Levy RN  Outcome: Progressing  Flowsheets (Taken 6/19/2024 2000)  Absence of seizures:   Monitor for seizure activity.  If seizure occurs, document type and location of movements and any associated apnea   If seizure occurs, turn head to side and suction secretions as needed   Administer anticonvulsants as ordered   Support airway/breathing, administer oxygen as needed   Diagnostic studies as ordered     Problem: Respiratory - Adult  Goal: Achieves optimal ventilation and oxygenation  6/20/2024 1114 by Kayy Lizama RN  Outcome: Progressing  6/19/2024 2214 by Meche Levy RN  Outcome: Progressing  Flowsheets (Taken 6/19/2024 2000)  Achieves optimal ventilation and oxygenation:   Assess for changes in respiratory status   Assess for changes in mentation and behavior   Position to facilitate oxygenation and minimize respiratory effort   Oxygen supplementation based on oxygen saturation or arterial blood gases   Initiate smoking cessation protocol as indicated   Encourage broncho-pulmonary hygiene including  provider or physical therapy orders to increase flexion toward goal  Goal: Return ADL status to a safe level of function  6/20/2024 1114 by Kayy Lizama RN  Outcome: Progressing  6/19/2024 2214 by Meche Levy RN  Outcome: Progressing  Flowsheets (Taken 6/19/2024 2000)  Return ADL Status to a Safe Level of Function:   Administer medication as ordered   Assess activities of daily living deficits and provide assistive devices as needed   Assist and instruct patient to increase activity and self care as tolerated   Obtain physical therapy/occupational therapy consults as needed     Problem: Gastrointestinal - Adult  Goal: Maintains adequate nutritional intake  6/20/2024 1114 by Kayy Lizama RN  Outcome: Progressing  6/19/2024 2214 by Meche Levy RN  Outcome: Progressing     Problem: Genitourinary - Adult  Goal: Absence of urinary retention  6/20/2024 1114 by Kayy Lizama RN  Outcome: Progressing  6/19/2024 2214 by Meche Levy RN  Outcome: Progressing  Flowsheets (Taken 6/19/2024 2000)  Absence of urinary retention:   Assess patient’s ability to void and empty bladder   Monitor intake/output and perform bladder scan as needed   Place urinary catheter per Licensed Independent Practitioner order if needed   Discuss with Licensed Independent Practitioner  medications to alleviate retention as needed   Discuss catheterization for long term situations as appropriate  Goal: Urinary catheter remains patent  6/20/2024 1114 by Kayy Lizama RN  Outcome: Progressing  6/19/2024 2214 by Meche Levy RN  Outcome: Progressing  Flowsheets (Taken 6/19/2024 2000)  Urinary catheter remains patent:   Assess patency of urinary catheter   Assess need for a larger catheter size or a 3-way catheter for continuous bladder irrigation   Irrigate catheter per Licensed Independent Practitioner order if indicated and notify Licensed Independent Practitioner if unable to irrigate     Problem:

## 2024-06-20 NOTE — PROGRESS NOTES
Infectious Diseases Inpatient Progress Note    CHIEF COMPLAINT:     Right shoulder abscess  Right upper extremity cellulitis  Left foot osteomyelitis  Diabetes poor control  Chronic kidney disease stage IIIb  CVA - ACUTE         HISTORY OF PRESENT ILLNESS:  59 y.o. man with a significant history for diabetes, chronic kidney disease stage IIIb, coronary artery disease, congestive heart failure, diabetes with neuropathy, peripheral vascular disease admitted to the hospital secondary to left diabetic foot ulcer.  Patient was seen by me recently when admitted in April for left foot infection was treated with IV antibiotic followed by oral antibiotic.  Now x-ray indicating progression to osteomyelitis of the left fifth metatarsal base.  He was also found to have cellulitis of the right shoulder was evaluated by general surgery given the progression to possible abscess formation there is a plan for incision and drainage.  Given the need for IV antibiotics we are consulted for recommendations.  Blood culture in process left foot wound culture in process, labs indicate creatinine 2.6 sodium 124 procalcitonin 0.21 hemoglobin A1c 11.4 with poor diabetes control WBC 14.8 hemoglobin 10.3      Interval history: Remains in CVICU for close monitoring creatinine trending down already and white therapy of the left foot right shoulder area drainage is improving tolerating antibiotics okay    Past Medical History:    Past Medical History:   Diagnosis Date    Back pain     Diabetes mellitus (HCC)     Pneumonia        Past Surgical History:    Past Surgical History:   Procedure Laterality Date    ARM SURGERY Right 6/12/2024    INCISION AND DRAINAGE RIGHT PROXIMAL ARM ABSCESS performed by Dann Nuñez MD at [facility] OR    COLONOSCOPY      LEG SURGERY Left     LIVER BIOPSY         Current Medications:    Outpatient Medications Marked as Taking for the 6/11/24 encounter (Hospital Encounter)   Medication Sig Dispense Refill     bolus **OR** dextrose bolus, glucagon (rDNA), dextrose    Imaging:   CT HEAD WO CONTRAST   Final Result   Evolving left frontal lobe cortical infarct with associated petechial   hemorrhage. No significant mass effect or midline shift.         CTA HEAD NECK W CONTRAST   Final Result   1. Unremarkable CTA of the head and neck.   2. Moderate bilateral pleural effusions, incompletely imaged.   These results were sent to Dr. Neff at 12:58 p.m. on 06/15/2024.         MRI BRAIN WO CONTRAST   Final Result   Late subacute left frontal lobe cortical infarct with associated petechial   hemorrhage. No significant mass effect or midline shift.         CT HEAD WO CONTRAST   Final Result   1. Findings concerning for acute ischemic change involving the left frontal   lobe.  Recommend further characterization with a brain MRI.   2. No acute intracranial hemorrhage or mass.  No mass effect or midline shift   is seen.   Findings were discussed with CHERI BYRNE at 1007 hours on 6/15/2024.         CT HUMERUS RIGHT WO CONTRAST   Final Result   Diffuse edema and skin thickening overlying the proximal to mid right arm   suggestive of cellulitis. No discrete fluid collection.      Advanced acromioclavicular and moderate glenohumeral joint osteoarthritis.         XR FOOT LEFT (MIN 3 VIEWS)   Final Result   1. Findings suggestive of osteomyelitis involving the base of the 5th   metatarsal.   2. 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   3. Soft tissue ulcer along the lateral aspect of the stump.         XR CHEST (2 VW)   Final Result   No radiographic evidence of acute cardiopulmonary disease.             All pertinent images and reports for the current Hospitalization were reviewed by me.    IMPRESSION:    Patient Active Problem List   Diagnosis Code    Sepsis (Formerly [facility]) A41.9    Generalized weakness R53.1    Acute on chronic congestive heart failure (Formerly [facility]) I50.9    Coronary artery disease involving native coronary

## 2024-06-20 NOTE — PROGRESS NOTES
DARLING:  Juan Carlos Wong Sr  YOB: 1965  MEDICAL RECORD NUMBER:  [id number]    Shift Summary: percocet given x1. `pt voided x1 450 while standing in bathroom. Pt had 16 beat run of V tach, asymptomatic. Pt slept through night     Family updated: Yes:       Rhythm: Normal Sinus Rhythm     Most recent vitals:   Visit Vitals  /70   Pulse 71   Temp 98.1 °F (36.7 °C) (Oral)   Resp 12   Ht 1.753 m (5' 9.02\")   Wt 83 kg (182 lb 15.7 oz)   SpO2 94%   BMI 27.01 kg/m²           No data found.    No data found.      Respiratory support needed (if any):  - RA    Admission weight Weight - Scale: 80.7 kg (177 lb 14.6 oz) (06/11/24 1136)    Today's weight    Wt Readings from Last 1 Encounters:   06/19/24 83 kg (182 lb 15.7 oz)        Elliott need assessed each shift: N/A - no elliott present  UOP >30ml/hr: YES  Last documented BM (in last 48 hrs):  Patient Vitals for the past 48 hrs:   Last BM (including prior to admit) Stool Occurrence   06/18/24 1757 06/18/24 1   06/18/24 2000 06/18/24 --   06/19/24 0740 06/18/24 --   06/19/24 2000 06/18/24 --                Restraints (in use currently or dc'd in last 12 hrs): No    Order current and documentation up to date? Yes    Lines/Drains reviewed @ bedside.  Peripheral IV 06/11/24 Left Forearm (Active)   Number of days: 8       Continuous Glucose Monitor   (Active)   Number of days:          Drip rates at handoff:    sodium chloride 5 mL/hr at 06/19/24 1724    dextrose         Lab Data:   CBC:   Recent Labs     06/19/24  0321 06/20/24 0313   WBC 8.5 7.6   HGB 10.7* 10.4*   HCT 31.8* 30.5*   MCV 86.8 86.6    324     BMP:    Recent Labs     06/19/24  0321 06/20/24  0313   * 131*   K 4.0 4.4   CO2 24 24   BUN 30* 35*   CREATININE 2.2* 2.3*     LIVR: No results for input(s): \"AST\", \"ALT\" in the last 72 hours.  PT/INR: No results for input(s): \"PROT\", \"INR\" in the last 72 hours.  APTT: No results for input(s): \"APTT\" in the last 72 hours.  ABG: No results for

## 2024-06-20 NOTE — PROGRESS NOTES
NAME:  Juan Carlos Wong Sr  YOB: 1965  MEDICAL RECORD NUMBER:  [id number]    Shift Summary: Pt became hypogycemic and Hypothermic Give nD50, D10 and several juices, Liane hugger applied.     Family updated: Yes:  Daughter    Rhythm: Normal Sinus Rhythm     Most recent vitals:   Visit Vitals  /76   Pulse 69   Temp (!) 93.3 °F (34.1 °C) (Rectal)   Resp 13   Ht 1.753 m (5' 9.02\")   Wt 83 kg (182 lb 15.7 oz)   SpO2 99%   BMI 27.01 kg/m²           No data found.    No data found.      Respiratory support needed (if any):  - RA    Admission weight Weight - Scale: 80.7 kg (177 lb 14.6 oz) (06/11/24 1136)    Today's weight    Wt Readings from Last 1 Encounters:   06/19/24 83 kg (182 lb 15.7 oz)        Elliott need assessed each shift: N/A - no elliott present  UOP >30ml/hr: YES or NO - yes  Last documented BM (in last 48 hrs):  Patient Vitals for the past 48 hrs:   Last BM (including prior to admit)   06/18/24 2000 06/18/24 06/19/24 0740 06/18/24 06/19/24 2000 06/18/24 06/20/24 0800 06/18/24                Restraints (in use currently or dc'd in last 12 hrs): No    Order current and documentation up to date? No    Lines/Drains reviewed @ bedside.  Peripheral IV 06/11/24 Left Forearm (Active)   Number of days: 9       Peripheral IV 06/20/24 Left;Posterior Hand (Active)   Number of days: 0       Continuous Glucose Monitor   (Active)   Number of days:          Drip rates at handoff:    sodium chloride 5 mL/hr at 06/19/24 1724    dextrose         Lab Data:   CBC:   Recent Labs     06/20/24 0313 06/20/24  1700   WBC 7.6 7.4   HGB 10.4* 11.6*   HCT 30.5* 33.4*   MCV 86.6 86.7    301     BMP:    Recent Labs     06/20/24 0313 06/20/24  1700   * 131*   K 4.4 4.0   CO2 24 24   BUN 35* 33*   CREATININE 2.3* 2.3*     LIVR:   Recent Labs     06/20/24  1700   AST 16   ALT <5*     PT/INR: No results for input(s): \"PROT\", \"INR\" in the last 72 hours.  APTT: No results for input(s): \"APTT\" in the last 72  hours.  ABG: No results for input(s): \"PHART\", \"FQQ5DTS\", \"PO2ART\" in the last 72 hours.    Any consults during the shift? No    Any signed and held orders to be released?  No        4 Eyes Skin Assessment       The patient is being assessed for  Shift Handoff    I agree that at least one RN has performed a thorough Head to Toe Skin Assessment on the patient. ALL assessment sites listed below have been assessed.      Areas assessed by both nurses: Head, Face, Ears, Shoulders, Back, Chest, Arms, Elbows, Hands, Sacrum. Buttock, Coccyx, Ischium, Legs. Feet and Heels, and Under Medical Devices         Does the Patient have a Wound? Yes wound(s) were present on assessment. LDA wound assessment was Initiated and completed by RN    Wound Care Orders initiated by RN: No       Jaxon Prevention initiated by RN: Yes    Pressure Injury (Stage 3,4, Unstageable, DTI, NWPT, and Complex wounds) if present, place Wound referral order by RN under : No    New Ostomies, if present place, Ostomy referral order under : No     Nurse 1 eSignature: Electronically signed by Kayy Lizama RN on 6/21/24 at 7:18 PM EDT    **SHARE this note so that the co-signing nurse can place an eSignature**    Nurse 2 eSignature: {Esignature:[id number]}

## 2024-06-20 NOTE — PROGRESS NOTES
V2.0    [facility] [place] Progress Note      Name:  Juan Carlos Wong Sr /Age/Sex: 1965  (59 y.o. male)   MRN & CSN:  [id number] & [id number] Encounter Date/Time: 2024 8:15 AM EDT   Location:  [id number] PCP: Idris Laws MD     Attending:Reji Prabhakar MD       Hospital Day: 10    Assessment and Recommendations   Juan Carlos Wong Sr is a 59 y.o. male who presents with Cellulitis and abscess of upper extremity      Plan:       59-year-old patient admitted to the hospital with cellulitis and abscess of right shoulder and diabetic foot infection on the left foot General surgery ID consulted status post I&D continue to be on IV antibiotics, had an event of abrupt onset aphasia on Sadie 15 code stroke activated some findings on the imaging with potential changes neurology consulted and following     Plan:      Sepsis with white count 14 heart rate 97 on admission, due to cellulitis and abscess of right shoulder and osteomyelitis and cellulitis of left foot initially on IV Zyvox, Flagyl, and cefepime ID following post I&D, culture MSSA, antibiotics changed to Ancef continue to be on both Ancef and Flagyl at this time Flagyl changed to p.o.  Cellulitis and abscess right shoulder status post I&D discussed with surgery.  Continue IV antibiotics no further intervention planned at this time.  Appreciate surgical input  Aphasia, abrupt onset, associated with encephalopathy, imaging positive for potential hemorrhage and evolving left frontal lobe cortical infarct, reported waxing and waning symptoms per nursing staff.  Per neurology this still could be recrudescence.  Neurology recommended to hold DAPT For 14 days from the date of symptoms started.  Osteomyelitis and cellulitis of left foot and diabetic foot infection, status post partial amputation in the past, podiatry consulted IV antibiotics as above and plan for surgical intervention likely this Friday  Diabetes mellitus, sliding scale and

## 2024-06-20 NOTE — PROGRESS NOTES
Nephrology Progress Note   [email or web address]      Reason for consultation: Hyponatremia / MIGUEL on CKD 3/4 -- baseline Cr ~ 2.0-2.3 mg/dL     Subjective:    The patient has been seen and examined. Labs and chart reviewed. Cr within baseline. Na+ is adequate. Wheeler catheter removed yesterday. Voiding fine.     There were not complications overnight.    Patient review of systems: Denies SOB.       Allergies:  No Known Allergies     Scheduled Meds:   metroNIDAZOLE  500 mg IntraVENous Q8H    atorvastatin  80 mg Oral Nightly    ceFAZolin  2,000 mg IntraVENous Q12H    enoxaparin  30 mg SubCUTAneous Daily    carvedilol  6.25 mg Oral BID WC    insulin glargine  13 Units SubCUTAneous Nightly    insulin lispro  5 Units SubCUTAneous TID WC    [Held by provider] aspirin  81 mg Oral Daily    [Held by provider] clopidogrel  75 mg Oral Nightly    gabapentin  300 mg Oral BID    hydrALAZINE  10 mg Oral TID    [Held by provider] isosorbide mononitrate  15 mg Oral Nightly    pantoprazole  40 mg Oral QAM AC    sodium chloride flush  5-40 mL IntraVENous 2 times per day    insulin lispro  0-4 Units SubCUTAneous TID WC    insulin lispro  0-4 Units SubCUTAneous Nightly        sodium chloride 5 mL/hr at 24 1724    dextrose         PRN Meds:melatonin, potassium chloride **OR** potassium alternative oral replacement **OR** potassium chloride, oxyCODONE-acetaminophen **OR** oxyCODONE-acetaminophen, morphine **OR** morphine, cyclobenzaprine, sodium chloride flush, sodium chloride, ondansetron **OR** ondansetron, polyethylene glycol, acetaminophen **OR** acetaminophen, glucose, dextrose bolus **OR** dextrose bolus, glucagon (rDNA), dextrose    Physical Exam:    TEMPERATURE:  Current - Temp: 98.1 °F (36.7 °C); Max - Temp  Av.3 °F (36.8 °C)  Min: 98 °F (36.7 °C)  Max: 98.5 °F (36.9 °C)  RESPIRATIONS RANGE: Resp  Avg: 15.9  Min: 12  Max: 24  PULSE RANGE: Pulse  Av  Min: 71  Max: 93  BLOOD PRESSURE RANGE:  Systolic (24hrs), Av ,  Min:112 , Max:153   ; Diastolic (24hrs), Av, Min:70, Max:92    24HR INTAKE/OUTPUT:    Intake/Output Summary (Last 24 hours) at 2024 0831  Last data filed at 2024 2200  Gross per 24 hour   Intake 1249.59 ml   Output 985 ml   Net 264.59 ml         Patient Vitals for the past 96 hrs (Last 3 readings):   Weight   24 0611 83 kg (182 lb 15.7 oz)   24 0500 82.2 kg (181 lb 3.5 oz)   24 0600 82.6 kg (182 lb 1.6 oz)         General: awake, alert, NAD, Obese  Chest: diminished to auscultation, no intercostal retractions  CVS: RRR, no murmur, no rub  Abdomen: soft, non tender  Extremities: trace edema to BLE, wound to R upper arm  Skin: normal texture, normal skin turgor, no rash  Psych: A&O x3    LAB DATA:    CBC:   Lab Results   Component Value Date/Time    WBC 7.6 2024 03:13 AM    RBC 3.52 2024 03:13 AM    HGB 10.4 2024 03:13 AM    HCT 30.5 2024 03:13 AM    MCV 86.6 2024 03:13 AM    MCH 29.5 2024 03:13 AM    MCHC 34.0 2024 03:13 AM    RDW 13.9 2024 03:13 AM     2024 03:13 AM    MPV 7.8 2024 03:13 AM     BMP:    Lab Results   Component Value Date/Time     2024 03:13 AM    K 4.4 2024 03:13 AM    K 3.6 2024 05:18 AM     2024 03:13 AM    CO2 24 2024 03:13 AM    BUN 35 2024 03:13 AM    CREATININE 2.3 2024 03:13 AM    CALCIUM 7.8 2024 03:13 AM    GFRAA >60 2017 09:36 PM    LABGLOM 31 2024 03:13 AM    LABGLOM 32 2024 04:27 AM    GLUCOSE 100 2024 03:13 AM     Ionized Calcium:  No components found for: \"IONCA\"  Magnesium:    Lab Results   Component Value Date/Time    MG 1.90 2024 03:15 AM     Phosphorus:    Lab Results   Component Value Date/Time    PHOS 3.5 2024 03:13 AM     U/A:    Lab Results   Component Value Date/Time    COLORU Yellow 2024 04:48 PM    PHUR 6.0 2024 04:48 PM    PHUR 6.0 2024 08:03 PM    LABCAST SEE NOTES

## 2024-06-20 NOTE — PROGRESS NOTES
Department of Podiatry Note         CHIEF COMPLAINT:  Wound with drainage, plantar LEFT foot with suspected osteomyelitis of 5th metatarsal    HISTORY OF PRESENT ILLNESS:                The patient is a 59 y.o. male with significant past medical history of Diabetes with peripheral neuropathy and  previos amputation status who is consulted for a several weeks history of wound of plantar LEFT foot with foul drainage and considerable depth that probes to bone. Dressings intact left foot, no shrike thru noted    Past Medical History:        Diagnosis Date    Back pain     Diabetes mellitus (HCC)     Pneumonia      Past Surgical History:        Procedure Laterality Date    ARM SURGERY Right 6/12/2024    INCISION AND DRAINAGE RIGHT PROXIMAL ARM ABSCESS performed by Dann Nuñez MD at [facility] OR    COLONOSCOPY      LEG SURGERY Left     LIVER BIOPSY       Current Medications:    Current Facility-Administered Medications: melatonin tablet 6 mg, 6 mg, Oral, Nightly PRN  potassium chloride (KLOR-CON M) extended release tablet 40 mEq, 40 mEq, Oral, PRN **OR** potassium bicarb-citric acid (EFFER-K) effervescent tablet 40 mEq, 40 mEq, Oral, PRN **OR** potassium chloride 10 mEq/100 mL IVPB (Peripheral Line), 10 mEq, IntraVENous, PRN  metroNIDAZOLE (FLAGYL) 500 mg in 0.9% NaCl 100 mL IVPB premix, 500 mg, IntraVENous, Q8H  atorvastatin (LIPITOR) tablet 80 mg, 80 mg, Oral, Nightly  ceFAZolin (ANCEF) 2,000 mg in sodium chloride 0.9 % 50 mL IVPB (mini-bag), 2,000 mg, IntraVENous, Q12H  enoxaparin Sodium (LOVENOX) injection 30 mg, 30 mg, SubCUTAneous, Daily  carvedilol (COREG) tablet 6.25 mg, 6.25 mg, Oral, BID WC  insulin glargine (LANTUS) injection vial 13 Units, 13 Units, SubCUTAneous, Nightly  insulin lispro (HUMALOG,ADMELOG) injection vial 5 Units, 5 Units, SubCUTAneous, TID WC  oxyCODONE-acetaminophen (PERCOCET) 5-325 MG per tablet 1 tablet, 1 tablet, Oral, Q4H PRN **OR** oxyCODONE-acetaminophen (PERCOCET) 5-325 MG per  tablet 2 tablet, 2 tablet, Oral, Q4H PRN  morphine (PF) injection 2 mg, 2 mg, IntraVENous, Q2H PRN **OR** morphine (PF) injection 4 mg, 4 mg, IntraVENous, Q2H PRN  [Held by provider] aspirin EC tablet 81 mg, 81 mg, Oral, Daily  [Held by provider] clopidogrel (PLAVIX) tablet 75 mg, 75 mg, Oral, Nightly  cyclobenzaprine (FLEXERIL) tablet 5 mg, 5 mg, Oral, BID PRN  gabapentin (NEURONTIN) capsule 300 mg, 300 mg, Oral, BID  hydrALAZINE (APRESOLINE) tablet 10 mg, 10 mg, Oral, TID  [Held by provider] isosorbide mononitrate (IMDUR) extended release tablet 15 mg, 15 mg, Oral, Nightly  pantoprazole (PROTONIX) tablet 40 mg, 40 mg, Oral, QAM AC  sodium chloride flush 0.9 % injection 5-40 mL, 5-40 mL, IntraVENous, 2 times per day  sodium chloride flush 0.9 % injection 5-40 mL, 5-40 mL, IntraVENous, PRN  0.9 % sodium chloride infusion, , IntraVENous, PRN  ondansetron (ZOFRAN-ODT) disintegrating tablet 4 mg, 4 mg, Oral, Q8H PRN **OR** ondansetron (ZOFRAN) injection 4 mg, 4 mg, IntraVENous, Q6H PRN  polyethylene glycol (GLYCOLAX) packet 17 g, 17 g, Oral, Daily PRN  acetaminophen (TYLENOL) tablet 650 mg, 650 mg, Oral, Q6H PRN **OR** acetaminophen (TYLENOL) suppository 650 mg, 650 mg, Rectal, Q6H PRN  glucose chewable tablet 16 g, 4 tablet, Oral, PRN  dextrose bolus 10% 125 mL, 125 mL, IntraVENous, PRN **OR** dextrose bolus 10% 250 mL, 250 mL, IntraVENous, PRN  glucagon injection 1 mg, 1 mg, SubCUTAneous, PRN  dextrose 10 % infusion, , IntraVENous, Continuous PRN  insulin lispro (HUMALOG,ADMELOG) injection vial 0-4 Units, 0-4 Units, SubCUTAneous, TID WC  insulin lispro (HUMALOG,ADMELOG) injection vial 0-4 Units, 0-4 Units, SubCUTAneous, Nightly    Allergies:   Patient has no known allergies.    Social History:    TOBACCO:  Never used tobacco  ETOH:  Never drank alcohol    Family History:   History reviewed. No pertinent family history.    REVIEW OF SYSTEMS:    CONSTITUTIONAL:  negative  ENDOCRINE:  positive for diabetic symptoms

## 2024-06-21 ENCOUNTER — APPOINTMENT (OUTPATIENT)
Dept: GENERAL RADIOLOGY | Age: 59
End: 2024-06-21
Payer: COMMERCIAL

## 2024-06-21 ENCOUNTER — ANESTHESIA (OUTPATIENT)
Dept: OPERATING ROOM | Age: 59
End: 2024-06-21
Payer: COMMERCIAL

## 2024-06-21 ENCOUNTER — APPOINTMENT (OUTPATIENT)
Age: 59
End: 2024-06-21
Attending: INTERNAL MEDICINE
Payer: COMMERCIAL

## 2024-06-21 ENCOUNTER — ANESTHESIA EVENT (OUTPATIENT)
Dept: OPERATING ROOM | Age: 59
End: 2024-06-21
Payer: COMMERCIAL

## 2024-06-21 LAB
ALBUMIN SERPL-MCNC: 1.9 G/DL (ref 3.4–5)
ANION GAP SERPL CALCULATED.3IONS-SCNC: 6 MMOL/L (ref 3–16)
BUN SERPL-MCNC: 33 MG/DL (ref 7–20)
CALCIUM SERPL-MCNC: 7.8 MG/DL (ref 8.3–10.6)
CHLORIDE SERPL-SCNC: 100 MMOL/L (ref 99–110)
CO2 SERPL-SCNC: 23 MMOL/L (ref 21–32)
CREAT SERPL-MCNC: 2.3 MG/DL (ref 0.8–1.3)
DEPRECATED RDW RBC AUTO: 14.1 % (ref 12.4–15.4)
ECHO AO ROOT DIAM: 3 CM
ECHO AO ROOT INDEX: 1.51 CM/M2
ECHO BSA: 1.98 M2
ECHO LA DIAMETER INDEX: 2.06 CM/M2
ECHO LA DIAMETER: 4.1 CM
ECHO LA TO AORTIC ROOT RATIO: 1.37
ECHO LV FRACTIONAL SHORTENING: 12 % (ref 28–44)
ECHO LV INTERNAL DIMENSION DIASTOLE INDEX: 2.61 CM/M2
ECHO LV INTERNAL DIMENSION DIASTOLIC: 5.2 CM (ref 4.2–5.9)
ECHO LV INTERNAL DIMENSION SYSTOLIC INDEX: 2.31 CM/M2
ECHO LV INTERNAL DIMENSION SYSTOLIC: 4.6 CM
ECHO LV IVSD: 1.4 CM (ref 0.6–1)
ECHO LV MASS 2D: 293.8 G (ref 88–224)
ECHO LV MASS INDEX 2D: 147.6 G/M2 (ref 49–115)
ECHO LV POSTERIOR WALL DIASTOLIC: 1.3 CM (ref 0.6–1)
ECHO LV RELATIVE WALL THICKNESS RATIO: 0.5
ECHO RV INTERNAL DIMENSION: 3.6 CM
ECHO RV TAPSE: 1.9 CM (ref 1.7–?)
GFR SERPLBLD CREATININE-BSD FMLA CKD-EPI: 31 ML/MIN/{1.73_M2}
GLUCOSE BLD-MCNC: 162 MG/DL (ref 70–99)
GLUCOSE BLD-MCNC: 195 MG/DL (ref 70–99)
GLUCOSE BLD-MCNC: 230 MG/DL (ref 70–99)
GLUCOSE BLD-MCNC: 268 MG/DL (ref 70–99)
GLUCOSE SERPL-MCNC: 259 MG/DL (ref 70–99)
HCT VFR BLD AUTO: 29.8 % (ref 40.5–52.5)
HGB BLD-MCNC: 10.2 G/DL (ref 13.5–17.5)
MCH RBC QN AUTO: 29.9 PG (ref 26–34)
MCHC RBC AUTO-ENTMCNC: 34.2 G/DL (ref 31–36)
MCV RBC AUTO: 87.6 FL (ref 80–100)
PERFORMED ON: ABNORMAL
PHOSPHATE SERPL-MCNC: 3.5 MG/DL (ref 2.5–4.9)
PLATELET # BLD AUTO: 270 K/UL (ref 135–450)
PMV BLD AUTO: 8 FL (ref 5–10.5)
POTASSIUM SERPL-SCNC: 4.6 MMOL/L (ref 3.5–5.1)
PROT PATTERN UR ELPH-IMP: NORMAL
RBC # BLD AUTO: 3.41 M/UL (ref 4.2–5.9)
SODIUM SERPL-SCNC: 129 MMOL/L (ref 136–145)
WBC # BLD AUTO: 6.9 K/UL (ref 4–11)

## 2024-06-21 PROCEDURE — 87070 CULTURE OTHR SPECIMN AEROBIC: CPT

## 2024-06-21 PROCEDURE — 6370000000 HC RX 637 (ALT 250 FOR IP): Performed by: INTERNAL MEDICINE

## 2024-06-21 PROCEDURE — 88305 TISSUE EXAM BY PATHOLOGIST: CPT

## 2024-06-21 PROCEDURE — 87185 SC STD ENZYME DETCJ PER NZM: CPT

## 2024-06-21 PROCEDURE — 87186 SC STD MICRODIL/AGAR DIL: CPT

## 2024-06-21 PROCEDURE — 6360000002 HC RX W HCPCS: Performed by: INTERNAL MEDICINE

## 2024-06-21 PROCEDURE — 3700000000 HC ANESTHESIA ATTENDED CARE: Performed by: PODIATRIST

## 2024-06-21 PROCEDURE — 99232 SBSQ HOSP IP/OBS MODERATE 35: CPT | Performed by: INTERNAL MEDICINE

## 2024-06-21 PROCEDURE — 2709999900 HC NON-CHARGEABLE SUPPLY: Performed by: PODIATRIST

## 2024-06-21 PROCEDURE — 2000000000 HC ICU R&B

## 2024-06-21 PROCEDURE — 0QTP0ZZ RESECTION OF LEFT METATARSAL, OPEN APPROACH: ICD-10-PCS

## 2024-06-21 PROCEDURE — 93308 TTE F-UP OR LMTD: CPT | Performed by: INTERNAL MEDICINE

## 2024-06-21 PROCEDURE — 87102 FUNGUS ISOLATION CULTURE: CPT

## 2024-06-21 PROCEDURE — 6360000002 HC RX W HCPCS

## 2024-06-21 PROCEDURE — 2580000003 HC RX 258: Performed by: INTERNAL MEDICINE

## 2024-06-21 PROCEDURE — 87205 SMEAR GRAM STAIN: CPT

## 2024-06-21 PROCEDURE — 93308 TTE F-UP OR LMTD: CPT

## 2024-06-21 PROCEDURE — 87075 CULTR BACTERIA EXCEPT BLOOD: CPT

## 2024-06-21 PROCEDURE — 3600000003 HC SURGERY LEVEL 3 BASE: Performed by: PODIATRIST

## 2024-06-21 PROCEDURE — 80069 RENAL FUNCTION PANEL: CPT

## 2024-06-21 PROCEDURE — 85027 COMPLETE CBC AUTOMATED: CPT

## 2024-06-21 PROCEDURE — 3700000001 HC ADD 15 MINUTES (ANESTHESIA): Performed by: PODIATRIST

## 2024-06-21 PROCEDURE — 2500000003 HC RX 250 WO HCPCS

## 2024-06-21 PROCEDURE — 3600000013 HC SURGERY LEVEL 3 ADDTL 15MIN: Performed by: PODIATRIST

## 2024-06-21 PROCEDURE — 36415 COLL VENOUS BLD VENIPUNCTURE: CPT

## 2024-06-21 PROCEDURE — 94760 N-INVAS EAR/PLS OXIMETRY 1: CPT

## 2024-06-21 PROCEDURE — 87077 CULTURE AEROBIC IDENTIFY: CPT

## 2024-06-21 PROCEDURE — 6360000002 HC RX W HCPCS: Performed by: PODIATRIST

## 2024-06-21 PROCEDURE — C1713 ANCHOR/SCREW BN/BN,TIS/BN: HCPCS | Performed by: PODIATRIST

## 2024-06-21 DEVICE — IMPLSYS 2NDRY FIXATN BIOSWVLK 4.75X19.1
Type: IMPLANTABLE DEVICE | Status: FUNCTIONAL
Brand: ARTHREX®

## 2024-06-21 RX ORDER — KETAMINE HCL IN NACL, ISO-OSM 100MG/10ML
SYRINGE (ML) INJECTION PRN
Status: DISCONTINUED | OUTPATIENT
Start: 2024-06-21 | End: 2024-06-21 | Stop reason: SDUPTHER

## 2024-06-21 RX ORDER — LIDOCAINE HYDROCHLORIDE 20 MG/ML
INJECTION, SOLUTION EPIDURAL; INFILTRATION; INTRACAUDAL; PERINEURAL PRN
Status: DISCONTINUED | OUTPATIENT
Start: 2024-06-21 | End: 2024-06-21 | Stop reason: SDUPTHER

## 2024-06-21 RX ORDER — METRONIDAZOLE 500 MG/100ML
500 INJECTION, SOLUTION INTRAVENOUS EVERY 8 HOURS
Status: DISCONTINUED | OUTPATIENT
Start: 2024-06-21 | End: 2024-06-25

## 2024-06-21 RX ORDER — BUPIVACAINE HYDROCHLORIDE 5 MG/ML
INJECTION, SOLUTION EPIDURAL; INTRACAUDAL
Status: COMPLETED | OUTPATIENT
Start: 2024-06-21 | End: 2024-06-21

## 2024-06-21 RX ORDER — GLYCOPYRROLATE 0.2 MG/ML
INJECTION INTRAMUSCULAR; INTRAVENOUS PRN
Status: DISCONTINUED | OUTPATIENT
Start: 2024-06-21 | End: 2024-06-21 | Stop reason: SDUPTHER

## 2024-06-21 RX ORDER — PROPOFOL 10 MG/ML
INJECTION, EMULSION INTRAVENOUS PRN
Status: DISCONTINUED | OUTPATIENT
Start: 2024-06-21 | End: 2024-06-21 | Stop reason: SDUPTHER

## 2024-06-21 RX ORDER — INSULIN GLARGINE 100 [IU]/ML
5 INJECTION, SOLUTION SUBCUTANEOUS NIGHTLY
Status: DISCONTINUED | OUTPATIENT
Start: 2024-06-21 | End: 2024-06-27 | Stop reason: HOSPADM

## 2024-06-21 RX ADMIN — Medication 20 MG: at 13:10

## 2024-06-21 RX ADMIN — SODIUM CHLORIDE 2000 MG: 900 INJECTION INTRAVENOUS at 12:22

## 2024-06-21 RX ADMIN — LIDOCAINE HYDROCHLORIDE 80 MG: 20 INJECTION, SOLUTION EPIDURAL; INFILTRATION; INTRACAUDAL; PERINEURAL at 13:08

## 2024-06-21 RX ADMIN — SODIUM CHLORIDE, PRESERVATIVE FREE 10 ML: 5 INJECTION INTRAVENOUS at 22:00

## 2024-06-21 RX ADMIN — HYDRALAZINE HYDROCHLORIDE 10 MG: 10 TABLET, FILM COATED ORAL at 15:01

## 2024-06-21 RX ADMIN — GABAPENTIN 300 MG: 300 CAPSULE ORAL at 21:58

## 2024-06-21 RX ADMIN — CARVEDILOL 6.25 MG: 6.25 TABLET, FILM COATED ORAL at 09:41

## 2024-06-21 RX ADMIN — OXYCODONE HYDROCHLORIDE AND ACETAMINOPHEN 2 TABLET: 5; 325 TABLET ORAL at 21:57

## 2024-06-21 RX ADMIN — Medication 10 MG: at 13:14

## 2024-06-21 RX ADMIN — INSULIN GLARGINE 5 UNITS: 100 INJECTION, SOLUTION SUBCUTANEOUS at 22:01

## 2024-06-21 RX ADMIN — PROPOFOL 40 MG: 10 INJECTION, EMULSION INTRAVENOUS at 13:08

## 2024-06-21 RX ADMIN — METRONIDAZOLE 500 MG: 500 INJECTION, SOLUTION INTRAVENOUS at 17:17

## 2024-06-21 RX ADMIN — SODIUM CHLORIDE 2000 MG: 900 INJECTION INTRAVENOUS at 23:50

## 2024-06-21 RX ADMIN — METRONIDAZOLE 500 MG: 500 INJECTION, SOLUTION INTRAVENOUS at 09:45

## 2024-06-21 RX ADMIN — PROPOFOL 30 MCG/KG/MIN: 10 INJECTION, EMULSION INTRAVENOUS at 13:10

## 2024-06-21 RX ADMIN — GABAPENTIN 300 MG: 300 CAPSULE ORAL at 09:41

## 2024-06-21 RX ADMIN — HYDRALAZINE HYDROCHLORIDE 10 MG: 10 TABLET, FILM COATED ORAL at 09:41

## 2024-06-21 RX ADMIN — INSULIN LISPRO 2 UNITS: 100 INJECTION, SOLUTION INTRAVENOUS; SUBCUTANEOUS at 09:43

## 2024-06-21 RX ADMIN — METRONIDAZOLE 500 MG: 500 INJECTION, SOLUTION INTRAVENOUS at 23:49

## 2024-06-21 RX ADMIN — HYDRALAZINE HYDROCHLORIDE 10 MG: 10 TABLET, FILM COATED ORAL at 22:01

## 2024-06-21 RX ADMIN — SODIUM CHLORIDE: 9 INJECTION, SOLUTION INTRAVENOUS at 00:32

## 2024-06-21 RX ADMIN — CARVEDILOL 6.25 MG: 6.25 TABLET, FILM COATED ORAL at 17:14

## 2024-06-21 RX ADMIN — SODIUM CHLORIDE 2000 MG: 900 INJECTION INTRAVENOUS at 00:01

## 2024-06-21 RX ADMIN — GLYCOPYRROLATE 0.2 MG: 0.2 INJECTION, SOLUTION INTRAMUSCULAR; INTRAVENOUS at 13:14

## 2024-06-21 RX ADMIN — METRONIDAZOLE 500 MG: 500 INJECTION, SOLUTION INTRAVENOUS at 00:32

## 2024-06-21 RX ADMIN — ATORVASTATIN CALCIUM 80 MG: 80 TABLET, FILM COATED ORAL at 21:57

## 2024-06-21 ASSESSMENT — PAIN SCALES - GENERAL
PAINLEVEL_OUTOF10: 0
PAINLEVEL_OUTOF10: 7
PAINLEVEL_OUTOF10: 0
PAINLEVEL_OUTOF10: 0

## 2024-06-21 ASSESSMENT — PAIN DESCRIPTION - ORIENTATION: ORIENTATION: LEFT

## 2024-06-21 ASSESSMENT — PAIN DESCRIPTION - DESCRIPTORS: DESCRIPTORS: ACHING

## 2024-06-21 ASSESSMENT — ENCOUNTER SYMPTOMS: SHORTNESS OF BREATH: 0

## 2024-06-21 ASSESSMENT — PAIN DESCRIPTION - LOCATION: LOCATION: FOOT

## 2024-06-21 NOTE — PROGRESS NOTES
normal. The soft tissues demonstrate no acute abnormality.     Late subacute left frontal lobe cortical infarct with associated petechial hemorrhage. No significant mass effect or midline shift.     CT HEAD WO CONTRAST    Result Date: 6/15/2024  EXAMINATION: CT OF THE HEAD WITHOUT CONTRAST  6/15/2024 9:42 am TECHNIQUE: CT of the head was performed without the administration of intravenous contrast. Automated exposure control, iterative reconstruction, and/or weight based adjustment of the mA/kV was utilized to reduce the radiation dose to as low as reasonably achievable. COMPARISON: 03/27/2024 HISTORY: ORDERING SYSTEM PROVIDED HISTORY: stroke r/o TECHNOLOGIST PROVIDED HISTORY: Reason for exam:->stroke r/o Has a \"code stroke\" or \"stroke alert\" been called?->Yes Reason for Exam: r/o stroke FINDINGS: BRAIN/VENTRICLES: There is no acute intracranial hemorrhage or mass. However, there is a new focus of low attenuation at the gray-white interface of the peripheral left frontal lobe, new from 03/27/2024, concerning for acute ischemic change.  No additional focus of acute abnormal brain attenuation is identified.  No mass effect or midline shift is seen.  The ventricles are normal in size and midline in position.  There is mild chronic small vessel ischemic white matter disease.  There is mild age-appropriate atrophy. ORBITS: The visualized portion of the orbits demonstrate no acute abnormality. SINUSES: There is near complete opacification of the right sphenoid sinus. The remainder of the paranasal sinuses are patent and clear.  The left mastoid air cells are hypoplastic.  The right mastoids are clear. SOFT TISSUES/SKULL:  No acute abnormality of the visualized skull or soft tissues.     1. Findings concerning for acute ischemic change involving the left frontal lobe.  Recommend further characterization with a brain MRI. 2. No acute intracranial hemorrhage or mass.  No mass effect or midline shift is seen. Findings were  discussed with CHERI BYRNE at 1007 hours on 6/15/2024.       CBC:   Recent Labs     06/20/24  0313 06/20/24  1700 06/21/24  0308   WBC 7.6 7.4 6.9   HGB 10.4* 11.6* 10.2*    301 270     BMP:    Recent Labs     06/20/24  0313 06/20/24  1700 06/21/24  0308   * 131* 129*   K 4.4 4.0 4.6    100 100   CO2 24 24 23   BUN 35* 33* 33*   CREATININE 2.3* 2.3* 2.3*   GLUCOSE 100* 85 259*     Hepatic:   Recent Labs     06/20/24  1700   AST 16   ALT <5*   BILITOT <0.2   ALKPHOS 113     Lipids:   Lab Results   Component Value Date/Time    CHOL 101 11/29/2022 11:20 AM    HDL 30 11/29/2022 11:20 AM    TRIG 94 11/29/2022 11:20 AM     Hemoglobin A1C:   Lab Results   Component Value Date/Time    LABA1C 11.4 06/11/2024 09:24 PM     TSH:   Lab Results   Component Value Date/Time    TSH 0.39 06/13/2024 05:17 AM    TSH 2.13 11/29/2022 11:20 AM     Troponin: No results found for: \"TROPONINT\"  Lactic Acid: No results for input(s): \"LACTA\" in the last 72 hours.  BNP: No results for input(s): \"PROBNP\" in the last 72 hours.  UA:  Lab Results   Component Value Date/Time    NITRU Negative 06/11/2024 04:48 PM    COLORU Yellow 06/11/2024 04:48 PM    PHUR 6.0 06/11/2024 04:48 PM    PHUR 6.0 03/27/2024 08:03 PM    LABCAST SEE NOTES 05/09/2024 09:17 AM    WBCUA 1 06/11/2024 04:48 PM    RBCUA 0-2 06/11/2024 04:48 PM    RBCUA 1+ (0.06-0.1 mg/dL) 05/09/2024 09:17 AM    MUCUS 1+ 03/27/2024 08:03 PM    BACTERIA None Seen 06/11/2024 04:48 PM    CLARITYU Clear 06/11/2024 04:48 PM    LEUKOCYTESUR Negative 06/11/2024 04:48 PM    UROBILINOGEN 1.0 06/11/2024 04:48 PM    BILIRUBINUR Negative 06/11/2024 04:48 PM    BLOODU MODERATE 06/11/2024 04:48 PM    GLUCOSEU 500 06/11/2024 04:48 PM    KETUA Negative 06/11/2024 04:48 PM     Urine Cultures:   Lab Results   Component Value Date/Time    LABURIN No growth at 18 to 36 hours 03/28/2024 02:33 PM     Blood Cultures:   Lab Results   Component Value Date/Time    BC No Growth after 4 days of

## 2024-06-21 NOTE — PROGRESS NOTES
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 Labs and chart reviewed. Cr within baseline. Na+ slightly below goal. Will have resection of osteomyelitic bone with Dr. Alvares today.     There were not complications overnight.    Patient review of systems: Denies SOB.       Allergies:  No Known Allergies     Scheduled Meds:   insulin glargine  5 Units SubCUTAneous Nightly    metroNIDAZOLE  500 mg IntraVENous Q8H    atorvastatin  80 mg Oral Nightly    ceFAZolin  2,000 mg IntraVENous Q12H    [Held by provider] enoxaparin  30 mg SubCUTAneous Daily    carvedilol  6.25 mg Oral BID WC    [Held by provider] insulin lispro  5 Units SubCUTAneous TID WC    [Held by provider] aspirin  81 mg Oral Daily    [Held by provider] clopidogrel  75 mg Oral Nightly    gabapentin  300 mg Oral BID    hydrALAZINE  10 mg Oral TID    [Held by provider] isosorbide mononitrate  15 mg Oral Nightly    pantoprazole  40 mg Oral QAM AC    sodium chloride flush  5-40 mL IntraVENous 2 times per day    insulin lispro  0-4 Units SubCUTAneous TID WC    insulin lispro  0-4 Units SubCUTAneous Nightly        sodium chloride 5 mL/hr at 24 0600    dextrose         PRN Meds:melatonin, potassium chloride **OR** potassium alternative oral replacement **OR** potassium chloride, oxyCODONE-acetaminophen **OR** oxyCODONE-acetaminophen, morphine **OR** morphine, cyclobenzaprine, sodium chloride flush, sodium chloride, ondansetron **OR** ondansetron, polyethylene glycol, acetaminophen **OR** acetaminophen, glucose, dextrose bolus **OR** dextrose bolus, glucagon (rDNA), dextrose    Physical Exam:    TEMPERATURE:  Current - Temp: 97.9 °F (36.6 °C); Max - Temp  Av.1 °F (35.1 °C)  Min: 92.8 °F (33.8 °C)  Max: 98.5 °F (36.9 °C)  RESPIRATIONS RANGE: Resp  Av.4  Min: 11  Max: 23  PULSE RANGE: Pulse  Av.2  Min: 59   Max: 88  BLOOD PRESSURE RANGE:  Systolic (24hrs), Av , Min:106 , Max:147   ; Diastolic (24hrs), Av, Min:63, Max:85    24HR INTAKE/OUTPUT:    Intake/Output Summary (Last 24 hours) at 2024 0828  Last data filed at 2024 0600  Gross per 24 hour   Intake 2387.88 ml   Output 2875 ml   Net -487.12 ml         Patient Vitals for the past 96 hrs (Last 3 readings):   Weight   24 0600 83.3 kg (183 lb 10.3 oz)   24 0611 83 kg (182 lb 15.7 oz)   24 0500 82.2 kg (181 lb 3.5 oz)         General: awake, alert, NAD, Obese  Chest: diminished to auscultation, no intercostal retractions  CVS: RRR, no murmur, no rub  Abdomen: soft, non tender  Extremities: trace edema to BLE, wound to R upper arm  Skin: normal texture, normal skin turgor, no rash  Psych: A&O x3    LAB DATA:    CBC:   Lab Results   Component Value Date/Time    WBC 6.9 2024 03:08 AM    RBC 3.41 2024 03:08 AM    HGB 10.2 2024 03:08 AM    HCT 29.8 2024 03:08 AM    MCV 87.6 2024 03:08 AM    MCH 29.9 2024 03:08 AM    MCHC 34.2 2024 03:08 AM    RDW 14.1 2024 03:08 AM     2024 03:08 AM    MPV 8.0 2024 03:08 AM     BMP:    Lab Results   Component Value Date/Time     2024 03:08 AM    K 4.6 2024 03:08 AM    K 3.6 2024 05:18 AM     2024 03:08 AM    CO2 23 2024 03:08 AM    BUN 33 2024 03:08 AM    CREATININE 2.3 2024 03:08 AM    CALCIUM 7.8 2024 03:08 AM    GFRAA >60 2017 09:36 PM    LABGLOM 31 2024 03:08 AM    LABGLOM 32 2024 04:27 AM    GLUCOSE 259 2024 03:08 AM     Ionized Calcium:  No components found for: \"IONCA\"  Magnesium:    Lab Results   Component Value Date/Time    MG 1.90 2024 03:15 AM     Phosphorus:    Lab Results   Component Value Date/Time    PHOS 3.5 2024 03:08 AM     U/A:    Lab Results   Component Value Date/Time    COLORU Yellow 2024 04:48 PM    PHUR 6.0

## 2024-06-21 NOTE — PLAN OF CARE
Problem: Discharge Planning  Goal: Discharge to home or other facility with appropriate resources  6/21/2024 1043 by Kayy Lizama RN  Outcome: Progressing  6/21/2024 0022 by Odalys Bhatia RN  Outcome: Progressing     Problem: Safety - Adult  Goal: Free from fall injury  6/21/2024 1043 by Kayy Lizama RN  Outcome: Progressing  Flowsheets (Taken 6/21/2024 1042)  Free From Fall Injury: Instruct family/caregiver on patient safety  6/21/2024 0022 by Odalys Bhatia RN  Outcome: Progressing  Flowsheets (Taken 6/21/2024 0019)  Free From Fall Injury: Instruct family/caregiver on patient safety     Problem: ABCDS Injury Assessment  Goal: Absence of physical injury  6/21/2024 1043 by Kayy Lizama RN  Outcome: Progressing  Flowsheets (Taken 6/21/2024 1042)  Absence of Physical Injury: Implement safety measures based on patient assessment  6/21/2024 0022 by Odalys Bhatia RN  Outcome: Progressing  Flowsheets (Taken 6/21/2024 0019)  Absence of Physical Injury: Implement safety measures based on patient assessment     Problem: Chronic Conditions and Co-morbidities  Goal: Patient's chronic conditions and co-morbidity symptoms are monitored and maintained or improved  6/21/2024 1043 by Kayy Lizama RN  Outcome: Progressing  6/21/2024 0022 by Odalys Bhatia RN  Outcome: Progressing     Problem: Infection - Adult  Goal: Absence of infection at discharge  6/21/2024 1043 by Kayy Lizama RN  Outcome: Progressing  6/21/2024 0022 by Odalys Bhatia RN  Outcome: Progressing     Problem: Metabolic/Fluid and Electrolytes - Adult  Goal: Electrolytes maintained within normal limits  6/21/2024 1043 by Kayy Lizama RN  Outcome: Progressing  6/21/2024 0022 by Odalys Bhatia RN  Outcome: Progressing  Goal: Glucose maintained within prescribed range  6/21/2024 1043 by Kayy Lizama RN  Outcome: Progressing  6/21/2024 0022 by Odalys Bhatia RN  Outcome: Progressing     Problem: Skin/Tissue Integrity -  nares and determine need for appliance change or resting period.  6/21/2024 1043 by Kayy Lizama RN  Outcome: Progressing  6/21/2024 0022 by Odalys Bhatia RN  Outcome: Progressing     Problem: Nutrition Deficit:  Goal: Optimize nutritional status  Outcome: Progressing     Problem: Neurosensory - Adult  Goal: Achieves stable or improved neurological status  6/21/2024 1043 by Kayy Lizama RN  Outcome: Progressing  6/21/2024 0022 by Odalys Bhatia RN  Outcome: Progressing  Flowsheets (Taken 6/20/2024 2000)  Achieves stable or improved neurological status: Assess for and report changes in neurological status  Goal: Absence of seizures  6/21/2024 1043 by Kayy Lizama RN  Outcome: Progressing  6/21/2024 0022 by Odalys Bhatia RN  Outcome: Progressing  Flowsheets (Taken 6/20/2024 2000)  Absence of seizures: Monitor for seizure activity.  If seizure occurs, document type and location of movements and any associated apnea     Problem: Respiratory - Adult  Goal: Achieves optimal ventilation and oxygenation  6/21/2024 1043 by Kayy Lizama RN  Outcome: Progressing  6/21/2024 0022 by Odalys Bhatia RN  Outcome: Progressing  Flowsheets (Taken 6/20/2024 2000)  Achieves optimal ventilation and oxygenation: Assess for changes in respiratory status     Problem: Cardiovascular - Adult  Goal: Maintains optimal cardiac output and hemodynamic stability  6/21/2024 1043 by Kayy Lizama RN  Outcome: Progressing  6/21/2024 0022 by Odalys Bhatia RN  Outcome: Progressing  Flowsheets (Taken 6/20/2024 2000)  Maintains optimal cardiac output and hemodynamic stability: Monitor blood pressure and heart rate  Goal: Absence of cardiac dysrhythmias or at baseline  6/21/2024 1043 by Kayy Lizama RN  Outcome: Progressing  6/21/2024 0022 by Odalys Bhatia RN  Outcome: Progressing  Flowsheets (Taken 6/20/2024 2000)  Absence of cardiac dysrhythmias or at baseline: Monitor cardiac rate and rhythm     Problem:

## 2024-06-21 NOTE — CARE COORDINATION
Chart Reviewed.  Pt had surgery today on Left Foot, 5th metatarsal resection  From home.  Active with [facility], [facility].  Dr Allen is following for IV abx needs.  May need to return to surgery.  TANIKA Vila     Case Management   [phone]    6/21/2024  3:12 PM

## 2024-06-21 NOTE — OP NOTE
Operative Note      Patient: Juan Carlos Wong Sr  YOB: 1965  MRN: [id number]    Date of Procedure: 6/21/2024    Pre-Op Diagnosis Codes:     * Acute osteomyelitis of left foot (HCC) [M86.172]    Post-Op Diagnosis: Same       Procedure(s):  RESECTION OF FIFTH METATARSAL LEFT FOOT    Surgeon(s):  Natanael Alvares DPM    Assistant:  Opal Esteves DPM PGY-2     Anesthesia: monitored anesthesia care     Hemostasis:  Anatomic Dissection      Injectables: Pre-Op 20 cc of 1% Polocaine plain and Post-Op 10 cc of 0.5 % Marcaine plain     Materials:  2-0 Nylon, Osteoset antibiotic beads with 1g of vancomycin powder     Estimated Blood Loss (mL): less than 35 mL     Complications: None    Specimens:   ID Type Source Tests Collected by Time Destination   1 : left foot abscess Tissue Tissue CULTURE, FUNGUS, CULTURE, TISSUE Natanael Alvares DPM 6/21/2024 1327    A : fifth metatarsal left foot Tissue Tissue SURGICAL PATHOLOGY Natanael Alvares DPM 6/21/2024 1336        Implants:  Implant Name Type Inv. Item Serial No.  Lot No. LRB No. Used Action   DEVICE GRFT FIX 4.75X19.1 MM ANCHR IMPL BIOCOMP SWIVELOCK - [id number]  DEVICE GRFT FIX 4.75X19.1 MM ANCHR IMPL BIOCOMP SWIVELOCK  [employer]- [id number] Left 1 Implanted         Drains:   [REMOVED] Urinary Catheter 06/12/24 Wheeler (Removed)   Catheter Indications Urinary retention (acute or chronic), continuous bladder irrigation or bladder outlet obstruction 06/19/24 0740   Site Assessment No urethral drainage 06/19/24 0740   Urine Color Yellow 06/19/24 0740   Urine Appearance Clear 06/19/24 0740   Urine Odor Other (Comment) 06/15/24 1335   Collection Container Standard 06/19/24 0740   Securement Method Securing device (Describe) 06/19/24 0740   Catheter Care  Perineal wipes 06/18/24 2000   Catheter Best Practices  Drainage tube clipped to bed;Catheter secured to thigh;Tamper seal intact;Bag below bladder;Bag not on floor;Lack of dependent loop in tubing;Drainage  bag less than half full 06/19/24 0740   Status Draining;Patent 06/19/24 0740   Output (mL) 35 mL 06/19/24 1133   Discontinuation Reason Per provider order 06/15/24 1509       Findings:  Infection Present At Time Of Surgery (PATOS) (choose all levels that have infection present):  - Organ Space infection (below fascia) present as evidenced by purulent fluid and osteomyelitis  Other Findings: Approximately 1 cc of purulence noted intraoperatively .  Entire 5th metatarsal removed en toto. Bone was noted to be soft, crumbling and felt to be consistent with OM      INDICATIONS FOR PROCEDURE: This patient has signs and symptoms clinically and radiographically consistent with the above mentioned preoperative diagnosis. Having failed conservative treatment, it was determined that the patient would benefit from surgical intervention. All potential risks, benefits, and complications were discussed with the patient prior to the scheduling of surgery. All the patient's questions were answered and no guarantees were given. The patient wished to proceed with surgery, and informed written consent was obtained.     DETAILS OF PROCEDURE: The patient was brought from the pre-operative area and placed on the operating table in the supine position. Following IV sedation, a local anesthetic block was then injected proximal to the incision site consisting of 20 cc of 1% Polocaine plain. The left lower extremity was then scrubbed, prepped, and draped in the usual sterile fashion. A time-out was performed. The patient, procedure, and operative site were confirmed.     Attention was then directed towards the open wound on the lateral aspect of the left foot at the  remaining 5th ray. The wound was noted to be very fibrotic and necrotic, thus necessitating the need for wound debridement of soft tissue and infected bone. Using a #15 blade, a full thickness incision was made through the wound along the 5th metatarsal and cuboid.  The incision

## 2024-06-21 NOTE — BRIEF OP NOTE
Brief Postoperative Note      Patient: Juan Carlos Wong Sr  YOB: 1965  MRN: [id number]    Date of Procedure: 6/21/2024    Pre-Op Diagnosis Codes:     * Acute osteomyelitis of left foot (HCC) [M86.172]    Post-Op Diagnosis: Same       Procedure(s):  RESECTION OF FIFTH METATARSAL LEFT FOOT    Surgeon(s):  Natanael Alvares DPM    Assistant:  Opal Esteves DPM PGY-2    Anesthesia: monitored anesthesia care    Hemostasis:  Anatomic Dissection     Injectables: Pre-Op 20 cc of 1% Polocaine plain and Post-Op 10 cc of 0.5 % Marcaine plain    Materials:  2-0 Nylon, Osteoset antibiotic beads with 1g of vancomycin powder    Estimated Blood Loss (mL): less than 35 mL    Complications: None    Specimens:   ID Type Source Tests Collected by Time Destination   1 : left foot abscess Tissue Tissue CULTURE, FUNGUS, CULTURE, TISSUE Natanael Alvares DPM 6/21/2024 1327    A : fifth metatarsal left foot Tissue Tissue SURGICAL PATHOLOGY Natanael Alvares DPM 6/21/2024 1336        Implants:  Implant Name Type Inv. Item Serial No.  Lot No. LRB No. Used Action   DEVICE GRFT FIX 4.75X19.1 MM ANCHR IMPL BIOCOMP SWIVELOCK - [id number]  DEVICE GRFT FIX 4.75X19.1 MM ANCHR IMPL BIOCOMP SWIVELOCK  [employer] [facility]- [id number] Left 1 Implanted         Drains:   [REMOVED] Urinary Catheter 06/12/24 Wheeler (Removed)   Catheter Indications Urinary retention (acute or chronic), continuous bladder irrigation or bladder outlet obstruction 06/19/24 0740   Site Assessment No urethral drainage 06/19/24 0740   Urine Color Yellow 06/19/24 0740   Urine Appearance Clear 06/19/24 0740   Urine Odor Other (Comment) 06/15/24 1335   Collection Container Standard 06/19/24 0740   Securement Method Securing device (Describe) 06/19/24 0740   Catheter Care  Perineal wipes 06/18/24 2000   Catheter Best Practices  Drainage tube clipped to bed;Catheter secured to thigh;Tamper seal intact;Bag below bladder;Bag not on floor;Lack of dependent loop in

## 2024-06-21 NOTE — PROGRESS NOTES
27.10 kg/m²     General Appearance: alert,in no acute distress, +  pallor, no icterus chronically ill-appearing gentleman  Skin: warm and dry, no rash or erythema  Head: normocephalic and atraumatic  Eyes: pupils equal, round, and reactive to light, conjunctivae normal  ENT: tympanic membrane, external ear and ear canal normal bilaterally, nose without deformity, nasal mucosa and turbinates normal without polyps  Neck: supple and non-tender without mass, no thyromegaly  no cervical lymphadenopathy  Pulmonary/Chest: clear to auscultation bilaterally- no wheezes, rales or rhonchi, normal air movement, no respiratory distress  Cardiovascular: normal rate, regular rhythm, normal S1 and S2, no murmurs, rubs, clicks, or gallops, no carotid bruits  Abdomen: soft, non-tender, non-distended, normal bowel sounds, no masses or organomegaly  Extremities: no cyanosis, clubbing or edema  Musculoskeletal: normal range of motion, no joint swelling, deformity or tenderness  Integumentary: No rashes, no abnormal skin lesions, no petechiae  Neurologic: reflexes normal and symmetric, no cranial nerve deficit  Psych:  Orientation, sensorium, mood normal   Lines:IV  Left foot plantar ulceration ongoing drainage with local cellulitis    Rt shoulder dressing ++     DATA:    CBC:   Lab Results   Component Value Date    WBC 6.9 06/21/2024    HGB 10.2 (L) 06/21/2024    HCT 29.8 (L) 06/21/2024    MCV 87.6 06/21/2024     06/21/2024     RENAL:   Lab Results   Component Value Date    CREATININE 2.3 (H) 06/21/2024    BUN 33 (H) 06/21/2024     (L) 06/21/2024    K 4.6 06/21/2024     06/21/2024    CO2 23 06/21/2024     SED RATE:   Lab Results   Component Value Date/Time    SEDRATE 72 06/19/2024 03:21 AM     CK:   Lab Results   Component Value Date/Time    CKTOTAL 972 03/27/2024 07:25 PM     CRP:   Lab Results   Component Value Date/Time    CRP 19.5 06/19/2024 03:21 AM     Hepatic Function Panel:   Lab Results   Component Value  Date/Time    ALKPHOS 113 06/20/2024 05:00 PM    ALT <5 06/20/2024 05:00 PM    AST 16 06/20/2024 05:00 PM    BILITOT <0.2 06/20/2024 05:00 PM     UA:  Lab Results   Component Value Date/Time    COLORU Yellow 06/11/2024 04:48 PM    CLARITYU Clear 06/11/2024 04:48 PM    GLUCOSEU 500 06/11/2024 04:48 PM    BILIRUBINUR Negative 06/11/2024 04:48 PM    KETUA Negative 06/11/2024 04:48 PM    BLOODU MODERATE 06/11/2024 04:48 PM    PHUR 6.0 06/11/2024 04:48 PM    PHUR 6.0 03/27/2024 08:03 PM    PROTEINU 300 06/11/2024 04:48 PM    UROBILINOGEN 1.0 06/11/2024 04:48 PM    NITRU Negative 06/11/2024 04:48 PM    LEUKOCYTESUR Negative 06/11/2024 04:48 PM    URINETYPE NotGiven 06/11/2024 04:48 PM      Urine Microscopic:   Lab Results   Component Value Date/Time    LABCAST SEE NOTES 05/09/2024 09:17 AM    BACTERIA None Seen 06/11/2024 04:48 PM    HYALCAST 2 06/11/2024 04:48 PM    WBCUA 1 06/11/2024 04:48 PM    RBCUA 0-2 06/11/2024 04:48 PM    RBCUA 1+ (0.06-0.1 mg/dL) 05/09/2024 09:17 AM     Urine Reflex to Culture:   Lab Results   Component Value Date/Time    URRFLXCULT Not Indicated 03/27/2024 08:03 PM       Creat 2.6     Hab1C   11.4    Wbc   14.8     Na+ 124     MICRO: cultures reviewed and updated by me     Procedure Component Value Units Date/Time   Culture, Tissue [[id number]] Collected: 06/12/24 1233   Order Status: Completed Specimen: Tissue Updated: 06/12/24 1518    Gram Stain Result No organisms seen  2+ WBC's  No Epithelial Cells seen   Narrative:     ORDER#: [id number]                          ORDERED BY: YEIMY SMART  SOURCE: Tissue                             COLLECTED:  06/12/24 12:33  ANTIBIOTICS AT EPI.:                      RECEIVED :  06/12/24 14:09   Culture, Blood 2 [[id number]] Collected: 06/11/24 1255   Order Status: Completed Specimen: Blood Updated: 06/12/24 1415    Culture, Blood 2 No Growth to date.  Any change in status will be called.   Narrative:     ORDER#: [id number]                          ORDERED

## 2024-06-21 NOTE — PLAN OF CARE
Problem: Discharge Planning  Goal: Discharge to home or other facility with appropriate resources  6/21/2024 0022 by Odalys Bhatia RN  Outcome: Progressing     Problem: Safety - Adult  Goal: Free from fall injury  6/21/2024 0022 by Odalys Bhatia RN  Outcome: Progressing  Flowsheets (Taken 6/21/2024 0019)  Free From Fall Injury: Instruct family/caregiver on patient safety     Problem: ABCDS Injury Assessment  Goal: Absence of physical injury  6/21/2024 0022 by Odalys Bhatia RN  Outcome: Progressing  Flowsheets (Taken 6/21/2024 0019)  Absence of Physical Injury: Implement safety measures based on patient assessment     Problem: Chronic Conditions and Co-morbidities  Goal: Patient's chronic conditions and co-morbidity symptoms are monitored and maintained or improved  6/21/2024 0022 by Odalys Bhatia RN  Outcome: Progressing     Problem: Infection - Adult  Goal: Absence of infection at discharge  6/21/2024 0022 by Odalys Bhatia RN  Outcome: Progressing     Problem: Metabolic/Fluid and Electrolytes - Adult  Goal: Electrolytes maintained within normal limits  6/21/2024 0022 by Odalys Bhatia RN  Outcome: Progressing     Problem: Metabolic/Fluid and Electrolytes - Adult  Goal: Glucose maintained within prescribed range  6/21/2024 0022 by Odalys Bhatia RN  Outcome: Progressing     Problem: Skin/Tissue Integrity - Adult  Goal: Oral mucous membranes remain intact  6/21/2024 0022 by Odalys Bhatia RN  Outcome: Progressing     Problem: Neurosensory - Adult  Goal: Achieves stable or improved neurological status  6/21/2024 0022 by Odalys Bhatia RN  Outcome: Progressing  Flowsheets (Taken 6/20/2024 2000)  Achieves stable or improved neurological status: Assess for and report changes in neurological status     Problem: Neurosensory - Adult  Goal: Absence of seizures  Outcome: Progressing  Flowsheets (Taken 6/20/2024 2000)  Absence of seizures: Monitor for seizure activity.  If seizure occurs, document

## 2024-06-21 NOTE — PROGRESS NOTES
Physical Therapy  PT attempt  Juan Carlos Wong Sr    Attempted PT treatment this PM.  Pt off the floor for resection of fifth metatarsal of L foot.  Will need new orders and WB status to resume PT treatment.      Electronically signed by Abbi Borrego PT [id number] on 6/21/2024 at 1:20 PM

## 2024-06-21 NOTE — CARE COORDINATION
Pt currently in OR for I&D of left foot with resection of 5th metatarsal by Dr. Alvares. Defer wound care to podiatry. Please re-consult if needed.Kevin Martell, MSN, RN, CWOCN

## 2024-06-21 NOTE — PROGRESS NOTES
NAME:  Juan Carlos Wong Sr  YOB: 1965  MEDICAL RECORD NUMBER:  [id number]    Shift Summary: Pt temp and blood sugars back to normal. NPO since MN for OR today at 13:00 for I&D of left foot and amputation of 5th metatarsal    Family updated: Yes:    per pt    Rhythm: Normal Sinus Rhythm     Most recent vitals:   Visit Vitals  BP (!) 140/83   Pulse 84   Temp 97.9 °F (36.6 °C) (Oral)   Resp 18   Ht 1.753 m (5' 9.02\")   Wt 83.3 kg (183 lb 10.3 oz)   SpO2 99%   BMI 27.10 kg/m²           No data found.    No data found.      Respiratory support needed (if any):  - RA    Admission weight Weight - Scale: 80.7 kg (177 lb 14.6 oz) (06/11/24 1136)    Today's weight    Wt Readings from Last 1 Encounters:   06/21/24 83.3 kg (183 lb 10.3 oz)        Elliott need assessed each shift: N/A - no elliott present  UOP >30ml/hr: YES  Last documented BM (in last 48 hrs):  Patient Vitals for the past 48 hrs:   Last BM (including prior to admit) Stool Occurrence   06/19/24 0740 06/18/24 --   06/19/24 2000 06/18/24 --   06/20/24 0800 06/18/24 --   06/20/24 2000 06/18/24 --   06/21/24 0200 06/21/24 1   06/21/24 0400 06/21/24 --                Restraints (in use currently or dc'd in last 12 hrs): No    Order current and documentation up to date? No    Lines/Drains reviewed @ bedside.  Peripheral IV 06/11/24 Left Forearm (Active)   Number of days: 9       Peripheral IV 06/20/24 Left;Posterior Hand (Active)   Number of days: 0       Continuous Glucose Monitor   (Active)   Number of days:          Drip rates at handoff:    sodium chloride 5 mL/hr at 06/21/24 0600    dextrose         Lab Data:   CBC:   Recent Labs     06/20/24  1700 06/21/24  0308   WBC 7.4 6.9   HGB 11.6* 10.2*   HCT 33.4* 29.8*   MCV 86.7 87.6    270     BMP:    Recent Labs     06/20/24  1700 06/21/24  0308   * 129*   K 4.0 4.6   CO2 24 23   BUN 33* 33*   CREATININE 2.3* 2.3*     LIVR:   Recent Labs     06/20/24  1700   AST 16   ALT <5*     PT/INR: No

## 2024-06-21 NOTE — ANESTHESIA POSTPROCEDURE EVALUATION
Department of Anesthesiology  Postprocedure Note    Patient: Juan Carlos Wong Sr  MRN: [id number]  YOB: 1965  Date of evaluation: 6/21/2024    Procedure Summary       Date: 06/21/24 Room / Location: [address]    Anesthesia Start: 1304 Anesthesia Stop: 1409    Procedure: RESECTION OF FIFTH METATARSAL LEFT FOOT (Left) Diagnosis:       Acute osteomyelitis of left foot (HCC)      (Acute osteomyelitis of left foot (HCC) [M86.172])    Surgeons: Natanael Alvares DPM Responsible Provider: Bimal Cancino MD    Anesthesia Type: MAC ASA Status: 3            Anesthesia Type: MAC    Javier Phase I: Javier Score: 9    Javier Phase II:      Anesthesia Post Evaluation    Patient location during evaluation: PACU  Level of consciousness: awake and alert  Airway patency: patent  Nausea & Vomiting: no nausea and no vomiting  Cardiovascular status: blood pressure returned to baseline  Respiratory status: acceptable  Hydration status: euvolemic  Comments: Postoperative Anesthesia Note    Name:    Juan Carlos Wong Sr  MRN:      [id number]    Patient Vitals in the past 12 hrs:  06/21/24 1501, BP:(!) 140/77  06/21/24 1300, BP:132/82, Pulse:96, Resp:26, SpO2:99 %  06/21/24 1200, BP:128/71, Temp:98.3 °F (36.8 °C), Temp src:Oral, Pulse:91, Resp:20, SpO2:99 %  06/21/24 1100, SpO2:95 %  06/21/24 1000, BP:(!) 132/93, Pulse:96, Resp:15, SpO2:98 %  06/21/24 0941, BP:133/78, Pulse:96  06/21/24 0900, SpO2:98 %  06/21/24 0843, Pulse:91, Resp:13, SpO2:97 %  06/21/24 0800, BP:(!) 141/86, Temp:98.5 °F (36.9 °C), Temp src:Oral, Pulse:94, Resp:13, SpO2:98 %  06/21/24 0600, BP:(!) 140/83, Pulse:84, Resp:18, SpO2:99 %, Weight:83.3 kg (183 lb 10.3 oz)  06/21/24 0400, BP:122/68, Temp:97.9 °F (36.6 °C), Temp src:Oral, Pulse:73, Resp:12, SpO2:98 %     LABS:    CBC  Lab Results       Component                Value               Date/Time                  WBC                      6.9                 06/21/2024

## 2024-06-21 NOTE — PROGRESS NOTES
Discharge medications are ready in inpatient pharmacy for weekend delivery.    06/21/24 10:03 AM

## 2024-06-21 NOTE — ANESTHESIA PRE PROCEDURE
[facility] Department of Anesthesiology  Pre-Anesthesia Evaluation/Consultation       Name:  Juan Carlos Wong Sr  : 1965  Age:  59 y.o.                                           MRN:  [id number]  Date: 2024           Surgeon: Surgeon(s):  Natanael Alvares DPM    Procedure: Procedure(s):  RESECTION OF FIFTH METATARSAL LEFT FOOT     No Known Allergies  Patient Active Problem List   Diagnosis    Sepsis (HCC)    Generalized weakness    Acute on chronic congestive heart failure (HCC)    Coronary artery disease involving native coronary artery of native heart without angina pectoris    Non-traumatic rhabdomyolysis    Neutrophilia    Diabetic foot infection (HCC)    Stage 3b chronic kidney disease (HCC)    Cellulitis of foot    Cellulitis and abscess of upper extremity    Cellulitis of right upper arm    DM (diabetes mellitus), type 2 with complications (HCC)    Antiplatelet or antithrombotic long-term use    Abscess of right arm    Osteomyelitis of left foot (HCC)    Poorly controlled diabetes mellitus (HCC)    Type 2 diabetes mellitus with diabetic foot infection (HCC)    Staph aureus infection    Cerebrovascular accident (CVA) due to thrombosis of left anterior cerebral artery (HCC)     Past Medical History:   Diagnosis Date    Back pain     Diabetes mellitus (HCC)     Pneumonia      Past Surgical History:   Procedure Laterality Date    ARM SURGERY Right 2024    INCISION AND DRAINAGE RIGHT PROXIMAL ARM ABSCESS performed by Dann Nuñez MD at [facility] OR    COLONOSCOPY      LEG SURGERY Left     LIVER BIOPSY       Social History     Tobacco Use    Smoking status: Never    Smokeless tobacco: Never   Substance Use Topics    Alcohol use: No    Drug use: No     Medications  No current facility-administered medications on file prior to encounter.     Current Outpatient Medications on File Prior to Encounter   Medication Sig Dispense Refill    Tirzepatide (MOUNJARO) 5 MG/0.5ML SOPN SC injection

## 2024-06-21 NOTE — PROGRESS NOTES
NAME:  Juan Carlos Wong Sr  YOB: 1965  MEDICAL RECORD NUMBER:  [id number]    Shift Summary: Pt had I&D today of left foot as well as the 5th metatarsal amputation. No complications. Uneventful shift    Family updated: Yes:  at bedside    Rhythm: Normal Sinus Rhythm     Most recent vitals:   Visit Vitals  /77   Pulse 86   Temp 98.3 °F (36.8 °C) (Oral)   Resp 26   Ht 1.753 m (5' 9.02\")   Wt 83.3 kg (183 lb 10.3 oz)   SpO2 99%   BMI 27.10 kg/m²           No data found.    No data found.      Respiratory support needed (if any):  - RA    Admission weight Weight - Scale: 80.7 kg (177 lb 14.6 oz) (06/11/24 1136)    Today's weight    Wt Readings from Last 1 Encounters:   06/21/24 83.3 kg (183 lb 10.3 oz)        Elliott need assessed each shift: N/A - no elliott present  UOP >30ml/hr: YES  Last documented BM (in last 48 hrs):  Patient Vitals for the past 48 hrs:   Last BM (including prior to admit) Stool Occurrence   06/19/24 2000 06/18/24 --   06/20/24 0800 06/18/24 --   06/20/24 2000 06/18/24 --   06/21/24 0200 06/21/24 1   06/21/24 0400 06/21/24 --                Restraints (in use currently or dc'd in last 12 hrs): No    Order current and documentation up to date? No    Lines/Drains reviewed @ bedside.  Peripheral IV 06/11/24 Left Forearm (Active)   Number of days: 10       Peripheral IV 06/20/24 Left;Posterior Hand (Active)   Number of days: 1       Continuous Glucose Monitor   (Active)   Number of days:          Drip rates at handoff:    sodium chloride Stopped (06/21/24 1407)    dextrose         Lab Data:   CBC:   Recent Labs     06/20/24 1700 06/21/24  0308   WBC 7.4 6.9   HGB 11.6* 10.2*   HCT 33.4* 29.8*   MCV 86.7 87.6    270     BMP:    Recent Labs     06/20/24 1700 06/21/24  0308   * 129*   K 4.0 4.6   CO2 24 23   BUN 33* 33*   CREATININE 2.3* 2.3*     LIVR:   Recent Labs     06/20/24  1700   AST 16   ALT <5*     PT/INR: No results for input(s): \"PROT\", \"INR\" in the last 72

## 2024-06-21 NOTE — PROGRESS NOTES
Pt had 2 episodes of hypoglycemia earlier today, despite eating his meals. He required D10 bolus and D50 bolus. FSBS 224 after eating turkey sandwich and apple juice. Rani Keys NP notified and lantus dose held for tonight.

## 2024-06-22 ENCOUNTER — APPOINTMENT (OUTPATIENT)
Dept: GENERAL RADIOLOGY | Age: 59
End: 2024-06-22
Payer: COMMERCIAL

## 2024-06-22 LAB
ALBUMIN SERPL-MCNC: 2 G/DL (ref 3.4–5)
ANION GAP SERPL CALCULATED.3IONS-SCNC: 7 MMOL/L (ref 3–16)
BUN SERPL-MCNC: 29 MG/DL (ref 7–20)
CALCIUM SERPL-MCNC: 7.8 MG/DL (ref 8.3–10.6)
CHLORIDE SERPL-SCNC: 98 MMOL/L (ref 99–110)
CO2 SERPL-SCNC: 20 MMOL/L (ref 21–32)
CREAT SERPL-MCNC: 2.2 MG/DL (ref 0.8–1.3)
DEPRECATED RDW RBC AUTO: 14 % (ref 12.4–15.4)
GFR SERPLBLD CREATININE-BSD FMLA CKD-EPI: 33 ML/MIN/{1.73_M2}
GLUCOSE BLD-MCNC: 186 MG/DL (ref 70–99)
GLUCOSE BLD-MCNC: 193 MG/DL (ref 70–99)
GLUCOSE BLD-MCNC: 209 MG/DL (ref 70–99)
GLUCOSE BLD-MCNC: 234 MG/DL (ref 70–99)
GLUCOSE SERPL-MCNC: 247 MG/DL (ref 70–99)
HCT VFR BLD AUTO: 29 % (ref 40.5–52.5)
HGB BLD-MCNC: 9.8 G/DL (ref 13.5–17.5)
LOEFFLER MB STN SPEC: NORMAL
MCH RBC QN AUTO: 29.9 PG (ref 26–34)
MCHC RBC AUTO-ENTMCNC: 33.9 G/DL (ref 31–36)
MCV RBC AUTO: 88.2 FL (ref 80–100)
PERFORMED ON: ABNORMAL
PHOSPHATE SERPL-MCNC: 3.6 MG/DL (ref 2.5–4.9)
PLATELET # BLD AUTO: 250 K/UL (ref 135–450)
PMV BLD AUTO: 8.2 FL (ref 5–10.5)
POTASSIUM SERPL-SCNC: 4.8 MMOL/L (ref 3.5–5.1)
RBC # BLD AUTO: 3.29 M/UL (ref 4.2–5.9)
SODIUM SERPL-SCNC: 125 MMOL/L (ref 136–145)
WBC # BLD AUTO: 10.8 K/UL (ref 4–11)

## 2024-06-22 PROCEDURE — 6370000000 HC RX 637 (ALT 250 FOR IP): Performed by: INTERNAL MEDICINE

## 2024-06-22 PROCEDURE — 6370000000 HC RX 637 (ALT 250 FOR IP)

## 2024-06-22 PROCEDURE — 2580000003 HC RX 258

## 2024-06-22 PROCEDURE — 97530 THERAPEUTIC ACTIVITIES: CPT

## 2024-06-22 PROCEDURE — 73630 X-RAY EXAM OF FOOT: CPT

## 2024-06-22 PROCEDURE — 80069 RENAL FUNCTION PANEL: CPT

## 2024-06-22 PROCEDURE — 94760 N-INVAS EAR/PLS OXIMETRY 1: CPT

## 2024-06-22 PROCEDURE — 6360000002 HC RX W HCPCS: Performed by: INTERNAL MEDICINE

## 2024-06-22 PROCEDURE — 2580000003 HC RX 258: Performed by: INTERNAL MEDICINE

## 2024-06-22 PROCEDURE — 6360000002 HC RX W HCPCS

## 2024-06-22 PROCEDURE — 85027 COMPLETE CBC AUTOMATED: CPT

## 2024-06-22 PROCEDURE — 36415 COLL VENOUS BLD VENIPUNCTURE: CPT

## 2024-06-22 PROCEDURE — 2000000000 HC ICU R&B

## 2024-06-22 RX ORDER — SODIUM CHLORIDE 0.9 % (FLUSH) 0.9 %
5-40 SYRINGE (ML) INJECTION EVERY 12 HOURS SCHEDULED
Status: DISCONTINUED | OUTPATIENT
Start: 2024-06-22 | End: 2024-06-22

## 2024-06-22 RX ORDER — SODIUM CHLORIDE 9 MG/ML
INJECTION, SOLUTION INTRAVENOUS PRN
Status: DISCONTINUED | OUTPATIENT
Start: 2024-06-22 | End: 2024-06-22

## 2024-06-22 RX ORDER — NALOXONE HYDROCHLORIDE 0.4 MG/ML
INJECTION, SOLUTION INTRAMUSCULAR; INTRAVENOUS; SUBCUTANEOUS PRN
Status: DISCONTINUED | OUTPATIENT
Start: 2024-06-22 | End: 2024-06-22

## 2024-06-22 RX ORDER — ENOXAPARIN SODIUM 100 MG/ML
30 INJECTION SUBCUTANEOUS DAILY
Status: DISCONTINUED | OUTPATIENT
Start: 2024-06-23 | End: 2024-06-27 | Stop reason: HOSPADM

## 2024-06-22 RX ORDER — CLOPIDOGREL BISULFATE 75 MG/1
75 TABLET ORAL NIGHTLY
Status: DISCONTINUED | OUTPATIENT
Start: 2024-06-22 | End: 2024-06-27 | Stop reason: HOSPADM

## 2024-06-22 RX ORDER — OXYCODONE HYDROCHLORIDE 10 MG/1
10 TABLET ORAL PRN
Status: DISCONTINUED | OUTPATIENT
Start: 2024-06-22 | End: 2024-06-22

## 2024-06-22 RX ORDER — ONDANSETRON 2 MG/ML
4 INJECTION INTRAMUSCULAR; INTRAVENOUS
Status: DISCONTINUED | OUTPATIENT
Start: 2024-06-22 | End: 2024-06-22

## 2024-06-22 RX ORDER — SODIUM CHLORIDE 0.9 % (FLUSH) 0.9 %
5-40 SYRINGE (ML) INJECTION PRN
Status: DISCONTINUED | OUTPATIENT
Start: 2024-06-22 | End: 2024-06-22

## 2024-06-22 RX ORDER — OXYCODONE HYDROCHLORIDE 5 MG/1
5 TABLET ORAL PRN
Status: DISCONTINUED | OUTPATIENT
Start: 2024-06-22 | End: 2024-06-22

## 2024-06-22 RX ADMIN — GABAPENTIN 300 MG: 300 CAPSULE ORAL at 08:16

## 2024-06-22 RX ADMIN — HYDRALAZINE HYDROCHLORIDE 10 MG: 10 TABLET, FILM COATED ORAL at 08:16

## 2024-06-22 RX ADMIN — CARVEDILOL 6.25 MG: 6.25 TABLET, FILM COATED ORAL at 08:16

## 2024-06-22 RX ADMIN — INSULIN GLARGINE 5 UNITS: 100 INJECTION, SOLUTION SUBCUTANEOUS at 21:30

## 2024-06-22 RX ADMIN — GABAPENTIN 300 MG: 300 CAPSULE ORAL at 21:30

## 2024-06-22 RX ADMIN — Medication 6 MG: at 22:11

## 2024-06-22 RX ADMIN — ATORVASTATIN CALCIUM 80 MG: 80 TABLET, FILM COATED ORAL at 22:10

## 2024-06-22 RX ADMIN — METRONIDAZOLE 500 MG: 500 INJECTION, SOLUTION INTRAVENOUS at 15:55

## 2024-06-22 RX ADMIN — HYDRALAZINE HYDROCHLORIDE 10 MG: 10 TABLET, FILM COATED ORAL at 22:10

## 2024-06-22 RX ADMIN — SODIUM CHLORIDE 2000 MG: 900 INJECTION INTRAVENOUS at 12:04

## 2024-06-22 RX ADMIN — SODIUM CHLORIDE, PRESERVATIVE FREE 10 ML: 5 INJECTION INTRAVENOUS at 22:12

## 2024-06-22 RX ADMIN — METRONIDAZOLE 500 MG: 500 INJECTION, SOLUTION INTRAVENOUS at 08:24

## 2024-06-22 RX ADMIN — HYDRALAZINE HYDROCHLORIDE 10 MG: 10 TABLET, FILM COATED ORAL at 14:46

## 2024-06-22 RX ADMIN — PANTOPRAZOLE SODIUM 40 MG: 40 TABLET, DELAYED RELEASE ORAL at 06:09

## 2024-06-22 RX ADMIN — INSULIN LISPRO 1 UNITS: 100 INJECTION, SOLUTION INTRAVENOUS; SUBCUTANEOUS at 18:05

## 2024-06-22 RX ADMIN — OXYCODONE HYDROCHLORIDE AND ACETAMINOPHEN 2 TABLET: 5; 325 TABLET ORAL at 22:11

## 2024-06-22 RX ADMIN — SODIUM CHLORIDE, PRESERVATIVE FREE 10 ML: 5 INJECTION INTRAVENOUS at 08:16

## 2024-06-22 RX ADMIN — INSULIN LISPRO 1 UNITS: 100 INJECTION, SOLUTION INTRAVENOUS; SUBCUTANEOUS at 08:17

## 2024-06-22 ASSESSMENT — PAIN DESCRIPTION - ORIENTATION
ORIENTATION: RIGHT
ORIENTATION: RIGHT

## 2024-06-22 ASSESSMENT — PAIN SCALES - GENERAL
PAINLEVEL_OUTOF10: 0
PAINLEVEL_OUTOF10: 4
PAINLEVEL_OUTOF10: 8
PAINLEVEL_OUTOF10: 0
PAINLEVEL_OUTOF10: 8

## 2024-06-22 ASSESSMENT — PAIN DESCRIPTION - LOCATION
LOCATION: SHOULDER
LOCATION: SHOULDER

## 2024-06-22 ASSESSMENT — PAIN - FUNCTIONAL ASSESSMENT: PAIN_FUNCTIONAL_ASSESSMENT: PREVENTS OR INTERFERES SOME ACTIVE ACTIVITIES AND ADLS

## 2024-06-22 ASSESSMENT — PAIN SCALES - WONG BAKER: WONGBAKER_NUMERICALRESPONSE: NO HURT

## 2024-06-22 ASSESSMENT — PAIN DESCRIPTION - DESCRIPTORS
DESCRIPTORS: ACHING;DISCOMFORT;DULL;SHARP
DESCRIPTORS: ACHING;DISCOMFORT;SORE;SHARP

## 2024-06-22 ASSESSMENT — PAIN DESCRIPTION - PAIN TYPE: TYPE: ACUTE PAIN

## 2024-06-22 NOTE — PROGRESS NOTES
NAME:  Juan Carlos Wong Sr  YOB: 1965  MEDICAL RECORD NUMBER:  [id number]    Shift Summary: uneventful shift. Patient non-weight bearing to left foot. Worked with PT/OT today. Plan for back to OR next week with podiatry.     Family updated: Yes:  over the phone and at bedside     Rhythm: Normal Sinus Rhythm     Most recent vitals:   Visit Vitals  /60   Pulse 85   Temp 98.8 °F (37.1 °C) (Axillary)   Resp 16   Ht 1.753 m (5' 9.02\")   Wt 83 kg (182 lb 15.7 oz)   SpO2 96%   BMI 27.01 kg/m²           No data found.    No data found.      Respiratory support needed (if any):  - RA    Admission weight Weight - Scale: 80.7 kg (177 lb 14.6 oz) (06/11/24 1136)    Today's weight    Wt Readings from Last 1 Encounters:   06/22/24 83 kg (182 lb 15.7 oz)        Elliott need assessed each shift: N/A - no elliott present  UOP >30ml/hr: YES  Last documented BM (in last 48 hrs):  Patient Vitals for the past 48 hrs:   Last BM (including prior to admit) Stool Occurrence   06/20/24 2000 06/18/24 --   06/21/24 0200 06/21/24 1 06/21/24 0400 06/21/24 --   06/21/24 0800 06/21/24 --   06/22/24 0800 06/21/24 --                Restraints (in use currently or dc'd in last 12 hrs): No    Order current and documentation up to date? No    Lines/Drains reviewed @ bedside.  Peripheral IV 06/11/24 Left Forearm (Active)   Number of days: 11       Peripheral IV 06/20/24 Left;Posterior Hand (Active)   Number of days: 2       Continuous Glucose Monitor   (Active)   Number of days:          Drip rates at handoff:    sodium chloride Stopped (06/21/24 1407)    dextrose         Lab Data:   CBC:   Recent Labs     06/21/24  0308 06/22/24  0340   WBC 6.9 10.8   HGB 10.2* 9.8*   HCT 29.8* 29.0*   MCV 87.6 88.2    250     BMP:    Recent Labs     06/21/24  0308 06/22/24  0340   * 125*   K 4.6 4.8   CO2 23 20*   BUN 33* 29*   CREATININE 2.3* 2.2*     LIVR:   Recent Labs     06/20/24  1700   AST 16   ALT <5*     PT/INR: No results for

## 2024-06-22 NOTE — PROGRESS NOTES
Physical Therapy  Facility/Department: [address] CVICU  Physical Therapy REEVAL/Initial Assessment    Name: Juan Carlos Wong Sr  : 1965  MRN: [id number]  Date of Service: 2024    Discharge Recommendations:  Continue to assess pending progress, Patient would benefit from continued therapy after discharge (3-5 x wk skilled PT)      Patient Diagnosis(es): The primary encounter diagnosis was Cellulitis of right upper arm. Diagnoses of Osteomyelitis of left foot, unspecified type (HCC), Cerebrovascular accident (CVA), unspecified mechanism (HCC), and Acute osteomyelitis of left foot (HCC) were also pertinent to this visit.  Past Medical History:  has a past medical history of Back pain, Diabetes mellitus (HCC), and Pneumonia.  Past Surgical History:  has a past surgical history that includes liver biopsy; Leg Surgery (Left); Colonoscopy; Arm Surgery (Right, 2024); and Toe amputation (Left, 2024).    Assessment   Body Structures, Functions, Activity Limitations Requiring Skilled Therapeutic Intervention: Decreased functional mobility   Assessment: Pt presents with decreased functiona mobility, after admission, per H&P, \"59 y.o. male who presented to [facility] ON 24 with right shoulder pain and drainage for the last 3 to 4 days. Pt transferred to CVICU follow code stroke. On 24, pt underwent \"RESECTION OF FIFTH METATARSAL LEFT FOOT\" and now NWB L LE with surgical shoe, new therapy orders 24.  Prior to admit, pt reported living alone in apt setting and fully independent with adls and mobility without a device.  This date, pt is now NWB L LE with surgical shoe and was not able to maintain despite Mod A x 2 persons.  Pt with some decreased insight into current deficits and ongoing skilled PT at 3-5 x wk intensity is warranted.  However, pt reported he is only planning to return Home at NH with multi family support.  Will cont to follow up and mobilize as able, make ongoing recs based on pts  assistance  Transfers  Sit to Stand: Moderate Assistance;2 Person Assistance  Stand to Sit: Moderate Assistance;2 Person Assistance  Bed to Chair: Dependent/Total (via Stedy lift)  Comment: attempted sit<>stand several times--initially with stedy lift, needing significant assist to keep NWB L LE throughout.  Once in recliner, performed sit<>stand x 2 trials each with Mod A x 2 with assist for NWB L LE as well as assist at trunk/for balance.  Pt with some decreased ability to maintain NWB L LE despite attempts at trial.  After, pt positioned in LE elevation, call light and needs in reach, set up with lunch tray.  Nursing aware, maxi liift for safest return to bed.  Ambulation  WB Status: NWB L LE--pt unable to maintain.     Balance  Sitting - Static: Good  Sitting - Dynamic: Good  Standing - Static: Poor  Standing - Dynamic:  (panchito)  Comments: sba sitting eob, Mod A x 2 stance in stedy/at RW--inability to keep NWB L LE status.          -PAC - Mobility    -PAC Basic Mobility - Inpatient   How much help is needed turning from your back to your side while in a flat bed without using bedrails?: A Little  How much help is needed moving from lying on your back to sitting on the side of a flat bed without using bedrails?: A Little  How much help is needed moving to and from a bed to a chair?: Total  How much help is needed standing up from a chair using your arms?: A Lot  How much help is needed walking in hospital room?: Total  How much help is needed climbing 3-5 steps with a railing?: Total  AM-PAC Inpatient Mobility Raw Score : 11  AM-PAC Inpatient T-Scale Score : 33.86  Mobility Inpatient CMS 0-100% Score: 72.57  Mobility Inpatient CMS G-Code Modifier : CL    Goals  Short Term Goals  Time Frame for Short Term Goals: by acute discharge - all goals updated/modified 6/22/24  Short Term Goal 1: bed mobility mod I  Short Term Goal 2: sit<>stand, NWB L LE and CGA  Short Term Goal 3: stand pivot transfers with/without AD

## 2024-06-22 NOTE — PROGRESS NOTES
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  No CP.  Fluid restriction was discontinued.  Serum sodium is worse.  ROS:  In bed.  No fever.  PMFSH:  medications reviewed.    Physical Exam:    /80   Pulse 98   Temp 98.5 °F (36.9 °C) (Oral)   Resp 20   Ht 1.753 m (5' 9.02\")   Wt 83 kg (182 lb 15.7 oz)   SpO2 96%   BMI 27.01 kg/m²       24HR INTAKE/OUTPUT:    Intake/Output Summary (Last 24 hours) at 6/22/2024 1118  Last data filed at 6/22/2024 0816  Gross per 24 hour   Intake 358.93 ml   Output 1785 ml   Net -1426.07 ml         Patient Vitals for the past 96 hrs (Last 3 readings):   Weight   06/22/24 0600 83 kg (182 lb 15.7 oz)   06/21/24 0600 83.3 kg (183 lb 10.3 oz)   06/19/24 0611 83 kg (182 lb 15.7 oz)         General: awake, alert, NAD, Obese  Chest: diminished to auscultation, no intercostal retractions  CVS: RRR, no murmur, no rub  Abdomen: soft, non tender  Extremities: trace edema to BLE, wound to R upper arm  Skin: normal texture, normal skin turgor, no rash  Psych: A&O x3    LAB DATA:    CBC:   Lab Results   Component Value Date/Time    WBC 10.8 06/22/2024 03:40 AM    RBC 3.29 06/22/2024 03:40 AM    HGB 9.8 06/22/2024 03:40 AM    HCT 29.0 06/22/2024 03:40 AM    MCV 88.2 06/22/2024 03:40 AM    MCH 29.9 06/22/2024 03:40 AM    MCHC 33.9 06/22/2024 03:40 AM    RDW 14.0 06/22/2024 03:40 AM     06/22/2024 03:40 AM    MPV 8.2 06/22/2024 03:40 AM     BMP:    Lab Results   Component Value Date/Time     06/22/2024 03:40 AM    K 4.8 06/22/2024 03:40 AM    K 3.6 06/12/2024 05:18 AM    CL 98 06/22/2024 03:40 AM    CO2 20 06/22/2024 03:40 AM    BUN 29 06/22/2024 03:40 AM    CREATININE 2.2 06/22/2024 03:40 AM    CALCIUM 7.8 06/22/2024 03:40 AM    GFRAA >60 01/03/2017 09:36 PM    LABGLOM 33 06/22/2024 03:40 AM    LABGLOM 32 04/03/2024 04:27 AM    GLUCOSE 247  06/22/2024 03:40 AM     Ionized Calcium:  No components found for: \"IONCA\"  Magnesium:    Lab Results   Component Value Date/Time    MG 1.90 06/16/2024 03:15 AM     Phosphorus:    Lab Results   Component Value Date/Time    PHOS 3.6 06/22/2024 03:40 AM     U/A:    Lab Results   Component Value Date/Time    COLORU Yellow 06/11/2024 04:48 PM    PHUR 6.0 06/11/2024 04:48 PM    PHUR 6.0 03/27/2024 08:03 PM    LABCAST SEE NOTES 05/09/2024 09:17 AM    WBCUA 1 06/11/2024 04:48 PM    RBCUA 0-2 06/11/2024 04:48 PM    RBCUA 1+ (0.06-0.1 mg/dL) 05/09/2024 09:17 AM    MUCUS 1+ 03/27/2024 08:03 PM    BACTERIA None Seen 06/11/2024 04:48 PM    CLARITYU Clear 06/11/2024 04:48 PM    LEUKOCYTESUR Negative 06/11/2024 04:48 PM    UROBILINOGEN 1.0 06/11/2024 04:48 PM    BILIRUBINUR Negative 06/11/2024 04:48 PM    BLOODU MODERATE 06/11/2024 04:48 PM    GLUCOSEU 500 06/11/2024 04:48 PM         IMPRESSION/RECOMMENDATIONS:      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 Follows with Dr. Howell in office. Etiology likely 2/2 volume depletion in the setting of poor oral intake, emesis, and diuretic use.              - Cr remains at baseline              - Bladder scan PRN.               - Continue to hold torsemide              - No NSAIDs. No ACE/ARBs. No SGLT2i.              - Daily RFTs     Hyponatremia: etiology likely 2/2 volume depletion in the setting of poor oral intake, emesis, and diuretic use.              - below goal.               - Na+ goal >/= 130              - tighten fluid restriction to 1 L/day              - Monitor     HFrEF              - TTE (3/27/2024): LVEF 20-25%.               - CXR unremarkable              - Holding torsemide              - Strict I/Os. Daily weights.     Cellulitis / RUE Wound / OM L Foot              - I&D of R arm abscess 6/12 with surgery              - On ancef and flagyl              - ID and gen surgery following              - Plans for resection of osteomyeltic bone with podiatry today

## 2024-06-22 NOTE — PROGRESS NOTES
V2.0    [facility] [place] Progress Note      Name:  Juan Carlos Wong Sr /Age/Sex: 1965  (59 y.o. male)   MRN & CSN:  [id number] & [id number] Encounter Date/Time: 2024 8:40 AM EDT   Location:  D0X-2425/1308-01 PCP: Idris Laws MD     Attending:Reji Prabhakar MD       Hospital Day: 12    Assessment and Recommendations   Juan Carlos Wong Sr is a 59 y.o. male who presents with Cellulitis and abscess of upper extremity      59-year-old patient admitted to the hospital with cellulitis and abscess of right shoulder and diabetic foot infection on the left foot General surgery ID consulted status post I&D continue to be on IV antibiotics, had an event of abrupt onset aphasia on Sadie 15 code stroke activated some findings on the imaging with potential changes neurology consulted and following     Plan:      Sepsis with white count 14 heart rate 97 on admission, due to cellulitis and abscess of right shoulder and osteomyelitis and cellulitis of left foot initially on IV Zyvox, Flagyl, and cefepime ID following post I&D, culture MSSA, antibiotics changed to Ancef continue to be on both Ancef and Flagyl at this time Flagyl changed to p.o. status post left resection of fifth metatarsal postop day 1  Cellulitis and abscess right shoulder status post I&D discussed with surgery.  Continue IV antibiotics no further intervention planned at this time.  Appreciate surgical input  Hypothermia and hypoglycemia, improved  Aphasia, abrupt onset, associated with encephalopathy, imaging positive for potential hemorrhage and evolving left frontal lobe cortical infarct, reported waxing and waning symptoms per nursing staff.  Per neurology this still could be recrudescence.  Neurology recommended to hold DAPT For 14 days from the date of symptoms started.  Osteomyelitis and cellulitis of left foot and diabetic foot infection, status post partial amputation in the past, podiatry consulted IV antibiotics as above and patient is  There is mild age-appropriate atrophy. ORBITS: The visualized portion of the orbits demonstrate no acute abnormality. SINUSES: There is near complete opacification of the right sphenoid sinus. The remainder of the paranasal sinuses are patent and clear.  The left mastoid air cells are hypoplastic.  The right mastoids are clear. SOFT TISSUES/SKULL:  No acute abnormality of the visualized skull or soft tissues.     1. Findings concerning for acute ischemic change involving the left frontal lobe.  Recommend further characterization with a brain MRI. 2. No acute intracranial hemorrhage or mass.  No mass effect or midline shift is seen. Findings were discussed with CHERI BYRNE at 1007 hours on 6/15/2024.       CBC:   Recent Labs     06/20/24  1700 06/21/24  0308 06/22/24  0340   WBC 7.4 6.9 10.8   HGB 11.6* 10.2* 9.8*    270 250     BMP:    Recent Labs     06/20/24  1700 06/21/24  0308 06/22/24  0340   * 129* 125*   K 4.0 4.6 4.8    100 98*   CO2 24 23 20*   BUN 33* 33* 29*   CREATININE 2.3* 2.3* 2.2*   GLUCOSE 85 259* 247*     Hepatic:   Recent Labs     06/20/24  1700   AST 16   ALT <5*   BILITOT <0.2   ALKPHOS 113     Lipids:   Lab Results   Component Value Date/Time    CHOL 101 11/29/2022 11:20 AM    HDL 30 11/29/2022 11:20 AM    TRIG 94 11/29/2022 11:20 AM     Hemoglobin A1C:   Lab Results   Component Value Date/Time    LABA1C 11.4 06/11/2024 09:24 PM     TSH:   Lab Results   Component Value Date/Time    TSH 0.39 06/13/2024 05:17 AM    TSH 2.13 11/29/2022 11:20 AM     Troponin: No results found for: \"TROPONINT\"  Lactic Acid: No results for input(s): \"LACTA\" in the last 72 hours.  BNP: No results for input(s): \"PROBNP\" in the last 72 hours.  UA:  Lab Results   Component Value Date/Time    NITRU Negative 06/11/2024 04:48 PM    COLORU Yellow 06/11/2024 04:48 PM    PHUR 6.0 06/11/2024 04:48 PM    PHUR 6.0 03/27/2024 08:03 PM    LABCAST SEE NOTES 05/09/2024 09:17 AM    WBCUA 1 06/11/2024 04:48 PM

## 2024-06-22 NOTE — PROGRESS NOTES
Occupational Therapy  Facility/Department: [address] CVU  Occupational Therapy Re- Assessment    Name: Juan Carlos Wong Sr  : 1965  MRN: [id number]  Date of Service: 2024    Discharge Recommendations:  Patient would benefit from continued therapy after discharge, 3-5 sessions per week  OT Equipment Recommendations  Other: defer to PA facility     Juan Carlos Wong Sr scored a 14/24 on the AM-PAC ADL Inpatient form. Current research shows that an AM-PAC score of 17 or less is typically not associated with a discharge to the patient's home setting. Based on the patient's AM-PAC score and their current ADL deficits, it is recommended that the patient have 3-5 sessions per week of Occupational Therapy at d/c to increase the patient's independence.  Please see assessment section for further patient specific details.    If patient discharges prior to next session this note will serve as a discharge summary.  Please see below for the latest assessment towards goals.      Patient Diagnosis(es): The primary encounter diagnosis was Cellulitis of right upper arm. Diagnoses of Osteomyelitis of left foot, unspecified type (HCC), Cerebrovascular accident (CVA), unspecified mechanism (HCC), and Acute osteomyelitis of left foot (HCC) were also pertinent to this visit.  Past Medical History:  has a past medical history of Back pain, Diabetes mellitus (HCC), and Pneumonia.  Past Surgical History:  has a past surgical history that includes liver biopsy; Leg Surgery (Left); Colonoscopy; Arm Surgery (Right, 2024); and Toe amputation (Left, 2024).    Treatment Diagnosis: Decreased: ADLs, functional transfers/mobility      Assessment   Performance deficits / Impairments: Decreased functional mobility ;Decreased high-level IADLs;Decreased ADL status;Decreased endurance;Decreased strength;Decreased balance  Assessment: Pt is a 58 yo M who presented with R UE swelling, s/p I&D R shoulder. Code stroke called 6/15 d/t

## 2024-06-22 NOTE — PROGRESS NOTES
NAME:  Juan Carlos Wong Sr  YOB: 1965  MEDICAL RECORD NUMBER:  [id number]    Shift Summary: uneventful shift, pt pain managed. R shoulder dressing changed. Pt pretty \"worn out\" last night from yesterdays surgery. Vitals stable overnight.    Family updated: No    Rhythm: Normal Sinus Rhythm     Most recent vitals:   Visit Vitals  /72   Pulse 81   Temp 98.2 °F (36.8 °C) (Oral)   Resp 15   Ht 1.753 m (5' 9.02\")   Wt 83 kg (182 lb 15.7 oz)   SpO2 96%   BMI 27.01 kg/m²           No data found.    No data found.      Respiratory support needed (if any):  - RA    Admission weight Weight - Scale: 80.7 kg (177 lb 14.6 oz) (06/11/24 1136)    Today's weight    Wt Readings from Last 1 Encounters:   06/22/24 83 kg (182 lb 15.7 oz)        Elliott need assessed each shift: N/A - no elliott present  UOP >30ml/hr: YES  Last documented BM (in last 48 hrs):  Patient Vitals for the past 48 hrs:   Last BM (including prior to admit) Stool Occurrence   06/20/24 0800 06/18/24 --   06/20/24 2000 06/18/24 --   06/21/24 0200 06/21/24 1   06/21/24 0400 06/21/24 --   06/21/24 0800 06/21/24 --                Restraints (in use currently or dc'd in last 12 hrs): No    Order current and documentation up to date? No    Lines/Drains reviewed @ bedside.  Peripheral IV 06/11/24 Left Forearm (Active)   Number of days: 10       Peripheral IV 06/20/24 Left;Posterior Hand (Active)   Number of days: 1       Continuous Glucose Monitor   (Active)   Number of days:          Drip rates at handoff:    sodium chloride Stopped (06/21/24 1407)    dextrose         Lab Data:   CBC:   Recent Labs     06/21/24  0308 06/22/24  0340   WBC 6.9 10.8   HGB 10.2* 9.8*   HCT 29.8* 29.0*   MCV 87.6 88.2    250     BMP:    Recent Labs     06/21/24  0308 06/22/24  0340   * 125*   K 4.6 4.8   CO2 23 20*   BUN 33* 29*   CREATININE 2.3* 2.2*     LIVR:   Recent Labs     06/20/24  1700   AST 16   ALT <5*     PT/INR: No results for input(s): \"PROT\",

## 2024-06-22 NOTE — PLAN OF CARE
Problem: Discharge Planning  Goal: Discharge to home or other facility with appropriate resources  Outcome: Progressing     Problem: Safety - Adult  Goal: Free from fall injury  Outcome: Progressing     Problem: ABCDS Injury Assessment  Goal: Absence of physical injury  Outcome: Progressing     Problem: Chronic Conditions and Co-morbidities  Goal: Patient's chronic conditions and co-morbidity symptoms are monitored and maintained or improved  Outcome: Progressing     Problem: Metabolic/Fluid and Electrolytes - Adult  Goal: Electrolytes maintained within normal limits  Outcome: Progressing  Goal: Glucose maintained within prescribed range  Outcome: Progressing     Problem: Skin/Tissue Integrity - Adult  Goal: Oral mucous membranes remain intact  Outcome: Progressing     Problem: Cardiovascular - Adult  Goal: Absence of cardiac dysrhythmias or at baseline  Outcome: Progressing     Problem: Genitourinary - Adult  Goal: Absence of urinary retention  Outcome: Progressing     Problem: Nutrition Deficit:  Goal: Optimize nutritional status  Outcome: Adequate for Discharge     Problem: Gastrointestinal - Adult  Goal: Maintains adequate nutritional intake  Outcome: Adequate for Discharge     Problem: Hematologic - Adult  Goal: Maintains hematologic stability  Outcome: Adequate for Discharge

## 2024-06-22 NOTE — PROGRESS NOTES
Department of Podiatry Note     SUBJECTIVE:   Patient seen and examined this am at the bedside. Patient denies any acute overnight events. S/p resection of fifth metatarsal, left foot (DOS 6/21/2024). Patient denies N/V/F/C/SOB. Patient denies calf pain, thigh pain, chest pain.     Review of Systems: A 12 point review of symptoms is unremarkable with the exception of the chief complaint. Patient specifically denies nausea, fever, vomiting, chills, shortness of breath, chest pain, abdominal pain, constipation or difficulty urinating.      Past Medical History:        Diagnosis Date    Back pain     Diabetes mellitus (HCC)     Pneumonia      Past Surgical History:        Procedure Laterality Date    ARM SURGERY Right 6/12/2024    INCISION AND DRAINAGE RIGHT PROXIMAL ARM ABSCESS performed by Dann Nuñez MD at [facility] OR    COLONOSCOPY      LEG SURGERY Left     LIVER BIOPSY      TOE AMPUTATION Left 6/21/2024    RESECTION OF FIFTH METATARSAL LEFT FOOT performed by Natanael Alvares DPM at [facility] OR     Current Medications:    Current Facility-Administered Medications: perflutren lipid microspheres (DEFINITY) injection 1.5 mL, 1.5 mL, IntraVENous, ONCE PRN  [START ON 6/23/2024] enoxaparin Sodium (LOVENOX) injection 30 mg, 30 mg, SubCUTAneous, Daily  clopidogrel (PLAVIX) tablet 75 mg, 75 mg, Oral, Nightly  insulin glargine (LANTUS) injection vial 5 Units, 5 Units, SubCUTAneous, Nightly  metroNIDAZOLE (FLAGYL) 500 mg in 0.9% NaCl 100 mL IVPB premix, 500 mg, IntraVENous, Q8H  melatonin tablet 6 mg, 6 mg, Oral, Nightly PRN  potassium chloride (KLOR-CON M) extended release tablet 40 mEq, 40 mEq, Oral, PRN **OR** potassium bicarb-citric acid (EFFER-K) effervescent tablet 40 mEq, 40 mEq, Oral, PRN **OR** potassium chloride 10 mEq/100 mL IVPB (Peripheral Line), 10 mEq, IntraVENous, PRN  atorvastatin (LIPITOR) tablet 80 mg, 80 mg, Oral, Nightly  ceFAZolin (ANCEF) 2,000 mg in sodium chloride 0.9 % 50 mL IVPB (mini-bag), 2,000  clean dry and intact  -Patient is non weightbearing to left LE  -Patient will require further podiatric surgical intervention during this admission consisting of delayed primary closure.  Tentatively plan for OR early next week pending OR and attending availability.  -Patient would like to follow with Dr. Kelly in the outpatient setting    DISPO: S/p resection of fifth metatarsal, left foot (DOS 6/21/2024).  All labs and imaging reviewed, impressions noted above.  Recommend patient continue IV antibiotics per primary team; IV cefazolin and Flagyl.  Dressing left clean dry and intact.  Patient is nonweightbearing to left lower extremity.  Patient will require further podiatric surgical intervention during this admission testing of delayed primary closure.  Podiatry will continue to follow patient while in house.  Patient would like to follow-up with Dr. Kelly in the outpatient setting.    Patient was seen and evaluated at bedside with LIZBETH AldridgeP.M.    Kisha Leon DPM   Podiatric Resident PGY1  [employer]  Pager number [phone]  6/22/2024, 11:33 AM

## 2024-06-23 LAB
ALBUMIN SERPL-MCNC: 2 G/DL (ref 3.4–5)
ANION GAP SERPL CALCULATED.3IONS-SCNC: 7 MMOL/L (ref 3–16)
BUN SERPL-MCNC: 29 MG/DL (ref 7–20)
CALCIUM SERPL-MCNC: 7.8 MG/DL (ref 8.3–10.6)
CHLORIDE SERPL-SCNC: 102 MMOL/L (ref 99–110)
CO2 SERPL-SCNC: 22 MMOL/L (ref 21–32)
CREAT SERPL-MCNC: 2.2 MG/DL (ref 0.8–1.3)
DEPRECATED RDW RBC AUTO: 14 % (ref 12.4–15.4)
GFR SERPLBLD CREATININE-BSD FMLA CKD-EPI: 33 ML/MIN/{1.73_M2}
GLUCOSE BLD-MCNC: 122 MG/DL (ref 70–99)
GLUCOSE BLD-MCNC: 146 MG/DL (ref 70–99)
GLUCOSE BLD-MCNC: 148 MG/DL (ref 70–99)
GLUCOSE BLD-MCNC: 211 MG/DL (ref 70–99)
GLUCOSE SERPL-MCNC: 168 MG/DL (ref 70–99)
HCT VFR BLD AUTO: 28.5 % (ref 40.5–52.5)
HGB BLD-MCNC: 9.5 G/DL (ref 13.5–17.5)
MCH RBC QN AUTO: 29.3 PG (ref 26–34)
MCHC RBC AUTO-ENTMCNC: 33.2 G/DL (ref 31–36)
MCV RBC AUTO: 88.1 FL (ref 80–100)
PERFORMED ON: ABNORMAL
PHOSPHATE SERPL-MCNC: 3.5 MG/DL (ref 2.5–4.9)
PLATELET # BLD AUTO: 216 K/UL (ref 135–450)
PMV BLD AUTO: 8.2 FL (ref 5–10.5)
POTASSIUM SERPL-SCNC: 4.4 MMOL/L (ref 3.5–5.1)
RBC # BLD AUTO: 3.24 M/UL (ref 4.2–5.9)
SODIUM SERPL-SCNC: 131 MMOL/L (ref 136–145)
WBC # BLD AUTO: 11.7 K/UL (ref 4–11)

## 2024-06-23 PROCEDURE — 6360000002 HC RX W HCPCS: Performed by: INTERNAL MEDICINE

## 2024-06-23 PROCEDURE — 6360000002 HC RX W HCPCS

## 2024-06-23 PROCEDURE — 2060000000 HC ICU INTERMEDIATE R&B

## 2024-06-23 PROCEDURE — 6370000000 HC RX 637 (ALT 250 FOR IP)

## 2024-06-23 PROCEDURE — 2580000003 HC RX 258

## 2024-06-23 PROCEDURE — 94760 N-INVAS EAR/PLS OXIMETRY 1: CPT

## 2024-06-23 PROCEDURE — 2580000003 HC RX 258: Performed by: INTERNAL MEDICINE

## 2024-06-23 PROCEDURE — 80069 RENAL FUNCTION PANEL: CPT

## 2024-06-23 PROCEDURE — 85027 COMPLETE CBC AUTOMATED: CPT

## 2024-06-23 PROCEDURE — 36415 COLL VENOUS BLD VENIPUNCTURE: CPT

## 2024-06-23 RX ADMIN — OXYCODONE HYDROCHLORIDE AND ACETAMINOPHEN 2 TABLET: 5; 325 TABLET ORAL at 12:09

## 2024-06-23 RX ADMIN — SODIUM CHLORIDE: 9 INJECTION, SOLUTION INTRAVENOUS at 09:34

## 2024-06-23 RX ADMIN — SODIUM CHLORIDE: 9 INJECTION, SOLUTION INTRAVENOUS at 09:32

## 2024-06-23 RX ADMIN — METRONIDAZOLE 500 MG: 500 INJECTION, SOLUTION INTRAVENOUS at 15:48

## 2024-06-23 RX ADMIN — INSULIN GLARGINE 5 UNITS: 100 INJECTION, SOLUTION SUBCUTANEOUS at 21:24

## 2024-06-23 RX ADMIN — GABAPENTIN 300 MG: 300 CAPSULE ORAL at 21:23

## 2024-06-23 RX ADMIN — CARVEDILOL 6.25 MG: 6.25 TABLET, FILM COATED ORAL at 17:16

## 2024-06-23 RX ADMIN — PANTOPRAZOLE SODIUM 40 MG: 40 TABLET, DELAYED RELEASE ORAL at 06:58

## 2024-06-23 RX ADMIN — OXYCODONE HYDROCHLORIDE AND ACETAMINOPHEN 2 TABLET: 5; 325 TABLET ORAL at 18:18

## 2024-06-23 RX ADMIN — ENOXAPARIN SODIUM 30 MG: 100 INJECTION SUBCUTANEOUS at 09:29

## 2024-06-23 RX ADMIN — CARVEDILOL 6.25 MG: 6.25 TABLET, FILM COATED ORAL at 09:29

## 2024-06-23 RX ADMIN — ATORVASTATIN CALCIUM 80 MG: 80 TABLET, FILM COATED ORAL at 21:24

## 2024-06-23 RX ADMIN — HYDRALAZINE HYDROCHLORIDE 10 MG: 10 TABLET, FILM COATED ORAL at 21:24

## 2024-06-23 RX ADMIN — SODIUM CHLORIDE 2000 MG: 900 INJECTION INTRAVENOUS at 01:52

## 2024-06-23 RX ADMIN — CEFEPIME 2000 MG: 2 INJECTION, POWDER, FOR SOLUTION INTRAVENOUS at 09:34

## 2024-06-23 RX ADMIN — METRONIDAZOLE 500 MG: 500 INJECTION, SOLUTION INTRAVENOUS at 01:51

## 2024-06-23 RX ADMIN — CEFEPIME 2000 MG: 2 INJECTION, POWDER, FOR SOLUTION INTRAVENOUS at 20:19

## 2024-06-23 RX ADMIN — METRONIDAZOLE 500 MG: 500 INJECTION, SOLUTION INTRAVENOUS at 09:32

## 2024-06-23 RX ADMIN — GABAPENTIN 300 MG: 300 CAPSULE ORAL at 09:29

## 2024-06-23 RX ADMIN — METRONIDAZOLE 500 MG: 500 INJECTION, SOLUTION INTRAVENOUS at 23:15

## 2024-06-23 RX ADMIN — SODIUM CHLORIDE, PRESERVATIVE FREE 10 ML: 5 INJECTION INTRAVENOUS at 09:29

## 2024-06-23 RX ADMIN — HYDRALAZINE HYDROCHLORIDE 10 MG: 10 TABLET, FILM COATED ORAL at 09:29

## 2024-06-23 RX ADMIN — HYDRALAZINE HYDROCHLORIDE 10 MG: 10 TABLET, FILM COATED ORAL at 14:17

## 2024-06-23 ASSESSMENT — PAIN SCALES - GENERAL
PAINLEVEL_OUTOF10: 8
PAINLEVEL_OUTOF10: 0
PAINLEVEL_OUTOF10: 7

## 2024-06-23 ASSESSMENT — PAIN DESCRIPTION - LOCATION
LOCATION: SHOULDER
LOCATION: SHOULDER

## 2024-06-23 ASSESSMENT — PAIN SCALES - WONG BAKER
WONGBAKER_NUMERICALRESPONSE: NO HURT

## 2024-06-23 ASSESSMENT — PAIN DESCRIPTION - DESCRIPTORS
DESCRIPTORS: SHARP
DESCRIPTORS: ACHING;SHARP

## 2024-06-23 ASSESSMENT — PAIN DESCRIPTION - ORIENTATION
ORIENTATION: RIGHT
ORIENTATION: RIGHT

## 2024-06-23 ASSESSMENT — PAIN - FUNCTIONAL ASSESSMENT
PAIN_FUNCTIONAL_ASSESSMENT: PREVENTS OR INTERFERES SOME ACTIVE ACTIVITIES AND ADLS
PAIN_FUNCTIONAL_ASSESSMENT: PREVENTS OR INTERFERES SOME ACTIVE ACTIVITIES AND ADLS

## 2024-06-23 NOTE — PROGRESS NOTES
Department of Podiatry Note     SUBJECTIVE:   Patient seen and examined this am at the bedside. Patient denies any acute overnight events. S/p resection of fifth metatarsal, left foot (DOS 6/21/2024). Patient denies N/V/F/C/SOB. Patient denies calf pain, thigh pain, chest pain.     Review of Systems: A 12 point review of symptoms is unremarkable with the exception of the chief complaint. Patient specifically denies nausea, fever, vomiting, chills, shortness of breath, chest pain, abdominal pain, constipation or difficulty urinating.      Past Medical History:        Diagnosis Date    Back pain     Diabetes mellitus (HCC)     Pneumonia      Past Surgical History:        Procedure Laterality Date    ARM SURGERY Right 6/12/2024    INCISION AND DRAINAGE RIGHT PROXIMAL ARM ABSCESS performed by Dann Nuñez MD at [facility] OR    COLONOSCOPY      LEG SURGERY Left     LIVER BIOPSY      TOE AMPUTATION Left 6/21/2024    RESECTION OF FIFTH METATARSAL LEFT FOOT performed by Natanael Alvares DPM at [facility] OR     Current Medications:    Current Facility-Administered Medications: perflutren lipid microspheres (DEFINITY) injection 1.5 mL, 1.5 mL, IntraVENous, ONCE PRN  enoxaparin Sodium (LOVENOX) injection 30 mg, 30 mg, SubCUTAneous, Daily  [Held by provider] clopidogrel (PLAVIX) tablet 75 mg, 75 mg, Oral, Nightly  insulin glargine (LANTUS) injection vial 5 Units, 5 Units, SubCUTAneous, Nightly  metroNIDAZOLE (FLAGYL) 500 mg in 0.9% NaCl 100 mL IVPB premix, 500 mg, IntraVENous, Q8H  melatonin tablet 6 mg, 6 mg, Oral, Nightly PRN  potassium chloride (KLOR-CON M) extended release tablet 40 mEq, 40 mEq, Oral, PRN **OR** potassium bicarb-citric acid (EFFER-K) effervescent tablet 40 mEq, 40 mEq, Oral, PRN **OR** potassium chloride 10 mEq/100 mL IVPB (Peripheral Line), 10 mEq, IntraVENous, PRN  atorvastatin (LIPITOR) tablet 80 mg, 80 mg, Oral, Nightly  ceFAZolin (ANCEF) 2,000 mg in sodium chloride 0.9 % 50 mL IVPB (mini-bag), 2,000

## 2024-06-23 NOTE — PLAN OF CARE
Problem: Discharge Planning  Goal: Discharge to home or other facility with appropriate resources  Outcome: Progressing     Problem: Safety - Adult  Goal: Free from fall injury  Outcome: Progressing     Problem: ABCDS Injury Assessment  Goal: Absence of physical injury  Outcome: Progressing     Problem: Chronic Conditions and Co-morbidities  Goal: Patient's chronic conditions and co-morbidity symptoms are monitored and maintained or improved  Outcome: Progressing     Problem: Infection - Adult  Goal: Absence of infection at discharge  Outcome: Progressing     Problem: Metabolic/Fluid and Electrolytes - Adult  Goal: Electrolytes maintained within normal limits  Outcome: Progressing     Problem: Metabolic/Fluid and Electrolytes - Adult  Goal: Glucose maintained within prescribed range  Outcome: Progressing     Problem: Skin/Tissue Integrity - Adult  Goal: Oral mucous membranes remain intact  Outcome: Progressing     Problem: Pain  Goal: Verbalizes/displays adequate comfort level or baseline comfort level  Outcome: Progressing     Problem: Skin/Tissue Integrity  Goal: Absence of new skin breakdown  Description: 1.  Monitor for areas of redness and/or skin breakdown  2.  Assess vascular access sites hourly  3.  Every 4-6 hours minimum:  Change oxygen saturation probe site  4.  Every 4-6 hours:  If on nasal continuous positive airway pressure, respiratory therapy assess nares and determine need for appliance change or resting period.  Outcome: Progressing     Problem: Nutrition Deficit:  Goal: Optimize nutritional status  Outcome: Progressing     Problem: Neurosensory - Adult  Goal: Achieves stable or improved neurological status  Outcome: Progressing     Problem: Neurosensory - Adult  Goal: Absence of seizures  Outcome: Progressing     Problem: Respiratory - Adult  Goal: Achieves optimal ventilation and oxygenation  Outcome: Progressing     Problem: Cardiovascular - Adult  Goal: Maintains optimal cardiac output and

## 2024-06-23 NOTE — PROGRESS NOTES
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  No CP.  Fluid restriction was discontinued.  Serum sodium is worse.  ROS:  In bed.  No fever.  PMFSH:  medications reviewed.    Physical Exam:    /63   Pulse 68   Temp 98.1 °F (36.7 °C) (Oral)   Resp 16   Ht 1.753 m (5' 9.02\")   Wt 84.8 kg (186 lb 15.2 oz)   SpO2 97%   BMI 27.59 kg/m²       24HR INTAKE/OUTPUT:    Intake/Output Summary (Last 24 hours) at 6/23/2024 1329  Last data filed at 6/23/2024 1105  Gross per 24 hour   Intake 380 ml   Output 1200 ml   Net -820 ml         Patient Vitals for the past 96 hrs (Last 3 readings):   Weight   06/23/24 0625 84.8 kg (186 lb 15.2 oz)   06/22/24 0600 83 kg (182 lb 15.7 oz)   06/21/24 0600 83.3 kg (183 lb 10.3 oz)         General: awake, alert, NAD, Obese  Chest: diminished to auscultation, no intercostal retractions  CVS: RRR, no murmur, no rub  Abdomen: soft, non tender  Extremities: trace edema to BLE, wound to R upper arm  Skin: normal texture, normal skin turgor, no rash  Psych: A&O x3    LAB DATA:    CBC:   Lab Results   Component Value Date/Time    WBC 11.7 06/23/2024 03:26 AM    RBC 3.24 06/23/2024 03:26 AM    HGB 9.5 06/23/2024 03:26 AM    HCT 28.5 06/23/2024 03:26 AM    MCV 88.1 06/23/2024 03:26 AM    MCH 29.3 06/23/2024 03:26 AM    MCHC 33.2 06/23/2024 03:26 AM    RDW 14.0 06/23/2024 03:26 AM     06/23/2024 03:26 AM    MPV 8.2 06/23/2024 03:26 AM     BMP:    Lab Results   Component Value Date/Time     06/23/2024 03:26 AM    K 4.4 06/23/2024 03:26 AM    K 3.6 06/12/2024 05:18 AM     06/23/2024 03:26 AM    CO2 22 06/23/2024 03:26 AM    BUN 29 06/23/2024 03:26 AM    CREATININE 2.2 06/23/2024 03:26 AM    CALCIUM 7.8 06/23/2024 03:26 AM    GFRAA >60 01/03/2017 09:36 PM    LABGLOM 33 06/23/2024 03:26 AM    LABGLOM 32 04/03/2024 04:27 AM    GLUCOSE 168  resection of osteomyeltic bone with podiatry today     Hypertension              - Controlled              - Carvedilol resumed 6/18. On hydralazine.    CVA with expressive aphasia              - Neurology following     CKD-MBD              - Monitor phos     Anemia              - Hgb within target              - Monitor

## 2024-06-23 NOTE — PROGRESS NOTES
Patient A/O x4, interactive with nursing staff. VSS on room air. Patient with dressing to right shoulder and left foot changed per podiatry this AM. Patient denies pain in left foot, c/o pain in right shoulder controlled with PRN Percocet. Patient tolerating PO. Blood glucose controlled. Patient voiding in urinal. Patient turning self when encouraged. Tolerates well. Bed alarm on with call light within reach. Care ongoing.

## 2024-06-23 NOTE — PROGRESS NOTES
4 Eyes Skin Assessment     NAME:  Juan Carlos Wong Sr  YOB: 1965  MEDICAL RECORD NUMBER:  [id number]    The patient is being assessed for  Transfer to New Unit    I agree that at least one RN has performed a thorough Head to Toe Skin Assessment on the patient. ALL assessment sites listed below have been assessed.      Areas assessed by both nurses:    Head, Face, Ears, Shoulders, Back, Chest, Arms, Elbows, Hands, Sacrum. Buttock, Coccyx, Ischium, Legs. Feet and Heels, Under Medical Devices , and Other          Does the Patient have a Wound? Yes wound(s) were present on assessment. LDA wound assessment was Initiated and completed by RN       Jaxon Prevention initiated by RN: Yes  Wound Care Orders initiated by RN: Yes    Pressure Injury (Stage 3,4, Unstageable, DTI, NWPT, and Complex wounds) if present, place Wound referral order by RN under : Yes    New Ostomies, if present place, Ostomy referral order under : No     Nurse 1 eSignature: Electronically signed by Phani Lyle RN on 6/23/24 at 6:50 AM EDT    **SHARE this note so that the co-signing nurse can place an eSignature**    Nurse 2 eSignature: Electronically signed by Jeanine Garcia RN on 6/23/24 at 7:13 AM EDT

## 2024-06-23 NOTE — PROGRESS NOTES
Clinical Pharmacy Note  Renal Dose Adjustment    Juan Carlos Wong Sr is receiving cefepime.  This medication is renally eliminated.  Based on the patient's Estimated Creatinine Clearance: 35 mL/min (A) (based on SCr of 2.2 mg/dL (H)). And Dx SSTI, the dose has been adjusted to 2000 mg once over 30 minutes, then 1000 mg Q24H per protocol.    Pharmacy will continue to monitor and adjust dose as needed for changes in renal function.    Yvonne Samayoa RPH, PharmD 6/23/2024 8:46 AM

## 2024-06-23 NOTE — PROGRESS NOTES
SUBJECTIVE:   CC: Lavinia is an 17 year old who presents for preventive health visit.     Healthy Habits:   PHQ-2 Total Score: 1        Today's PHQ-2 Score:   PHQ-2 ( 1999 [employer]) 1/12/2023   Q1: Little interest or pleasure in doing things 0   Q2: Feeling down, depressed or hopeless 1   PHQ-2 Score 1   PHQ-2 Total Score (12-17 Years)- Positive if 3 or more points; Administer PHQ-A if positive -   Q1: Little interest or pleasure in doing things Not at all   Q2: Feeling down, depressed or hopeless Several days   PHQ-2 Score 1           Social History     Tobacco Use     Smoking status: Never     Smokeless tobacco: Never   Substance Use Topics     Alcohol use: No     If you drink alcohol do you typically have >3 drinks per day or >7 drinks per week? No    Alcohol Use 1/12/2023   Prescreen: >3 drinks/day or >7 drinks/week? No   Prescreen: >3 drinks/day or >7 drinks/week? -       Reviewed orders with patient.  Reviewed health maintenance and updated orders accordingly - Yes  Patient Active Problem List   Diagnosis     Concussion     Past Surgical History:   Procedure Laterality Date     No Surgeries         Social History     Tobacco Use     Smoking status: Never     Smokeless tobacco: Never   Substance Use Topics     Alcohol use: No     Family History   Problem Relation Age of Onset     Hypertension Father      Other - See Comments Other         add/adhd     Allergies Other      Asthma Other      Other - See Comments Other         birth defects     Cancer Other         basal cell     C.A.D. Other         coronary artery disesae     Other - See Comments Other         ddh (hip dysplasia)     Other - See Comments Other         deafness     Depression Other      Other - See Comments Other         developmental delay     Diabetes Other      Eczema Other      Other - See Comments Other         genetic disorder     High cholesterol Other         hyperlipidemia     Hypertension Other      Other - See Comments Other          Patient arrived to room 5128 from CVICU via stretcher. Patient alert orient x4. Able to make needs known. VS taken, Room air. Placed on tele monitor. Patient oriented to unit and room.  Call light in reach. Bed in lowest position, bed alarm on. Denies other needs. Will continue to monitor.     "learning disability     Other - See Comments Other         mental retardation     Other - See Comments Other         migraines     Obesity Other      Other - See Comments Other         scoliosis     Other - See Comments Other         seizure disorder     Other - See Comments Other         sids     Other - See Comments Other         strabismus     Thyroid Disease Other         disease         Current Outpatient Medications   Medication Sig Dispense Refill     NUVARING 0.12-0.015 MG/24HR vaginal ring PLACE 1 EACH VAGINALLY EVERY 28 DAYS 3 each 2     No Known Allergies    Breast Cancer Screening:        History of abnormal Pap smear: NO - under age 21, PAP not appropriate for age     Reviewed and updated as needed this visit by clinical staff   Tobacco  Allergies  Meds  Problems  Med Hx  Surg Hx  Fam Hx          Reviewed and updated as needed this visit by Provider   Tobacco  Allergies  Meds  Problems  Med Hx  Surg Hx  Fam Hx           Review of Systems  CONSTITUTIONAL: NEGATIVE for fever, chills, change in weight  INTEGUMENTARU/SKIN: NEGATIVE for worrisome rashes, moles or lesions  EYES: NEGATIVE for vision changes or irritation  ENT: NEGATIVE for ear, mouth and throat problems  RESP: NEGATIVE for significant cough or SOB  BREAST: NEGATIVE for masses, tenderness or discharge  CV: NEGATIVE for chest pain, palpitations or peripheral edema  GI: NEGATIVE for nausea, abdominal pain, heartburn, or change in bowel habits  : NEGATIVE for unusual urinary or vaginal symptoms. Periods are regular.  MUSCULOSKELETAL: NEGATIVE for significant arthralgias or myalgia  NEURO: NEGATIVE for weakness, dizziness or paresthesias  PSYCHIATRIC: NEGATIVE for changes in mood or affect     OBJECTIVE:   /62 (BP Location: Left arm, Patient Position: Sitting, Cuff Size: Adult Regular)   Pulse 92   Temp 97.5  F (36.4  C) (Tympanic)   Resp 16   Ht 1.746 m (5' 8.75\")   Wt 67 kg (147 lb 11.2 oz)   LMP 12/25/2022 " (Approximate)   SpO2 100%   BMI 21.97 kg/m    Physical Exam  GENERAL: healthy, alert and no distress  EYES: Eyes grossly normal to inspection, PERRL and conjunctivae and sclerae normal  HENT: ear canals and TM's normal, nose and mouth without ulcers or lesions  NECK: no adenopathy  RESP: lungs clear to auscultation - no rales, rhonchi or wheezes  CV: regular rates and rhythm, normal S1 S2, no S3 or S4 and no murmur, click or rub  MS: no gross musculoskeletal defects noted, no edema  SKIN: benign brown nevus along left side of neck  NEURO: Normal strength and tone, mentation intact and speech normal  PSYCH: mentation appears normal, affect normal/bright    Diagnostic Test Results:  none     ASSESSMENT/PLAN:       ICD-10-CM    1. Routine general medical examination at a health care facility  Z00.00       2. Encounter for surveillance of vaginal ring hormonal contraceptive device  Z30.44 GC/Chlamydia by PCR - [facility]     GC/Chlamydia by PCR - [facility]              COUNSELING:  Reviewed preventive health counseling, as reflected in patient instructions        She reports that she has never smoked. She has never used smokeless tobacco.      Kimberly Starr MD  [facility]

## 2024-06-24 LAB
ALBUMIN SERPL-MCNC: 2 G/DL (ref 3.4–5)
ANION GAP SERPL CALCULATED.3IONS-SCNC: 8 MMOL/L (ref 3–16)
BACTERIA BLD CULT ORG #2: NORMAL
BACTERIA BLD CULT: NORMAL
BUN SERPL-MCNC: 30 MG/DL (ref 7–20)
CALCIUM SERPL-MCNC: 7.8 MG/DL (ref 8.3–10.6)
CHLORIDE SERPL-SCNC: 103 MMOL/L (ref 99–110)
CO2 SERPL-SCNC: 20 MMOL/L (ref 21–32)
CREAT SERPL-MCNC: 2.5 MG/DL (ref 0.8–1.3)
DEPRECATED RDW RBC AUTO: 14.3 % (ref 12.4–15.4)
GFR SERPLBLD CREATININE-BSD FMLA CKD-EPI: 28 ML/MIN/{1.73_M2}
GLUCOSE BLD-MCNC: 172 MG/DL (ref 70–99)
GLUCOSE BLD-MCNC: 174 MG/DL (ref 70–99)
GLUCOSE BLD-MCNC: 195 MG/DL (ref 70–99)
GLUCOSE BLD-MCNC: 255 MG/DL (ref 70–99)
GLUCOSE SERPL-MCNC: 236 MG/DL (ref 70–99)
HCT VFR BLD AUTO: 28.2 % (ref 40.5–52.5)
HGB BLD-MCNC: 9.5 G/DL (ref 13.5–17.5)
MCH RBC QN AUTO: 29.5 PG (ref 26–34)
MCHC RBC AUTO-ENTMCNC: 33.8 G/DL (ref 31–36)
MCV RBC AUTO: 87.2 FL (ref 80–100)
PERFORMED ON: ABNORMAL
PHOSPHATE SERPL-MCNC: 3.8 MG/DL (ref 2.5–4.9)
PLATELET # BLD AUTO: 204 K/UL (ref 135–450)
PMV BLD AUTO: 8.6 FL (ref 5–10.5)
POTASSIUM SERPL-SCNC: 4.4 MMOL/L (ref 3.5–5.1)
RBC # BLD AUTO: 3.23 M/UL (ref 4.2–5.9)
SODIUM SERPL-SCNC: 131 MMOL/L (ref 136–145)
WBC # BLD AUTO: 7.9 K/UL (ref 4–11)

## 2024-06-24 PROCEDURE — 2060000000 HC ICU INTERMEDIATE R&B

## 2024-06-24 PROCEDURE — 6360000002 HC RX W HCPCS

## 2024-06-24 PROCEDURE — 6370000000 HC RX 637 (ALT 250 FOR IP)

## 2024-06-24 PROCEDURE — 6360000002 HC RX W HCPCS: Performed by: INTERNAL MEDICINE

## 2024-06-24 PROCEDURE — 2580000003 HC RX 258: Performed by: INTERNAL MEDICINE

## 2024-06-24 PROCEDURE — 2580000003 HC RX 258

## 2024-06-24 PROCEDURE — 36415 COLL VENOUS BLD VENIPUNCTURE: CPT

## 2024-06-24 PROCEDURE — 99233 SBSQ HOSP IP/OBS HIGH 50: CPT | Performed by: INTERNAL MEDICINE

## 2024-06-24 PROCEDURE — 94760 N-INVAS EAR/PLS OXIMETRY 1: CPT

## 2024-06-24 PROCEDURE — 80069 RENAL FUNCTION PANEL: CPT

## 2024-06-24 PROCEDURE — 85027 COMPLETE CBC AUTOMATED: CPT

## 2024-06-24 RX ADMIN — HYDRALAZINE HYDROCHLORIDE 10 MG: 10 TABLET, FILM COATED ORAL at 13:42

## 2024-06-24 RX ADMIN — GABAPENTIN 300 MG: 300 CAPSULE ORAL at 21:29

## 2024-06-24 RX ADMIN — GABAPENTIN 300 MG: 300 CAPSULE ORAL at 08:48

## 2024-06-24 RX ADMIN — PANTOPRAZOLE SODIUM 40 MG: 40 TABLET, DELAYED RELEASE ORAL at 05:28

## 2024-06-24 RX ADMIN — METRONIDAZOLE 500 MG: 500 INJECTION, SOLUTION INTRAVENOUS at 23:24

## 2024-06-24 RX ADMIN — METRONIDAZOLE 500 MG: 500 INJECTION, SOLUTION INTRAVENOUS at 08:52

## 2024-06-24 RX ADMIN — CARVEDILOL 6.25 MG: 6.25 TABLET, FILM COATED ORAL at 08:48

## 2024-06-24 RX ADMIN — CARVEDILOL 6.25 MG: 6.25 TABLET, FILM COATED ORAL at 16:25

## 2024-06-24 RX ADMIN — INSULIN GLARGINE 5 UNITS: 100 INJECTION, SOLUTION SUBCUTANEOUS at 21:30

## 2024-06-24 RX ADMIN — INSULIN LISPRO 2 UNITS: 100 INJECTION, SOLUTION INTRAVENOUS; SUBCUTANEOUS at 09:29

## 2024-06-24 RX ADMIN — CEFEPIME 1000 MG: 1 INJECTION, POWDER, FOR SOLUTION INTRAMUSCULAR; INTRAVENOUS at 21:29

## 2024-06-24 RX ADMIN — SODIUM CHLORIDE, PRESERVATIVE FREE 10 ML: 5 INJECTION INTRAVENOUS at 08:49

## 2024-06-24 RX ADMIN — METRONIDAZOLE 500 MG: 500 INJECTION, SOLUTION INTRAVENOUS at 16:27

## 2024-06-24 RX ADMIN — OXYCODONE HYDROCHLORIDE AND ACETAMINOPHEN 2 TABLET: 5; 325 TABLET ORAL at 23:24

## 2024-06-24 RX ADMIN — ATORVASTATIN CALCIUM 80 MG: 80 TABLET, FILM COATED ORAL at 21:29

## 2024-06-24 RX ADMIN — HYDRALAZINE HYDROCHLORIDE 10 MG: 10 TABLET, FILM COATED ORAL at 21:29

## 2024-06-24 RX ADMIN — CEFEPIME 2000 MG: 2 INJECTION, POWDER, FOR SOLUTION INTRAVENOUS at 08:51

## 2024-06-24 RX ADMIN — ENOXAPARIN SODIUM 30 MG: 100 INJECTION SUBCUTANEOUS at 08:49

## 2024-06-24 RX ADMIN — HYDRALAZINE HYDROCHLORIDE 10 MG: 10 TABLET, FILM COATED ORAL at 08:48

## 2024-06-24 ASSESSMENT — PAIN DESCRIPTION - ORIENTATION: ORIENTATION: LEFT

## 2024-06-24 ASSESSMENT — PAIN DESCRIPTION - LOCATION: LOCATION: SHOULDER

## 2024-06-24 ASSESSMENT — PAIN DESCRIPTION - DESCRIPTORS: DESCRIPTORS: SHARP

## 2024-06-24 ASSESSMENT — PAIN - FUNCTIONAL ASSESSMENT: PAIN_FUNCTIONAL_ASSESSMENT: ACTIVITIES ARE NOT PREVENTED

## 2024-06-24 ASSESSMENT — PAIN SCALES - GENERAL: PAINLEVEL_OUTOF10: 8

## 2024-06-24 NOTE — PROGRESS NOTES
V2.0    [facility] Progress Note      Name:  Juan Carlos Wong Sr /Age/Sex: 1965  (59 y.o. male)   MRN & CSN:  [id number] & [id number] Encounter Date/Time: 2024 11:08 AM EDT   Location:  [id number] PCP: Idris Laws MD     Attending:Reji Prabhakar MD       Hospital Day: 14    Assessment and Recommendations   Juan Carlos Wong Sr is a 59 y.o. male who presents with Cellulitis and abscess of upper extremity    59-year-old patient admitted to the hospital with cellulitis and abscess of right shoulder and diabetic foot infection on the left foot General surgery ID consulted status post I&D continue to be on IV antibiotics, had an event of abrupt onset aphasia on Sadie 15 code stroke activated some findings on the imaging with potential changes neurology consulted and following     Plan:      Sepsis with white count 14 heart rate 97 on admission, due to cellulitis and abscess of right shoulder and osteomyelitis and cellulitis of left foot initially on IV Zyvox, Flagyl, and cefepime ID following post I&D, culture MSSA, antibiotics changed to Ancef continue to be on both Ancef and Flagyl at this time Flagyl changed to p.o. status post left resection of fifth metatarsal on  and per podiatry patient will need further surgical intervention including delayed primary closure likely tomorrow.  Wound culture from foot positive for Klebsiella and Enterococcus antibiotics changed from Ancef to cefepime  Cellulitis and abscess right shoulder status post I&D discussed with surgery.  Continue IV antibiotics no further intervention planned at this time.  Appreciate surgical input  Episode of hypothermia and hypoglycemia, improved  Aphasia, abrupt onset, associated with encephalopathy, imaging positive for potential hemorrhage and evolving left frontal lobe cortical infarct, reported waxing and waning symptoms per nursing staff.  Per neurology this still could be recrudescence.  Neurology recommended

## 2024-06-24 NOTE — PROGRESS NOTES
Infectious Diseases Inpatient Progress Note    CHIEF COMPLAINT:     Right shoulder abscess  Right upper extremity cellulitis  Left foot osteomyelitis  Diabetes poor control  Chronic kidney disease stage IIIb  CVA -         HISTORY OF PRESENT ILLNESS:  59 y.o. man with a significant history for diabetes, chronic kidney disease stage IIIb, coronary artery disease, congestive heart failure, diabetes with neuropathy, peripheral vascular disease admitted to the hospital secondary to left diabetic foot ulcer.  Patient was seen by me recently when admitted in April for left foot infection was treated with IV antibiotic followed by oral antibiotic.  Now x-ray indicating progression to osteomyelitis of the left fifth metatarsal base.  He was also found to have cellulitis of the right shoulder was evaluated by general surgery given the progression to possible abscess formation there is a plan for incision and drainage.  Given the need for IV antibiotics we are consulted for recommendations.  Blood culture in process left foot wound culture in process, labs indicate creatinine 2.6 sodium 124 procalcitonin 0.21 hemoglobin A1c 11.4 with poor diabetes control WBC 14.8 hemoglobin 10.3      Interval history: Status post left foot surgery for osteomyelitis, OR cx with Klebsiella pneumonia and Enterobacter and Enterococcus.  IV antibiotics changed to IV cefepime over the weekend given the positive cultures tolerating it well therapy okay he is going for repeat surgery tomorrow for possible closure.      Past Medical History:    Past Medical History:   Diagnosis Date    Back pain     Diabetes mellitus (HCC)     Pneumonia        Past Surgical History:    Past Surgical History:   Procedure Laterality Date    ARM SURGERY Right 6/12/2024    INCISION AND DRAINAGE RIGHT PROXIMAL ARM ABSCESS performed by Dann Nuñez MD at [facility] OR    COLONOSCOPY      LEG SURGERY Left     LIVER BIOPSY      TOE AMPUTATION Left 6/21/2024     Physician  Phone: [phone]   Fax : [phone]

## 2024-06-24 NOTE — PROGRESS NOTES
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 Resting in bed on RA. Cr slightly higher than baseline. One soft pressure noted yesterday. Na+ remains within goal.     ROS: Denies SOB.     PMFSH:  medications reviewed.    Physical Exam:    /66   Pulse 79   Temp 98.1 °F (36.7 °C) (Oral)   Resp 13   Ht 1.753 m (5' 9.02\")   Wt 85.1 kg (187 lb 9.8 oz)   SpO2 96%   BMI 27.69 kg/m²       24HR INTAKE/OUTPUT:    Intake/Output Summary (Last 24 hours) at 6/24/2024 1025  Last data filed at 6/24/2024 0402  Gross per 24 hour   Intake 1493.56 ml   Output 1325 ml   Net 168.56 ml         Patient Vitals for the past 96 hrs (Last 3 readings):   Weight   06/24/24 0337 85.1 kg (187 lb 9.8 oz)   06/23/24 0625 84.8 kg (186 lb 15.2 oz)   06/22/24 0600 83 kg (182 lb 15.7 oz)         General: awake, alert, NAD, Obese  Chest: diminished to auscultation, no intercostal retractions  CVS: RRR, no murmur, no rub  Abdomen: soft, non tender  Extremities: trace edema to BLE, wound to R upper arm  Skin: normal texture, normal skin turgor, no rash  Psych: A&O x3    LAB DATA:    CBC:   Lab Results   Component Value Date/Time    WBC 7.9 06/24/2024 04:55 AM    RBC 3.23 06/24/2024 04:55 AM    HGB 9.5 06/24/2024 04:55 AM    HCT 28.2 06/24/2024 04:55 AM    MCV 87.2 06/24/2024 04:55 AM    MCH 29.5 06/24/2024 04:55 AM    MCHC 33.8 06/24/2024 04:55 AM    RDW 14.3 06/24/2024 04:55 AM     06/24/2024 04:55 AM    MPV 8.6 06/24/2024 04:55 AM     BMP:    Lab Results   Component Value Date/Time     06/24/2024 04:55 AM    K 4.4 06/24/2024 04:55 AM    K 3.6 06/12/2024 05:18 AM     06/24/2024 04:55 AM    CO2 20 06/24/2024 04:55 AM    BUN 30 06/24/2024 04:55 AM    CREATININE 2.5 06/24/2024 04:55 AM    CALCIUM 7.8 06/24/2024 04:55 AM    GFRAA >60 01/03/2017 09:36 PM    LABGLOM 28 06/24/2024 04:55 AM

## 2024-06-24 NOTE — PROGRESS NOTES
No acute events over night, VSS. Pt remains A&Ox4, able to make his needs known. Pt appears to have slept well over night. He has done a great job of turning himself in bed and makes a point to let me known that he is turning. RUE wound cleansed with saline and dressing changed. Fall precautions in place.   Samm Rasheed RN

## 2024-06-24 NOTE — PLAN OF CARE
Problem: Discharge Planning  Goal: Discharge to home or other facility with appropriate resources  Outcome: Progressing     Problem: Safety - Adult  Goal: Free from fall injury  Outcome: Progressing     Problem: ABCDS Injury Assessment  Goal: Absence of physical injury  Outcome: Progressing     Problem: Chronic Conditions and Co-morbidities  Goal: Patient's chronic conditions and co-morbidity symptoms are monitored and maintained or improved  Outcome: Progressing     Problem: Infection - Adult  Goal: Absence of infection at discharge  Outcome: Progressing     Problem: Metabolic/Fluid and Electrolytes - Adult  Goal: Electrolytes maintained within normal limits  Outcome: Progressing     Problem: Metabolic/Fluid and Electrolytes - Adult  Goal: Glucose maintained within prescribed range  Outcome: Progressing     Problem: Skin/Tissue Integrity - Adult  Goal: Oral mucous membranes remain intact  Outcome: Progressing     Problem: Neurosensory - Adult  Goal: Achieves stable or improved neurological status  Outcome: Progressing     Problem: Neurosensory - Adult  Goal: Absence of seizures  Outcome: Progressing     Problem: Respiratory - Adult  Goal: Achieves optimal ventilation and oxygenation  Outcome: Progressing     Problem: Cardiovascular - Adult  Goal: Maintains optimal cardiac output and hemodynamic stability  Outcome: Progressing     Problem: Cardiovascular - Adult  Goal: Absence of cardiac dysrhythmias or at baseline  Outcome: Progressing     Problem: Musculoskeletal - Adult  Goal: Return mobility to safest level of function  Outcome: Progressing     Problem: Musculoskeletal - Adult  Goal: Return ADL status to a safe level of function  Outcome: Progressing     Problem: Gastrointestinal - Adult  Goal: Maintains adequate nutritional intake  Outcome: Progressing     Problem: Genitourinary - Adult  Goal: Absence of urinary retention  Outcome: Progressing     Problem: Hematologic - Adult  Goal: Maintains hematologic

## 2024-06-24 NOTE — CARE COORDINATION
DISCHARGE PLANNING:    DC plan pending at this time.  Patient is from home alone with family support.  Active with [facility].  Surgery with Podiatry on L foot 6/21/24.  Wound closure scheduled for tomorrow 6/25/24 with Podiatry.  Patient would like to return home if possible, watching needs.  Possible need for SNF at discharge.  Watching ID recs.      #[phone]  Electronically signed by Marianne Live RN on 6/24/2024 at 2:44 PM

## 2024-06-24 NOTE — PROGRESS NOTES
mg, IntraVENous, Q12H  carvedilol (COREG) tablet 6.25 mg, 6.25 mg, Oral, BID WC  [Held by provider] insulin lispro (HUMALOG,ADMELOG) injection vial 5 Units, 5 Units, SubCUTAneous, TID WC  oxyCODONE-acetaminophen (PERCOCET) 5-325 MG per tablet 1 tablet, 1 tablet, Oral, Q4H PRN **OR** oxyCODONE-acetaminophen (PERCOCET) 5-325 MG per tablet 2 tablet, 2 tablet, Oral, Q4H PRN  morphine (PF) injection 2 mg, 2 mg, IntraVENous, Q2H PRN **OR** morphine (PF) injection 4 mg, 4 mg, IntraVENous, Q2H PRN  [Held by provider] aspirin EC tablet 81 mg, 81 mg, Oral, Daily  cyclobenzaprine (FLEXERIL) tablet 5 mg, 5 mg, Oral, BID PRN  gabapentin (NEURONTIN) capsule 300 mg, 300 mg, Oral, BID  hydrALAZINE (APRESOLINE) tablet 10 mg, 10 mg, Oral, TID  [Held by provider] isosorbide mononitrate (IMDUR) extended release tablet 15 mg, 15 mg, Oral, Nightly  pantoprazole (PROTONIX) tablet 40 mg, 40 mg, Oral, QAM AC  sodium chloride flush 0.9 % injection 5-40 mL, 5-40 mL, IntraVENous, 2 times per day  sodium chloride flush 0.9 % injection 5-40 mL, 5-40 mL, IntraVENous, PRN  0.9 % sodium chloride infusion, , IntraVENous, PRN  ondansetron (ZOFRAN-ODT) disintegrating tablet 4 mg, 4 mg, Oral, Q8H PRN **OR** ondansetron (ZOFRAN) injection 4 mg, 4 mg, IntraVENous, Q6H PRN  polyethylene glycol (GLYCOLAX) packet 17 g, 17 g, Oral, Daily PRN  acetaminophen (TYLENOL) tablet 650 mg, 650 mg, Oral, Q6H PRN **OR** acetaminophen (TYLENOL) suppository 650 mg, 650 mg, Rectal, Q6H PRN  glucose chewable tablet 16 g, 4 tablet, Oral, PRN  dextrose bolus 10% 125 mL, 125 mL, IntraVENous, PRN **OR** dextrose bolus 10% 250 mL, 250 mL, IntraVENous, PRN  glucagon injection 1 mg, 1 mg, SubCUTAneous, PRN  dextrose 10 % infusion, , IntraVENous, Continuous PRN  insulin lispro (HUMALOG,ADMELOG) injection vial 0-4 Units, 0-4 Units, SubCUTAneous, TID WC  insulin lispro (HUMALOG,ADMELOG) injection vial 0-4 Units, 0-4 Units, SubCUTAneous, Nightly    Allergies:   Patient has no known

## 2024-06-25 ENCOUNTER — ANESTHESIA EVENT (OUTPATIENT)
Dept: OPERATING ROOM | Age: 59
End: 2024-06-25
Payer: COMMERCIAL

## 2024-06-25 ENCOUNTER — ANESTHESIA (OUTPATIENT)
Dept: OPERATING ROOM | Age: 59
End: 2024-06-25
Payer: COMMERCIAL

## 2024-06-25 LAB
ALBUMIN SERPL-MCNC: 1.8 G/DL (ref 3.4–5)
ANION GAP SERPL CALCULATED.3IONS-SCNC: 7 MMOL/L (ref 3–16)
BACTERIA SPEC AEROBE CULT: ABNORMAL
BACTERIA SPEC ANAEROBE CULT: ABNORMAL
BUN SERPL-MCNC: 28 MG/DL (ref 7–20)
CALCIUM SERPL-MCNC: 7.8 MG/DL (ref 8.3–10.6)
CHLORIDE SERPL-SCNC: 104 MMOL/L (ref 99–110)
CO2 SERPL-SCNC: 21 MMOL/L (ref 21–32)
CREAT SERPL-MCNC: 2.4 MG/DL (ref 0.8–1.3)
DEPRECATED RDW RBC AUTO: 14.6 % (ref 12.4–15.4)
FERRITIN SERPL IA-MCNC: 435.1 NG/ML (ref 30–400)
GFR SERPLBLD CREATININE-BSD FMLA CKD-EPI: 30 ML/MIN/{1.73_M2}
GLUCOSE BLD-MCNC: 157 MG/DL (ref 70–99)
GLUCOSE BLD-MCNC: 163 MG/DL (ref 70–99)
GLUCOSE BLD-MCNC: 176 MG/DL (ref 70–99)
GLUCOSE BLD-MCNC: 184 MG/DL (ref 70–99)
GLUCOSE BLD-MCNC: 213 MG/DL (ref 70–99)
GLUCOSE SERPL-MCNC: 204 MG/DL (ref 70–99)
GRAM STN SPEC: ABNORMAL
HCT VFR BLD AUTO: 27.1 % (ref 40.5–52.5)
HGB BLD-MCNC: 9.2 G/DL (ref 13.5–17.5)
IRON SATN MFR SERPL: 46 % (ref 20–50)
IRON SERPL-MCNC: 53 UG/DL (ref 59–158)
MCH RBC QN AUTO: 29.7 PG (ref 26–34)
MCHC RBC AUTO-ENTMCNC: 34 G/DL (ref 31–36)
MCV RBC AUTO: 87.3 FL (ref 80–100)
ORGANISM: ABNORMAL
PERFORMED ON: ABNORMAL
PHOSPHATE SERPL-MCNC: 3.5 MG/DL (ref 2.5–4.9)
PLATELET # BLD AUTO: 196 K/UL (ref 135–450)
PMV BLD AUTO: 8.5 FL (ref 5–10.5)
POTASSIUM SERPL-SCNC: 4.3 MMOL/L (ref 3.5–5.1)
RBC # BLD AUTO: 3.11 M/UL (ref 4.2–5.9)
SODIUM SERPL-SCNC: 132 MMOL/L (ref 136–145)
TIBC SERPL-MCNC: 116 UG/DL (ref 260–445)
TRANSFERRIN SERPL-MCNC: 79 MG/DL (ref 200–360)
WBC # BLD AUTO: 8.6 K/UL (ref 4–11)

## 2024-06-25 PROCEDURE — 85027 COMPLETE CBC AUTOMATED: CPT

## 2024-06-25 PROCEDURE — 2500000003 HC RX 250 WO HCPCS: Performed by: NURSE ANESTHETIST, CERTIFIED REGISTERED

## 2024-06-25 PROCEDURE — 7100000000 HC PACU RECOVERY - FIRST 15 MIN: Performed by: PODIATRIST

## 2024-06-25 PROCEDURE — 2580000003 HC RX 258: Performed by: INTERNAL MEDICINE

## 2024-06-25 PROCEDURE — 2709999900 HC NON-CHARGEABLE SUPPLY: Performed by: PODIATRIST

## 2024-06-25 PROCEDURE — 6370000000 HC RX 637 (ALT 250 FOR IP)

## 2024-06-25 PROCEDURE — 2580000003 HC RX 258

## 2024-06-25 PROCEDURE — 6360000002 HC RX W HCPCS

## 2024-06-25 PROCEDURE — 83540 ASSAY OF IRON: CPT

## 2024-06-25 PROCEDURE — 6360000002 HC RX W HCPCS: Performed by: NURSE ANESTHETIST, CERTIFIED REGISTERED

## 2024-06-25 PROCEDURE — 97530 THERAPEUTIC ACTIVITIES: CPT

## 2024-06-25 PROCEDURE — 0HRLXK3 REPLACEMENT OF LEFT LOWER LEG SKIN WITH NONAUTOLOGOUS TISSUE SUBSTITUTE, FULL THICKNESS, EXTERNAL APPROACH: ICD-10-PCS | Performed by: SURGERY

## 2024-06-25 PROCEDURE — 97129 THER IVNTJ 1ST 15 MIN: CPT

## 2024-06-25 PROCEDURE — 3700000001 HC ADD 15 MINUTES (ANESTHESIA): Performed by: PODIATRIST

## 2024-06-25 PROCEDURE — 36415 COLL VENOUS BLD VENIPUNCTURE: CPT

## 2024-06-25 PROCEDURE — 3600000013 HC SURGERY LEVEL 3 ADDTL 15MIN: Performed by: PODIATRIST

## 2024-06-25 PROCEDURE — 82728 ASSAY OF FERRITIN: CPT

## 2024-06-25 PROCEDURE — 0JQR0ZZ REPAIR LEFT FOOT SUBCUTANEOUS TISSUE AND FASCIA, OPEN APPROACH: ICD-10-PCS | Performed by: SURGERY

## 2024-06-25 PROCEDURE — 7100000001 HC PACU RECOVERY - ADDTL 15 MIN: Performed by: PODIATRIST

## 2024-06-25 PROCEDURE — 99232 SBSQ HOSP IP/OBS MODERATE 35: CPT | Performed by: INTERNAL MEDICINE

## 2024-06-25 PROCEDURE — 3700000000 HC ANESTHESIA ATTENDED CARE: Performed by: PODIATRIST

## 2024-06-25 PROCEDURE — 3600000003 HC SURGERY LEVEL 3 BASE: Performed by: PODIATRIST

## 2024-06-25 PROCEDURE — 1200000000 HC SEMI PRIVATE

## 2024-06-25 PROCEDURE — 80069 RENAL FUNCTION PANEL: CPT

## 2024-06-25 PROCEDURE — 3600000014 HC SURGERY LEVEL 4 ADDTL 15MIN: Performed by: PODIATRIST

## 2024-06-25 PROCEDURE — 2580000003 HC RX 258: Performed by: PODIATRIST

## 2024-06-25 PROCEDURE — 84466 ASSAY OF TRANSFERRIN: CPT

## 2024-06-25 PROCEDURE — 3600000004 HC SURGERY LEVEL 4 BASE: Performed by: PODIATRIST

## 2024-06-25 PROCEDURE — 6360000002 HC RX W HCPCS: Performed by: PODIATRIST

## 2024-06-25 PROCEDURE — A4217 STERILE WATER/SALINE, 500 ML: HCPCS | Performed by: PODIATRIST

## 2024-06-25 PROCEDURE — 92526 ORAL FUNCTION THERAPY: CPT

## 2024-06-25 DEVICE — GRAFT HUM TISS W2XL4CM CRYOPRESERVED PLCNTA TISS UMB AMNION: Type: IMPLANTABLE DEVICE | Site: FOOT | Status: FUNCTIONAL

## 2024-06-25 RX ORDER — ONDANSETRON 2 MG/ML
4 INJECTION INTRAMUSCULAR; INTRAVENOUS
Status: DISCONTINUED | OUTPATIENT
Start: 2024-06-25 | End: 2024-06-25 | Stop reason: HOSPADM

## 2024-06-25 RX ORDER — PROPOFOL 10 MG/ML
INJECTION, EMULSION INTRAVENOUS PRN
Status: DISCONTINUED | OUTPATIENT
Start: 2024-06-25 | End: 2024-06-25 | Stop reason: SDUPTHER

## 2024-06-25 RX ORDER — MAGNESIUM HYDROXIDE 1200 MG/15ML
LIQUID ORAL CONTINUOUS PRN
Status: COMPLETED | OUTPATIENT
Start: 2024-06-25 | End: 2024-06-25

## 2024-06-25 RX ORDER — SODIUM CHLORIDE 0.9 % (FLUSH) 0.9 %
5-40 SYRINGE (ML) INJECTION EVERY 12 HOURS SCHEDULED
Status: DISCONTINUED | OUTPATIENT
Start: 2024-06-25 | End: 2024-06-25 | Stop reason: HOSPADM

## 2024-06-25 RX ORDER — SODIUM CHLORIDE 9 MG/ML
INJECTION, SOLUTION INTRAVENOUS PRN
Status: DISCONTINUED | OUTPATIENT
Start: 2024-06-25 | End: 2024-06-25 | Stop reason: HOSPADM

## 2024-06-25 RX ORDER — FENTANYL CITRATE 0.05 MG/ML
50 INJECTION, SOLUTION INTRAMUSCULAR; INTRAVENOUS EVERY 5 MIN PRN
Status: DISCONTINUED | OUTPATIENT
Start: 2024-06-25 | End: 2024-06-25 | Stop reason: HOSPADM

## 2024-06-25 RX ORDER — SODIUM CHLORIDE 0.9 % (FLUSH) 0.9 %
5-40 SYRINGE (ML) INJECTION PRN
Status: DISCONTINUED | OUTPATIENT
Start: 2024-06-25 | End: 2024-06-25 | Stop reason: HOSPADM

## 2024-06-25 RX ORDER — FENTANYL CITRATE 0.05 MG/ML
25 INJECTION, SOLUTION INTRAMUSCULAR; INTRAVENOUS EVERY 5 MIN PRN
Status: DISCONTINUED | OUTPATIENT
Start: 2024-06-25 | End: 2024-06-25 | Stop reason: HOSPADM

## 2024-06-25 RX ORDER — FENTANYL CITRATE 50 UG/ML
INJECTION, SOLUTION INTRAMUSCULAR; INTRAVENOUS PRN
Status: DISCONTINUED | OUTPATIENT
Start: 2024-06-25 | End: 2024-06-25 | Stop reason: SDUPTHER

## 2024-06-25 RX ORDER — SODIUM CHLORIDE 0.9 % (FLUSH) 0.9 %
5-40 SYRINGE (ML) INJECTION PRN
Status: DISCONTINUED | OUTPATIENT
Start: 2024-06-25 | End: 2024-06-27 | Stop reason: HOSPADM

## 2024-06-25 RX ORDER — SODIUM CHLORIDE 0.9 % (FLUSH) 0.9 %
5-40 SYRINGE (ML) INJECTION EVERY 12 HOURS SCHEDULED
Status: DISCONTINUED | OUTPATIENT
Start: 2024-06-25 | End: 2024-06-27 | Stop reason: HOSPADM

## 2024-06-25 RX ORDER — LIDOCAINE HYDROCHLORIDE 20 MG/ML
INJECTION, SOLUTION EPIDURAL; INFILTRATION; INTRACAUDAL; PERINEURAL PRN
Status: DISCONTINUED | OUTPATIENT
Start: 2024-06-25 | End: 2024-06-25 | Stop reason: SDUPTHER

## 2024-06-25 RX ORDER — NALOXONE HYDROCHLORIDE 0.4 MG/ML
INJECTION, SOLUTION INTRAMUSCULAR; INTRAVENOUS; SUBCUTANEOUS PRN
Status: DISCONTINUED | OUTPATIENT
Start: 2024-06-25 | End: 2024-06-25 | Stop reason: HOSPADM

## 2024-06-25 RX ORDER — AMOXICILLIN 250 MG/1
500 CAPSULE ORAL EVERY 12 HOURS SCHEDULED
Status: DISCONTINUED | OUTPATIENT
Start: 2024-06-26 | End: 2024-06-27 | Stop reason: HOSPADM

## 2024-06-25 RX ORDER — SODIUM CHLORIDE 9 MG/ML
INJECTION, SOLUTION INTRAVENOUS PRN
Status: DISCONTINUED | OUTPATIENT
Start: 2024-06-25 | End: 2024-06-27 | Stop reason: HOSPADM

## 2024-06-25 RX ADMIN — CARVEDILOL 6.25 MG: 6.25 TABLET, FILM COATED ORAL at 09:08

## 2024-06-25 RX ADMIN — INSULIN GLARGINE 5 UNITS: 100 INJECTION, SOLUTION SUBCUTANEOUS at 21:24

## 2024-06-25 RX ADMIN — FENTANYL CITRATE 25 MCG: 50 INJECTION INTRAMUSCULAR; INTRAVENOUS at 07:33

## 2024-06-25 RX ADMIN — HYDRALAZINE HYDROCHLORIDE 10 MG: 10 TABLET, FILM COATED ORAL at 09:08

## 2024-06-25 RX ADMIN — SODIUM CHLORIDE, PRESERVATIVE FREE 10 ML: 5 INJECTION INTRAVENOUS at 09:16

## 2024-06-25 RX ADMIN — FENTANYL CITRATE 25 MCG: 50 INJECTION INTRAMUSCULAR; INTRAVENOUS at 07:30

## 2024-06-25 RX ADMIN — CARVEDILOL 6.25 MG: 6.25 TABLET, FILM COATED ORAL at 16:16

## 2024-06-25 RX ADMIN — SODIUM CHLORIDE: 9 INJECTION, SOLUTION INTRAVENOUS at 09:15

## 2024-06-25 RX ADMIN — GABAPENTIN 300 MG: 300 CAPSULE ORAL at 09:08

## 2024-06-25 RX ADMIN — METRONIDAZOLE 500 MG: 500 INJECTION, SOLUTION INTRAVENOUS at 16:21

## 2024-06-25 RX ADMIN — EPOETIN ALFA-EPBX 10000 UNITS: 10000 INJECTION, SOLUTION INTRAVENOUS; SUBCUTANEOUS at 21:24

## 2024-06-25 RX ADMIN — ATORVASTATIN CALCIUM 80 MG: 80 TABLET, FILM COATED ORAL at 21:24

## 2024-06-25 RX ADMIN — LIDOCAINE HYDROCHLORIDE 100 MG: 20 INJECTION, SOLUTION EPIDURAL; INFILTRATION; INTRACAUDAL; PERINEURAL at 07:34

## 2024-06-25 RX ADMIN — CEFEPIME 1000 MG: 1 INJECTION, POWDER, FOR SOLUTION INTRAMUSCULAR; INTRAVENOUS at 21:21

## 2024-06-25 RX ADMIN — HYDRALAZINE HYDROCHLORIDE 10 MG: 10 TABLET, FILM COATED ORAL at 14:19

## 2024-06-25 RX ADMIN — PROPOFOL 50 MG: 10 INJECTION, EMULSION INTRAVENOUS at 07:37

## 2024-06-25 RX ADMIN — OXYCODONE HYDROCHLORIDE AND ACETAMINOPHEN 1 TABLET: 5; 325 TABLET ORAL at 09:11

## 2024-06-25 RX ADMIN — Medication 10 ML: at 21:25

## 2024-06-25 RX ADMIN — Medication 10 ML: at 09:08

## 2024-06-25 RX ADMIN — PROPOFOL 75 MCG/KG/MIN: 10 INJECTION, EMULSION INTRAVENOUS at 07:39

## 2024-06-25 RX ADMIN — HYDRALAZINE HYDROCHLORIDE 10 MG: 10 TABLET, FILM COATED ORAL at 21:24

## 2024-06-25 RX ADMIN — ENOXAPARIN SODIUM 30 MG: 100 INJECTION SUBCUTANEOUS at 09:08

## 2024-06-25 RX ADMIN — GABAPENTIN 300 MG: 300 CAPSULE ORAL at 21:24

## 2024-06-25 RX ADMIN — CEFEPIME 1000 MG: 1 INJECTION, POWDER, FOR SOLUTION INTRAMUSCULAR; INTRAVENOUS at 07:43

## 2024-06-25 RX ADMIN — METRONIDAZOLE 500 MG: 500 INJECTION, SOLUTION INTRAVENOUS at 08:02

## 2024-06-25 RX ADMIN — SODIUM CHLORIDE: 9 INJECTION, SOLUTION INTRAVENOUS at 16:21

## 2024-06-25 ASSESSMENT — PAIN SCALES - GENERAL
PAINLEVEL_OUTOF10: 3
PAINLEVEL_OUTOF10: 6

## 2024-06-25 ASSESSMENT — PAIN DESCRIPTION - ORIENTATION: ORIENTATION: RIGHT

## 2024-06-25 ASSESSMENT — PAIN - FUNCTIONAL ASSESSMENT
PAIN_FUNCTIONAL_ASSESSMENT: PREVENTS OR INTERFERES SOME ACTIVE ACTIVITIES AND ADLS
PAIN_FUNCTIONAL_ASSESSMENT: NONE - DENIES PAIN

## 2024-06-25 ASSESSMENT — PAIN DESCRIPTION - DESCRIPTORS: DESCRIPTORS: SHARP

## 2024-06-25 ASSESSMENT — PAIN DESCRIPTION - LOCATION: LOCATION: SHOULDER

## 2024-06-25 ASSESSMENT — ENCOUNTER SYMPTOMS: SHORTNESS OF BREATH: 0

## 2024-06-25 NOTE — FLOWSHEET NOTE
06/25/24 0952   Treatment Team Notification   Reason for Communication Review case  (Nephrology clearance to place midline.)   Name of Team Member Notified Greer Fernández CNP   Treatment Team Role Consulting Provider   Method of Communication Secure Message   Response See orders  (ok to place midline)   Notification Time 0953       NP aware of and ok with needing to place midline in left arm d/t abscess in right arm.     Electronically signed by Maria Elena Castillo RN on 6/25/2024 at 9:53 AM

## 2024-06-25 NOTE — PROGRESS NOTES
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 Resting in bed on RA. Cr trending down. Na+ is adequate. S/p delayed primary closure of L foot today.     ROS: Denies SOB.     PMFSH:  medications reviewed.    Physical Exam:    /68   Pulse 73   Temp 97.6 °F (36.4 °C) (Oral)   Resp 12   Ht 1.753 m (5' 9.02\")   Wt 85.7 kg (188 lb 15 oz)   SpO2 96%   BMI 27.88 kg/m²       24HR INTAKE/OUTPUT:    Intake/Output Summary (Last 24 hours) at 6/25/2024 1422  Last data filed at 6/25/2024 0840  Gross per 24 hour   Intake 170 ml   Output 2100 ml   Net -1930 ml         Patient Vitals for the past 96 hrs (Last 3 readings):   Weight   06/25/24 0432 85.7 kg (188 lb 15 oz)   06/24/24 0337 85.1 kg (187 lb 9.8 oz)   06/23/24 0625 84.8 kg (186 lb 15.2 oz)         General: awake, alert, NAD, Obese  Chest: diminished to auscultation, no intercostal retractions  CVS: RRR, no murmur, no rub  Abdomen: soft, non tender  Extremities: trace edema to BLE, wound to R upper arm  Skin: normal texture, normal skin turgor, no rash  Psych: A&O x3    LAB DATA:    CBC:   Lab Results   Component Value Date/Time    WBC 8.6 06/25/2024 04:39 AM    RBC 3.11 06/25/2024 04:39 AM    HGB 9.2 06/25/2024 04:39 AM    HCT 27.1 06/25/2024 04:39 AM    MCV 87.3 06/25/2024 04:39 AM    MCH 29.7 06/25/2024 04:39 AM    MCHC 34.0 06/25/2024 04:39 AM    RDW 14.6 06/25/2024 04:39 AM     06/25/2024 04:39 AM    MPV 8.5 06/25/2024 04:39 AM     BMP:    Lab Results   Component Value Date/Time     06/25/2024 04:39 AM    K 4.3 06/25/2024 04:39 AM    K 3.6 06/12/2024 05:18 AM     06/25/2024 04:39 AM    CO2 21 06/25/2024 04:39 AM    BUN 28 06/25/2024 04:39 AM    CREATININE 2.4 06/25/2024 04:39 AM    CALCIUM 7.8 06/25/2024 04:39 AM    GFRAA >60 01/03/2017 09:36 PM    LABGLOM 30 06/25/2024 04:39 AM    LABGLOM 32 04/03/2024  04:27 AM    GLUCOSE 204 06/25/2024 04:39 AM     Ionized Calcium:  No components found for: \"IONCA\"  Magnesium:    Lab Results   Component Value Date/Time    MG 1.90 06/16/2024 03:15 AM     Phosphorus:    Lab Results   Component Value Date/Time    PHOS 3.5 06/25/2024 04:39 AM     U/A:    Lab Results   Component Value Date/Time    COLORU Yellow 06/11/2024 04:48 PM    PHUR 6.0 06/11/2024 04:48 PM    PHUR 6.0 03/27/2024 08:03 PM    LABCAST SEE NOTES 05/09/2024 09:17 AM    WBCUA 1 06/11/2024 04:48 PM    RBCUA 0-2 06/11/2024 04:48 PM    RBCUA 1+ (0.06-0.1 mg/dL) 05/09/2024 09:17 AM    MUCUS 1+ 03/27/2024 08:03 PM    BACTERIA None Seen 06/11/2024 04:48 PM    CLARITYU Clear 06/11/2024 04:48 PM    LEUKOCYTESUR Negative 06/11/2024 04:48 PM    UROBILINOGEN 1.0 06/11/2024 04:48 PM    BILIRUBINUR Negative 06/11/2024 04:48 PM    BLOODU MODERATE 06/11/2024 04:48 PM    GLUCOSEU 500 06/11/2024 04:48 PM         IMPRESSION/RECOMMENDATIONS:      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 Follows with Dr. Howell in office. Etiology likely 2/2 volume depletion in the setting of poor oral intake, emesis, and diuretic use.              - Cr trending down and close to baseline               - Bladder scan PRN.               - No NSAIDs. No ACE/ARBs. No SGLT2i.              - Daily RFTs     Hyponatremia: etiology likely 2/2 volume depletion in the setting of poor oral intake, emesis, and diuretic use.              - Na+ within goal this AM              - Na+ goal >/= 130              - Continue 1 L/day FR     HFrEF              - TTE (3/27/2024): LVEF 20-25%.               - CXR unremarkable              - If patient adheres to fluid restriction, torsemide may not need to be resumed after discharge              - Strict I/Os. Daily weights.     Cellulitis / RUE Wound / OM L Foot              - I&D of R arm abscess 6/12 with surgery              - On cefepime and flagyl              - ID and podiatry following              - S/p resection of

## 2024-06-25 NOTE — PROGRESS NOTES
To pacu from OR.  PT awake, denies pain.  DIMAS to request.  Left popliteal pulse palpable.  Dressing to left foot dry and intact.  IV infusing.  Monitor in sinus rhythm.    Cheek Interpolation Flap Text: A decision was made to reconstruct the defect utilizing an interpolation axial flap and a staged reconstruction.  A telfa template was made of the defect.  This telfa template was then used to outline the Cheek Interpolation flap.  The donor area for the pedicle flap was then injected with anesthesia.  The flap was excised through the skin and subcutaneous tissue down to the layer of the underlying musculature.  The interpolation flap was carefully excised within this deep plane to maintain its blood supply.  The edges of the donor site were undermined.   The donor site was closed in a primary fashion.  The pedicle was then rotated into position and sutured.  Once the tube was sutured into place, adequate blood supply was confirmed with blanching and refill.  The pedicle was then wrapped with xeroform gauze and dressed appropriately with a telfa and gauze bandage to ensure continued blood supply and protect the attached pedicle.

## 2024-06-25 NOTE — PLAN OF CARE
Problem: Discharge Planning  Goal: Discharge to home or other facility with appropriate resources  Outcome: Progressing     Problem: Safety - Adult  Goal: Free from fall injury  Outcome: Progressing     Problem: ABCDS Injury Assessment  Goal: Absence of physical injury  Outcome: Progressing     Problem: Chronic Conditions and Co-morbidities  Goal: Patient's chronic conditions and co-morbidity symptoms are monitored and maintained or improved  Outcome: Progressing     Problem: Infection - Adult  Goal: Absence of infection at discharge  Outcome: Progressing     Problem: Metabolic/Fluid and Electrolytes - Adult  Goal: Electrolytes maintained within normal limits  Outcome: Progressing     Problem: Metabolic/Fluid and Electrolytes - Adult  Goal: Glucose maintained within prescribed range  Outcome: Progressing     Problem: Skin/Tissue Integrity - Adult  Goal: Oral mucous membranes remain intact  Outcome: Progressing     Problem: Neurosensory - Adult  Goal: Achieves stable or improved neurological status  Outcome: Progressing     Problem: Neurosensory - Adult  Goal: Absence of seizures  Outcome: Progressing     Problem: Respiratory - Adult  Goal: Achieves optimal ventilation and oxygenation  Outcome: Progressing     Problem: Cardiovascular - Adult  Goal: Maintains optimal cardiac output and hemodynamic stability  Outcome: Progressing     Problem: Cardiovascular - Adult  Goal: Absence of cardiac dysrhythmias or at baseline  Outcome: Progressing     Problem: Musculoskeletal - Adult  Goal: Return mobility to safest level of function  Outcome: Progressing  Goal: Return ADL status to a safe level of function  Outcome: Progressing     Problem: Gastrointestinal - Adult  Goal: Maintains adequate nutritional intake  Outcome: Progressing     Problem: Genitourinary - Adult  Goal: Absence of urinary retention  Outcome: Progressing     Problem: Hematologic - Adult  Goal: Maintains hematologic stability  Outcome: Progressing

## 2024-06-25 NOTE — BRIEF OP NOTE
Brief Postoperative Note      Patient: Juan Carlos Wong Sr  YOB: 1965  MRN: [id number]    Date of Procedure: 6/25/2024    Pre-Op Diagnosis Codes:     * Acute osteomyelitis of left foot (HCC) [M86.172]    Post-Op Diagnosis: Same       Procedure(s):  DELAYED PRIMARY CLOSURE OF LEFT FOOT WITH GRAFT APPLICATION    Surgeon(s):  Natanael Alvares DPM    Assistant:  Kisha Leon DPM    Anesthesia: Monitor Anesthesia Care    Estimated Blood Loss (mL): Minimal    Hemostasis: anatomic dissection and electrocautery, pneumatic ankle tourniquet applied but not inflated     Injectables: Pre-Op 10 cc of 1% Polocaine plain     Materials: 2-0, 3-0 Nylon    Implants: 2x4 cm Stravix Graft      Complications: None    Specimens:   * No specimens in log *    Implants:  Implant Name Type Inv. Item Serial No.  Lot No. LRB No. Used Action   GRAFT HUM TISS [id number] CRYOPRESERVED PLCNTA TISS [employer] AMNION - [id number]  GRAFT HUM TISS [id number] CRYOPRESERVED PLCNTA TISS [employer] AMNION [id number] [employer]- [id number] Left 1 Implanted         Drains:   [REMOVED] Urinary Catheter 06/12/24 Wheeler (Removed)   Catheter Indications Urinary retention (acute or chronic), continuous bladder irrigation or bladder outlet obstruction 06/19/24 0740   Site Assessment No urethral drainage 06/19/24 0740   Urine Color Yellow 06/19/24 0740   Urine Appearance Clear 06/19/24 0740   Urine Odor Other (Comment) 06/15/24 1335   Collection Container Standard 06/19/24 0740   Securement Method Securing device (Describe) 06/19/24 0740   Catheter Care  Perineal wipes 06/18/24 2000   Catheter Best Practices  Drainage tube clipped to bed;Catheter secured to thigh;Tamper seal intact;Bag below bladder;Bag not on floor;Lack of dependent loop in tubing;Drainage bag less than half full 06/19/24 0740   Status Draining;Patent 06/19/24 0740   Output (mL) 35 mL 06/19/24 1133   Discontinuation Reason Per provider order 06/15/24 1335       Findings:  Infection Present

## 2024-06-25 NOTE — PROGRESS NOTES
Patient resting comfortably in recliner at this time with visitor in room. Remains a&0 x 4.   Patient remains non weight bearing to LLE . No pain at this time. Call light in reach.  Bed alarm on.      Electronically signed by Maria Elena Castillo RN on 6/25/2024 at 6:53 PM

## 2024-06-25 NOTE — PROGRESS NOTES
Order for Midline per Dr. Allen. Pre procedure and timeout done with pt's RN. Limited to left arm given right arm abscess. Okay with nephrology order placed    Successful insertion of a Single lumen midline into pt's left brachial vein. No issues gaining access or advancing guidewire/introducer/midline      Midline is cut at 10cm and out externally 1cm. SL flushes without resistance and draw back brisk blood return.    Midline site CDI with hemostasis maintained and a CHG dressing applied to site. Pt instructed to stay in bed and keep arm flat and still for 30 minutes  to promote hemostasis.     Maria Elena GARCIA given handoff report

## 2024-06-25 NOTE — ANESTHESIA POSTPROCEDURE EVALUATION
Department of Anesthesiology  Postprocedure Note    Patient: Juan Carlos Wong Sr  MRN: [id number]  YOB: 1965  Date of evaluation: 6/25/2024    Procedure Summary       Date: 06/25/24 Room / Location: [address]    Anesthesia Start: 0728 Anesthesia Stop: 0817    Procedure: DELAYED PRIMARY CLOSURE OF LEFT FOOT WITH GRAFT APPLICATION (Left: Foot) Diagnosis:       Acute osteomyelitis of left foot (HCC)      (Acute osteomyelitis of left foot (HCC) [M86.172])    Surgeons: Natanael Alvares DPM Responsible Provider: Ty Dudley MD    Anesthesia Type: MAC ASA Status: 3            Anesthesia Type: No value filed.    Javier Phase I: Javier Score: 9    Javier Phase II:      Anesthesia Post Evaluation    Patient location during evaluation: PACU  Patient participation: complete - patient participated  Level of consciousness: awake and alert  Pain score: 0  Airway patency: patent  Nausea & Vomiting: no nausea and no vomiting  Cardiovascular status: blood pressure returned to baseline  Respiratory status: acceptable  Hydration status: euvolemic  Pain management: adequate    No notable events documented.

## 2024-06-25 NOTE — PROGRESS NOTES
Infectious Diseases Inpatient Progress Note    CHIEF COMPLAINT:     Right shoulder abscess  Right upper extremity cellulitis  Left foot osteomyelitis  Diabetes poor control  Chronic kidney disease stage IIIb  CVA -         HISTORY OF PRESENT ILLNESS:  59 y.o. man with a significant history for diabetes, chronic kidney disease stage IIIb, coronary artery disease, congestive heart failure, diabetes with neuropathy, peripheral vascular disease admitted to the hospital secondary to left diabetic foot ulcer.  Patient was seen by me recently when admitted in April for left foot infection was treated with IV antibiotic followed by oral antibiotic.  Now x-ray indicating progression to osteomyelitis of the left fifth metatarsal base.  He was also found to have cellulitis of the right shoulder was evaluated by general surgery given the progression to possible abscess formation there is a plan for incision and drainage.  Given the need for IV antibiotics we are consulted for recommendations.  Blood culture in process left foot wound culture in process, labs indicate creatinine 2.6 sodium 124 procalcitonin 0.21 hemoglobin A1c 11.4 with poor diabetes control WBC 14.8 hemoglobin 10.3      Interval history: Status post left foot surgery for osteomyelitis, OR cx with Klebsiella pneumonia and Enterobacter and Enterococcus.  IV antibiotics changed to IV cefepime and PICC placed for IV abx and family at bed side   Past Medical History:    Past Medical History:   Diagnosis Date    Back pain     Diabetes mellitus (HCC)     Pneumonia        Past Surgical History:    Past Surgical History:   Procedure Laterality Date    ARM SURGERY Right 6/12/2024    INCISION AND DRAINAGE RIGHT PROXIMAL ARM ABSCESS performed by Dann Nuñez MD at [facility] OR    COLONOSCOPY      FOOT DEBRIDEMENT Left 6/25/2024    DELAYED PRIMARY CLOSURE OF LEFT FOOT WITH GRAFT APPLICATION performed by Natanael Alvares DPM at [facility] OR    LEG SURGERY Left      culture in process/11/24  Coronary artery disease  Hyponatremia  Diabetes poor control hemoglobin A1c greater than 11.4  Severe left frontal infarct possible petechial hemorrhage FROM 6/16      Operative culture positive for Staph aureus MSSA will adjust antibiotic therapy ongoing left foot infection with osteomyelitis noted    Seen by Dr. Alvares, plan for surgery resection of the fifth metatarsal base on the left foot likely Wednesday    CVA now seen by Neurology and notes reviewed MRI results noted     Creatinine remains elevated now trending down tolerating antibiotic therapy okay       Status post left foot surgery resection of the fifth metatarsal head, bone biopsy and   operative cultures mixed infectious process noted antibiotics adjusted       Labs, Microbiology, Radiology and pertinent results from current hospitalization and care every where were reviewed by me as a part of the consultation.    PLAN :  IV Ertapenem x 500 mg q 24 hr x stop date x  7/10  Oral Amoxicillin x 500 mg Q  12 hr for Enterococcus    Operative culture Staph aureus MSSA Rt shoulder    D/c   oral metronidazole   Trend WBC improved  Watch creatinine  Hemoglobin A1c is elevated  ESR trend  75   CRP trend  19.5  Control diabetes  Creatinine elevated   acute CVA 6/15 -left frontal infarct on MRI  Status post left foot surgery operative cultures NOTED  OPAT d/wp t  S/p Mid line for IV abx  Robyn DONE     Discussed with patient/Family and Nursing     Medical Decision Making:  The following items were considered in medical decision making:  Discussion of patient care with other providers  Reviewed clinical lab tests  Reviewed radiology tests  Reviewed other diagnostic tests/interventions  Independent review of radiologic images  Independent review of  Microbiology cultures and other micro tests reviewed     Risk of Complications/Morbidity: High      Illness(es)/ Infection present that pose threat to bodily function.   There is potential for

## 2024-06-25 NOTE — PROGRESS NOTES
No acute events over night, VSS. Pt remains A&Ox4, able to make his needs known. NPO since midnight.  Samm Rasheed RN    Pt being taking to surgery at this time.  Electronically signed by Samm Rasheed RN on 6/25/2024 at 6:18 AM

## 2024-06-25 NOTE — PROGRESS NOTES
Facility/Department: [address] CVICU  DYSPHAGIA THERAPY and SPEECH / LANGUAGE / COGNITIVE-LINGUISTIC TREATMENT and DISCHARGE SUMMARY    Patient: Juan Carlos Wong Sr   : 1965   MRN: [id number]      Treatment Date: 2024      Admitting Dx:   Cellulitis of right upper arm [L03.113]  Osteomyelitis of left foot, unspecified type (HCC) [M86.9]  Cellulitis and abscess of upper extremity [L03.119, L02.419]   has a past medical history of Back pain, Diabetes mellitus (HCC), and Pneumonia.   has a past surgical history that includes liver biopsy; Leg Surgery (Left); Colonoscopy; Arm Surgery (Right, 2024); and Toe amputation (Left, 2024).  ALLERGIES: No Known Allergies    History/Prior Level of Function  Living Status: admitted from home; lives alone; independent for ADLs; daughter assists with laundry; patient completes cooks (mostly microwave); patient tidies (daughter assists with cleaning d/t patient's visual imps); daughter sets up medications; daughter manages finances; not an active ; completed 9th grade; worked as a ; on disability  Prior Dysphagia History: denies  Prior Speech History: denies          Onset: 2024     Chart Review:   H&P: 2024 admitted with c/o R arm pain  MD ADMISSION H&P HPI:  59 y.o. male who presented to [facility] with right shoulder pain and drainage for the last 3 to 4 days, worsening, no obvious elevated or elevating factors, associated with some chills and fever associated also with weakness, no obvious etiology, per ED provider likely patient was injecting insulin at that part, also having ulcer on his plantar aspect of left foot draining pus.  Denies vomiting but stated he was having nausea this morning, no abdominal pain no diarrhea discussed with ED provider agree with plan to admit for further management and treatment.   Consults noted: Nephrology, General Surgery, ID, Podiatry, Neurology  2024 incision and drainage R proximal arm

## 2024-06-25 NOTE — PROGRESS NOTES
V2.0    [facility] [place]      Name:  Juan Carlos Wong Sr /Age/Sex: 1965  (59 y.o. male)   MRN & CSN:  [id number] & [id number] Encounter Date/Time: 2024 2:17 PM EDT   Location:  [id number] PCP: Idris Laws MD     Attending:Reji Prabhakar MD       Hospital Day: 15    Assessment and Recommendations   Juan Carlos Wong Sr is a 59 y.o. male who presents with Cellulitis and abscess of upper extremity    59-year-old patient admitted to the hospital with cellulitis and abscess of right shoulder and diabetic foot infection on the left foot General surgery ID consulted status post I&D continue to be on IV antibiotics, had an event of abrupt onset aphasia on Sadie 15 code stroke activated some findings on the imaging with potential changes neurology consulted and following     Plan:      Sepsis with white count 14 heart rate 97 on admission, due to cellulitis and abscess of right shoulder and osteomyelitis and cellulitis of left foot initially on IV Zyvox, Flagyl, and cefepime ID following post I&D, culture MSSA, antibiotics changed to Ancef continue to be on both Ancef and Flagyl at this time Flagyl changed to p.o. status post left resection of fifth metatarsal on  and per podiatry patient will need further surgical intervention including delayed primary closure likely tomorrow.  Wound culture from foot positive for Klebsiella and Enterococcus antibiotics changed from Ancef to cefepime continue for now patient might need IV antibiotics on discharge  Cellulitis and abscess right shoulder status post I&D discussed with surgery.  Continue IV antibiotics no further intervention planned at this time.  Appreciate surgical input  Episode of hypothermia and hypoglycemia, improved  Aphasia, abrupt onset, associated with encephalopathy, imaging positive for potential hemorrhage and evolving left frontal lobe cortical infarct, reported waxing and waning symptoms per nursing staff.  Per

## 2024-06-25 NOTE — CARE COORDINATION
DISCHARGE PLANNING:    Spoke to patient at bedside regarding discharge planning.  DC plan to return home with active services and family support.  Patient is active with [facility].  Referral placed to [facility] for home IV atb therapy.  Patient has PICC in place.  Awaiting final ID recs.  Patient with wound closure per podiatry today.    #[phone]  Electronically signed by Marianne Live RN on 6/25/2024 at 2:48 PM

## 2024-06-25 NOTE — PROGRESS NOTES
Physical Therapy  Facility/Department: [address] PROGRESSIVE CARE  Daily Treatment Note  NAME: Juan Carlos Wong Sr  : 1965  MRN: [id number]    Date of Service: 2024    Discharge Recommendations:  Continue to assess pending progress, Patient would benefit from continued therapy after discharge        Patient Diagnosis(es): The primary encounter diagnosis was Cellulitis of right upper arm. Diagnoses of Osteomyelitis of left foot, unspecified type (HCC), Cerebrovascular accident (CVA), unspecified mechanism (HCC), and Acute osteomyelitis of left foot (HCC) were also pertinent to this visit.    Assessment   Assessment: Pt agreeable to activity, able to complete sit < > stand transfers to stedy and walker with CGA, but unable to pivot with walker support.  He did complete squat-pivot transfers with CGA, and with cueing did well to maintain NWB LLE throughout session.  He is intent upon returning home upon discharge.  I explained to him that he will need to function from a wc level d/t his WB status, and he stated that he has a scooter that he can use at home (will need to verify).  He does not have steps to enter his apartment, and likely could return home with intermittent assist for ADLs.  If this is not available, he would need to consider rehab.  Will continue to assess pending pt progress, and level of assist available at home.  Recommend continued therapy to improve balance, strength, endurance, independence ambulating.     Juan Carlos Wong Sr scored a 14/24 on the AM-PAC short mobility form. Current research shows that an AM-PAC score of 17 or less is typically not associated with a discharge to the patient's home setting. Based on the patient's AM-PAC score and their current functional mobility deficits, it is recommended that the patient have 3-5 sessions per week of Physical Therapy at d/c to increase the patient's independence.  Please see assessment section for further patient specific details.    If

## 2024-06-25 NOTE — ANESTHESIA PRE PROCEDURE
injection 4 mg  4 mg IntraVENous Q6H PRN Opal Esteves DPM       • polyethylene glycol (GLYCOLAX) packet 17 g  17 g Oral Daily PRN Opal Esteves DPM       • acetaminophen (TYLENOL) tablet 650 mg  650 mg Oral Q6H PRN Opal Esteves DPM        Or   • acetaminophen (TYLENOL) suppository 650 mg  650 mg Rectal Q6H PRN Opal Esteves DPM       • glucose chewable tablet 16 g  4 tablet Oral PRN Opal Esteves DPM       • dextrose bolus 10% 125 mL  125 mL IntraVENous PRN Opal Esteves DPM   Stopped at 24 1650    Or   • dextrose bolus 10% 250 mL  250 mL IntraVENous PRN Opal Esteves DPM       • glucagon injection 1 mg  1 mg SubCUTAneous PRN Opal Esteves DPM       • dextrose 10 % infusion   IntraVENous Continuous PRN Opal Esteves DPM       • insulin lispro (HUMALOG,ADMELOG) injection vial 0-4 Units  0-4 Units SubCUTAneous TID WC Opal Esteves DPM   2 Units at 24 0929   • insulin lispro (HUMALOG,ADMELOG) injection vial 0-4 Units  0-4 Units SubCUTAneous Nightly Opal Esteves DPM   4 Units at 24 2154     Vital Signs (Current)   Vitals:    24 0312 24 0432 24 0500 24 0642   BP: (!) 147/93   (!) 158/88   Pulse: 75  72 77   Resp: 15   16   Temp: 98.2 °F (36.8 °C)   98.2 °F (36.8 °C)   TempSrc: Oral   Temporal   SpO2: 96%   96%   Weight:  85.7 kg (188 lb 15 oz)     Height:                                                Vital Signs Statistics (for past 48 hrs)     Temp  Av.1 °F (36.7 °C)  Min: 97.4 °F (36.3 °C)   Min taken time: 24 1505  Max: 98.4 °F (36.9 °C)   Max taken time: 24 1929  Pulse  Av.3  Min: 68   Min taken time: 24 1200  Max: 97   Max taken time: 24 1326  Resp  Av.3  Min: 12   Min taken time: 24 1505  Max: 25   Max taken time: 24 1929  BP  Min: 99/58   Min taken time: 24  Max: 158/88   Max taken time: 24 0642  MAP (mmHg)  Av  Min: 72   Min taken time: 24  Max: 108   Max taken time: 24

## 2024-06-25 NOTE — OP NOTE
Operative Note      Patient: Juan Carlos Wong Sr  YOB: 1965  MRN: [id number]     Date of Procedure: 6/25/2024     Pre-Op Diagnosis Codes:     * Acute osteomyelitis of left foot (HCC) [M86.172]     Post-Op Diagnosis: Same       Procedure(s):  DELAYED PRIMARY CLOSURE OF LEFT FOOT WITH GRAFT APPLICATION     Surgeon(s):  Natanael Alvares DPM     Assistant:  Kisha Leon DPM     Anesthesia: Monitor Anesthesia Care     Estimated Blood Loss (mL): Minimal     Hemostasis: anatomic dissection and electrocautery, pneumatic ankle tourniquet applied but not inflated      Injectables: Pre-Op 10 cc of 1% Polocaine plain      Materials: 2-0, 3-0 Nylon     Implants: 2x4 cm Stravix Graft       Complications: None     Specimens:   * No specimens in log *     Implants:  Implant Name Type Inv. Item Serial No.  Lot No. LRB No. Used Action   GRAFT HUM TISS [id number] CRYOPRESERVED PLCNTA TISS UMB AMNION - [id number]   GRAFT HUM TISS [id number] CRYOPRESERVED PLCNTA TISS UMB AMNION [id number] [employer]- [id number] Left 1 Implanted          Drains:        [REMOVED] Urinary Catheter 06/12/24 Wheeler (Removed)   Catheter Indications Urinary retention (acute or chronic), continuous bladder irrigation or bladder outlet obstruction 06/19/24 0740   Site Assessment No urethral drainage 06/19/24 0740   Urine Color Yellow 06/19/24 0740   Urine Appearance Clear 06/19/24 0740   Urine Odor Other (Comment) 06/15/24 1335   Collection Container Standard 06/19/24 0740   Securement Method Securing device (Describe) 06/19/24 0740   Catheter Care  Perineal wipes 06/18/24 2000   Catheter Best Practices  Drainage tube clipped to bed;Catheter secured to thigh;Tamper seal intact;Bag below bladder;Bag not on floor;Lack of dependent loop in tubing;Drainage bag less than half full 06/19/24 0740   Status Draining;Patent 06/19/24 0740   Output (mL) 35 mL 06/19/24 1133   Discontinuation Reason Per provider order 06/15/24 1335        all venous tributaries were hemocauterized as encountered. At no time was there any evidence of additional purulence or frankly infectious material. Previously inserted antibiotic beads were noted to be in place. Next, a #15 blade was used to sharply debride the skin edges of the surgical site until healthy bleeding of the epidermal and dermal layers was achieved in preparation of delayed closure. At this time, a 1000mL sterile saline pulse lavage was employed to irrigate the surgical site. The wound was again inspected and found to be clean, granular and deemed appropriate for closure. Next, closure of the surgical site was initiated. Using 2-0 Nylon the surgical incision was re-approximated utilizing vertical mattress sutures and 3-0 Nylon was then used to close the incision surrounding the 2-0 nylon.     PROCEDURE #2: GRAFT APPLICATION, LEFT FOOT:  Attention was directed to the full thickness wound appreciated to the central aspect of the incision on the plantar lateral aspect of the left foot. At this time, a 2 x 4 cm Stravix Graft was placed over the full thickness wound according to the manufacturers instructions to help augment the healing process post-operatively. Special care was taken to make sure that the Stravix graft remained flat with underlying anatomical surface. Next, the graft was secured to the wound bed using 3-0 nylon at the periphery of the wound. Upon completion, the graft was noted to lie flat against the wound bed and be secured in correct position     At this time, a soft sterile dressing was applied consisting of adaptic, saline soaked gauze, dry gauze, ABD pad, cast padding, coban and ACE bandage. The pneumatic ankle tourniquet was not inflated for the duration of the case.    END OF PROCEDURE: The patient tolerated the procedure and anesthesia well and was transported from the operating room to the PACU with vital signs stable and vascular status intact to all aspects of the patient's

## 2024-06-26 LAB
ALBUMIN SERPL-MCNC: 2 G/DL (ref 3.4–5)
ANION GAP SERPL CALCULATED.3IONS-SCNC: 7 MMOL/L (ref 3–16)
BUN SERPL-MCNC: 25 MG/DL (ref 7–20)
CALCIUM SERPL-MCNC: 8 MG/DL (ref 8.3–10.6)
CHLORIDE SERPL-SCNC: 103 MMOL/L (ref 99–110)
CO2 SERPL-SCNC: 22 MMOL/L (ref 21–32)
CREAT SERPL-MCNC: 2.3 MG/DL (ref 0.8–1.3)
DEPRECATED RDW RBC AUTO: 14.8 % (ref 12.4–15.4)
GFR SERPLBLD CREATININE-BSD FMLA CKD-EPI: 31 ML/MIN/{1.73_M2}
GLUCOSE BLD-MCNC: 179 MG/DL (ref 70–99)
GLUCOSE BLD-MCNC: 194 MG/DL (ref 70–99)
GLUCOSE BLD-MCNC: 208 MG/DL (ref 70–99)
GLUCOSE BLD-MCNC: 221 MG/DL (ref 70–99)
GLUCOSE SERPL-MCNC: 225 MG/DL (ref 70–99)
HCT VFR BLD AUTO: 28 % (ref 40.5–52.5)
HGB BLD-MCNC: 9.6 G/DL (ref 13.5–17.5)
MCH RBC QN AUTO: 29.9 PG (ref 26–34)
MCHC RBC AUTO-ENTMCNC: 34.1 G/DL (ref 31–36)
MCV RBC AUTO: 87.6 FL (ref 80–100)
PERFORMED ON: ABNORMAL
PHOSPHATE SERPL-MCNC: 3.6 MG/DL (ref 2.5–4.9)
PLATELET # BLD AUTO: 187 K/UL (ref 135–450)
PMV BLD AUTO: 8.5 FL (ref 5–10.5)
POTASSIUM SERPL-SCNC: 4.2 MMOL/L (ref 3.5–5.1)
RBC # BLD AUTO: 3.2 M/UL (ref 4.2–5.9)
SODIUM SERPL-SCNC: 132 MMOL/L (ref 136–145)
WBC # BLD AUTO: 6.9 K/UL (ref 4–11)

## 2024-06-26 PROCEDURE — 6370000000 HC RX 637 (ALT 250 FOR IP)

## 2024-06-26 PROCEDURE — 36415 COLL VENOUS BLD VENIPUNCTURE: CPT

## 2024-06-26 PROCEDURE — 6370000000 HC RX 637 (ALT 250 FOR IP): Performed by: INTERNAL MEDICINE

## 2024-06-26 PROCEDURE — 99232 SBSQ HOSP IP/OBS MODERATE 35: CPT | Performed by: INTERNAL MEDICINE

## 2024-06-26 PROCEDURE — 97530 THERAPEUTIC ACTIVITIES: CPT

## 2024-06-26 PROCEDURE — 1200000000 HC SEMI PRIVATE

## 2024-06-26 PROCEDURE — 6360000002 HC RX W HCPCS

## 2024-06-26 PROCEDURE — 85027 COMPLETE CBC AUTOMATED: CPT

## 2024-06-26 PROCEDURE — 80069 RENAL FUNCTION PANEL: CPT

## 2024-06-26 PROCEDURE — 2580000003 HC RX 258: Performed by: INTERNAL MEDICINE

## 2024-06-26 PROCEDURE — 94760 N-INVAS EAR/PLS OXIMETRY 1: CPT

## 2024-06-26 PROCEDURE — 6360000002 HC RX W HCPCS: Performed by: INTERNAL MEDICINE

## 2024-06-26 PROCEDURE — 2580000003 HC RX 258

## 2024-06-26 RX ORDER — ASPIRIN 81 MG/1
81 TABLET ORAL DAILY
Qty: 30 TABLET | Refills: 0
Start: 2024-06-29

## 2024-06-26 RX ORDER — INSULIN LISPRO 100 [IU]/ML
3 INJECTION, SOLUTION INTRAVENOUS; SUBCUTANEOUS
Status: DISCONTINUED | OUTPATIENT
Start: 2024-06-26 | End: 2024-06-27 | Stop reason: HOSPADM

## 2024-06-26 RX ORDER — INSULIN LISPRO 100 [IU]/ML
0-4 INJECTION, SOLUTION INTRAVENOUS; SUBCUTANEOUS
Qty: 1 EACH | Refills: 0
Start: 2024-06-26

## 2024-06-26 RX ORDER — AMOXICILLIN 500 MG/1
500 CAPSULE ORAL EVERY 12 HOURS SCHEDULED
Qty: 20 CAPSULE | Refills: 0
Start: 2024-06-26 | End: 2024-07-06

## 2024-06-26 RX ORDER — INSULIN GLARGINE 100 [IU]/ML
5 INJECTION, SOLUTION SUBCUTANEOUS NIGHTLY
Qty: 5 ADJUSTABLE DOSE PRE-FILLED PEN SYRINGE | Refills: 0
Start: 2024-06-26

## 2024-06-26 RX ORDER — CARVEDILOL 6.25 MG/1
6.25 TABLET ORAL 2 TIMES DAILY WITH MEALS
Qty: 60 TABLET | Refills: 0
Start: 2024-06-26

## 2024-06-26 RX ORDER — ATORVASTATIN CALCIUM 80 MG/1
80 TABLET, FILM COATED ORAL NIGHTLY
Qty: 30 TABLET | Refills: 0
Start: 2024-06-26

## 2024-06-26 RX ORDER — INSULIN LISPRO 100 [IU]/ML
3 INJECTION, SOLUTION INTRAVENOUS; SUBCUTANEOUS
Qty: 1 EACH | Refills: 0
Start: 2024-06-26

## 2024-06-26 RX ORDER — CLOPIDOGREL BISULFATE 75 MG/1
75 TABLET ORAL DAILY
Qty: 30 TABLET | Refills: 0
Start: 2024-06-29 | End: 2024-12-26

## 2024-06-26 RX ADMIN — INSULIN LISPRO 1 UNITS: 100 INJECTION, SOLUTION INTRAVENOUS; SUBCUTANEOUS at 13:12

## 2024-06-26 RX ADMIN — Medication 10 ML: at 09:20

## 2024-06-26 RX ADMIN — GABAPENTIN 300 MG: 300 CAPSULE ORAL at 20:46

## 2024-06-26 RX ADMIN — ENOXAPARIN SODIUM 30 MG: 100 INJECTION SUBCUTANEOUS at 09:18

## 2024-06-26 RX ADMIN — HYDRALAZINE HYDROCHLORIDE 10 MG: 10 TABLET, FILM COATED ORAL at 20:46

## 2024-06-26 RX ADMIN — AMOXICILLIN 500 MG: 250 CAPSULE ORAL at 09:18

## 2024-06-26 RX ADMIN — GABAPENTIN 300 MG: 300 CAPSULE ORAL at 09:18

## 2024-06-26 RX ADMIN — PANTOPRAZOLE SODIUM 40 MG: 40 TABLET, DELAYED RELEASE ORAL at 05:22

## 2024-06-26 RX ADMIN — ATORVASTATIN CALCIUM 80 MG: 80 TABLET, FILM COATED ORAL at 20:46

## 2024-06-26 RX ADMIN — AMOXICILLIN 500 MG: 250 CAPSULE ORAL at 20:46

## 2024-06-26 RX ADMIN — HYDRALAZINE HYDROCHLORIDE 10 MG: 10 TABLET, FILM COATED ORAL at 13:41

## 2024-06-26 RX ADMIN — INSULIN GLARGINE 5 UNITS: 100 INJECTION, SOLUTION SUBCUTANEOUS at 20:46

## 2024-06-26 RX ADMIN — HYDRALAZINE HYDROCHLORIDE 10 MG: 10 TABLET, FILM COATED ORAL at 09:18

## 2024-06-26 RX ADMIN — CARVEDILOL 6.25 MG: 6.25 TABLET, FILM COATED ORAL at 18:02

## 2024-06-26 RX ADMIN — ERTAPENEM SODIUM 1000 MG: 1 INJECTION INTRAMUSCULAR; INTRAVENOUS at 09:47

## 2024-06-26 RX ADMIN — INSULIN LISPRO 3 UNITS: 100 INJECTION, SOLUTION INTRAVENOUS; SUBCUTANEOUS at 18:02

## 2024-06-26 RX ADMIN — Medication 10 ML: at 20:47

## 2024-06-26 RX ADMIN — SODIUM CHLORIDE, PRESERVATIVE FREE 10 ML: 5 INJECTION INTRAVENOUS at 20:47

## 2024-06-26 RX ADMIN — CARVEDILOL 6.25 MG: 6.25 TABLET, FILM COATED ORAL at 09:18

## 2024-06-26 NOTE — DISCHARGE SUMMARY
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    Hospital Course:            Sepsis with white count 14 heart rate 97 on admission, due to cellulitis and abscess of right shoulder and osteomyelitis and cellulitis of left foot initially on IV Zyvox, Flagyl, and cefepime ID following post I&D, culture MSSA, antibiotics changed to Ancef continue to be on both Ancef and Flagyl at this time Flagyl changed to p.o. status post left resection of fifth metatarsal on June 21 and per podiatry patient will need further surgical intervention including delayed primary closure likely tomorrow.  Wound culture from foot positive for Klebsiella and Enterococcus antibiotics changed from Ancef to cefepime and then changed to ertapenem per ID continue ertapenem after discharge  Cellulitis and abscess right shoulder status post I&D discussed with surgery.  Continue IV antibiotics no further intervention planned at    oxyCODONE-acetaminophen (PERCOCET) 5-325 MG per tablet Take 1 tablet by mouth every 6 hours as needed for Pain for up to 7 days. Max Daily Amount: 4 tablets  Qty: 15 tablet, Refills: 0    Comments: Reduce doses taken as pain becomes manageable  Associated Diagnoses: Cellulitis of right upper arm       !! - Potential duplicate medications found. Please discuss with provider.             Details   aspirin 81 MG EC tablet Take 1 tablet by mouth daily  Qty: 30 tablet, Refills: 0    Associated Diagnoses: Coronary artery disease of native artery of native heart with stable angina pectoris ([facility])      insulin glargine (BASAGLAR KWIKPEN) 100 UNIT/ML injection pen Inject 5 Units into the skin nightly  Qty: 5 Adjustable Dose Pre-filled Pen Syringe, Refills: 0    Associated Diagnoses: Type 2 diabetes mellitus with hyperglycemia, with long-term current use of insulin ([facility])      atorvastatin (LIPITOR) 80 MG tablet Take 1 tablet by mouth at bedtime  Qty: 30 tablet, Refills: 0      carvedilol (COREG) 6.25 MG tablet Take 1 tablet by mouth 2 times daily (with meals)  Qty: 60 tablet, Refills: 0      clopidogrel (PLAVIX) 75 MG tablet Take 1 tablet by mouth daily  Qty: 30 tablet, Refills: 0                Details   !! Continuous Glucose Sensor (DEXCOM G7 SENSOR) MISC USE AS DIRECTED FOR CONTINUOUS GLUCOSE MONITORING  Qty: 6 each, Refills: 1    Associated Diagnoses: Diabetic mononeuropathy associated with type 2 diabetes mellitus ([facility])      Tirzepatide (MOUNJARO) 5 MG/0.5ML SOPN SC injection Inject 0.5 mLs into the skin once a week  Qty: 2 mL, Refills: 1    Associated Diagnoses: Type 2 diabetes mellitus with hyperglycemia, with long-term current use of insulin ([facility])      gabapentin (NEURONTIN) 300 MG capsule Take 1 capsule by mouth 2 times daily for 180 days.  Qty: 180 capsule, Refills: 1    Associated Diagnoses: Diabetic mononeuropathy associated with type 2 diabetes mellitus ([facility])      ondansetron (ZOFRAN-ODT) 4 MG disintegrating

## 2024-06-26 NOTE — PLAN OF CARE
Problem: Discharge Planning  Goal: Discharge to home or other facility with appropriate resources  6/26/2024 1137 by Dann Iglesias, RN  Outcome: Progressing  Discharge to home or other facility with appropriate resources:   Identify barriers to discharge with patient and caregiver   Identify discharge learning needs (meds, wound care, etc)   Arrange for interpreters to assist at discharge as needed   Refer to discharge planning if patient needs post-hospital services based on physician order or complex needs related to functional status, cognitive ability or social support system   Arrange for needed discharge resources and transportation as appropriate     Problem: Safety - Adult  Goal: Free from fall injury  6/26/2024 1137 by Dann Iglesias, RN  Outcome: Progressing  Free From Fall Injury: Instruct family/caregiver on patient safety       Problem: Infection - Adult  Goal: Absence of infection at discharge  Outcome: Progressing  Absence of infection at discharge:   Monitor lab/diagnostic results   Assess and monitor for signs and symptoms of infection   Monitor all insertion sites i.e., indwelling lines, tubes and drains   Administer medications as ordered   [place] appropriate cooling/warming therapies per order   Monitor endotracheal (as able) and nasal secretions for changes in amount and color     Problem: Metabolic/Fluid and Electrolytes - Adult  Goal: Electrolytes maintained within normal limits  Outcome: Progressing  Electrolytes maintained within normal limits:   Monitor labs and assess patient for signs and symptoms of electrolyte imbalances   Administer electrolyte replacement as ordered   Monitor response to electrolyte replacements, including repeat lab results as appropriate   Fluid restriction as ordered

## 2024-06-26 NOTE — CARE COORDINATION
SW spoke to Meena at [facility]. She is aware that patient has selected another facility and is no longer following him. SW thanked her for taking the time to review this referral.     Respectfully submitted,    Neyda MAIER, VAZQUEZ  [facility] [place]   [phone]    Electronically signed by DARRION Bobby, LAW on 6/26/2024 at 3:44 PM

## 2024-06-26 NOTE — PROGRESS NOTES
Podiatric Surgery Daily Progress Note  Juan Carlos Wong Sr  Subjective :   Patient seen and examined this am at the bedside. Patient denies any acute overnight events. Patient denies N/V/F/C/SOB. Patient denies calf pain, thigh pain, chest pain.     S/P LEFT foot Delayed Primary Closure of wounds, including graft placement for skin void of ulcer       Objective     BP (!) 151/97   Pulse 82   Temp 98.2 °F (36.8 °C) (Oral)   Resp 16   Ht 1.753 m (5' 9.02\")   Wt 85.9 kg (189 lb 6 oz)   SpO2 95%   BMI 27.95 kg/m²      I/O:  Intake/Output Summary (Last 24 hours) at 6/26/2024 1300  Last data filed at 6/26/2024 1049  Gross per 24 hour   Intake 1267.55 ml   Output 1750 ml   Net -482.45 ml              Wt Readings from Last 3 Encounters:   06/26/24 85.9 kg (189 lb 6 oz)   06/11/24 81.2 kg (179 lb)   04/11/24 93 kg (205 lb)       LABS:    Recent Labs     06/25/24  0439 06/26/24  0509   WBC 8.6 6.9   HGB 9.2* 9.6*   HCT 27.1* 28.0*    187        Recent Labs     06/26/24  0509   *   K 4.2      CO2 22   PHOS 3.6   BUN 25*   CREATININE 2.3*      No results for input(s): \"PROT\", \"INR\", \"APTT\" in the last 72 hours.        LOWER EXTREMITY EXAMINATION    Dressing to LEFT LE intact. No strikethrough noted to the external dressing.    Vascular:  DP/PT pulses 1/4 b/l. As expected edema noted. No streaking, no lymphangitis.     Dermatological:  Wound closure of LEFT lateral foot including skin graft with bleeding and healthy edges to partial delayed primary closure    Neurological:  Epicritic sensation blunted b/l. Patient responds well to pain stimuli.     Musculoskeletal:  No pain on compression of the LEFT calf.     ANCILLARY STUDIES      ASSESSMENT/PLAN  1. S/P LEFT foot delayed Primary Closure of wounds with use of skin graft for fill to ulcer void of soft tissues. Definitive procedure has been performed and patient may plan for extended NON-weight to LEFT foot for period of 4-6 weeks      Natanael Alvares

## 2024-06-26 NOTE — CARE COORDINATION
06/26/24 1418   Avoidable Days   Community/External       SNF       Referrals sent today.     SHAUN left messages for the following providers:    Kareem Verduzco: [phone]. SHAUN left a message for Juan Pablo Goldberg in admissions today.     Anne Marie Wilson: [phone]. Meena in admissions stated that they have available beds and are in network with his insurance but they need to finish their clinical review. She will call us momentarily with a decision.     Respectfully submitted,    Neyda MAIER, VAZQUEZ  [facility] [place]   [phone]    Electronically signed by DARRION Bobby, TANIKA on 6/26/2024 at 2:18 PM

## 2024-06-26 NOTE — CARE COORDINATION
SW received phone calls back from [facility]areem Verduzco and [facility]. They can both accept.     Marilee Verduzco will be visiting within the hour to talk about their facility.     [facility] will be called by patient. He will talk with admissions directly.     SHAUN will return to bedside later today to find out what facility he wants to do his rehab.    Respectfully submitted,    Neyda MAIER, BRADLEYS  [facility]   [phone]    Electronically signed by DARRION Bobby, LAW on 6/26/2024 at 3:08 PM

## 2024-06-26 NOTE — PROGRESS NOTES
Physical Therapy  Facility/Department: [address] PROGRESSIVE CARE  Daily Treatment Note  NAME: Juan Carlos Wong Sr  : 1965  MRN: [id number]    Date of Service: 2024    Discharge Recommendations:  Subacute/Skilled Nursing Facility, Patient would benefit from continued therapy after discharge        Patient Diagnosis(es): The primary encounter diagnosis was Cellulitis of right upper arm. Diagnoses of Osteomyelitis of left foot, unspecified type (HCC), Cerebrovascular accident (CVA), unspecified mechanism (HCC), and Acute osteomyelitis of left foot (HCC) were also pertinent to this visit.    Assessment   Assessment: Pt able to complete squat-pivot transfer x 4 trials.  More difficulty pivoting to L, but able to maintain NWB LLE when moving to R.  Still unable to ambulate.  Continue to recommend low-moderate frequency therapy to improve strength, balance, endurance, independence with functional mobility tasks.    Juan Carlos Wong Sr scored a 14/24 on the AM-PAC short mobility form. Current research shows that an AM-PAC score of 17 or less is typically not associated with a discharge to the patient's home setting. Based on the patient's AM-PAC score and their current functional mobility deficits, it is recommended that the patient have 3-5 sessions per week of Physical Therapy at d/c to increase the patient's independence.  Please see assessment section for further patient specific details.    If patient discharges prior to next session this note will serve as a discharge summary.  Please see below for the latest assessment towards goals.     Activity Tolerance: Patient tolerated treatment well     Plan    Physical Therapy Plan  General Plan: 3-5 times per week  Current Treatment Recommendations: Strengthening;Balance training;Functional mobility training;Transfer training;Gait training;Endurance training;Patient/Caregiver education & training;Safety education & training;Equipment evaluation, education, &

## 2024-06-26 NOTE — PROGRESS NOTES
No acute events over night. VSS. No c/o of pain. Pt slept very well. Pt transferred from the chair back to the bed around midnight using the sera steady and the surgical shoe- Pt verbalized understanding about not bearing any weight on that L foot.   Samm Rasheed RN

## 2024-06-26 NOTE — CARE COORDINATION
The Plan for Transition of Care is related to the following treatment goals:     SNF placement at 3-5 times per week.    The Patient and/or patient representative was provided with a choice of provider and agrees   with the discharge plan. [x] Yes [] No    Freedom of choice list was provided with basic dialogue that supports the patient's individualized plan of care/goals, treatment preferences and shares the quality data associated with the providers. [x] Yes [] No     sent referrals to the following companies in order of location from patient's home (per his request)    [facility]    Respectfully submitted,    Neyda MAIER, VAZQUEZ  [facility]   [phone]    Electronically signed by DARRION Bobby, TANIKA on 6/26/2024 at 2:15 PM

## 2024-06-26 NOTE — CARE COORDINATION
SW placed phone call to [facility] and asked them to continue to follow his care once we find a nursing facility. He is NOT discharging home today.     Respectfully submitted,    Neyda MAIER, VAZQUEZ  [facility]   [phone]    Electronically signed by DARRION Bobby, LAW on 6/26/2024 at 2:23 PM

## 2024-06-26 NOTE — PROGRESS NOTES
Occupational Therapy      Attempted to see pt for OT tx. Pt on the phone and asking therapy to return later. Will follow up as schedule and pt condition permit.    Electronically signed by Suri Nova OT on 6/26/2024 at 11:22 AM

## 2024-06-26 NOTE — PROGRESS NOTES
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 Resting in bed on RA. Cr back to baseline. Na+ is adequate. S/p delayed primary closure of L foot yesterday.     ROS: Denies SOB.     PMFSH:  medications reviewed.    Physical Exam:    BP (!) 151/97   Pulse 82   Temp 98.2 °F (36.8 °C) (Oral)   Resp 16   Ht 1.753 m (5' 9.02\")   Wt 85.9 kg (189 lb 6 oz)   SpO2 95%   BMI 27.95 kg/m²       24HR INTAKE/OUTPUT:    Intake/Output Summary (Last 24 hours) at 6/26/2024 0902  Last data filed at 6/26/2024 0529  Gross per 24 hour   Intake 1027.55 ml   Output 1650 ml   Net -622.45 ml         Patient Vitals for the past 96 hrs (Last 3 readings):   Weight   06/26/24 0519 85.9 kg (189 lb 6 oz)   06/25/24 0432 85.7 kg (188 lb 15 oz)   06/24/24 0337 85.1 kg (187 lb 9.8 oz)         General: awake, alert, NAD, Obese  Chest: diminished to auscultation, no intercostal retractions  CVS: RRR, no murmur, no rub  Abdomen: soft, non tender  Extremities: trace edema to BLE, wound to R upper arm  Skin: normal texture, normal skin turgor, no rash  Psych: A&O x3    LAB DATA:    CBC:   Lab Results   Component Value Date/Time    WBC 6.9 06/26/2024 05:09 AM    RBC 3.20 06/26/2024 05:09 AM    HGB 9.6 06/26/2024 05:09 AM    HCT 28.0 06/26/2024 05:09 AM    MCV 87.6 06/26/2024 05:09 AM    MCH 29.9 06/26/2024 05:09 AM    MCHC 34.1 06/26/2024 05:09 AM    RDW 14.8 06/26/2024 05:09 AM     06/26/2024 05:09 AM    MPV 8.5 06/26/2024 05:09 AM     BMP:    Lab Results   Component Value Date/Time     06/26/2024 05:09 AM    K 4.2 06/26/2024 05:09 AM    K 3.6 06/12/2024 05:18 AM     06/26/2024 05:09 AM    CO2 22 06/26/2024 05:09 AM    BUN 25 06/26/2024 05:09 AM    CREATININE 2.3 06/26/2024 05:09 AM    CALCIUM 8.0 06/26/2024 05:09 AM    GFRAA >60 01/03/2017 09:36 PM    LABGLOM 31 06/26/2024 05:09 AM    LABGLOM  delayed primary closure 6/25/2024    Hypertension              - BP controlled overall              - Continue current antihypertensives    CVA with expressive aphasia              - Neurology following     CKD-MBD              - Monitor phos     Anemia              - Hgb stable   - Iron is adequate   - Will give weekly DESHAUN    Will discuss with nephrology attending physician, Dr. Simmons.  See attestation for additional recommendations.    Greer Fernández, CHANELLE - CNP

## 2024-06-26 NOTE — CARE COORDINATION
HENS completed for [facility]. Document ID number is [id number]     A copy will be placed on his soft chart momentarily.     Respectfully submitted,    Neyda MAIER, VAZQUEZ  [facility]   [phone]    Electronically signed by DARRION Bobby, TANIKA on 6/26/2024 at 3:42 PM

## 2024-06-26 NOTE — CARE COORDINATION
SW received phone call back from Juan Pablo at [facility] and he is pulling his clinical info now. He will do an onsite visit within the hour and let us know if they are able to accept shortly after his arrival.     Respectfully submitted,    Neyda MAIER, VAZQUEZ  [facility]   [phone]    Electronically signed by DARRION Bobby, LSW on 6/26/2024 at 2:46 PM

## 2024-06-27 VITALS
TEMPERATURE: 97.8 F | RESPIRATION RATE: 18 BRPM | BODY MASS INDEX: 28.05 KG/M2 | DIASTOLIC BLOOD PRESSURE: 71 MMHG | WEIGHT: 189.38 LBS | SYSTOLIC BLOOD PRESSURE: 126 MMHG | HEIGHT: 69 IN | HEART RATE: 68 BPM | OXYGEN SATURATION: 99 %

## 2024-06-27 PROBLEM — A49.8 KLEBSIELLA INFECTION: Status: ACTIVE | Noted: 2024-06-27

## 2024-06-27 PROBLEM — A49.8 ENTEROCOCCUS FAECALIS INFECTION: Status: ACTIVE | Noted: 2024-06-27

## 2024-06-27 LAB
ALBUMIN SERPL-MCNC: 2.1 G/DL (ref 3.4–5)
ANION GAP SERPL CALCULATED.3IONS-SCNC: 7 MMOL/L (ref 3–16)
BUN SERPL-MCNC: 24 MG/DL (ref 7–20)
CALCIUM SERPL-MCNC: 8 MG/DL (ref 8.3–10.6)
CHLORIDE SERPL-SCNC: 107 MMOL/L (ref 99–110)
CO2 SERPL-SCNC: 22 MMOL/L (ref 21–32)
CREAT SERPL-MCNC: 2.3 MG/DL (ref 0.8–1.3)
DEPRECATED RDW RBC AUTO: 14.8 % (ref 12.4–15.4)
GFR SERPLBLD CREATININE-BSD FMLA CKD-EPI: 31 ML/MIN/{1.73_M2}
GLUCOSE BLD-MCNC: 174 MG/DL (ref 70–99)
GLUCOSE BLD-MCNC: 229 MG/DL (ref 70–99)
GLUCOSE SERPL-MCNC: 242 MG/DL (ref 70–99)
HCT VFR BLD AUTO: 29 % (ref 40.5–52.5)
HGB BLD-MCNC: 9.8 G/DL (ref 13.5–17.5)
MCH RBC QN AUTO: 29.7 PG (ref 26–34)
MCHC RBC AUTO-ENTMCNC: 33.6 G/DL (ref 31–36)
MCV RBC AUTO: 88.2 FL (ref 80–100)
PERFORMED ON: ABNORMAL
PERFORMED ON: ABNORMAL
PHOSPHATE SERPL-MCNC: 3.1 MG/DL (ref 2.5–4.9)
PLATELET # BLD AUTO: 207 K/UL (ref 135–450)
PMV BLD AUTO: 9.1 FL (ref 5–10.5)
POTASSIUM SERPL-SCNC: 4.1 MMOL/L (ref 3.5–5.1)
RBC # BLD AUTO: 3.29 M/UL (ref 4.2–5.9)
SODIUM SERPL-SCNC: 136 MMOL/L (ref 136–145)
WBC # BLD AUTO: 6.9 K/UL (ref 4–11)

## 2024-06-27 PROCEDURE — 6370000000 HC RX 637 (ALT 250 FOR IP): Performed by: INTERNAL MEDICINE

## 2024-06-27 PROCEDURE — 80069 RENAL FUNCTION PANEL: CPT

## 2024-06-27 PROCEDURE — 6370000000 HC RX 637 (ALT 250 FOR IP)

## 2024-06-27 PROCEDURE — 6360000002 HC RX W HCPCS: Performed by: INTERNAL MEDICINE

## 2024-06-27 PROCEDURE — 36415 COLL VENOUS BLD VENIPUNCTURE: CPT

## 2024-06-27 PROCEDURE — 2580000003 HC RX 258: Performed by: INTERNAL MEDICINE

## 2024-06-27 PROCEDURE — 85027 COMPLETE CBC AUTOMATED: CPT

## 2024-06-27 PROCEDURE — 6360000002 HC RX W HCPCS

## 2024-06-27 PROCEDURE — 94760 N-INVAS EAR/PLS OXIMETRY 1: CPT

## 2024-06-27 PROCEDURE — 97530 THERAPEUTIC ACTIVITIES: CPT

## 2024-06-27 RX ADMIN — HYDRALAZINE HYDROCHLORIDE 10 MG: 10 TABLET, FILM COATED ORAL at 13:35

## 2024-06-27 RX ADMIN — OXYCODONE HYDROCHLORIDE AND ACETAMINOPHEN 2 TABLET: 5; 325 TABLET ORAL at 01:12

## 2024-06-27 RX ADMIN — HYDRALAZINE HYDROCHLORIDE 10 MG: 10 TABLET, FILM COATED ORAL at 09:38

## 2024-06-27 RX ADMIN — OXYCODONE HYDROCHLORIDE AND ACETAMINOPHEN 2 TABLET: 5; 325 TABLET ORAL at 13:38

## 2024-06-27 RX ADMIN — PANTOPRAZOLE SODIUM 40 MG: 40 TABLET, DELAYED RELEASE ORAL at 06:20

## 2024-06-27 RX ADMIN — INSULIN LISPRO 3 UNITS: 100 INJECTION, SOLUTION INTRAVENOUS; SUBCUTANEOUS at 13:34

## 2024-06-27 RX ADMIN — ERTAPENEM SODIUM 1000 MG: 1 INJECTION INTRAMUSCULAR; INTRAVENOUS at 09:46

## 2024-06-27 RX ADMIN — AMOXICILLIN 500 MG: 250 CAPSULE ORAL at 09:38

## 2024-06-27 RX ADMIN — ENOXAPARIN SODIUM 30 MG: 100 INJECTION SUBCUTANEOUS at 09:38

## 2024-06-27 RX ADMIN — INSULIN LISPRO 3 UNITS: 100 INJECTION, SOLUTION INTRAVENOUS; SUBCUTANEOUS at 09:39

## 2024-06-27 RX ADMIN — GABAPENTIN 300 MG: 300 CAPSULE ORAL at 09:38

## 2024-06-27 RX ADMIN — INSULIN LISPRO 1 UNITS: 100 INJECTION, SOLUTION INTRAVENOUS; SUBCUTANEOUS at 09:39

## 2024-06-27 RX ADMIN — CARVEDILOL 6.25 MG: 6.25 TABLET, FILM COATED ORAL at 09:38

## 2024-06-27 ASSESSMENT — PAIN DESCRIPTION - ORIENTATION
ORIENTATION: LEFT
ORIENTATION: RIGHT

## 2024-06-27 ASSESSMENT — PAIN SCALES - GENERAL
PAINLEVEL_OUTOF10: 8
PAINLEVEL_OUTOF10: 9
PAINLEVEL_OUTOF10: 0

## 2024-06-27 ASSESSMENT — PAIN DESCRIPTION - LOCATION
LOCATION: SHOULDER
LOCATION: ARM

## 2024-06-27 ASSESSMENT — PAIN DESCRIPTION - DESCRIPTORS: DESCRIPTORS: ACHING

## 2024-06-27 ASSESSMENT — PAIN SCALES - WONG BAKER: WONGBAKER_NUMERICALRESPONSE: NO HURT

## 2024-06-27 NOTE — PROGRESS NOTES
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 Resting in bed on RA. Cr remains at baseline. Na+ is adequate. S/p delayed primary closure of L foot 6/25/2024. Being discharged to [facility] today.     ROS: Denies SOB.     PMFSH:  medications reviewed.    Physical Exam:    /71   Pulse 68   Temp 97.8 °F (36.6 °C) (Oral)   Resp 18   Ht 1.753 m (5' 9.02\")   Wt 85.9 kg (189 lb 6 oz)   SpO2 99%   BMI 27.95 kg/m²       24HR INTAKE/OUTPUT:    Intake/Output Summary (Last 24 hours) at 6/27/2024 1515  Last data filed at 6/27/2024 1153  Gross per 24 hour   Intake 240 ml   Output 1600 ml   Net -1360 ml         Patient Vitals for the past 96 hrs (Last 3 readings):   Weight   06/26/24 0519 85.9 kg (189 lb 6 oz)   06/25/24 0432 85.7 kg (188 lb 15 oz)   06/24/24 0337 85.1 kg (187 lb 9.8 oz)         General: awake, alert, NAD, Obese  Chest: diminished to auscultation, no intercostal retractions  CVS: RRR, no murmur, no rub  Abdomen: soft, non tender  Extremities: trace edema to BLE, wound to R upper arm  Skin: normal texture, normal skin turgor, no rash  Psych: A&O x3    LAB DATA:    CBC:   Lab Results   Component Value Date/Time    WBC 6.9 06/27/2024 05:08 AM    RBC 3.29 06/27/2024 05:08 AM    HGB 9.8 06/27/2024 05:08 AM    HCT 29.0 06/27/2024 05:08 AM    MCV 88.2 06/27/2024 05:08 AM    MCH 29.7 06/27/2024 05:08 AM    MCHC 33.6 06/27/2024 05:08 AM    RDW 14.8 06/27/2024 05:08 AM     06/27/2024 05:08 AM    MPV 9.1 06/27/2024 05:08 AM     BMP:    Lab Results   Component Value Date/Time     06/27/2024 05:08 AM    K 4.1 06/27/2024 05:08 AM    K 3.6 06/12/2024 05:18 AM     06/27/2024 05:08 AM    CO2 22 06/27/2024 05:08 AM    BUN 24 06/27/2024 05:08 AM    CREATININE 2.3 06/27/2024 05:08 AM    CALCIUM 8.0 06/27/2024 05:08 AM    GFRAA >60 01/03/2017 09:36 PM

## 2024-06-27 NOTE — PLAN OF CARE
Problem: Discharge Planning  Goal: Discharge to home or other facility with appropriate resources  6/26/2024 2306 by Nery Tovar RN  Outcome: Progressing  6/26/2024 1137 by Dann Iglesias RN  Outcome: Progressing  Flowsheets (Taken 6/22/2024 2000 by Meche Levy, RN)  Discharge to home or other facility with appropriate resources:   Identify barriers to discharge with patient and caregiver   Identify discharge learning needs (meds, wound care, etc)   Arrange for interpreters to assist at discharge as needed   Refer to discharge planning if patient needs post-hospital services based on physician order or complex needs related to functional status, cognitive ability or social support system   Arrange for needed discharge resources and transportation as appropriate     Problem: Safety - Adult  Goal: Free from fall injury  6/26/2024 2306 by Nery Tovar RN  Outcome: Progressing  6/26/2024 1137 by Dann Iglesias RN  Outcome: Progressing  Flowsheets (Taken 6/21/2024 1042 by Kayy Lizama, RN)  Free From Fall Injury: Instruct family/caregiver on patient safety     Problem: ABCDS Injury Assessment  Goal: Absence of physical injury  Outcome: Progressing     Problem: Chronic Conditions and Co-morbidities  Goal: Patient's chronic conditions and co-morbidity symptoms are monitored and maintained or improved  Outcome: Progressing     Problem: Infection - Adult  Goal: Absence of infection at discharge  6/26/2024 2306 by Nery Tovar RN  Outcome: Progressing  6/26/2024 1137 by Dann Iglesias RN  Outcome: Progressing  Flowsheets (Taken 6/22/2024 2000 by Meche Levy, RN)  Absence of infection at discharge:   Monitor lab/diagnostic results   Assess and monitor for signs and symptoms of infection   Monitor all insertion sites i.e., indwelling lines, tubes and drains   Administer medications as ordered   [place] appropriate cooling/warming therapies per order   Monitor endotracheal (as able) and  Musculoskeletal - Adult  Goal: Return mobility to safest level of function  Outcome: Progressing  Goal: Return ADL status to a safe level of function  Outcome: Progressing     Problem: Gastrointestinal - Adult  Goal: Maintains adequate nutritional intake  Outcome: Progressing     Problem: Genitourinary - Adult  Goal: Absence of urinary retention  Outcome: Progressing     Problem: Hematologic - Adult  Goal: Maintains hematologic stability  Outcome: Progressing

## 2024-06-27 NOTE — PROGRESS NOTES
Discharge instructions given to patient, IV and telemetry removed, midline left in place to continue antibiotics, bedside handoff given to EMS, multiple attempts made to call report to [facility] without answer. Discharged to SNF in stable condition with all belongings via [facility].  Hernan Call RN  6/27/2024

## 2024-06-27 NOTE — PROGRESS NOTES
V2.0    [facility] [place]      Name:  Juan Carlos Wong Sr /Age/Sex: 1965  (59 y.o. male)   MRN & CSN:  [id number] & [id number] Encounter Date/Time: 2024 9:35 AM EDT   Location:  [id number] PCP: Idris Laws MD     Attending:Reji Prabhakar MD       Hospital Day: 17    Assessment and Recommendations   Juan Carlos Wong Sr is a 59 y.o. male who presents with Cellulitis and abscess of upper extremity      Plan:     Sepsis with white count 14 heart rate 97 on admission, due to cellulitis and abscess of right shoulder and osteomyelitis and cellulitis of left foot initially on IV Zyvox, Flagyl, and cefepime ID following post I&D, culture MSSA, antibiotics changed to Ancef continue to be on both Ancef and Flagyl at this time Flagyl changed to p.o. status post left resection of fifth metatarsal on  and next closure.  Wound culture from foot positive for Klebsiella and Enterococcus antibiotics changed from Ancef to cefepime and then changed to ertapenem per ID continue ertapenem after discharge.  Discussed with  yesterday.  Patient continued to feel okay denies any fever chills nausea or vomiting today  Cellulitis and abscess right shoulder status post I&D discussed with surgery.  Continue IV antibiotics no further intervention planned at this time.  Appreciate surgical input  Episode of hypothermia and hypoglycemia, resolved  Aphasia, abrupt onset, associated with encephalopathy, imaging positive for potential hemorrhage and evolving left frontal lobe cortical infarct, reported waxing and waning symptoms per nursing staff.  Per neurology this still could be recrudescence.  Neurology recommended to hold DAPT For 14 days from the date of symptoms started stroke alert was called on Sadie 15 so DAPT should be started on .  Further renewal of medications and adjustment per PCP and neurology after follow-up  Osteomyelitis and cellulitis of left foot and diabetic foot  03/27/2024 08:03 PM    LABCAST SEE NOTES 05/09/2024 09:17 AM    WBCUA 1 06/11/2024 04:48 PM    RBCUA 0-2 06/11/2024 04:48 PM    RBCUA 1+ (0.06-0.1 mg/dL) 05/09/2024 09:17 AM    MUCUS 1+ 03/27/2024 08:03 PM    BACTERIA None Seen 06/11/2024 04:48 PM    CLARITYU Clear 06/11/2024 04:48 PM    LEUKOCYTESUR Negative 06/11/2024 04:48 PM    UROBILINOGEN 1.0 06/11/2024 04:48 PM    BILIRUBINUR Negative 06/11/2024 04:48 PM    BLOODU MODERATE 06/11/2024 04:48 PM    GLUCOSEU 500 06/11/2024 04:48 PM    KETUA Negative 06/11/2024 04:48 PM     Urine Cultures:   Lab Results   Component Value Date/Time    LABURIN No growth at 18 to 36 hours 03/28/2024 02:33 PM     Blood Cultures:   Lab Results   Component Value Date/Time    BC No Growth after 4 days of incubation. 06/20/2024 05:05 PM     Lab Results   Component Value Date/Time    BLOODCULT2 No Growth after 4 days of incubation. 06/20/2024 05:10 PM     Organism:   Lab Results   Component Value Date/Time    ORG Klebsiella pneumoniae 06/21/2024 01:27 PM    ORG Enterobacter cloacae complex 06/21/2024 01:27 PM    ORG Enterococcus faecalis 06/21/2024 01:27 PM    ORG Prevotella buccae 06/21/2024 01:27 PM         Electronically signed by Reji Prabhakar MD on 6/27/2024 at 9:35 AM  Comment: Please note this report has been produced using speech recognition software and may contain errors related to that system including errors in grammar, punctuation, and spelling, as well as words and phrases that may be inappropriate. If there are any questions or concerns, please feel free to contact the dictating provider for clarification.

## 2024-06-27 NOTE — CARE COORDINATION
DISCHARGE PLANNING:    DC plan to [facility].  Precert pending per admission liaison.  Admission liaison will notify CM when precert is obtained. Will need HENS and transport once precert obtained.  IV atb at [place], PICC in place.    #[phone]  Electronically signed by Marianne Live RN on 6/27/2024 at 11:37 AM

## 2024-06-27 NOTE — PROGRESS NOTES
Occupational Therapy  Facility/Department: [address] PROGRESSIVE CARE  Occupational Therapy Daily Assessment    Name: Juan Carlos Wong Sr  : 1965  MRN: [id number]  Date of Service: 2024    Discharge Recommendations:  Patient would benefit from continued therapy after discharge, 3-5 sessions per week  OT Equipment Recommendations  Other: defer to [place] facility     Juan Carlos Wong Sr scored a 15/24 on the AM-PAC ADL Inpatient form. Current research shows that an AM-PAC score of 17 or less is typically not associated with a discharge to the patient's home setting. Based on the patient's AM-PAC score and their current ADL deficits, it is recommended that the patient have 3-5 sessions per week of Occupational Therapy at d/c to increase the patient's independence.  Please see assessment section for further patient specific details.    If patient discharges prior to next session this note will serve as a discharge summary.  Please see below for the latest assessment towards goals.      Patient Diagnosis(es): The primary encounter diagnosis was Cellulitis of right upper arm. Diagnoses of Osteomyelitis of left foot, unspecified type (HCC), Cerebrovascular accident (CVA), unspecified mechanism (HCC), Acute osteomyelitis of left foot (HCC), Coronary artery disease of native artery of native heart with stable angina pectoris (HCC), and Type 2 diabetes mellitus with hyperglycemia, with long-term current use of insulin (HCC) were also pertinent to this visit.  Past Medical History:  has a past medical history of Back pain, Diabetes mellitus (HCC), and Pneumonia.  Past Surgical History:  has a past surgical history that includes liver biopsy; Leg Surgery (Left); Colonoscopy; Arm Surgery (Right, 2024); Toe amputation (Left, 2024); and Foot Debridement (Left, 2024).    Treatment Diagnosis: Decreased: ADLs, functional transfers/mobility      Assessment   Performance deficits / Impairments: Decreased functional  right shoulder and diabetic foot infection on the left foot General surgery ID consulted status post I&D continue to be on IV antibiotics, had an event of abrupt onset aphasia on Sadie 15 code stroke activated some findings on the imaging with potential changes neurology consulted and following \" CT scan showed L frontal lobe lesion w/ some petechial hemorrhage, possibly a late subacute finding. 6/21 s/p 5th ray resection with further plans to return to OR for closure. 6/25 DELAYED PRIMARY CLOSURE OF LEFT FOOT WITH GRAFT APPLICATION  Family / Caregiver Present: No  Referring Practitioner: Reji Prabhakar MD, Opal Esteves DPM  Subjective  Subjective: Pt seen bedside and agreeable to therapy.  General Comment  Comments: Per RN, OK to see     Social/Functional History  Social/Functional History  Lives With: Alone  Type of Home: Apartment (6th floor apartment)  Home Layout: One level  Home Access: Elevator  Bathroom Shower/Tub: Tub/Shower unit, Shower chair with back  Bathroom Toilet: Standard  Bathroom Equipment: Grab bars in shower, Grab bars around toilet, Shower chair  Home Equipment: Cane, Walker - Rolling, Wheelchair - Manual (transport chair)  ADL Assistance: Needs assistance (PRN assist from daughter)  Homemaking Assistance: Needs assistance (daughter does grocery shopping and laundry and cleaning; pt does light meal prep)  Homemaking Responsibilities: No  Ambulation Assistance: Independent (no AD but reports furniture walks in home)  Transfer Assistance: Independent  Active : No  Occupation: On disability  Leisure & Hobbies: fishing  Additional Comments: 6/22 pt reported \"I have plenty of help around when I go home\" (reported multi family members available to assist?       Objective   Safety Devices  Type of Devices: All fall risk precautions in place;Call light within reach;Chair alarm in place;Left in chair;Nurse notified;Gait belt           ADL  Functional Mobility Skilled Clinical Factors: Pt

## 2024-06-27 NOTE — PROGRESS NOTES
Infectious Diseases Inpatient Progress Note    CHIEF COMPLAINT:     Right shoulder abscess  Right upper extremity cellulitis  Left foot osteomyelitis  Diabetes poor control  Chronic kidney disease stage IIIb  CVA -         HISTORY OF PRESENT ILLNESS:  59 y.o. man with a significant history for diabetes, chronic kidney disease stage IIIb, coronary artery disease, congestive heart failure, diabetes with neuropathy, peripheral vascular disease admitted to the hospital secondary to left diabetic foot ulcer.  Patient was seen by me recently when admitted in April for left foot infection was treated with IV antibiotic followed by oral antibiotic.  Now x-ray indicating progression to osteomyelitis of the left fifth metatarsal base.  He was also found to have cellulitis of the right shoulder was evaluated by general surgery given the progression to possible abscess formation there is a plan for incision and drainage.  Given the need for IV antibiotics we are consulted for recommendations.  Blood culture in process left foot wound culture in process, labs indicate creatinine 2.6 sodium 124 procalcitonin 0.21 hemoglobin A1c 11.4 with poor diabetes control WBC 14.8 hemoglobin 10.3      Interval history: Status post left foot surgery for osteomyelitis, OR cx with Klebsiella pneumonia and Enterobacter and Enterococcus. Bone path with osteomyelitis noted and Mid line placed for IV abx       Past Medical History:    Past Medical History:   Diagnosis Date    Back pain     Diabetes mellitus (HCC)     Pneumonia        Past Surgical History:    Past Surgical History:   Procedure Laterality Date    ARM SURGERY Right 6/12/2024    INCISION AND DRAINAGE RIGHT PROXIMAL ARM ABSCESS performed by Dann Nuñez MD at [facility] OR    COLONOSCOPY      FOOT DEBRIDEMENT Left 6/25/2024    DELAYED PRIMARY CLOSURE OF LEFT FOOT WITH GRAFT APPLICATION performed by Natanael Alvares DPM at [facility] OR    LEG SURGERY Left     LIVER BIOPSY      TOE  perflutren lipid microspheres, melatonin, potassium chloride **OR** potassium alternative oral replacement **OR** potassium chloride, oxyCODONE-acetaminophen **OR** oxyCODONE-acetaminophen, morphine **OR** morphine, cyclobenzaprine, sodium chloride flush, sodium chloride, ondansetron **OR** ondansetron, polyethylene glycol, acetaminophen **OR** acetaminophen, glucose, dextrose bolus **OR** dextrose bolus, glucagon (rDNA), dextrose    Imaging:   XR FOOT LEFT (MIN 3 VIEWS)   Final Result   Postsurgical changes as described with no complicating features evident.         FLUORO FOR SURGICAL PROCEDURES   Final Result      CT HEAD WO CONTRAST   Final Result   Evolving left frontal lobe cortical infarct with associated petechial   hemorrhage. No significant mass effect or midline shift.         CTA HEAD NECK W CONTRAST   Final Result   1. Unremarkable CTA of the head and neck.   2. Moderate bilateral pleural effusions, incompletely imaged.   These results were sent to Dr. Neff at 12:58 p.m. on 06/15/2024.         MRI BRAIN WO CONTRAST   Final Result   Late subacute left frontal lobe cortical infarct with associated petechial   hemorrhage. No significant mass effect or midline shift.         CT HEAD WO CONTRAST   Final Result   1. Findings concerning for acute ischemic change involving the left frontal   lobe.  Recommend further characterization with a brain MRI.   2. No acute intracranial hemorrhage or mass.  No mass effect or midline shift   is seen.   Findings were discussed with CHERI BYRNE at 1007 hours on 6/15/2024.         CT HUMERUS RIGHT WO CONTRAST   Final Result   Diffuse edema and skin thickening overlying the proximal to mid right arm   suggestive of cellulitis. No discrete fluid collection.      Advanced acromioclavicular and moderate glenohumeral joint osteoarthritis.         XR FOOT LEFT (MIN 3 VIEWS)   Final Result   1. Findings suggestive of osteomyelitis involving the base of the 5th

## 2024-06-27 NOTE — CARE COORDINATION
Case Management Discharge Note          Date / Time of Note: 6/27/2024 11:59 AM                  Patient Name: Juan Carlos Wong Sr   YOB: 1965  Diagnosis: Cellulitis of right upper arm [L03.113]  Osteomyelitis of left foot, unspecified type (HCC) [M86.9]  Cellulitis and abscess of upper extremity [L03.119, L02.419]   Date / Time: 6/11/2024 12:03 PM    Financial:  Payor: JOSE RUSSO / Plan: JOSE JUNIOR [place] DUAL / Product Type: *No Product type* /      Pharmacy:    [facility]/pharmacy #[id number] - [place], [place] - [address] - P [phone] - F [phone] [address] [address]  Phone: [phone] Fax: [phone]      Assistance purchasing medications?: Potential Assistance Purchasing Medications: No  Assistance provided by Case Management: None at this time    DISCHARGE Disposition: Skilled Nursing Facility (SNF)    Nursing Facility:   Discharging to Facility/ Agency   Name:  [facility]  Address:  [address] [place] [address]   Phone:  [phone]  Fax:  [phone]     LOC at discharge: Skilled Nursing  BRUNO Completed: Yes              Notification completed in HENS/PAS?:  Yes : CM has completed HENS online through secure website for SNF admission at [facility].   Document ID #: [id number]    Transportation:  Transportation PLAN for discharge: EMS transportation   Mode of Transport: Ambulance stretcher - BLS  Reason for medical transport: Bed confined: Meets the following criteria 1) unable to get out of bed without assistance or ambulate, 2) unable to safely sit up in a wheelchair, 3) unable to maintain erect seating position in a chair for time needed for transport  Name of Transport Company: [employer]  Phone: [phone]  Time of Transport: 2:30 pm    Transport form completed: Yes    IMM Completed:   Yes, Case management has presented and reviewed IMM letter #2.       IMM Letter date given:: 06/27/24  IMM Letter time given::

## 2024-06-28 ENCOUNTER — TELEPHONE (OUTPATIENT)
Dept: INFECTIOUS DISEASES | Age: 59
End: 2024-06-28

## 2024-06-28 ENCOUNTER — TELEPHONE (OUTPATIENT)
Dept: INTERNAL MEDICINE CLINIC | Age: 59
End: 2024-06-28

## 2024-06-28 DIAGNOSIS — M86.9 OSTEOMYELITIS OF LEFT FOOT, UNSPECIFIED TYPE (HCC): Primary | ICD-10-CM

## 2024-06-28 RX ORDER — AMOXICILLIN 500 MG/1
500 CAPSULE ORAL 2 TIMES DAILY
Qty: 20 CAPSULE | Refills: 0 | Status: CANCELLED
Start: 2024-06-28 | End: 2024-07-10

## 2024-06-28 NOTE — TELEPHONE ENCOUNTER
Care Transitions Initial Follow Up Call    Outreach made within 2 business days of discharge: Yes    Patient: Juan Carlos Wong Sr Patient : 1965   MRN: [id number]  Reason for Admission: There are no discharge diagnoses documented for the most recent discharge.  Discharge Date: 24       Spoke with: Daughter     Discharge department/facility: [facility] Interactive Patient Contact:  Was patient able to fill all prescriptions: Yes  Was patient instructed to bring all medications to the follow-up visit: Yes  Is patient taking all medications as directed in the discharge summary? Yes  Does patient understand their discharge instructions: Yes  Does patient have questions or concerns that need addressed prior to 7-14 day follow up office visit: no    Scheduled appointment with PCP within 7-14 days    Follow Up  Future Appointments   Date Time Provider Department Center   2024  3:00 PM Ronaldo Malave MD [facility]   7/10/2024  8:30 AM Idris Laws MD [facility] - [facility] Desai MA

## 2024-06-28 NOTE — TELEPHONE ENCOUNTER
Spoke with nurse at facility and verified IV abx orders and weekly labs needed.  She verbalized understanding

## 2024-06-28 NOTE — TELEPHONE ENCOUNTER
Attempted to contact facility to verify OPAT orders as well as verify pt is also taking Amoxicillin  After  answered phone, and transferred me, the phone rang for 10 minutes with no amswer  I disconnected call due to other tasks needing completed

## 2024-07-01 LAB
FUNGUS SPEC CULT: NORMAL
LOEFFLER MB STN SPEC: NORMAL

## 2024-07-03 ENCOUNTER — TELEPHONE (OUTPATIENT)
Dept: INFECTIOUS DISEASES | Age: 59
End: 2024-07-03

## 2024-07-03 NOTE — TELEPHONE ENCOUNTER
Left message with  for weekly labs to be faxed.  She stated she would give message to nurse and gave fax number

## 2024-07-08 ENCOUNTER — TELEPHONE (OUTPATIENT)
Dept: INFECTIOUS DISEASES | Age: 59
End: 2024-07-08

## 2024-07-08 LAB
FUNGUS SPEC CULT: NORMAL
LOEFFLER MB STN SPEC: NORMAL

## 2024-07-08 NOTE — TELEPHONE ENCOUNTER
Received call from [facility]. Pt has been admitted since 7-1-24. They will follow pt at [facility].

## 2024-07-15 LAB
FUNGUS SPEC CULT: NORMAL
LOEFFLER MB STN SPEC: NORMAL

## 2024-07-22 LAB
FUNGUS SPEC CULT: NORMAL
LOEFFLER MB STN SPEC: NORMAL

## 2024-07-24 LAB
CHOLESTEROL, TOTAL: 96 MG/DL
CHOLESTEROL/HDL RATIO: ABNORMAL
HDLC SERPL-MCNC: 24 MG/DL (ref 35–70)
LDL CHOLESTEROL: 46
NONHDLC SERPL-MCNC: 72 MG/DL
TRIGL SERPL-MCNC: 131 MG/DL
VLDLC SERPL CALC-MCNC: ABNORMAL MG/DL

## 2024-07-25 ENCOUNTER — COMMUNITY OUTREACH (OUTPATIENT)
Dept: INTERNAL MEDICINE CLINIC | Age: 59
End: 2024-07-25

## 2024-07-25 NOTE — PROGRESS NOTES
Patient's HM shows they are overdue for Colorectal Screening.   Care Everywhere and  files searched.   updated with 2019 colonoscopy.

## 2024-07-26 LAB
LEFT VENTRICULAR EJECTION FRACTION HIGH VALUE: 35 %
LEFT VENTRICULAR EJECTION FRACTION MODE: NORMAL
LV EF: 30 %

## 2024-08-06 ENCOUNTER — TELEPHONE (OUTPATIENT)
Dept: INTERNAL MEDICINE CLINIC | Age: 59
End: 2024-08-06

## 2024-08-06 NOTE — TELEPHONE ENCOUNTER
Received refill request for Basaglar. Pt has a NTP appt scheduled at [facility] on 8/16/2024. Left message on voicemail for return call to see if he will need refill before then or if it can wait until he gets in with new provider

## 2024-08-06 NOTE — PROGRESS NOTES
Jammie Mazariegos presents today for   Chief Complaint   Patient presents with    Follow-up     Weight loss        Is someone accompanying this pt? no    Is the patient using any DME equipment during OV? no    Depression Screening:      10/6/2023     1:45 PM 6/2/2022     1:36 PM   PHQ-9 Questionaire   Little interest or pleasure in doing things 1 0   Feeling down, depressed, or hopeless 3 0   Trouble falling or staying asleep, or sleeping too much 1    Feeling tired or having little energy 3    Poor appetite or overeating 3    Feeling bad about yourself - or that you are a failure or have let yourself or your family down 0    Trouble concentrating on things, such as reading the newspaper or watching television 3    Moving or speaking so slowly that other people could have noticed. Or the opposite - being so fidgety or restless that you have been moving around a lot more than usual 0    Thoughts that you would be better off dead, or of hurting yourself in some way 0    PHQ-9 Total Score 14 0   If you checked off any problems, how difficult have these problems made it for you to do your work, take care of things at home, or get along with other people? 1        Fall Risk       No data to display                 Health Maintenance reviewed and discussed and ordered per Provider.    Health Maintenance Due   Topic Date Due    Hepatitis B vaccine (1 of 3 - 3-dose series) Never done    Varicella vaccine (1 of 2 - 2-dose childhood series) Never done    DTaP/Tdap/Td vaccine (1 - Tdap) Never done    COVID-19 Vaccine (3 - 2023-24 season) 09/01/2023    Flu vaccine (1) 08/01/2024   .        \"Have you been to the ER, urgent care clinic since your last visit?  Hospitalized since your last visit?\"    NO    “Have you seen or consulted any other health care providers outside of [facility] since your last visit?”    NO    tariq Nixon           Expert - P [phone] - F [phone]  [address] [facility] [address]      Phone: [phone]   Zepbound 5 MG/0.5ML Soraz Reaves, YOSVANY - ROXANN                          Disclaimer:    I have discussed the diagnosis with the patient and the intended plan as seen above.The patient understands our medical plan. The risks, benefits and significant side effects of all medications have been reviewed. Anticipated time course and progression of condition reviewed. All questions have been addressed.  She received an after visit summary, with information reviewed, and questions answered.      Where appropriate, she is instructed to call the clinic if she has not been notified either by phone or through [employer] with the results of her tests or with an appointment plan for any referrals within 1 week(s). The patient  is to call if her condition worsens or fails to improve or if significant side effects are experienced.       Sandra Reaves, APRN - CNP    day    insulin lispro  0-4 Units SubCUTAneous TID WC    insulin lispro  0-4 Units SubCUTAneous Nightly      Infusions:    sodium chloride Stopped (06/21/24 1407)    dextrose       PRN Meds: perflutren lipid microspheres, 1.5 mL, ONCE PRN  melatonin, 6 mg, Nightly PRN  potassium chloride, 40 mEq, PRN   Or  potassium alternative oral replacement, 40 mEq, PRN   Or  potassium chloride, 10 mEq, PRN  oxyCODONE-acetaminophen, 1 tablet, Q4H PRN   Or  oxyCODONE-acetaminophen, 2 tablet, Q4H PRN  morphine, 2 mg, Q2H PRN   Or  morphine, 4 mg, Q2H PRN  cyclobenzaprine, 5 mg, BID PRN  sodium chloride flush, 5-40 mL, PRN  sodium chloride, , PRN  ondansetron, 4 mg, Q8H PRN   Or  ondansetron, 4 mg, Q6H PRN  polyethylene glycol, 17 g, Daily PRN  acetaminophen, 650 mg, Q6H PRN   Or  acetaminophen, 650 mg, Q6H PRN  glucose, 4 tablet, PRN  dextrose bolus, 125 mL, PRN   Or  dextrose bolus, 250 mL, PRN  glucagon (rDNA), 1 mg, PRN  dextrose, , Continuous PRN        Labs and Imaging   XR FOOT LEFT (MIN 3 VIEWS)    Result Date: 6/22/2024  EXAMINATION: THREE XRAY VIEWS OF THE LEFT FOOT 6/22/2024 8:19 am COMPARISON: None. HISTORY: ORDERING SYSTEM PROVIDED HISTORY: s/p 5th ray resection TECHNOLOGIST PROVIDED HISTORY: Reason for exam:->s/p 5th ray resection Reason for Exam: resection of 5th met left foot FINDINGS: Postsurgical changes following transmetatarsal amputation of the 1st through 5th digits.  No complicating features evident.  Bones are osteopenic. Postoperative changes at the surgical site soft tissues.  Partially imaged distal tibial intramedullary nail with distal interlocking screws.     Postsurgical changes as described with no complicating features evident.     Echo (TTE) limited (PRN contrast/bubble/strain/3D)    Result Date: 6/21/2024    Left Ventricle: Moderately reduced left ventricular systolic function with a visually estimated EF of 35 - 40%. Left ventricle size is normal. Mildly increased wall thickness. Moderate global  recognition software and may contain errors related to that system including errors in grammar, punctuation, and spelling, as well as words and phrases that may be inappropriate. If there are any questions or concerns, please feel free to contact the dictating provider for clarification.

## 2024-08-07 ENCOUNTER — OFFICE VISIT (OUTPATIENT)
Dept: INTERNAL MEDICINE CLINIC | Age: 59
End: 2024-08-07
Payer: MEDICARE

## 2024-08-07 VITALS
SYSTOLIC BLOOD PRESSURE: 120 MMHG | HEART RATE: 84 BPM | WEIGHT: 174 LBS | DIASTOLIC BLOOD PRESSURE: 68 MMHG | BODY MASS INDEX: 25.68 KG/M2 | OXYGEN SATURATION: 97 %

## 2024-08-07 DIAGNOSIS — E11.65 TYPE 2 DIABETES MELLITUS WITH HYPERGLYCEMIA, WITH LONG-TERM CURRENT USE OF INSULIN (HCC): ICD-10-CM

## 2024-08-07 DIAGNOSIS — Z79.4 TYPE 2 DIABETES MELLITUS WITH HYPERGLYCEMIA, WITH LONG-TERM CURRENT USE OF INSULIN (HCC): ICD-10-CM

## 2024-08-07 DIAGNOSIS — Z01.818 PREOP EXAMINATION: Primary | ICD-10-CM

## 2024-08-07 PROCEDURE — G8427 DOCREV CUR MEDS BY ELIG CLIN: HCPCS | Performed by: STUDENT IN AN ORGANIZED HEALTH CARE EDUCATION/TRAINING PROGRAM

## 2024-08-07 PROCEDURE — 1036F TOBACCO NON-USER: CPT | Performed by: STUDENT IN AN ORGANIZED HEALTH CARE EDUCATION/TRAINING PROGRAM

## 2024-08-07 PROCEDURE — G8417 CALC BMI ABV UP PARAM F/U: HCPCS | Performed by: STUDENT IN AN ORGANIZED HEALTH CARE EDUCATION/TRAINING PROGRAM

## 2024-08-07 PROCEDURE — 3017F COLORECTAL CA SCREEN DOC REV: CPT | Performed by: STUDENT IN AN ORGANIZED HEALTH CARE EDUCATION/TRAINING PROGRAM

## 2024-08-07 RX ORDER — DULOXETIN HYDROCHLORIDE 30 MG/1
30 CAPSULE, DELAYED RELEASE ORAL DAILY
COMMUNITY
Start: 2024-08-06

## 2024-08-07 ASSESSMENT — ENCOUNTER SYMPTOMS
SHORTNESS OF BREATH: 0
ABDOMINAL PAIN: 0
EYE DISCHARGE: 0
EYES NEGATIVE: 1
GASTROINTESTINAL NEGATIVE: 1
RESPIRATORY NEGATIVE: 1
ALLERGIC/IMMUNOLOGIC NEGATIVE: 1

## 2024-08-07 NOTE — PROGRESS NOTES
Endocrine: Negative.    Genitourinary: Negative.    Musculoskeletal: Negative.    Skin: Negative.    Allergic/Immunologic: Negative.    Neurological: Negative.  Negative for dizziness and headaches.   Hematological: Negative.    Psychiatric/Behavioral: Negative.           Physical Exam  Vitals reviewed.   Constitutional:       Appearance: Normal appearance.   HENT:      Head: Normocephalic.   Eyes:      Extraocular Movements: Extraocular movements intact.      Pupils: Pupils are equal, round, and reactive to light.   Cardiovascular:      Rate and Rhythm: Normal rate.      Heart sounds: Normal heart sounds. No murmur heard.  Pulmonary:      Effort: Pulmonary effort is normal.      Breath sounds: Normal breath sounds.   Abdominal:      General: Bowel sounds are normal.      Palpations: Abdomen is soft.   Musculoskeletal:         General: Normal range of motion.   Skin:     General: Skin is warm.   Neurological:      General: No focal deficit present.      Mental Status: He is alert and oriented to person, place, and time. Mental status is at baseline.   Psychiatric:         Mood and Affect: Mood normal.           Assessment:       59 y.o. patient with planned surgery as above.      Extensive hospital course, 6/11-6/27/2024 admitted for sepsis due to cellulitis and abscess of the right shoulder and osteomyelitis and cellulitis of the left foot.  Also found to have aphasia, imaging revealed left frontal lobe infarct.  Was admitted again from 7/1/2024-7/10/2020 for for altered mental status and metabolic encephalopathy.  Patient noted to have acute on chronic left foot osteomyelitis.  Started on antibiotic, PICC line placed and discharged.  Hospital stay from 7/14 to 7/29/2022 for acute metabolic encephalopathy with facial hallucination, as a result of ertapenem, left foot osteomyelitis, acute urinary retention, acute on chronic kidney failure.      Known risk factors for perioperative complications: Congestive heart

## 2024-08-08 DIAGNOSIS — Z79.4 TYPE 2 DIABETES MELLITUS WITH HYPERGLYCEMIA, WITH LONG-TERM CURRENT USE OF INSULIN (HCC): ICD-10-CM

## 2024-08-08 DIAGNOSIS — E11.41 DIABETIC MONONEUROPATHY ASSOCIATED WITH TYPE 2 DIABETES MELLITUS (HCC): ICD-10-CM

## 2024-08-08 DIAGNOSIS — E11.65 TYPE 2 DIABETES MELLITUS WITH HYPERGLYCEMIA, WITH LONG-TERM CURRENT USE OF INSULIN (HCC): ICD-10-CM

## 2024-08-09 RX ORDER — ACYCLOVIR 400 MG/1
TABLET ORAL
Qty: 6 EACH | Refills: 1 | Status: SHIPPED | OUTPATIENT
Start: 2024-08-09

## 2024-08-09 RX ORDER — GLUCOSAMINE HCL/CHONDROITIN SU 500-400 MG
CAPSULE ORAL
Qty: 100 STRIP | Refills: 1 | Status: SHIPPED | OUTPATIENT
Start: 2024-08-09

## 2024-08-09 NOTE — TELEPHONE ENCOUNTER
Last appointment: 6/11/2024  Next appointment: 8/8/2024  Last refill: 1/15/24    Requested Prescriptions     Pending Prescriptions Disp Refills    Continuous Glucose Sensor (DEXCOM G7 SENSOR) MISC 6 each 1

## 2024-08-09 NOTE — TELEPHONE ENCOUNTER
Medication:   Requested Prescriptions     Pending Prescriptions Disp Refills    blood glucose monitor strips 100 strip 5     Sig: Test 2 times a day & as needed for symptoms of irregular blood glucose. Dispense sufficient amount for indicated testing frequency plus additional to accommodate PRN testing needs.       Last Filled:      Patient Phone Number: [phone] (home)     Last appt: 8/7/2024   Next appt: 8/16/2024  Future Appointments   Date Time Provider Department Center   8/16/2024 11:30 AM Gonzalo Valladares MD [facility]   8/27/2024  3:00 PM Gonzalo Valladares MD [facility]

## 2024-08-10 DIAGNOSIS — I10 ESSENTIAL HYPERTENSION: ICD-10-CM

## 2024-08-12 RX ORDER — HYDRALAZINE HYDROCHLORIDE 10 MG/1
10 TABLET, FILM COATED ORAL 3 TIMES DAILY
Qty: 270 TABLET | Refills: 0 | Status: SHIPPED | OUTPATIENT
Start: 2024-08-12

## 2024-08-12 NOTE — TELEPHONE ENCOUNTER
Medication:   Requested Prescriptions     Pending Prescriptions Disp Refills    hydrALAZINE (APRESOLINE) 10 MG tablet [Pharmacy Med Name: HYDRALAZINE 10 MG TABLET] 270 tablet 1     Sig: TAKE 1 TABLET BY MOUTH THREE TIMES A DAY       Last Filled:      Patient Phone Number: [phone] (home)     Last appt: 8/7/2024   Next appt: 8/16/2024  Future Appointments   Date Time Provider Department Center   8/16/2024 11:30 AM Gonzalo Valladares MD [facility]   8/27/2024  3:00 PM Gonzalo Valladares MD [facility] DE[facility]

## 2024-08-15 DIAGNOSIS — K21.9 GASTROESOPHAGEAL REFLUX DISEASE, UNSPECIFIED WHETHER ESOPHAGITIS PRESENT: ICD-10-CM

## 2024-08-15 RX ORDER — OMEPRAZOLE 20 MG/1
40 CAPSULE, DELAYED RELEASE ORAL DAILY
Qty: 30 CAPSULE | Refills: 0 | Status: SHIPPED | OUTPATIENT
Start: 2024-08-15

## 2024-08-15 NOTE — TELEPHONE ENCOUNTER
Future Appointments   Date Time Provider Department Center   8/16/2024 11:30 AM Gonzalo Valladares MD [facility] DE[facility]   8/27/2024  3:00 PM Gonzalo Valladares MD [facility] DE[facility]     Last appt 08/07/24

## 2024-08-27 ENCOUNTER — OFFICE VISIT (OUTPATIENT)
Dept: INTERNAL MEDICINE CLINIC | Age: 59
End: 2024-08-27
Payer: MEDICARE

## 2024-08-27 DIAGNOSIS — Z00.00 INITIAL MEDICARE ANNUAL WELLNESS VISIT: Primary | ICD-10-CM

## 2024-08-27 PROCEDURE — G0438 PPPS, INITIAL VISIT: HCPCS | Performed by: STUDENT IN AN ORGANIZED HEALTH CARE EDUCATION/TRAINING PROGRAM

## 2024-08-27 PROCEDURE — 3017F COLORECTAL CA SCREEN DOC REV: CPT | Performed by: STUDENT IN AN ORGANIZED HEALTH CARE EDUCATION/TRAINING PROGRAM

## 2024-08-27 ASSESSMENT — PATIENT HEALTH QUESTIONNAIRE - PHQ9
SUM OF ALL RESPONSES TO PHQ QUESTIONS 1-9: 0
SUM OF ALL RESPONSES TO PHQ9 QUESTIONS 1 & 2: 0
SUM OF ALL RESPONSES TO PHQ QUESTIONS 1-9: 0
SUM OF ALL RESPONSES TO PHQ QUESTIONS 1-9: 0
1. LITTLE INTEREST OR PLEASURE IN DOING THINGS: NOT AT ALL
2. FEELING DOWN, DEPRESSED OR HOPELESS: NOT AT ALL
SUM OF ALL RESPONSES TO PHQ QUESTIONS 1-9: 0

## 2024-08-27 NOTE — PATIENT INSTRUCTIONS
Advance Directives: Care Instructions  Overview  An advance directive is a legal way to state your wishes at the end of your life. It tells your family and your doctor what to do if you can't say what you want.  There are two main types of advance directives. You can change them any time your wishes change.  Living will.  This form tells your family and your doctor your wishes about life support and other treatment. The form is also called a declaration.  Medical power of .  This form lets you name a person to make treatment decisions for you when you can't speak for yourself. This person is called a health care agent (health care proxy, health care surrogate). The form is also called a durable power of  for health care.  If you do not have an advance directive, decisions about your medical care may be made by a family member, or by a doctor or a  who doesn't know you.  It may help to think of an advance directive as a gift to the people who care for you. If you have one, they won't have to make tough decisions by themselves.  For more information, including forms for your state, see the [employer] website (www.c[employer]info.org/planning/advance-directives/).  Follow-up care is a key part of your treatment and safety. Be sure to make and go to all appointments, and call your doctor if you are having problems. It's also a good idea to know your test results and keep a list of the medicines you take.  What should you include in an advance directive?  Many states have a unique advance directive form. (It may ask you to address specific issues.) Or you might use a universal form that's approved by many states.  If your form doesn't tell you what to address, it may be hard to know what to include in your advance directive. Use the questions below to help you get started.  Who do you want to make decisions about your medical care if you are not able to?  What life-support measures do you want if you  ask your healthcare professional. [employer] disclaims any warranty or liability for your use of this information.      Personalized Preventive Plan for Juan Carlos Wong Sr - 8/27/2024  Medicare offers a range of preventive health benefits. Some of the tests and screenings are paid in full while other may be subject to a deductible, co-insurance, and/or copay.    Some of these benefits include a comprehensive review of your medical history including lifestyle, illnesses that may run in your family, and various assessments and screenings as appropriate.    After reviewing your medical record and screening and assessments performed today your provider may have ordered immunizations, labs, imaging, and/or referrals for you.  A list of these orders (if applicable) as well as your Preventive Care list are included within your After Visit Summary for your review.    Other Preventive Recommendations:    A preventive eye exam performed by an eye specialist is recommended every 1-2 years to screen for glaucoma; cataracts, macular degeneration, and other eye disorders.  A preventive dental visit is recommended every 6 months.  Try to get at least 150 minutes of exercise per week or 10,000 steps per day on a pedometer .  Order or download the FREE \"Exercise & Physical Activity: Your Everyday Guide\" from [employer]. Call 1-168.331.1175 or search [employer] online.  You need 0647-7170 mg of calcium and 1423-6161 IU of vitamin D per day. It is possible to meet your calcium requirement with diet alone, but a vitamin D supplement is usually necessary to meet this goal.  When exposed to the sun, use a sunscreen that protects against both UVA and UVB radiation with an SPF of 30 or greater. Reapply every 2 to 3 hours or after sweating, drying off with a towel, or swimming.  Always wear a seat belt when traveling in a car. Always wear a helmet when riding a bicycle or motorcycle.

## 2024-08-27 NOTE — PROGRESS NOTES
Medicare Annual Wellness Visit    Juan Carlos Wong Sr is here for No chief complaint on file.    Assessment & Plan   Initial Medicare annual wellness visit  Recommendations for Preventive Services Due: see orders and patient instructions/AVS.  Recommended screening schedule for the next 5-10 years is provided to the patient in written form: see Patient Instructions/AVS.     No follow-ups on file.     Subjective       Patient's complete Health Risk Assessment and screening values have been reviewed and are found in Flowsheets. The following problems were reviewed today and where indicated follow up appointments were made and/or referrals ordered.    Positive Risk Factor Screenings with Interventions:       Cognitive:      Words recalled: 2 Words Recalled     Total Score Interpretation: Abnormal Mini-Cog  Interventions:  Patient advised to follow-up in this office for further evaluation and treatment               Dentist Screen:  Have you seen the dentist within the past year?: (!) No    Intervention:  Advised to schedule with their dentist        Advanced Directives:  Do you have a Living Will?: (!) No    Intervention:  Would like to discuss this with his daughter who helps him.                     Objective   There were no vitals filed for this visit.   There is no height or weight on file to calculate BMI.                  No Known Allergies  Prior to Visit Medications    Medication Sig Taking? Authorizing Provider   omeprazole (PRILOSEC) 20 MG delayed release capsule TAKE 2 CAPSULES BY MOUTH EVERY DAY  Idris Laws MD   hydrALAZINE (APRESOLINE) 10 MG tablet TAKE 1 TABLET BY MOUTH THREE TIMES A DAY  Idris Laws MD   Continuous Glucose Sensor (DEXCOM G7 SENSOR) MISC USE AS DIRECTED FOR CONTINUOUS GLUCOSE MONITORING  Idris Laws MD   blood glucose monitor strips Test 2 times a day & as needed for symptoms of irregular blood glucose. Dispense sufficient amount for indicated  testing frequency plus additional to accommodate PRN testing needs.  Idris Laws MD   DULoxetine (CYMBALTA) 30 MG extended release capsule Take 1 capsule by mouth daily  Serina Goins MD   insulin glargine (BASAGLAR KWIKPEN) 100 UNIT/ML injection pen Inject 5 Units into the skin nightly  Patient taking differently: Inject 25 Units into the skin nightly  Reji Prabhakar MD   insulin lispro (HUMALOG,ADMELOG) 100 UNIT/ML SOLN injection vial Inject 0-4 Units into the skin 3 times daily (with meals)  Reji Prabhakar MD   insulin lispro (HUMALOG,ADMELOG) 100 UNIT/ML SOLN injection vial Inject 3 Units into the skin 3 times daily (with meals)  Reji Prabhakar MD   atorvastatin (LIPITOR) 80 MG tablet Take 1 tablet by mouth at bedtime  Reji Prabhakar MD   carvedilol (COREG) 6.25 MG tablet Take 1 tablet by mouth 2 times daily (with meals)  Reji Prabhakar MD   clopidogrel (PLAVIX) 75 MG tablet Take 1 tablet by mouth daily  Reji Prabhakar MD   Tirzepatide (MOUNJARO) 5 MG/0.5ML SOPN SC injection Inject 0.5 mLs into the skin once a week  Idris Laws MD   gabapentin (NEURONTIN) 300 MG capsule Take 1 capsule by mouth 2 times daily for 180 days.  Patient not taking: Reported on 8/7/2024  Idris Laws MD   ondansetron (ZOFRAN-ODT) 4 MG disintegrating tablet Take 1 tablet by mouth 3 times daily as needed for Nausea or Vomiting  Idris Laws MD   ofloxacin (OCUFLOX) 0.3 % solution Place 1 drop into the right eye 4 times daily  Patient not taking: Reported on 6/13/2024  Serina Goins MD   polyethylene glycol (GLYCOLAX) 17 GM/SCOOP powder Take 17 g by mouth daily as needed  Serina Goins MD   prednisoLONE acetate (PRED FORTE) 1 % ophthalmic suspension Place 1 drop into the right eye 4 times daily  Serina Goins MD   furosemide (LASIX) 40 MG tablet Take 1 tablet by mouth daily  Ronaldo Malave MD   Continuous Blood Gluc

## 2024-08-28 DIAGNOSIS — L08.9 TYPE 2 DIABETES MELLITUS WITH DIABETIC FOOT INFECTION (HCC): Primary | ICD-10-CM

## 2024-08-28 DIAGNOSIS — I50.9 ACUTE ON CHRONIC CONGESTIVE HEART FAILURE, UNSPECIFIED HEART FAILURE TYPE (HCC): ICD-10-CM

## 2024-08-28 DIAGNOSIS — E11.628 TYPE 2 DIABETES MELLITUS WITH DIABETIC FOOT INFECTION (HCC): Primary | ICD-10-CM

## 2024-08-29 DIAGNOSIS — K21.9 GASTROESOPHAGEAL REFLUX DISEASE, UNSPECIFIED WHETHER ESOPHAGITIS PRESENT: ICD-10-CM

## 2024-08-29 NOTE — TELEPHONE ENCOUNTER
Medication:   Requested Prescriptions     Pending Prescriptions Disp Refills    omeprazole (PRILOSEC) 20 MG delayed release capsule [Pharmacy Med Name: OMEPRAZOLE DR 20 MG CAPSULE] 180 capsule 1     Sig: TAKE 2 CAPSULES BY MOUTH EVERY DAY       Last Filled:      Patient Phone Number: [phone] (home)     Last appt: 8/27/2024   Next appt: 9/17/2024  Future Appointments   Date Time Provider Department Center   9/17/2024 11:45 AM Gonzalo Valladares MD [facility] E[facility] DEP

## 2024-09-03 DIAGNOSIS — E11.65 TYPE 2 DIABETES MELLITUS WITH HYPERGLYCEMIA, WITH LONG-TERM CURRENT USE OF INSULIN (HCC): ICD-10-CM

## 2024-09-03 DIAGNOSIS — Z79.4 TYPE 2 DIABETES MELLITUS WITH HYPERGLYCEMIA, WITH LONG-TERM CURRENT USE OF INSULIN (HCC): ICD-10-CM

## 2024-09-03 RX ORDER — ACYCLOVIR 400 MG/1
TABLET ORAL
Qty: 2 EACH | Refills: 5 | Status: SHIPPED | OUTPATIENT
Start: 2024-09-03

## 2024-09-03 RX ORDER — GLUCOSAMINE HCL/CHONDROITIN SU 500-400 MG
CAPSULE ORAL
Qty: 100 STRIP | Refills: 1 | Status: SHIPPED | OUTPATIENT
Start: 2024-09-03

## 2024-09-03 RX ORDER — ACYCLOVIR 400 MG/1
1 TABLET ORAL DAILY
Qty: 1 EACH | Refills: 0 | OUTPATIENT
Start: 2024-09-03

## 2024-09-03 NOTE — TELEPHONE ENCOUNTER
Medication:   Requested Prescriptions     Pending Prescriptions Disp Refills    Continuous Glucose Sensor (DEXCOM G7 SENSOR) MISC 2 each 5     Sig: Use as directed    blood glucose monitor strips 100 strip 1     Sig: Test 2 times a day & as needed for symptoms of irregular blood glucose. Dispense sufficient amount for indicated testing frequency plus additional to accommodate PRN testing needs.       Last Filled:      Patient Phone Number: [phone] (home)     Last appt: 8/27/2024   Next appt: 9/17/2024  Future Appointments   Date Time Provider Department Center   9/17/2024 11:45 AM Gonzalo Valladares MD [facility] DEP

## 2024-09-16 ASSESSMENT — ENCOUNTER SYMPTOMS
VOMITING: 1
NAUSEA: 1
SHORTNESS OF BREATH: 0
COUGH: 0
ABDOMINAL PAIN: 0
BLOOD IN STOOL: 0
SORE THROAT: 0

## 2024-09-17 ENCOUNTER — OFFICE VISIT (OUTPATIENT)
Dept: INTERNAL MEDICINE CLINIC | Age: 59
End: 2024-09-17
Payer: MEDICARE

## 2024-09-17 VITALS
BODY MASS INDEX: 23.32 KG/M2 | OXYGEN SATURATION: 99 % | SYSTOLIC BLOOD PRESSURE: 120 MMHG | HEART RATE: 84 BPM | DIASTOLIC BLOOD PRESSURE: 60 MMHG | WEIGHT: 158 LBS

## 2024-09-17 DIAGNOSIS — I50.22 CHRONIC SYSTOLIC (CONGESTIVE) HEART FAILURE (HCC): ICD-10-CM

## 2024-09-17 DIAGNOSIS — E11.65 TYPE 2 DIABETES MELLITUS WITH HYPERGLYCEMIA, WITH LONG-TERM CURRENT USE OF INSULIN (HCC): Primary | ICD-10-CM

## 2024-09-17 DIAGNOSIS — Z12.11 COLON CANCER SCREENING: ICD-10-CM

## 2024-09-17 DIAGNOSIS — I25.119 ATHEROSCLEROSIS OF NATIVE CORONARY ARTERY OF NATIVE HEART WITH ANGINA PECTORIS (HCC): ICD-10-CM

## 2024-09-17 DIAGNOSIS — N18.4 STAGE 4 CHRONIC KIDNEY DISEASE (HCC): ICD-10-CM

## 2024-09-17 DIAGNOSIS — I10 ESSENTIAL HYPERTENSION: ICD-10-CM

## 2024-09-17 DIAGNOSIS — G62.9 NEUROPATHY: ICD-10-CM

## 2024-09-17 DIAGNOSIS — I20.9 ANGINA PECTORIS, UNSPECIFIED (HCC): ICD-10-CM

## 2024-09-17 DIAGNOSIS — Z79.4 TYPE 2 DIABETES MELLITUS WITH HYPERGLYCEMIA, WITH LONG-TERM CURRENT USE OF INSULIN (HCC): Primary | ICD-10-CM

## 2024-09-17 LAB — HBA1C MFR BLD: 7.9 %

## 2024-09-17 PROCEDURE — G2211 COMPLEX E/M VISIT ADD ON: HCPCS | Performed by: STUDENT IN AN ORGANIZED HEALTH CARE EDUCATION/TRAINING PROGRAM

## 2024-09-17 PROCEDURE — 3078F DIAST BP <80 MM HG: CPT | Performed by: STUDENT IN AN ORGANIZED HEALTH CARE EDUCATION/TRAINING PROGRAM

## 2024-09-17 PROCEDURE — 3074F SYST BP LT 130 MM HG: CPT | Performed by: STUDENT IN AN ORGANIZED HEALTH CARE EDUCATION/TRAINING PROGRAM

## 2024-09-17 PROCEDURE — 83036 HEMOGLOBIN GLYCOSYLATED A1C: CPT | Performed by: STUDENT IN AN ORGANIZED HEALTH CARE EDUCATION/TRAINING PROGRAM

## 2024-09-17 PROCEDURE — 99215 OFFICE O/P EST HI 40 MIN: CPT | Performed by: STUDENT IN AN ORGANIZED HEALTH CARE EDUCATION/TRAINING PROGRAM

## 2024-09-17 PROCEDURE — 3017F COLORECTAL CA SCREEN DOC REV: CPT | Performed by: STUDENT IN AN ORGANIZED HEALTH CARE EDUCATION/TRAINING PROGRAM

## 2024-09-17 PROCEDURE — G8420 CALC BMI NORM PARAMETERS: HCPCS | Performed by: STUDENT IN AN ORGANIZED HEALTH CARE EDUCATION/TRAINING PROGRAM

## 2024-09-17 PROCEDURE — 2022F DILAT RTA XM EVC RTNOPTHY: CPT | Performed by: STUDENT IN AN ORGANIZED HEALTH CARE EDUCATION/TRAINING PROGRAM

## 2024-09-17 PROCEDURE — 3051F HG A1C>EQUAL 7.0%<8.0%: CPT | Performed by: STUDENT IN AN ORGANIZED HEALTH CARE EDUCATION/TRAINING PROGRAM

## 2024-09-17 PROCEDURE — 1036F TOBACCO NON-USER: CPT | Performed by: STUDENT IN AN ORGANIZED HEALTH CARE EDUCATION/TRAINING PROGRAM

## 2024-09-17 PROCEDURE — G8428 CUR MEDS NOT DOCUMENT: HCPCS | Performed by: STUDENT IN AN ORGANIZED HEALTH CARE EDUCATION/TRAINING PROGRAM

## 2024-09-17 RX ORDER — PEN NEEDLE, DIABETIC 30 GX3/16"
1 NEEDLE, DISPOSABLE MISCELLANEOUS 3 TIMES DAILY
Qty: 200 EACH | Refills: 1 | Status: SHIPPED | OUTPATIENT
Start: 2024-09-17

## 2024-09-17 RX ORDER — BLOOD SUGAR DIAGNOSTIC
STRIP MISCELLANEOUS
COMMUNITY
End: 2024-09-17 | Stop reason: SDUPTHER

## 2024-09-17 RX ORDER — BLOOD SUGAR DIAGNOSTIC
1 STRIP MISCELLANEOUS 3 TIMES DAILY
Qty: 200 EACH | Refills: 1 | Status: SHIPPED | OUTPATIENT
Start: 2024-09-17

## 2024-09-17 RX ORDER — CARVEDILOL 12.5 MG/1
12.5 TABLET ORAL 2 TIMES DAILY WITH MEALS
COMMUNITY
Start: 2024-07-29

## 2024-09-17 RX ORDER — LANCETS 33 GAUGE
1 EACH MISCELLANEOUS 3 TIMES DAILY
Qty: 200 EACH | Refills: 1 | Status: SHIPPED | OUTPATIENT
Start: 2024-09-17

## 2024-09-17 RX ORDER — GLUCOSAMINE HCL/CHONDROITIN SU 500-400 MG
CAPSULE ORAL
Qty: 100 STRIP | Refills: 1 | Status: SHIPPED | OUTPATIENT
Start: 2024-09-17

## 2024-09-17 RX ORDER — DULAGLUTIDE 3 MG/.5ML
3 INJECTION, SOLUTION SUBCUTANEOUS WEEKLY
COMMUNITY

## 2024-09-17 RX ORDER — INSULIN GLARGINE 100 [IU]/ML
20 INJECTION, SOLUTION SUBCUTANEOUS NIGHTLY
Qty: 5 ADJUSTABLE DOSE PRE-FILLED PEN SYRINGE | Refills: 0 | Status: SHIPPED | OUTPATIENT
Start: 2024-09-17

## 2024-09-17 RX ORDER — LANCETS 33 GAUGE
EACH MISCELLANEOUS
COMMUNITY
End: 2024-09-17 | Stop reason: SDUPTHER

## 2024-09-17 RX ORDER — PEN NEEDLE, DIABETIC 30 GX3/16"
NEEDLE, DISPOSABLE MISCELLANEOUS
COMMUNITY
End: 2024-09-17 | Stop reason: SDUPTHER

## 2024-11-23 DIAGNOSIS — I10 ESSENTIAL HYPERTENSION: ICD-10-CM

## 2024-11-25 RX ORDER — HYDRALAZINE HYDROCHLORIDE 10 MG/1
10 TABLET, FILM COATED ORAL 3 TIMES DAILY
Qty: 270 TABLET | Refills: 0 | Status: SHIPPED | OUTPATIENT
Start: 2024-11-25

## 2024-12-26 RX ORDER — CLOPIDOGREL BISULFATE 75 MG/1
75 TABLET ORAL DAILY
Qty: 30 TABLET | Refills: 0 | OUTPATIENT
Start: 2024-12-26 | End: 2025-06-24

## 2024-12-26 RX ORDER — ATORVASTATIN CALCIUM 80 MG/1
80 TABLET, FILM COATED ORAL NIGHTLY
Qty: 90 TABLET | Refills: 0 | Status: SHIPPED | OUTPATIENT
Start: 2024-12-26

## 2025-03-06 ENCOUNTER — TELEPHONE (OUTPATIENT)
Dept: INTERNAL MEDICINE CLINIC | Age: 60
End: 2025-03-06

## 2025-03-06 NOTE — TELEPHONE ENCOUNTER
Care connection , kieran, he doesn't want them , he only wants his daughter to take care of him....

## 2025-03-13 ENCOUNTER — OFFICE VISIT (OUTPATIENT)
Dept: INTERNAL MEDICINE CLINIC | Age: 60
End: 2025-03-13
Payer: COMMERCIAL

## 2025-03-13 VITALS
WEIGHT: 194 LBS | OXYGEN SATURATION: 98 % | DIASTOLIC BLOOD PRESSURE: 82 MMHG | BODY MASS INDEX: 28.63 KG/M2 | HEART RATE: 92 BPM | SYSTOLIC BLOOD PRESSURE: 138 MMHG

## 2025-03-13 DIAGNOSIS — E11.65 TYPE 2 DIABETES MELLITUS WITH HYPERGLYCEMIA, WITH LONG-TERM CURRENT USE OF INSULIN (HCC): ICD-10-CM

## 2025-03-13 DIAGNOSIS — Z09 HOSPITAL DISCHARGE FOLLOW-UP: Primary | ICD-10-CM

## 2025-03-13 DIAGNOSIS — N18.4 STAGE 4 CHRONIC KIDNEY DISEASE (HCC): ICD-10-CM

## 2025-03-13 DIAGNOSIS — Z79.4 TYPE 2 DIABETES MELLITUS WITH HYPERGLYCEMIA, WITH LONG-TERM CURRENT USE OF INSULIN (HCC): ICD-10-CM

## 2025-03-13 DIAGNOSIS — L03.116 CELLULITIS OF FOOT, LEFT: ICD-10-CM

## 2025-03-13 PROCEDURE — G8417 CALC BMI ABV UP PARAM F/U: HCPCS | Performed by: STUDENT IN AN ORGANIZED HEALTH CARE EDUCATION/TRAINING PROGRAM

## 2025-03-13 PROCEDURE — G8427 DOCREV CUR MEDS BY ELIG CLIN: HCPCS | Performed by: STUDENT IN AN ORGANIZED HEALTH CARE EDUCATION/TRAINING PROGRAM

## 2025-03-13 PROCEDURE — 99213 OFFICE O/P EST LOW 20 MIN: CPT | Performed by: STUDENT IN AN ORGANIZED HEALTH CARE EDUCATION/TRAINING PROGRAM

## 2025-03-13 PROCEDURE — 3046F HEMOGLOBIN A1C LEVEL >9.0%: CPT | Performed by: STUDENT IN AN ORGANIZED HEALTH CARE EDUCATION/TRAINING PROGRAM

## 2025-03-13 PROCEDURE — 2022F DILAT RTA XM EVC RTNOPTHY: CPT | Performed by: STUDENT IN AN ORGANIZED HEALTH CARE EDUCATION/TRAINING PROGRAM

## 2025-03-13 PROCEDURE — 1036F TOBACCO NON-USER: CPT | Performed by: STUDENT IN AN ORGANIZED HEALTH CARE EDUCATION/TRAINING PROGRAM

## 2025-03-13 PROCEDURE — 3017F COLORECTAL CA SCREEN DOC REV: CPT | Performed by: STUDENT IN AN ORGANIZED HEALTH CARE EDUCATION/TRAINING PROGRAM

## 2025-03-13 SDOH — ECONOMIC STABILITY: FOOD INSECURITY: WITHIN THE PAST 12 MONTHS, THE FOOD YOU BOUGHT JUST DIDN'T LAST AND YOU DIDN'T HAVE MONEY TO GET MORE.: NEVER TRUE

## 2025-03-13 SDOH — ECONOMIC STABILITY: FOOD INSECURITY: WITHIN THE PAST 12 MONTHS, YOU WORRIED THAT YOUR FOOD WOULD RUN OUT BEFORE YOU GOT MONEY TO BUY MORE.: NEVER TRUE

## 2025-03-13 ASSESSMENT — PATIENT HEALTH QUESTIONNAIRE - PHQ9
SUM OF ALL RESPONSES TO PHQ QUESTIONS 1-9: 0
1. LITTLE INTEREST OR PLEASURE IN DOING THINGS: NOT AT ALL
SUM OF ALL RESPONSES TO PHQ QUESTIONS 1-9: 0
SUM OF ALL RESPONSES TO PHQ QUESTIONS 1-9: 0
2. FEELING DOWN, DEPRESSED OR HOPELESS: NOT AT ALL
SUM OF ALL RESPONSES TO PHQ QUESTIONS 1-9: 0

## 2025-03-13 NOTE — PATIENT INSTRUCTIONS
Discharge Follow up/ Next Steps:   [facility]  [address] [place] [address]  [address]  [facility] [id number] [phone]    Devin Tiwari DPM  [facility]  [address]  [facility] [address]  [phone]    Schedule an appointment as soon as possible for a visit in 1 week(s)    Idania Howell MD  [facility]  [address] [place] [address] [address]  [facility] [address]  [phone]    Schedule an appointment as soon as possible for a visit in 1 week(s)

## 2025-03-30 DIAGNOSIS — R11.2 NAUSEA AND VOMITING, UNSPECIFIED VOMITING TYPE: ICD-10-CM

## 2025-03-31 RX ORDER — ONDANSETRON 4 MG/1
4 TABLET, ORALLY DISINTEGRATING ORAL 3 TIMES DAILY PRN
Qty: 21 TABLET | Refills: 0 | OUTPATIENT
Start: 2025-03-31

## 2025-03-31 NOTE — TELEPHONE ENCOUNTER
Future Appointments   Date Time Provider Department Center   6/17/2025  3:45 PM Gonzalo Valladares MD [facility] [place] DEP

## 2025-04-04 ENCOUNTER — CLINICAL DOCUMENTATION (OUTPATIENT)
Dept: INTERNAL MEDICINE CLINIC | Age: 60
End: 2025-04-04
Payer: COMMERCIAL

## 2025-04-04 DIAGNOSIS — Z74.2 NEED FOR HOME HEALTH CARE: Primary | ICD-10-CM

## 2025-04-04 PROCEDURE — G0180 MD CERTIFICATION HHA PATIENT: HCPCS | Performed by: STUDENT IN AN ORGANIZED HEALTH CARE EDUCATION/TRAINING PROGRAM

## 2025-04-04 NOTE — PROGRESS NOTES
Home health care orders signed, certification from 3/13/2025 to 5/11/2025.  SN, PT/OT visit.  Home health care from [facility].  Order #[id number]

## 2025-04-29 ENCOUNTER — CLINICAL DOCUMENTATION (OUTPATIENT)
Dept: INTERNAL MEDICINE CLINIC | Age: 60
End: 2025-04-29
Payer: COMMERCIAL

## 2025-04-29 DIAGNOSIS — Z09 NEED FOR FOLLOW-UP BY HOME HEALTH SERVICE: Primary | ICD-10-CM

## 2025-04-29 PROCEDURE — G0179 MD RECERTIFICATION HHA PT: HCPCS | Performed by: STUDENT IN AN ORGANIZED HEALTH CARE EDUCATION/TRAINING PROGRAM

## 2025-04-29 NOTE — PROGRESS NOTES
Resumption of care orders signed.  Ordered #[id number].  Certification from 3/13/2025 to 5/11/2025.  Patient goal to heal wound and get stronger.  Home health care with RN.

## 2025-06-04 ENCOUNTER — OFFICE VISIT (OUTPATIENT)
Dept: INTERNAL MEDICINE CLINIC | Age: 60
End: 2025-06-04
Payer: COMMERCIAL

## 2025-06-04 VITALS
HEART RATE: 61 BPM | WEIGHT: 168 LBS | SYSTOLIC BLOOD PRESSURE: 120 MMHG | OXYGEN SATURATION: 94 % | BODY MASS INDEX: 24.79 KG/M2 | DIASTOLIC BLOOD PRESSURE: 84 MMHG

## 2025-06-04 DIAGNOSIS — R11.2 NAUSEA AND VOMITING, UNSPECIFIED VOMITING TYPE: ICD-10-CM

## 2025-06-04 DIAGNOSIS — Z79.4 TYPE 2 DIABETES MELLITUS WITH HYPERGLYCEMIA, WITH LONG-TERM CURRENT USE OF INSULIN (HCC): Primary | ICD-10-CM

## 2025-06-04 DIAGNOSIS — E11.65 TYPE 2 DIABETES MELLITUS WITH HYPERGLYCEMIA, WITH LONG-TERM CURRENT USE OF INSULIN (HCC): Primary | ICD-10-CM

## 2025-06-04 DIAGNOSIS — Z01.818 PREOP EXAMINATION: ICD-10-CM

## 2025-06-04 PROCEDURE — 1036F TOBACCO NON-USER: CPT | Performed by: STUDENT IN AN ORGANIZED HEALTH CARE EDUCATION/TRAINING PROGRAM

## 2025-06-04 PROCEDURE — G8428 CUR MEDS NOT DOCUMENT: HCPCS | Performed by: STUDENT IN AN ORGANIZED HEALTH CARE EDUCATION/TRAINING PROGRAM

## 2025-06-04 PROCEDURE — G2211 COMPLEX E/M VISIT ADD ON: HCPCS | Performed by: STUDENT IN AN ORGANIZED HEALTH CARE EDUCATION/TRAINING PROGRAM

## 2025-06-04 PROCEDURE — 83036 HEMOGLOBIN GLYCOSYLATED A1C: CPT | Performed by: STUDENT IN AN ORGANIZED HEALTH CARE EDUCATION/TRAINING PROGRAM

## 2025-06-04 PROCEDURE — 99214 OFFICE O/P EST MOD 30 MIN: CPT | Performed by: STUDENT IN AN ORGANIZED HEALTH CARE EDUCATION/TRAINING PROGRAM

## 2025-06-04 PROCEDURE — 2022F DILAT RTA XM EVC RTNOPTHY: CPT | Performed by: STUDENT IN AN ORGANIZED HEALTH CARE EDUCATION/TRAINING PROGRAM

## 2025-06-04 PROCEDURE — G8420 CALC BMI NORM PARAMETERS: HCPCS | Performed by: STUDENT IN AN ORGANIZED HEALTH CARE EDUCATION/TRAINING PROGRAM

## 2025-06-04 PROCEDURE — 3017F COLORECTAL CA SCREEN DOC REV: CPT | Performed by: STUDENT IN AN ORGANIZED HEALTH CARE EDUCATION/TRAINING PROGRAM

## 2025-06-04 PROCEDURE — 3046F HEMOGLOBIN A1C LEVEL >9.0%: CPT | Performed by: STUDENT IN AN ORGANIZED HEALTH CARE EDUCATION/TRAINING PROGRAM

## 2025-06-04 RX ORDER — ONDANSETRON 4 MG/1
4 TABLET, ORALLY DISINTEGRATING ORAL 3 TIMES DAILY PRN
Qty: 21 TABLET | Refills: 0 | Status: SHIPPED | OUTPATIENT
Start: 2025-06-04

## 2025-06-04 SDOH — ECONOMIC STABILITY: FOOD INSECURITY: WITHIN THE PAST 12 MONTHS, YOU WORRIED THAT YOUR FOOD WOULD RUN OUT BEFORE YOU GOT MONEY TO BUY MORE.: NEVER TRUE

## 2025-06-04 SDOH — ECONOMIC STABILITY: FOOD INSECURITY: WITHIN THE PAST 12 MONTHS, THE FOOD YOU BOUGHT JUST DIDN'T LAST AND YOU DIDN'T HAVE MONEY TO GET MORE.: NEVER TRUE

## 2025-06-04 ASSESSMENT — ENCOUNTER SYMPTOMS
EYES NEGATIVE: 1
RESPIRATORY NEGATIVE: 1
ALLERGIC/IMMUNOLOGIC NEGATIVE: 1
SHORTNESS OF BREATH: 0
EYE DISCHARGE: 0
GASTROINTESTINAL NEGATIVE: 1
ABDOMINAL PAIN: 0

## 2025-06-04 NOTE — PROGRESS NOTES
Preoperative Consultation    Past Medical history of HTN, HLD, T2DM, stage IV CKD, CAD s/p PCI 2023, HFrEF (EF 30-35%), foot osteomyelitis and amputation.    Juan Carlos Wong Sr  YOB: 1965    Date of Service:  6/4/2025    Vitals:    06/04/25 1500   BP: 120/84   Pulse: 61   SpO2: 94%   Weight: 76.2 kg (168 lb)      Wt Readings from Last 2 Encounters:   06/04/25 76.2 kg (168 lb)   03/13/25 88 kg (194 lb)     BP Readings from Last 3 Encounters:   06/04/25 120/84   03/13/25 138/82   09/17/24 120/60        Chief Complaint   Patient presents with    Pre-op Exam     6-11-25     No Known Allergies  Outpatient Medications Marked as Taking for the 6/4/25 encounter (Office Visit) with Gonzalo Valladares MD   Medication Sig Dispense Refill    ondansetron (ZOFRAN-ODT) 4 MG disintegrating tablet Take 1 tablet by mouth 3 times daily as needed for Nausea or Vomiting 21 tablet 0       This patient presents to the office today for a preoperative consultation at the request of surgeon, Dr. Sinha, who plans on performing pars plano vitrectomy with membrane peel and laser treatment/intra vitreal injection of Avastin/fluid air exchange of left eye on June 11, 2025.    Planned anesthesia: Peribulbar long block/MAC   Known anesthesia problems: None   Bleeding risk: No recent or remote history of abnormal bleeding  Personal or FH of DVT/PE: No    Patient objection to receiving blood products: No    Patient Active Problem List   Diagnosis    Sepsis (HCC)    Generalized weakness    Acute on chronic congestive heart failure (HCC)    Coronary artery disease involving native coronary artery of native heart without angina pectoris    Non-traumatic rhabdomyolysis    Neutrophilia    Diabetic foot infection (HCC)    Stage 3b chronic kidney disease (HCC)    Cellulitis of foot    Cellulitis and abscess of upper extremity    Cellulitis of right upper arm    DM (diabetes mellitus), type 2 with complications (HCC)    Antiplatelet or

## 2025-07-16 NOTE — PROGRESS NOTES
Preoperative Consultation    Past Medical history of HTN, HLD, T2DM, stage IV CKD, CAD s/p PCI 2023, HFrEF (EF 30-35%), foot osteomyelitis and amputation.    Juan Carlos Wong Sr  YOB: 1965    Date of Service:  7/17/2025    Vitals:    07/17/25 1551   BP: 138/72   Pulse: 83   SpO2: 99%   Weight: 74.4 kg (164 lb)      Wt Readings from Last 2 Encounters:   07/17/25 74.4 kg (164 lb)   06/04/25 76.2 kg (168 lb)     BP Readings from Last 3 Encounters:   07/17/25 138/72   06/04/25 120/84   03/13/25 138/82        Chief Complaint   Patient presents with    Diabetes    Pre-op Exam     7/23/25 rt eye      No Known Allergies  Outpatient Medications Marked as Taking for the 7/17/25 encounter (Office Visit) with Gonzalo Valladares MD   Medication Sig Dispense Refill    carvedilol (COREG) 3.125 MG tablet Take 1 tablet by mouth 2 times daily (with meals) 180 tablet 1    Lidocaine 4 % GEL Apply in the tissue of  left foot to help with pain, ~ two times daily as needed. 1 each 1    Dulaglutide 3 MG/0.5ML SOAJ Inject 3 mg into the skin every 7 days 2 mL 0       This patient presents to the office today for a preoperative consultation at the request of surgeon, Dr. Sinha, who plans on performing pars plano vitrectomy with membrane peel and laser treatment/intra vitreal injection of Avastin/fluid air exchange of right eye on July 23, 2025.    Planned anesthesia: Peribulbar long block/MAC   Known anesthesia problems: None   Bleeding risk: No recent or remote history of abnormal bleeding  Personal or FH of DVT/PE: No    Patient objection to receiving blood products: No    Patient Active Problem List   Diagnosis    Sepsis (HCC)    Generalized weakness    Acute on chronic congestive heart failure (HCC)    Coronary artery disease involving native coronary artery of native heart without angina pectoris    Non-traumatic rhabdomyolysis    Neutrophilia    Diabetic foot infection (HCC)    Stage 3b chronic kidney disease (HCC)

## 2025-07-17 ENCOUNTER — OFFICE VISIT (OUTPATIENT)
Dept: INTERNAL MEDICINE CLINIC | Age: 60
End: 2025-07-17
Payer: COMMERCIAL

## 2025-07-17 VITALS
BODY MASS INDEX: 24.2 KG/M2 | HEART RATE: 83 BPM | OXYGEN SATURATION: 99 % | DIASTOLIC BLOOD PRESSURE: 72 MMHG | SYSTOLIC BLOOD PRESSURE: 138 MMHG | WEIGHT: 164 LBS

## 2025-07-17 DIAGNOSIS — E11.65 TYPE 2 DIABETES MELLITUS WITH HYPERGLYCEMIA, WITH LONG-TERM CURRENT USE OF INSULIN (HCC): ICD-10-CM

## 2025-07-17 DIAGNOSIS — R52 PAIN: ICD-10-CM

## 2025-07-17 DIAGNOSIS — I10 ESSENTIAL HYPERTENSION: Primary | ICD-10-CM

## 2025-07-17 DIAGNOSIS — Z01.818 PREOP EXAMINATION: ICD-10-CM

## 2025-07-17 DIAGNOSIS — Z79.4 TYPE 2 DIABETES MELLITUS WITH HYPERGLYCEMIA, WITH LONG-TERM CURRENT USE OF INSULIN (HCC): ICD-10-CM

## 2025-07-17 PROCEDURE — 3046F HEMOGLOBIN A1C LEVEL >9.0%: CPT | Performed by: STUDENT IN AN ORGANIZED HEALTH CARE EDUCATION/TRAINING PROGRAM

## 2025-07-17 PROCEDURE — 2022F DILAT RTA XM EVC RTNOPTHY: CPT | Performed by: STUDENT IN AN ORGANIZED HEALTH CARE EDUCATION/TRAINING PROGRAM

## 2025-07-17 PROCEDURE — G8428 CUR MEDS NOT DOCUMENT: HCPCS | Performed by: STUDENT IN AN ORGANIZED HEALTH CARE EDUCATION/TRAINING PROGRAM

## 2025-07-17 PROCEDURE — 3017F COLORECTAL CA SCREEN DOC REV: CPT | Performed by: STUDENT IN AN ORGANIZED HEALTH CARE EDUCATION/TRAINING PROGRAM

## 2025-07-17 PROCEDURE — 3075F SYST BP GE 130 - 139MM HG: CPT | Performed by: STUDENT IN AN ORGANIZED HEALTH CARE EDUCATION/TRAINING PROGRAM

## 2025-07-17 PROCEDURE — G8420 CALC BMI NORM PARAMETERS: HCPCS | Performed by: STUDENT IN AN ORGANIZED HEALTH CARE EDUCATION/TRAINING PROGRAM

## 2025-07-17 PROCEDURE — 1036F TOBACCO NON-USER: CPT | Performed by: STUDENT IN AN ORGANIZED HEALTH CARE EDUCATION/TRAINING PROGRAM

## 2025-07-17 PROCEDURE — G2211 COMPLEX E/M VISIT ADD ON: HCPCS | Performed by: STUDENT IN AN ORGANIZED HEALTH CARE EDUCATION/TRAINING PROGRAM

## 2025-07-17 PROCEDURE — 3078F DIAST BP <80 MM HG: CPT | Performed by: STUDENT IN AN ORGANIZED HEALTH CARE EDUCATION/TRAINING PROGRAM

## 2025-07-17 PROCEDURE — 99214 OFFICE O/P EST MOD 30 MIN: CPT | Performed by: STUDENT IN AN ORGANIZED HEALTH CARE EDUCATION/TRAINING PROGRAM

## 2025-07-17 RX ORDER — CARVEDILOL 3.12 MG/1
3.12 TABLET ORAL 2 TIMES DAILY WITH MEALS
COMMUNITY
Start: 2025-04-15 | End: 2025-07-17 | Stop reason: SDUPTHER

## 2025-07-17 RX ORDER — CARVEDILOL 3.12 MG/1
3.12 TABLET ORAL 2 TIMES DAILY WITH MEALS
Qty: 180 TABLET | Refills: 1 | Status: SHIPPED | OUTPATIENT
Start: 2025-07-17

## 2025-07-17 ASSESSMENT — ENCOUNTER SYMPTOMS
RESPIRATORY NEGATIVE: 1
EYE DISCHARGE: 0
ABDOMINAL PAIN: 0
ALLERGIC/IMMUNOLOGIC NEGATIVE: 1
GASTROINTESTINAL NEGATIVE: 1
EYES NEGATIVE: 1
SHORTNESS OF BREATH: 0

## 2025-07-20 DIAGNOSIS — R11.2 NAUSEA AND VOMITING, UNSPECIFIED VOMITING TYPE: ICD-10-CM

## 2025-07-21 RX ORDER — ONDANSETRON 4 MG/1
4 TABLET, ORALLY DISINTEGRATING ORAL 3 TIMES DAILY PRN
Qty: 21 TABLET | Refills: 0 | Status: SHIPPED | OUTPATIENT
Start: 2025-07-21

## 2025-07-21 NOTE — TELEPHONE ENCOUNTER
Future Appointments   Date Time Provider Department Center   8/19/2025  4:30 PM Gonzalo Valladares MD [facility] DE[facility]       Last appt 7/17/25

## 2025-07-25 DIAGNOSIS — E11.41 DIABETIC MONONEUROPATHY ASSOCIATED WITH TYPE 2 DIABETES MELLITUS (HCC): ICD-10-CM

## 2025-07-25 RX ORDER — ACYCLOVIR 400 MG/1
TABLET ORAL
Qty: 6 EACH | Refills: 1 | Status: SHIPPED | OUTPATIENT
Start: 2025-07-25

## 2025-08-19 ENCOUNTER — OFFICE VISIT (OUTPATIENT)
Dept: INTERNAL MEDICINE CLINIC | Age: 60
End: 2025-08-19
Payer: COMMERCIAL

## 2025-08-19 VITALS
OXYGEN SATURATION: 98 % | HEART RATE: 80 BPM | DIASTOLIC BLOOD PRESSURE: 60 MMHG | SYSTOLIC BLOOD PRESSURE: 124 MMHG | WEIGHT: 167 LBS | HEIGHT: 63 IN | BODY MASS INDEX: 29.59 KG/M2

## 2025-08-19 DIAGNOSIS — E11.628 TYPE 2 DIABETES MELLITUS WITH DIABETIC FOOT INFECTION (HCC): ICD-10-CM

## 2025-08-19 DIAGNOSIS — L08.9 TYPE 2 DIABETES MELLITUS WITH DIABETIC FOOT INFECTION (HCC): ICD-10-CM

## 2025-08-19 DIAGNOSIS — Z00.00 MEDICARE ANNUAL WELLNESS VISIT, SUBSEQUENT: Primary | ICD-10-CM

## 2025-08-19 DIAGNOSIS — I10 ESSENTIAL HYPERTENSION: ICD-10-CM

## 2025-08-19 DIAGNOSIS — E11.42 DM TYPE 2 WITH DIABETIC PERIPHERAL NEUROPATHY (HCC): ICD-10-CM

## 2025-08-19 PROCEDURE — G8417 CALC BMI ABV UP PARAM F/U: HCPCS | Performed by: STUDENT IN AN ORGANIZED HEALTH CARE EDUCATION/TRAINING PROGRAM

## 2025-08-19 PROCEDURE — 99214 OFFICE O/P EST MOD 30 MIN: CPT | Performed by: STUDENT IN AN ORGANIZED HEALTH CARE EDUCATION/TRAINING PROGRAM

## 2025-08-19 PROCEDURE — 3078F DIAST BP <80 MM HG: CPT | Performed by: STUDENT IN AN ORGANIZED HEALTH CARE EDUCATION/TRAINING PROGRAM

## 2025-08-19 PROCEDURE — 1036F TOBACCO NON-USER: CPT | Performed by: STUDENT IN AN ORGANIZED HEALTH CARE EDUCATION/TRAINING PROGRAM

## 2025-08-19 PROCEDURE — G0439 PPPS, SUBSEQ VISIT: HCPCS | Performed by: STUDENT IN AN ORGANIZED HEALTH CARE EDUCATION/TRAINING PROGRAM

## 2025-08-19 PROCEDURE — 3046F HEMOGLOBIN A1C LEVEL >9.0%: CPT | Performed by: STUDENT IN AN ORGANIZED HEALTH CARE EDUCATION/TRAINING PROGRAM

## 2025-08-19 PROCEDURE — G8427 DOCREV CUR MEDS BY ELIG CLIN: HCPCS | Performed by: STUDENT IN AN ORGANIZED HEALTH CARE EDUCATION/TRAINING PROGRAM

## 2025-08-19 PROCEDURE — 2022F DILAT RTA XM EVC RTNOPTHY: CPT | Performed by: STUDENT IN AN ORGANIZED HEALTH CARE EDUCATION/TRAINING PROGRAM

## 2025-08-19 PROCEDURE — 3074F SYST BP LT 130 MM HG: CPT | Performed by: STUDENT IN AN ORGANIZED HEALTH CARE EDUCATION/TRAINING PROGRAM

## 2025-08-19 PROCEDURE — 3017F COLORECTAL CA SCREEN DOC REV: CPT | Performed by: STUDENT IN AN ORGANIZED HEALTH CARE EDUCATION/TRAINING PROGRAM

## 2025-08-19 RX ORDER — GABAPENTIN 300 MG/1
300 CAPSULE ORAL NIGHTLY
Qty: 90 CAPSULE | Refills: 0 | Status: SHIPPED | OUTPATIENT
Start: 2025-08-19 | End: 2025-11-17

## 2025-08-19 RX ORDER — GABAPENTIN 300 MG/1
300 CAPSULE ORAL NIGHTLY
COMMUNITY
Start: 2025-07-31 | End: 2025-08-19 | Stop reason: SDUPTHER

## 2025-08-19 ASSESSMENT — PATIENT HEALTH QUESTIONNAIRE - PHQ9
SUM OF ALL RESPONSES TO PHQ QUESTIONS 1-9: 0
2. FEELING DOWN, DEPRESSED OR HOPELESS: NOT AT ALL
SUM OF ALL RESPONSES TO PHQ QUESTIONS 1-9: 0
1. LITTLE INTEREST OR PLEASURE IN DOING THINGS: NOT AT ALL

## 2025-08-19 ASSESSMENT — ENCOUNTER SYMPTOMS
ALLERGIC/IMMUNOLOGIC NEGATIVE: 1
SHORTNESS OF BREATH: 0
GASTROINTESTINAL NEGATIVE: 1
EYES NEGATIVE: 1
ABDOMINAL PAIN: 0
EYE DISCHARGE: 0
RESPIRATORY NEGATIVE: 1

## 2025-08-19 ASSESSMENT — LIFESTYLE VARIABLES: HOW MANY STANDARD DRINKS CONTAINING ALCOHOL DO YOU HAVE ON A TYPICAL DAY: PATIENT DOES NOT DRINK

## (undated) DEVICE — MINOR SET UP: Brand: MEDLINE INDUSTRIES, INC.

## (undated) DEVICE — GOWN,AURORA,NONREINF,RAGLAN,XXL,STERILE: Brand: MEDLINE

## (undated) DEVICE — GOWN,SIRUS,POLYRNF,BRTHSLV,XL,30/CS: Brand: MEDLINE

## (undated) DEVICE — SOLUTION IRRIG 3000ML 0.9% SOD CHL USP UROMATIC PLAS CONT

## (undated) DEVICE — PODIATRY: Brand: MEDLINE INDUSTRIES, INC.

## (undated) DEVICE — MERCY HEALTH WEST TURNOVER: Brand: MEDLINE INDUSTRIES, INC.

## (undated) DEVICE — TRANSFER SET 3": Brand: MEDLINE INDUSTRIES, INC.

## (undated) DEVICE — 3M™ TEGADERM™ TRANSPARENT FILM DRESSING FRAME STYLE, 1624W, 2-3/8 IN X 2-3/4 IN (6 CM X 7 CM), 100/CT 4CT/CASE: Brand: 3M™ TEGADERM™

## (undated) DEVICE — GAUZE,SPONGE,2"X2",8PLY,STERILE,LF,2'S: Brand: MEDLINE

## (undated) DEVICE — SPONGE LAP W18XL18IN WHT COT 4 PLY FLD STRUNG RADPQ DISP ST 2 PER PACK

## (undated) DEVICE — DRESSING ALG W3XL4IN TRNSPAR ANTIMIC WND CNTCT LAYR W/

## (undated) DEVICE — BANDAGE,GAUZE,CONFORMING,4"X75",STRL,LF: Brand: MEDLINE

## (undated) DEVICE — DRESSING,GAUZE,XEROFORM,CURAD,5"X9",ST: Brand: CURAD

## (undated) DEVICE — THIN OFFSET (9.0 X 0.38 X 25.0MM)

## (undated) DEVICE — STRIP,CLOSURE,WOUND,MEDI-STRIP,1/2X4: Brand: MEDLINE

## (undated) DEVICE — PREMIUM DRY TRAY LF: Brand: MEDLINE INDUSTRIES, INC.

## (undated) DEVICE — DRESSING PETRO W3XL3IN OIL EMUL N ADH GZ KNIT IMPREG CELOS

## (undated) DEVICE — MASTISOL ADHESIVE LIQ 2/3ML

## (undated) DEVICE — PAD,ABDOMINAL,8"X7.5",STERILE,LF,1/PK: Brand: MEDLINE

## (undated) DEVICE — SHEET,DRAPE,53X77,STERILE: Brand: MEDLINE

## (undated) DEVICE — BANDAGE GZ W2XL75IN ST RAYON POLY CNFRM STRTCH LTWT

## (undated) DEVICE — GLOVE SURG SZ 85 CRM LTX FREE POLYISOPRENE POLYMER BEAD ANTI

## (undated) DEVICE — SPONGE GZ W4XL4IN COT 12 PLY TYP VII WVN C FLD DSGN STERILE

## (undated) DEVICE — HANDPIECE SET WITH HIGH FLOW TIP AND SUCTION TUBE: Brand: INTERPULSE

## (undated) DEVICE — COVER LT HNDL BLU PLAS

## (undated) DEVICE — GLOVE ORANGE PI 7 1/2   MSG9075

## (undated) DEVICE — DECANTER BTL 6IN: Brand: MEDLINE INDUSTRIES, INC.

## (undated) DEVICE — GLOVE,SURG,SENSICARE SLT,LF,PF,7.5: Brand: MEDLINE

## (undated) DEVICE — PAD,NON-ADHERENT,3X8,STERILE,LF,1/PK: Brand: MEDLINE

## (undated) DEVICE — STOCKINETTE,IMPERVIOUS,12X48,STERILE: Brand: MEDLINE

## (undated) DEVICE — 3M™ TEGADERM™ TRANSPARENT FILM DRESSING FRAME STYLE, 1626W, 4 IN X 4-3/4 IN (10 CM X 12 CM), 50/CT 4CT/CASE: Brand: 3M™ TEGADERM™

## (undated) DEVICE — ELECTRODE PT RET AD L9FT HI MOIST COND ADH HYDRGEL CORDED

## (undated) DEVICE — BANDAGE COMPR W4INXL15FT BGE E SGL LAYERED CLP CLSR

## (undated) DEVICE — PADDING,UNDERCAST,COTTON, 4"X4YD STERILE: Brand: MEDLINE

## (undated) DEVICE — SOLUTION SCRB 4OZ 7.5% POVIDONE IOD ANTIMIC BTL

## (undated) DEVICE — DRESSING,GAUZE,XEROFORM,CURAD,1"X8",ST: Brand: CURAD

## (undated) DEVICE — TUBING, SUCTION, 1/4" X 12', STRAIGHT: Brand: MEDLINE

## (undated) DEVICE — BANDAGE COMPR W4INXL12FT E DISP ESMARCH EVEN

## (undated) DEVICE — SOLUTION PREP PAINT POV IOD FOR SKIN MUCOUS MEM

## (undated) DEVICE — GOWN SIRUS NONREIN XL W/TWL: Brand: MEDLINE INDUSTRIES, INC.

## (undated) DEVICE — GLOVE,SURG,SENSICARE SLT,LF,PF,8: Brand: MEDLINE

## (undated) DEVICE — DRAPE,REIN 53X77,STERILE: Brand: MEDLINE

## (undated) DEVICE — BANDAGE,GAUZE,BULKEE II,4.5"X4.1YD,STRL: Brand: MEDLINE

## (undated) DEVICE — SOLUTION IRRIG 1000ML 0.9% SOD CHL USP POUR PLAS BTL

## (undated) DEVICE — GLOVE SURG SZ 8 L12IN FNGR THK79MIL GRN LTX FREE